# Patient Record
Sex: FEMALE | Race: WHITE | Employment: FULL TIME | URBAN - METROPOLITAN AREA
[De-identification: names, ages, dates, MRNs, and addresses within clinical notes are randomized per-mention and may not be internally consistent; named-entity substitution may affect disease eponyms.]

---

## 2017-03-23 ENCOUNTER — TELEPHONE (OUTPATIENT)
Dept: FAMILY MEDICINE CLINIC | Age: 62
End: 2017-03-23

## 2017-04-20 ENCOUNTER — OFFICE VISIT (OUTPATIENT)
Dept: FAMILY MEDICINE CLINIC | Age: 62
End: 2017-04-20

## 2017-04-20 VITALS
SYSTOLIC BLOOD PRESSURE: 118 MMHG | WEIGHT: 220.8 LBS | RESPIRATION RATE: 16 BRPM | HEART RATE: 60 BPM | BODY MASS INDEX: 36.79 KG/M2 | HEIGHT: 65 IN | DIASTOLIC BLOOD PRESSURE: 82 MMHG

## 2017-04-20 DIAGNOSIS — F33.42 RECURRENT MAJOR DEPRESSIVE DISORDER, IN FULL REMISSION (HCC): ICD-10-CM

## 2017-04-20 DIAGNOSIS — I34.1 MVP (MITRAL VALVE PROLAPSE): ICD-10-CM

## 2017-04-20 DIAGNOSIS — F51.01 PRIMARY INSOMNIA: ICD-10-CM

## 2017-04-20 DIAGNOSIS — E78.00 PURE HYPERCHOLESTEROLEMIA: Primary | ICD-10-CM

## 2017-04-20 DIAGNOSIS — G47.33 OBSTRUCTIVE SLEEP APNEA SYNDROME: ICD-10-CM

## 2017-04-20 DIAGNOSIS — G25.81 RESTLESS LEG SYNDROME: ICD-10-CM

## 2017-04-20 PROCEDURE — 99214 OFFICE O/P EST MOD 30 MIN: CPT | Performed by: INTERNAL MEDICINE

## 2017-04-20 RX ORDER — ZOLPIDEM TARTRATE 6.25 MG/1
TABLET, FILM COATED, EXTENDED RELEASE ORAL
Qty: 30 TABLET | Refills: 5 | Status: SHIPPED | OUTPATIENT
Start: 2017-04-20 | End: 2017-10-20 | Stop reason: SDUPTHER

## 2017-04-20 RX ORDER — PRAMIPEXOLE DIHYDROCHLORIDE 0.5 MG/1
TABLET ORAL
Qty: 90 TABLET | Refills: 1 | Status: SHIPPED | OUTPATIENT
Start: 2017-04-20 | End: 2017-10-17 | Stop reason: SDUPTHER

## 2017-04-20 RX ORDER — ATORVASTATIN CALCIUM 10 MG/1
TABLET, FILM COATED ORAL
Qty: 90 TABLET | Refills: 1 | Status: SHIPPED | OUTPATIENT
Start: 2017-04-20 | End: 2017-06-19 | Stop reason: SDUPTHER

## 2017-04-20 RX ORDER — BUPROPION HYDROCHLORIDE 150 MG/1
TABLET ORAL
Qty: 90 TABLET | Refills: 1 | Status: SHIPPED | OUTPATIENT
Start: 2017-04-20 | End: 2017-10-30 | Stop reason: SDUPTHER

## 2017-04-20 RX ORDER — SERTRALINE HYDROCHLORIDE 100 MG/1
TABLET, FILM COATED ORAL
Qty: 90 TABLET | Refills: 1 | Status: SHIPPED | OUTPATIENT
Start: 2017-04-20 | End: 2017-10-30 | Stop reason: SDUPTHER

## 2017-04-20 ASSESSMENT — ENCOUNTER SYMPTOMS
ALLERGIC/IMMUNOLOGIC NEGATIVE: 1
RESPIRATORY NEGATIVE: 1

## 2017-04-27 ENCOUNTER — HOSPITAL ENCOUNTER (OUTPATIENT)
Dept: NON INVASIVE DIAGNOSTICS | Age: 62
Discharge: HOME | End: 2017-04-27
Attending: INTERNAL MEDICINE | Admitting: INTERNAL MEDICINE

## 2017-04-27 DIAGNOSIS — I34.1 NONRHEUMATIC MITRAL VALVE PROLAPSE: ICD-10-CM

## 2017-04-27 LAB
LV EF: 55 %
LVEF MODALITY: NORMAL

## 2017-05-01 ENCOUNTER — TELEPHONE (OUTPATIENT)
Dept: FAMILY MEDICINE CLINIC | Age: 62
End: 2017-05-01

## 2017-05-30 ENCOUNTER — OFFICE VISIT (OUTPATIENT)
Dept: PULMONOLOGY | Age: 62
End: 2017-05-30

## 2017-05-30 ENCOUNTER — TELEPHONE (OUTPATIENT)
Dept: PULMONOLOGY | Age: 62
End: 2017-05-30

## 2017-05-30 VITALS
RESPIRATION RATE: 16 BRPM | BODY MASS INDEX: 36.99 KG/M2 | HEART RATE: 74 BPM | SYSTOLIC BLOOD PRESSURE: 116 MMHG | WEIGHT: 222 LBS | HEIGHT: 65 IN | TEMPERATURE: 98 F | OXYGEN SATURATION: 98 % | DIASTOLIC BLOOD PRESSURE: 72 MMHG

## 2017-05-30 DIAGNOSIS — G47.10 HYPERSOMNIA: ICD-10-CM

## 2017-05-30 DIAGNOSIS — G47.33 OSA (OBSTRUCTIVE SLEEP APNEA): Primary | ICD-10-CM

## 2017-05-30 DIAGNOSIS — G25.81 RESTLESS LEG SYNDROME: Primary | ICD-10-CM

## 2017-05-30 DIAGNOSIS — R06.83 SNORING: ICD-10-CM

## 2017-05-30 DIAGNOSIS — F51.01 PRIMARY INSOMNIA: ICD-10-CM

## 2017-05-30 PROCEDURE — 99203 OFFICE O/P NEW LOW 30 MIN: CPT | Performed by: NURSE PRACTITIONER

## 2017-05-30 ASSESSMENT — SLEEP AND FATIGUE QUESTIONNAIRES
HOW LIKELY ARE YOU TO NOD OFF OR FALL ASLEEP IN A CAR, WHILE STOPPED FOR A FEW MINUTES IN TRAFFIC: 0
HOW LIKELY ARE YOU TO NOD OFF OR FALL ASLEEP WHILE SITTING QUIETLY AFTER LUNCH WITHOUT ALCOHOL: 0
HOW LIKELY ARE YOU TO NOD OFF OR FALL ASLEEP WHILE SITTING AND READING: 3
HOW LIKELY ARE YOU TO NOD OFF OR FALL ASLEEP WHILE SITTING INACTIVE IN A PUBLIC PLACE: 1
HOW LIKELY ARE YOU TO NOD OFF OR FALL ASLEEP WHEN YOU ARE A PASSENGER IN A CAR FOR AN HOUR WITHOUT A BREAK: 3
HOW LIKELY ARE YOU TO NOD OFF OR FALL ASLEEP WHILE SITTING AND TALKING TO SOMEONE: 0
HOW LIKELY ARE YOU TO NOD OFF OR FALL ASLEEP WHILE LYING DOWN TO REST IN THE AFTERNOON WHEN CIRCUMSTANCES PERMIT: 3
NECK CIRCUMFERENCE (INCHES): 14.25
HOW LIKELY ARE YOU TO NOD OFF OR FALL ASLEEP WHILE WATCHING TV: 1
ESS TOTAL SCORE: 11

## 2017-06-19 DIAGNOSIS — E78.00 PURE HYPERCHOLESTEROLEMIA: ICD-10-CM

## 2017-06-19 RX ORDER — ATORVASTATIN CALCIUM 10 MG/1
TABLET, FILM COATED ORAL
Qty: 90 TABLET | Refills: 1 | Status: SHIPPED | OUTPATIENT
Start: 2017-06-19 | End: 2018-05-10 | Stop reason: SDUPTHER

## 2017-07-21 ENCOUNTER — HOSPITAL ENCOUNTER (OUTPATIENT)
Dept: SLEEP MEDICINE | Age: 62
Discharge: HOME | End: 2017-07-21
Attending: NURSE PRACTITIONER | Admitting: NURSE PRACTITIONER

## 2017-07-21 DIAGNOSIS — G47.33 OSA (OBSTRUCTIVE SLEEP APNEA): ICD-10-CM

## 2017-07-25 ENCOUNTER — TELEPHONE (OUTPATIENT)
Dept: PULMONOLOGY | Age: 62
End: 2017-07-25

## 2017-07-25 DIAGNOSIS — G47.33 OSA (OBSTRUCTIVE SLEEP APNEA): Primary | ICD-10-CM

## 2017-10-10 ENCOUNTER — OFFICE VISIT (OUTPATIENT)
Dept: PULMONOLOGY | Age: 62
End: 2017-10-10

## 2017-10-10 VITALS
TEMPERATURE: 98.4 F | HEIGHT: 65 IN | DIASTOLIC BLOOD PRESSURE: 65 MMHG | RESPIRATION RATE: 16 BRPM | BODY MASS INDEX: 37.29 KG/M2 | OXYGEN SATURATION: 97 % | HEART RATE: 80 BPM | SYSTOLIC BLOOD PRESSURE: 103 MMHG | WEIGHT: 223.8 LBS

## 2017-10-10 DIAGNOSIS — Z99.89 OSA ON CPAP: Primary | ICD-10-CM

## 2017-10-10 DIAGNOSIS — G47.33 OSA ON CPAP: Primary | ICD-10-CM

## 2017-10-10 DIAGNOSIS — E66.9 OBESITY (BMI 30-39.9): ICD-10-CM

## 2017-10-10 DIAGNOSIS — F51.04 PSYCHOPHYSIOLOGICAL INSOMNIA: ICD-10-CM

## 2017-10-10 PROCEDURE — 99213 OFFICE O/P EST LOW 20 MIN: CPT | Performed by: NURSE PRACTITIONER

## 2017-10-10 ASSESSMENT — SLEEP AND FATIGUE QUESTIONNAIRES
HOW LIKELY ARE YOU TO NOD OFF OR FALL ASLEEP WHILE SITTING AND READING: 2
HOW LIKELY ARE YOU TO NOD OFF OR FALL ASLEEP IN A CAR, WHILE STOPPED FOR A FEW MINUTES IN TRAFFIC: 0
HOW LIKELY ARE YOU TO NOD OFF OR FALL ASLEEP WHILE SITTING INACTIVE IN A PUBLIC PLACE: 0
HOW LIKELY ARE YOU TO NOD OFF OR FALL ASLEEP WHILE SITTING AND TALKING TO SOMEONE: 0
HOW LIKELY ARE YOU TO NOD OFF OR FALL ASLEEP WHILE LYING DOWN TO REST IN THE AFTERNOON WHEN CIRCUMSTANCES PERMIT: 0
ESS TOTAL SCORE: 4
HOW LIKELY ARE YOU TO NOD OFF OR FALL ASLEEP WHEN YOU ARE A PASSENGER IN A CAR FOR AN HOUR WITHOUT A BREAK: 0
HOW LIKELY ARE YOU TO NOD OFF OR FALL ASLEEP WHILE SITTING QUIETLY AFTER LUNCH WITHOUT ALCOHOL: 0
NECK CIRCUMFERENCE (INCHES): 14
HOW LIKELY ARE YOU TO NOD OFF OR FALL ASLEEP WHILE WATCHING TV: 2

## 2017-10-10 NOTE — PATIENT INSTRUCTIONS
Please keep all of your future appointments scheduled by [address], [facility] ([facility]) Pulmonary and Sleep. You should have a follow up appointment scheduled with a sleep medicine provider within 12 months. Routine parts, masks, tubing and filters should all be obtainable from your DME (equipment) provider. Any problems related to mask fit, comfort or functioning of equipment should also be addressed directly with your DME provider. If you are unable to resolve problems, then please notify our office and schedule an appointment to be seen sooner than the usual follow up appointment. For all patients who are evaluated for sleep disorders, never drive or operate motorized equipment while sleepy, fatigued or groggy. Please bring in all of your sleep equipment to all of your appointments, including machine, mask, cords and hoses. Here are some tips to to getting better sleep  1- Avoid napping during the day: This will ensure you are tired at bedtime. If you have to take a nap, sleep less than one hour, before 3 pm.   2- Exercise regularly, but not right before bed: but the timing of the workout is important. Exercising in the morning or early afternoon will not interfere with sleep. Exercising within two hours before bedtime can decrease your ability to fall asleep. Regular exercise is recommended to help you deepen the sleep. 3- Avoid heavy, spicy, or sugary foods 4-6 hours before bedtime: These can affect your ability to stay asleep. 4- Have a light snack before bed: Having an empty stomach can interfere with your sleep. Dairy products and turkey contain tryptophan, which acts as a natural sleep inducer. 5- Stay away from caffeine, nicotine and alcohol at least 4-6 hours before bed: Caffeine and nicotine are stimulants that interfere with your ability to fall asleep.  While alcohol has an immediate sleep-inducing effect, a few hours later, as alcohol levels in your blood start to fall, there is a stimulant effect and you will experience fragmented sleep. 6- Take a hot bath 90 minutes before bedtime:  A hot bath will raise your body temperature, but it is the drop in body temperature that may leave you feeling sleepy  7- Develop sleep rituals: it is important to give your body cues that it is time to slow down and sleep. Listen to relaxing music, read something soothing for 15 minutes, have a cup of caffeine free tea, or do relaxation exercises such as yoga or deep breathing help relieve anxiety and reduce muscle tension. 8- Fix a bedtime and an awakening time: Even on weekends! When your sleep cycle has a regular rhythm, you will feel better. 9- Sleep only when sleepy: This reduces the time you are awake in bed. 10- Get into your favorite sleeping position: If you can't fall asleep within 15-30 minutes, get up and do something boring until you feel sleepy. Sit quietly in the dark or read the warranty on your refrigerator. Don't expose yourself to bright light while you are up, it gives cues to your brain that it is time to wake up. 11- Only use your bed for sleeping: Dont use the bed as an office, workroom or recreation room. Let your body \"know\" that the bed is associated with sleeping  12- Use comfortable bedding. Uncomfortable bedding can prevent good sleep. Evaluate whether or not this is a source of your problem, and make appropriate changes. 13- Make sure your bed and bedroom are quiet and comfortable: A hot room can be uncomfortable. A cooler room, along with enough blankets to stay warm is recommended. Get a blackout shade or wear a slumber mask and wear earplugs or get a \"white noise\" machine for light and noise distractions. 14- Use sunlight to set your biological clock: When you get up in the morning, go outside and turn your face to the sun for 15 minutes. 13- Dont take your worries to bed: Leave worries about job, school, daily life, etc., behind when you go to bed.  Some people find it useful to assign a \"worry period\" during the evening or afternoon for these issues. CPAP Equipment Cleaning and Disinfecting Schedule  Equipment Cleaning Frequency Instructions  Disinfecting Frequency   Non-Disposable Filters  Weekly Mild soapy water, Rinse, Air Dry Not Required   Disposable Filters Change as needed  2-4 weeks Do Not Wash Not Required   Hose/tubing Daily Mild soapy water, Rinse, Air Dry Once a week   Mask / Nasal Pillows Daily Mild soapy water, Rinse, Air Dry Once a week   Headgear Weekly Hand wash, Mild soapy water, Rinse, Dry  Not Required   Humidifier Daily Empty water daily  Mild soapy water, Rinse well, Air Dry  Once a week   CPAP Unit As Needed Dust with damp cloth,  No detergents or sprays Not Required         Disinfect (per schedule) with 1 part white vinegar and 3 parts water- soak mask and water chamber for 30 minutes every 1-2 weeks, more often if sick. Allow water/vinegar mixture to run through tubing. Allow all equipment to air dry. Drying Hints:   Always hang tubing away from direct sunlight, as this will cause the tubing to become yellow, brittle and crack over a period of time. DO NOT attach the wet tubing to your CPAP unit to blow-dry it. The moisture from the tubing can drain back into your machine. Moisture in your unit can cause sudden pressure increases or short circuits  DO's and DON'Ts:  - Don't use alcohol-based products to clean your mask, because it can cause the materials to become hard and brittle. - Don't put headgear in the washer or dryer  - Don't use any caustic or household cleaning solutions such as bleach on your CPAP   equipment.  - Do follow the recommended cleaning schedule. - Do change your disposable filter frequently. Adapted From: [email or web address]. shtm.   These are general suggestions for all models please follow specific s recommendations and specific instructions

## 2017-10-10 NOTE — PROGRESS NOTES
Patient ID: Miky Acuña is a 63 y.o. female who is being seen today for   Chief Complaint   Patient presents with    Sleep Apnea     31-90     Referring Dr. Mukul Houston    HPI:     Miky Acuña is a 63 y.o. female in office for BIJAN follow up. Old PSG and current CPAP titration were reviewed by me and noted below. Results were dicussed with patient and multiple good questions were answered. States she had not been doing as well with CPAP but last night she realized there were different size nasal pillows. Last night she used medium and slept all night and feels great today. She was using small pillows not getting good seal and waking through the night. States she is much less sleepy during day since CPAP. Patient is using CPAP 4 hrs/night. Using humidifier. No snoring on CPAP. The pressure is well tolerated. The mask is comfortable- nasal pillows. Some mask leak- now better with size medium. No significant daytime sleepiness. No nodding off when driving. No dry nose or throat. No fatigue. Bedtime is 10-11 pm and rise time is 530 am. Sleep onset is 10 minutes. Still taking Ambien, states has skipped some on weekends and done okay thinks she might start weaning off. Wakes up 0 times at night total. 0 nocturia. No naps during the day. No headache in am. No weight gain. 0-1 caffienated beverages during the day. Rare alcohol. ESS is 4 down from 11 initially    Compliance download report from 8/2/17 to 8/31/17 reviewed today by me and showed patient is using machine 6:33 hrs/night with 70% compliance and AHI 1.0 within this time frame. 21/30days with greater than 4 hours of machine use. CPAP 9 cm H20    Compliance download report from 9/10/17 to 10/9/17 reviewed today by me and showed patient is using machine 3:16 hrs/night with 26% compliance and AHI 1.3 within this time frame. 8/30days with greater than 4 hours of machine use.   90% pressure CPAP 9 cm H20    Sleep Medicine 10/10/2017 5/30/2017   Sitting and

## 2017-10-17 RX ORDER — PRAMIPEXOLE DIHYDROCHLORIDE 0.5 MG/1
TABLET ORAL
Qty: 90 TABLET | Refills: 3 | Status: SHIPPED | OUTPATIENT
Start: 2017-10-17 | End: 2018-05-10 | Stop reason: ALTCHOICE

## 2017-10-30 ENCOUNTER — OFFICE VISIT (OUTPATIENT)
Dept: FAMILY MEDICINE CLINIC | Age: 62
End: 2017-10-30

## 2017-10-30 VITALS
BODY MASS INDEX: 37.55 KG/M2 | HEIGHT: 65 IN | HEART RATE: 60 BPM | DIASTOLIC BLOOD PRESSURE: 78 MMHG | RESPIRATION RATE: 18 BRPM | WEIGHT: 225.4 LBS | SYSTOLIC BLOOD PRESSURE: 128 MMHG | OXYGEN SATURATION: 96 %

## 2017-10-30 DIAGNOSIS — I34.1 MVP (MITRAL VALVE PROLAPSE): ICD-10-CM

## 2017-10-30 DIAGNOSIS — F33.42 RECURRENT MAJOR DEPRESSIVE DISORDER, IN FULL REMISSION (HCC): ICD-10-CM

## 2017-10-30 DIAGNOSIS — E78.00 PURE HYPERCHOLESTEROLEMIA: ICD-10-CM

## 2017-10-30 DIAGNOSIS — Z12.11 SCREENING FOR COLON CANCER: ICD-10-CM

## 2017-10-30 DIAGNOSIS — Z99.89 OSA ON CPAP: ICD-10-CM

## 2017-10-30 DIAGNOSIS — Z11.4 SCREENING FOR HIV (HUMAN IMMUNODEFICIENCY VIRUS): ICD-10-CM

## 2017-10-30 DIAGNOSIS — G47.33 OSA ON CPAP: ICD-10-CM

## 2017-10-30 DIAGNOSIS — Z00.00 ANNUAL PHYSICAL EXAM: Primary | ICD-10-CM

## 2017-10-30 DIAGNOSIS — G25.81 RESTLESS LEG SYNDROME: ICD-10-CM

## 2017-10-30 DIAGNOSIS — Z11.59 NEED FOR HEPATITIS C SCREENING TEST: ICD-10-CM

## 2017-10-30 DIAGNOSIS — Z12.11 COLON CANCER SCREENING: ICD-10-CM

## 2017-10-30 LAB
ALBUMIN SERPL-MCNC: 4.4 G/DL (ref 3.4–5)
ALP BLD-CCNC: 118 U/L (ref 40–129)
ALT SERPL-CCNC: 8 U/L (ref 10–40)
ANION GAP SERPL CALCULATED.3IONS-SCNC: 15 MMOL/L (ref 3–16)
AST SERPL-CCNC: 13 U/L (ref 15–37)
BILIRUB SERPL-MCNC: 0.6 MG/DL (ref 0–1)
BILIRUBIN DIRECT: <0.2 MG/DL (ref 0–0.3)
BILIRUBIN, INDIRECT: ABNORMAL MG/DL (ref 0–1)
BUN BLDV-MCNC: 12 MG/DL (ref 7–20)
CALCIUM SERPL-MCNC: 9.7 MG/DL (ref 8.3–10.6)
CHLORIDE BLD-SCNC: 100 MMOL/L (ref 99–110)
CHOLESTEROL, TOTAL: 204 MG/DL (ref 0–199)
CO2: 26 MMOL/L (ref 21–32)
CONTROL: ABNORMAL
CREAT SERPL-MCNC: 0.8 MG/DL (ref 0.6–1.2)
GFR AFRICAN AMERICAN: >60
GFR NON-AFRICAN AMERICAN: >60
GLUCOSE BLD-MCNC: 87 MG/DL (ref 70–99)
HCT VFR BLD CALC: 43.3 % (ref 36–48)
HDLC SERPL-MCNC: 81 MG/DL (ref 40–60)
HEMOCCULT STL QL: ABNORMAL
HEMOGLOBIN: 14.1 G/DL (ref 12–16)
HEPATITIS C ANTIBODY INTERPRETATION: NORMAL
LDL CHOLESTEROL CALCULATED: 102 MG/DL
MCH RBC QN AUTO: 28.1 PG (ref 26–34)
MCHC RBC AUTO-ENTMCNC: 32.5 G/DL (ref 31–36)
MCV RBC AUTO: 86.4 FL (ref 80–100)
PDW BLD-RTO: 14.6 % (ref 12.4–15.4)
PHOSPHORUS: 4 MG/DL (ref 2.5–4.9)
PLATELET # BLD: 264 K/UL (ref 135–450)
PMV BLD AUTO: 9.3 FL (ref 5–10.5)
POTASSIUM SERPL-SCNC: 4.1 MMOL/L (ref 3.5–5.1)
RBC # BLD: 5.01 M/UL (ref 4–5.2)
SODIUM BLD-SCNC: 141 MMOL/L (ref 136–145)
TOTAL PROTEIN: 7.1 G/DL (ref 6.4–8.2)
TRIGL SERPL-MCNC: 106 MG/DL (ref 0–150)
TSH SERPL DL<=0.05 MIU/L-ACNC: 3.39 UIU/ML (ref 0.27–4.2)
VLDLC SERPL CALC-MCNC: 21 MG/DL
WBC # BLD: 6.6 K/UL (ref 4–11)

## 2017-10-30 PROCEDURE — 36415 COLL VENOUS BLD VENIPUNCTURE: CPT | Performed by: INTERNAL MEDICINE

## 2017-10-30 PROCEDURE — 81002 URINALYSIS NONAUTO W/O SCOPE: CPT | Performed by: INTERNAL MEDICINE

## 2017-10-30 PROCEDURE — 82274 ASSAY TEST FOR BLOOD FECAL: CPT | Performed by: INTERNAL MEDICINE

## 2017-10-30 PROCEDURE — 99396 PREV VISIT EST AGE 40-64: CPT | Performed by: INTERNAL MEDICINE

## 2017-10-30 RX ORDER — BUPROPION HYDROCHLORIDE 150 MG/1
TABLET ORAL
Qty: 90 TABLET | Refills: 1 | Status: SHIPPED | OUTPATIENT
Start: 2017-10-30 | End: 2018-05-03 | Stop reason: SDUPTHER

## 2017-10-30 RX ORDER — SERTRALINE HYDROCHLORIDE 100 MG/1
TABLET, FILM COATED ORAL
Qty: 90 TABLET | Refills: 1 | Status: SHIPPED | OUTPATIENT
Start: 2017-10-30 | End: 2018-05-10 | Stop reason: SDUPTHER

## 2017-10-30 ASSESSMENT — PATIENT HEALTH QUESTIONNAIRE - PHQ9
SUM OF ALL RESPONSES TO PHQ9 QUESTIONS 1 & 2: 1
2. FEELING DOWN, DEPRESSED OR HOPELESS: 1
1. LITTLE INTEREST OR PLEASURE IN DOING THINGS: 0
SUM OF ALL RESPONSES TO PHQ QUESTIONS 1-9: 1

## 2017-10-30 ASSESSMENT — ENCOUNTER SYMPTOMS
RESPIRATORY NEGATIVE: 1
ALLERGIC/IMMUNOLOGIC NEGATIVE: 1
EYES NEGATIVE: 1
GASTROINTESTINAL NEGATIVE: 1

## 2017-10-30 NOTE — PROGRESS NOTES
Subjective:      Patient ID: Dina Calvo is a 63 y.o. female. HPI   Annual physical exam  Mammo: 10/2016. rechx 2 yr per Gyn  Pap: 10/2016. rechx 2 yr per Gyn  Colonoscopy: Referral made. DEXA: due age 74  Eye: 2017. Hearing: passed in office exam  Immunnization: Flu shot to given at work in 2 weeks. Rx for Shingles vaccine given  MMSE: na  Get Up and Go: na  Tob: nonsmoker/ never  ETOH: 1 glass wine/week  Caffeine: moderate  Cardiac Risk Assessment: labs pending. Living will: no  Medical power of : no      BIJAN on CPAP  Doing much better w/ compliance on C-pap. Much better sleeping and less Restless leg. HYPERLIPIDEMIA  Has gained wt over last yr. Diet not as good. MVP (mitral valve prolapse)  Dx many yrs ago. On Verapamil for long time w/ no new c/o. Recurrent major depressive disorder, in full remission ([facility] [address]  Stable w/ meds. Restless leg syndrome  Much better w/ C-pap. Past Medical History:   Diagnosis Date    Depression     GERD (gastroesophageal reflux disease)     Hyperlipidemia     Lumbar herniated disc 2/98    MVP (mitral valve prolapse)     BIJAN (obstructive sleep apnea)     Restless leg syndrome      Current Outpatient Prescriptions   Medication Sig Dispense Refill    zolpidem (AMBIEN CR) 6.25 MG extended release tablet Take 1 tablet by mouth nightly as needed for Sleep . Zolpidem is intended for short term of occasional use for sleeplessness. Long term use has been associated w/ chronic memory loss and increased risk of night time falls. If chronic use is required alternative mediation might be more appropriate.  30 tablet 0    pramipexole (MIRAPEX) 0.5 MG tablet TAKE 1 TABLET BY MOUTH EVERY NIGHT 90 tablet 3    atorvastatin (LIPITOR) 10 MG tablet TAKE 1 TABLET DAILY 90 tablet 1    buPROPion (WELLBUTRIN XL) 150 MG extended release tablet TAKE 1 TABLET BY MOUTH EVERY MORNING 90 tablet 1    sertraline (ZOLOFT) 100 MG tablet TAKE 1 TABLET DAILY 90 tablet 1    abdominal bruit, no ascites, no pulsatile midline mass and no mass. There is no hepatosplenomegaly. There is no tenderness. There is no rebound, no guarding and no CVA tenderness. No hernia. Hernia confirmed negative in the ventral area. Genitourinary: No breast swelling, tenderness, discharge or bleeding. Pelvic exam was performed with patient supine. Genitourinary Comments: To gyn   Musculoskeletal: Normal range of motion. She exhibits no edema or tenderness. Lymphadenopathy:        Head (right side): No submental, no submandibular, no tonsillar, no preauricular, no posterior auricular and no occipital adenopathy present. Head (left side): No submental, no submandibular, no tonsillar, no preauricular, no posterior auricular and no occipital adenopathy present. She has no cervical adenopathy. She has no axillary adenopathy. Right: No inguinal, no supraclavicular and no epitrochlear adenopathy present. Left: No inguinal, no supraclavicular and no epitrochlear adenopathy present. Neurological: She is alert and oriented to person, place, and time. She has normal strength and normal reflexes. She is not disoriented. She displays no atrophy and no tremor. No cranial nerve deficit or sensory deficit. She exhibits normal muscle tone. She displays a negative Romberg sign. She displays no seizure activity. Gait normal.   Reflex Scores:       Tricep reflexes are 2+ on the right side and 2+ on the left side. Bicep reflexes are 2+ on the right side and 2+ on the left side. Brachioradialis reflexes are 2+ on the right side and 2+ on the left side. Patellar reflexes are 2+ on the right side and 2+ on the left side. Achilles reflexes are 2+ on the right side and 2+ on the left side. Skin: Skin is warm, dry and intact. No abrasion and no rash noted. She is not diaphoretic. No cyanosis or erythema. No pallor. Nails show no clubbing.    Psychiatric: She has a normal mood

## 2017-10-30 NOTE — PATIENT INSTRUCTIONS
All above problems reviewed and the found to be unchanged except for the following :     Annual Physical Exam. Flu shot soon. Shingles shot Rx soon. Colonoscopy referrral     Grabiel On Cpap. Good w/ C-pap. Continue ,call if new c/o. Mvp (Mitral Valve Prolapse). Wean Verapamil as directed and stoip. Call if BP>140/90 or new c/o. Restless Leg Syndrome. Continue C-pap. Call if new c/o. Hyperlipidemia. Must improve diet and lose some weight. Goal ! Lb/week. Recurrent Major Depressive Disorder, in Full Remission ([facility]). Continue meds. Call if new c/o. Mammo: 10/2016. rechx 2 yr per Gyn  Pap: 10/2016. rechx 2 yr per Gyn  Colonoscopy: Referral made. DEXA: due age 74  Eye: 2017. Hearing: passed in office exam  Immunnization: Flu shot to given at work in 2 weeks. Rx for Shingles vaccine given  MMSE: na  Get Up and Go: na  Tob: nonsmoker/ never  ETOH: 1 glass wine/week  Caffeine: moderate  Cardiac Risk Assessment: labs pending.   Living will: no  Medical power of : no

## 2017-10-31 LAB — HIV-1 AND HIV-2 ANTIBODIES: NORMAL

## 2017-12-05 RX ORDER — ZOLPIDEM TARTRATE 6.25 MG/1
6.25 TABLET, FILM COATED, EXTENDED RELEASE ORAL NIGHTLY PRN
Qty: 30 TABLET | Refills: 0 | Status: SHIPPED | OUTPATIENT
Start: 2017-12-05 | End: 2018-01-05 | Stop reason: SDUPTHER

## 2017-12-14 DIAGNOSIS — E78.00 PURE HYPERCHOLESTEROLEMIA: ICD-10-CM

## 2017-12-14 RX ORDER — ATORVASTATIN CALCIUM 10 MG/1
TABLET, FILM COATED ORAL
Qty: 90 TABLET | Refills: 1 | Status: SHIPPED | OUTPATIENT
Start: 2017-12-14 | End: 2018-05-10 | Stop reason: SDUPTHER

## 2017-12-14 RX ORDER — SERTRALINE HYDROCHLORIDE 100 MG/1
TABLET, FILM COATED ORAL
Qty: 90 TABLET | Refills: 1 | Status: SHIPPED | OUTPATIENT
Start: 2017-12-14 | End: 2018-05-10 | Stop reason: SDUPTHER

## 2018-01-05 RX ORDER — ZOLPIDEM TARTRATE 6.25 MG/1
TABLET, FILM COATED, EXTENDED RELEASE ORAL
Qty: 30 TABLET | Refills: 0 | Status: SHIPPED | OUTPATIENT
Start: 2018-01-05 | End: 2018-02-06 | Stop reason: SDUPTHER

## 2018-01-05 NOTE — TELEPHONE ENCOUNTER
From: Essie Quintero  Sent: 1/4/2018 5:24 PM EST  Subject: Medication Renewal Request    Essie Quintero would like a refill of the following medications:  zolpidem (AMBIEN CR) 6.25 MG extended release tablet [SAJI Lozada MD]    Preferred pharmacy: [address] [phone]    Comment:

## 2018-02-06 RX ORDER — ZOLPIDEM TARTRATE 6.25 MG/1
TABLET, FILM COATED, EXTENDED RELEASE ORAL
Qty: 30 TABLET | Refills: 0 | Status: SHIPPED | OUTPATIENT
Start: 2018-02-06 | End: 2018-03-07 | Stop reason: SDUPTHER

## 2018-03-07 RX ORDER — ZOLPIDEM TARTRATE 6.25 MG/1
TABLET, FILM COATED, EXTENDED RELEASE ORAL
Qty: 30 TABLET | Refills: 0 | Status: SHIPPED | OUTPATIENT
Start: 2018-03-07 | End: 2018-04-09 | Stop reason: SDUPTHER

## 2018-04-10 ENCOUNTER — OFFICE VISIT (OUTPATIENT)
Dept: PULMONOLOGY | Age: 63
End: 2018-04-10

## 2018-04-10 VITALS
OXYGEN SATURATION: 99 % | HEIGHT: 65 IN | RESPIRATION RATE: 16 BRPM | BODY MASS INDEX: 37.82 KG/M2 | TEMPERATURE: 98.4 F | DIASTOLIC BLOOD PRESSURE: 70 MMHG | WEIGHT: 227 LBS | SYSTOLIC BLOOD PRESSURE: 104 MMHG | HEART RATE: 76 BPM

## 2018-04-10 DIAGNOSIS — F51.04 PSYCHOPHYSIOLOGICAL INSOMNIA: ICD-10-CM

## 2018-04-10 DIAGNOSIS — Z99.89 OSA ON CPAP: Primary | ICD-10-CM

## 2018-04-10 DIAGNOSIS — Z71.89 CPAP USE COUNSELING: ICD-10-CM

## 2018-04-10 DIAGNOSIS — E66.9 OBESITY (BMI 30-39.9): ICD-10-CM

## 2018-04-10 DIAGNOSIS — G47.33 OSA ON CPAP: Primary | ICD-10-CM

## 2018-04-10 PROCEDURE — 99213 OFFICE O/P EST LOW 20 MIN: CPT | Performed by: NURSE PRACTITIONER

## 2018-04-10 RX ORDER — ZOLPIDEM TARTRATE 6.25 MG/1
TABLET, FILM COATED, EXTENDED RELEASE ORAL
Qty: 30 TABLET | Refills: 0 | Status: SHIPPED | OUTPATIENT
Start: 2018-04-10 | End: 2018-05-09 | Stop reason: SDUPTHER

## 2018-04-10 ASSESSMENT — SLEEP AND FATIGUE QUESTIONNAIRES
HOW LIKELY ARE YOU TO NOD OFF OR FALL ASLEEP WHILE WATCHING TV: 1
HOW LIKELY ARE YOU TO NOD OFF OR FALL ASLEEP WHILE SITTING QUIETLY AFTER LUNCH WITHOUT ALCOHOL: 2
HOW LIKELY ARE YOU TO NOD OFF OR FALL ASLEEP WHILE WATCHING TV: 0
HOW LIKELY ARE YOU TO NOD OFF OR FALL ASLEEP WHEN YOU ARE A PASSENGER IN A CAR FOR AN HOUR WITHOUT A BREAK: 1
ESS TOTAL SCORE: 15
HOW LIKELY ARE YOU TO NOD OFF OR FALL ASLEEP WHILE SITTING QUIETLY AFTER LUNCH WITHOUT ALCOHOL: 2
HOW LIKELY ARE YOU TO NOD OFF OR FALL ASLEEP WHILE SITTING AND READING: 3
HOW LIKELY ARE YOU TO NOD OFF OR FALL ASLEEP WHILE SITTING AND TALKING TO SOMEONE: 1
ESS TOTAL SCORE: 9
HOW LIKELY ARE YOU TO NOD OFF OR FALL ASLEEP IN A CAR, WHILE STOPPED FOR A FEW MINUTES IN TRAFFIC: 2
HOW LIKELY ARE YOU TO NOD OFF OR FALL ASLEEP WHILE SITTING INACTIVE IN A PUBLIC PLACE: 2
NECK CIRCUMFERENCE (INCHES): 15.5
HOW LIKELY ARE YOU TO NOD OFF OR FALL ASLEEP WHILE SITTING INACTIVE IN A PUBLIC PLACE: 1
HOW LIKELY ARE YOU TO NOD OFF OR FALL ASLEEP IN A CAR, WHILE STOPPED FOR A FEW MINUTES IN TRAFFIC: 0
HOW LIKELY ARE YOU TO NOD OFF OR FALL ASLEEP WHILE LYING DOWN TO REST IN THE AFTERNOON WHEN CIRCUMSTANCES PERMIT: 2
HOW LIKELY ARE YOU TO NOD OFF OR FALL ASLEEP WHILE LYING DOWN TO REST IN THE AFTERNOON WHEN CIRCUMSTANCES PERMIT: 2
HOW LIKELY ARE YOU TO NOD OFF OR FALL ASLEEP WHEN YOU ARE A PASSENGER IN A CAR FOR AN HOUR WITHOUT A BREAK: 2
HOW LIKELY ARE YOU TO NOD OFF OR FALL ASLEEP WHILE SITTING AND READING: 3
HOW LIKELY ARE YOU TO NOD OFF OR FALL ASLEEP WHILE SITTING AND TALKING TO SOMEONE: 0

## 2018-04-12 PROBLEM — Z00.00 ANNUAL PHYSICAL EXAM: Status: RESOLVED | Noted: 2017-10-30 | Resolved: 2018-04-12

## 2018-05-03 RX ORDER — BUPROPION HYDROCHLORIDE 150 MG/1
TABLET ORAL
Qty: 90 TABLET | Refills: 1 | Status: SHIPPED | OUTPATIENT
Start: 2018-05-03 | End: 2018-09-24 | Stop reason: SDUPTHER

## 2018-05-09 RX ORDER — ZOLPIDEM TARTRATE 6.25 MG/1
TABLET, FILM COATED, EXTENDED RELEASE ORAL
Qty: 30 TABLET | Refills: 0 | Status: SHIPPED | OUTPATIENT
Start: 2018-05-09 | End: 2018-05-10 | Stop reason: ALTCHOICE

## 2018-05-10 ENCOUNTER — OFFICE VISIT (OUTPATIENT)
Dept: FAMILY MEDICINE CLINIC | Age: 63
End: 2018-05-10

## 2018-05-10 VITALS
WEIGHT: 228 LBS | DIASTOLIC BLOOD PRESSURE: 72 MMHG | BODY MASS INDEX: 37.99 KG/M2 | RESPIRATION RATE: 16 BRPM | HEIGHT: 65 IN | OXYGEN SATURATION: 98 % | HEART RATE: 73 BPM | SYSTOLIC BLOOD PRESSURE: 120 MMHG

## 2018-05-10 DIAGNOSIS — E66.9 OBESITY (BMI 30-39.9): ICD-10-CM

## 2018-05-10 DIAGNOSIS — I34.1 MVP (MITRAL VALVE PROLAPSE): ICD-10-CM

## 2018-05-10 DIAGNOSIS — G47.33 OSA ON CPAP: ICD-10-CM

## 2018-05-10 DIAGNOSIS — F51.01 PRIMARY INSOMNIA: ICD-10-CM

## 2018-05-10 DIAGNOSIS — Z99.89 OSA ON CPAP: ICD-10-CM

## 2018-05-10 DIAGNOSIS — E78.00 PURE HYPERCHOLESTEROLEMIA: ICD-10-CM

## 2018-05-10 DIAGNOSIS — F33.42 RECURRENT MAJOR DEPRESSIVE DISORDER, IN FULL REMISSION (HCC): ICD-10-CM

## 2018-05-10 PROCEDURE — 99214 OFFICE O/P EST MOD 30 MIN: CPT | Performed by: INTERNAL MEDICINE

## 2018-05-10 RX ORDER — ATORVASTATIN CALCIUM 10 MG/1
10 TABLET, FILM COATED ORAL DAILY
Qty: 90 TABLET | Refills: 1 | Status: SHIPPED | OUTPATIENT
Start: 2018-05-10 | End: 2018-11-13 | Stop reason: SDUPTHER

## 2018-05-10 RX ORDER — PRAMIPEXOLE DIHYDROCHLORIDE 0.5 MG/1
0.5 TABLET ORAL NIGHTLY
Qty: 90 TABLET | Refills: 1 | Status: SHIPPED | OUTPATIENT
Start: 2018-05-10 | End: 2019-04-25 | Stop reason: SDUPTHER

## 2018-05-10 RX ORDER — SERTRALINE HYDROCHLORIDE 100 MG/1
100 TABLET, FILM COATED ORAL DAILY
Qty: 90 TABLET | Refills: 1 | Status: SHIPPED | OUTPATIENT
Start: 2018-05-10 | End: 2018-11-13 | Stop reason: SDUPTHER

## 2018-05-10 RX ORDER — ZOLPIDEM TARTRATE 5 MG/1
TABLET ORAL
Qty: 30 TABLET | Refills: 0 | Status: SHIPPED | OUTPATIENT
Start: 2018-05-10 | End: 2018-07-10 | Stop reason: SDUPTHER

## 2018-05-10 ASSESSMENT — ENCOUNTER SYMPTOMS: RESPIRATORY NEGATIVE: 1

## 2018-07-10 DIAGNOSIS — F51.01 PRIMARY INSOMNIA: Primary | ICD-10-CM

## 2018-07-10 RX ORDER — ZOLPIDEM TARTRATE 5 MG/1
TABLET ORAL
Qty: 30 TABLET | Refills: 0 | Status: SHIPPED | OUTPATIENT
Start: 2018-07-10 | End: 2018-08-07 | Stop reason: SDUPTHER

## 2018-09-04 DIAGNOSIS — F51.01 PRIMARY INSOMNIA: ICD-10-CM

## 2018-09-04 RX ORDER — ZOLPIDEM TARTRATE 5 MG/1
TABLET ORAL
Qty: 30 TABLET | Refills: 0 | Status: SHIPPED | OUTPATIENT
Start: 2018-09-04 | End: 2018-10-08 | Stop reason: SDUPTHER

## 2018-09-24 RX ORDER — BUPROPION HYDROCHLORIDE 150 MG/1
150 TABLET ORAL EVERY MORNING
Qty: 90 TABLET | Refills: 1 | Status: SHIPPED | OUTPATIENT
Start: 2018-09-24 | End: 2018-10-08 | Stop reason: SDUPTHER

## 2018-09-24 NOTE — TELEPHONE ENCOUNTER
From: Dwan Bosworth  Sent: 9/23/2018 11:22 AM EDT  Subject: Medication Renewal Request    Sallie Garcia would like a refill of the following medications:     buPROPion (WELLBUTRIN XL) 150 MG extended release tablet [SAJI Song MD]   Patient Comment: [facility] filled the Verapamil instead of buPropion on the last refill. I discontinued taking the Verapamil, so they would not refill the buPropion stating that they had already refilled it when in fact they did not.     Preferred pharmacy: [place] [address] -  [phone]

## 2018-09-25 ENCOUNTER — HOSPITAL ENCOUNTER (OUTPATIENT)
Dept: MAMMOGRAPHY | Age: 63
Discharge: HOME | End: 2018-09-30

## 2018-09-25 DIAGNOSIS — Z12.31 VISIT FOR SCREENING MAMMOGRAM: ICD-10-CM

## 2018-09-25 PROCEDURE — 77067 SCR MAMMO BI INCL CAD: CPT

## 2018-10-08 DIAGNOSIS — F51.01 PRIMARY INSOMNIA: ICD-10-CM

## 2018-10-08 RX ORDER — ZOLPIDEM TARTRATE 5 MG/1
TABLET ORAL
Qty: 30 TABLET | Refills: 0 | Status: SHIPPED | OUTPATIENT
Start: 2018-10-08 | End: 2018-11-06 | Stop reason: SDUPTHER

## 2018-10-08 RX ORDER — BUPROPION HYDROCHLORIDE 150 MG/1
150 TABLET ORAL EVERY MORNING
Qty: 90 TABLET | Refills: 1 | Status: SHIPPED | OUTPATIENT
Start: 2018-10-08 | End: 2019-04-11 | Stop reason: SDUPTHER

## 2018-10-08 NOTE — TELEPHONE ENCOUNTER
From: Bisi Rodriguez  Sent: 10/6/2018 10:41 PM EDT  Subject: Medication Renewal Request    Deidre Angelo would like a refill of the following medications:     zolpidem (AMBIEN) 5 MG tablet [SAJI Higuera MD]    Preferred pharmacy: [facility] [address] [phone]

## 2018-11-06 DIAGNOSIS — F51.01 PRIMARY INSOMNIA: ICD-10-CM

## 2018-11-06 RX ORDER — ZOLPIDEM TARTRATE 5 MG/1
TABLET ORAL
Qty: 30 TABLET | Refills: 0 | Status: SHIPPED | OUTPATIENT
Start: 2018-11-06 | End: 2018-12-07 | Stop reason: SDUPTHER

## 2018-11-13 DIAGNOSIS — E78.00 PURE HYPERCHOLESTEROLEMIA: ICD-10-CM

## 2018-11-13 RX ORDER — SERTRALINE HYDROCHLORIDE 100 MG/1
100 TABLET, FILM COATED ORAL DAILY
Qty: 90 TABLET | Refills: 0 | Status: SHIPPED | OUTPATIENT
Start: 2018-11-13 | End: 2019-02-10 | Stop reason: SDUPTHER

## 2018-11-13 RX ORDER — ATORVASTATIN CALCIUM 10 MG/1
10 TABLET, FILM COATED ORAL DAILY
Qty: 90 TABLET | Refills: 0 | Status: SHIPPED | OUTPATIENT
Start: 2018-11-13 | End: 2019-02-10 | Stop reason: SDUPTHER

## 2018-12-07 DIAGNOSIS — F51.01 PRIMARY INSOMNIA: ICD-10-CM

## 2018-12-07 RX ORDER — ZOLPIDEM TARTRATE 5 MG/1
TABLET ORAL
Qty: 15 TABLET | Refills: 0 | Status: SHIPPED | OUTPATIENT
Start: 2018-12-07 | End: 2019-06-25 | Stop reason: ALTCHOICE

## 2018-12-07 RX ORDER — TRAZODONE HYDROCHLORIDE 50 MG/1
TABLET ORAL
Qty: 90 TABLET | Refills: 1 | Status: SHIPPED | OUTPATIENT
Start: 2018-12-07 | End: 2019-06-04 | Stop reason: SDUPTHER

## 2018-12-07 NOTE — TELEPHONE ENCOUNTER
From: Gerber Grande  Sent: 12/6/2018 8:22 PM EST  Subject: Medication Renewal Request    Junior Dietrich would like a refill of the following medications:     zolpidem (AMBIEN) 5 MG tablet [SAJI Lange MD]    Preferred pharmacy: [address] [phone]

## 2018-12-24 ENCOUNTER — OFFICE VISIT (OUTPATIENT)
Dept: FAMILY MEDICINE CLINIC | Age: 63
End: 2018-12-24
Payer: COMMERCIAL

## 2018-12-24 VITALS
HEIGHT: 65 IN | BODY MASS INDEX: 35.19 KG/M2 | OXYGEN SATURATION: 99 % | WEIGHT: 211.2 LBS | HEART RATE: 87 BPM | DIASTOLIC BLOOD PRESSURE: 68 MMHG | RESPIRATION RATE: 16 BRPM | SYSTOLIC BLOOD PRESSURE: 118 MMHG

## 2018-12-24 DIAGNOSIS — Z00.00 ANNUAL PHYSICAL EXAM: Primary | ICD-10-CM

## 2018-12-24 DIAGNOSIS — E78.00 PURE HYPERCHOLESTEROLEMIA: ICD-10-CM

## 2018-12-24 DIAGNOSIS — F51.01 PRIMARY INSOMNIA: ICD-10-CM

## 2018-12-24 DIAGNOSIS — G25.81 RESTLESS LEG SYNDROME: ICD-10-CM

## 2018-12-24 DIAGNOSIS — F33.42 RECURRENT MAJOR DEPRESSIVE DISORDER, IN FULL REMISSION (HCC): ICD-10-CM

## 2018-12-24 LAB
ALBUMIN SERPL-MCNC: 4.5 G/DL (ref 3.4–5)
ALP BLD-CCNC: 117 U/L (ref 40–129)
ALT SERPL-CCNC: 7 U/L (ref 10–40)
ANION GAP SERPL CALCULATED.3IONS-SCNC: 16 MMOL/L (ref 3–16)
AST SERPL-CCNC: 12 U/L (ref 15–37)
BILIRUB SERPL-MCNC: 0.5 MG/DL (ref 0–1)
BILIRUBIN DIRECT: <0.2 MG/DL (ref 0–0.3)
BILIRUBIN, INDIRECT: ABNORMAL MG/DL (ref 0–1)
BUN BLDV-MCNC: 9 MG/DL (ref 7–20)
CALCIUM SERPL-MCNC: 9.6 MG/DL (ref 8.3–10.6)
CHLORIDE BLD-SCNC: 106 MMOL/L (ref 99–110)
CHOLESTEROL, TOTAL: 182 MG/DL (ref 0–199)
CO2: 22 MMOL/L (ref 21–32)
CREAT SERPL-MCNC: 0.9 MG/DL (ref 0.6–1.2)
GFR AFRICAN AMERICAN: >60
GFR NON-AFRICAN AMERICAN: >60
GLUCOSE BLD-MCNC: 89 MG/DL (ref 70–99)
HCT VFR BLD CALC: 42.8 % (ref 36–48)
HDLC SERPL-MCNC: 68 MG/DL (ref 40–60)
HEMOGLOBIN: 14 G/DL (ref 12–16)
LDL CHOLESTEROL CALCULATED: 96 MG/DL
MCH RBC QN AUTO: 28.2 PG (ref 26–34)
MCHC RBC AUTO-ENTMCNC: 32.7 G/DL (ref 31–36)
MCV RBC AUTO: 86.1 FL (ref 80–100)
PDW BLD-RTO: 14.4 % (ref 12.4–15.4)
PHOSPHORUS: 3.9 MG/DL (ref 2.5–4.9)
PLATELET # BLD: 273 K/UL (ref 135–450)
PMV BLD AUTO: 9.3 FL (ref 5–10.5)
POTASSIUM SERPL-SCNC: 4.1 MMOL/L (ref 3.5–5.1)
RBC # BLD: 4.97 M/UL (ref 4–5.2)
SODIUM BLD-SCNC: 144 MMOL/L (ref 136–145)
TOTAL PROTEIN: 6.8 G/DL (ref 6.4–8.2)
TRIGL SERPL-MCNC: 91 MG/DL (ref 0–150)
TSH SERPL DL<=0.05 MIU/L-ACNC: 3.59 UIU/ML (ref 0.27–4.2)
VLDLC SERPL CALC-MCNC: 18 MG/DL
WBC # BLD: 6.5 K/UL (ref 4–11)

## 2018-12-24 PROCEDURE — 99396 PREV VISIT EST AGE 40-64: CPT | Performed by: INTERNAL MEDICINE

## 2018-12-24 PROCEDURE — 36415 COLL VENOUS BLD VENIPUNCTURE: CPT | Performed by: INTERNAL MEDICINE

## 2018-12-24 ASSESSMENT — ENCOUNTER SYMPTOMS
GASTROINTESTINAL NEGATIVE: 1
RESPIRATORY NEGATIVE: 1
EYES NEGATIVE: 1
ALLERGIC/IMMUNOLOGIC NEGATIVE: 1

## 2018-12-24 NOTE — PROGRESS NOTES
Subjective:      Patient ID: Brittani España is a 66 y.o. female. HPI  Annual physical exam  Mammo: 9/25/2018. rechx 2 yr per Gyn  Pap: 10/2016. rechx 2 yr per Gyn  Colonoscopy: 11/2017. rechx 5 yrs. Brother w/ Colon Ca. DEXA: due age 72  Eye: 6/2018  Hearing: passed in office exam  Immunnization:  Rx for Shingles vaccine given  MMSE: na  Get Up and Go: na  Tob: nonsmoker/ never  ETOH: rare  Caffeine: moderate  Cardiac Risk Assessment: labs pending. Living will: no  Medical power of : no    Recurrent major depressive disorder, in full remission ([facility] [address]  Stable w/ meds. Feels much better. Pure hypercholesterolemia  Has lost 14 lbs over last yr. Diet has improved, more active. Restless leg syndrome  Much better w/ C-pap and Mirapex. Primary insomnia  Changing from Ambien to Trazodone. Past Medical History:   Diagnosis Date    Depression     GERD (gastroesophageal reflux disease)     Hyperlipidemia     Lumbar herniated disc 2/98    MVP (mitral valve prolapse)     BIJAN (obstructive sleep apnea)     Restless leg syndrome      Current Outpatient Prescriptions   Medication Sig Dispense Refill    Phentermine-Topiramate (QSYMIA) 15-92 MG CP24 Take 1 capsule by mouth daily. .      zolpidem (AMBIEN) 5 MG tablet Take 1/2 pill at bed time as needed. . 15 tablet 0    traZODone (DESYREL) 50 MG tablet Begin w/ 1/2 pill at bed time and may increase to full tab after 3rd night. 90 tablet 1    atorvastatin (LIPITOR) 10 MG tablet TAKE 1 TABLET BY MOUTH DAILY 90 tablet 0    sertraline (ZOLOFT) 100 MG tablet TAKE 1 TABLET BY MOUTH DAILY 90 tablet 0    buPROPion (WELLBUTRIN XL) 150 MG extended release tablet Take 1 tablet by mouth every morning 90 tablet 1    pramipexole (MIRAPEX) 0.5 MG tablet Take 1 tablet by mouth nightly 90 tablet 1     No current facility-administered medications for this visit.       No Known Allergies  Social History   Substance Use Topics    Smoking status: Never Smoker   

## 2018-12-24 NOTE — ASSESSMENT & PLAN NOTE
Mammo: 9/25/2018. rechx 2 yr per Gyn  Pap: 10/2016. rechx 2 yr per Gyn  Colonoscopy: 11/2017. rechx 5 yrs. Brother w/ Colon Ca. DEXA: due age 72  Eye: 6/2018  Hearing: passed in office exam  Immunnization:  Rx for Shingles vaccine given  MMSE: na  Get Up and Go: na  Tob: nonsmoker/ never  ETOH: rare  Caffeine: moderate  Cardiac Risk Assessment: labs pending.   Living will: no  Medical power of : no

## 2019-01-23 PROBLEM — Z00.00 ANNUAL PHYSICAL EXAM: Status: RESOLVED | Noted: 2017-10-30 | Resolved: 2019-01-23

## 2019-02-10 DIAGNOSIS — E78.00 PURE HYPERCHOLESTEROLEMIA: ICD-10-CM

## 2019-02-11 RX ORDER — SERTRALINE HYDROCHLORIDE 100 MG/1
100 TABLET, FILM COATED ORAL DAILY
Qty: 90 TABLET | Refills: 1 | Status: SHIPPED | OUTPATIENT
Start: 2019-02-11 | End: 2019-06-25 | Stop reason: SDUPTHER

## 2019-02-11 RX ORDER — ATORVASTATIN CALCIUM 10 MG/1
10 TABLET, FILM COATED ORAL DAILY
Qty: 90 TABLET | Refills: 1 | Status: SHIPPED | OUTPATIENT
Start: 2019-02-11 | End: 2019-08-14 | Stop reason: SDUPTHER

## 2019-04-04 ENCOUNTER — TELEPHONE (OUTPATIENT)
Dept: PULMONOLOGY | Age: 64
End: 2019-04-04

## 2019-04-04 NOTE — TELEPHONE ENCOUNTER
Appointment canceled for [facility] ([id number])   Visit Type: OFFICE VISIT   Date        Time      Length    Provider                  Department   4/9/2019     8:30 AM  30 mins. [facility] [place] - [facility] SLEEP      Reason for Cancellation: Cancelled via MyChart       LOV  4/10/18    Assessment:       · Mild BIJAN (from PSG 2007). CPAP 9 cm H2O. Optimal compliance and efficacy on review today. · Snoring- resolved on CPAP  · Hypersomnia -improved with CPAP  · Insomnia- psychophysiologic arousal, stable- taking Ambien per PCP  · RLS- taking mirapex per PCP with good benefit  · Obesity         Plan:       · Continue CPAP 9 cm H2O with medium nasal pillows  · Advised to use CPAP 6-8 hrs at night and during naps. · Replacement of mask, tubing, head straps every 3-6 months or sooner if damaged. · Patient instructed to contact [facility] for any mask, tubing or machine trouble shooting if problems arise. · Sleep hygiene  · Cognitive behavioral therapy was discussed with patient including stimulus control and sleep restriction  · Discussed \"worry period\" before bed, write down issues on mind. Restart keeping notebook by bed. · Recommend Go! To Sleep online program with [facility]. Six weeks' worth of effective sleep therapy, online sleep log, daily sleep score, daily sleep improvement recommendations, activities to help get the sleep needed, daily e-mails from program , daily articles to help get the most out of the program, personal progress charts, six specially crafted relaxation practices, and motivational tips. · Avoid sedatives, alcohol and caffeinated drinks at bed time. · D/W patient non pharmacological therapy for RLS including avoiding aggravating factors (sleep deprivation, mental alerting activities at time of rest, antihistamines), moderate regular exercise, leg message and heating pads/hot shower.   · Continue Mirapex per PCP  · Try to decrease Ambien  · No driving time.  · Stay away from stimulants such as caffeine and nicotine for at least 4-6 hours before bed. Stimulants can interfere with your ability to fall asleep. Caffeine is found in tea, cola, coffee, cocoa and chocolate and is best avoided at bedtime. Nicotine is found in tobacco products. · Avoid alcohol 4-6 hours before bedtime. Alcohol has an immediate sleep-inducing effect, after a few hours when alcohol levels fall there is a stimulant or wake-up effect and will cause fragmented sleep. · Sleep rituals are important. Give your body clues it is time to slow down and sleep. Examples include; yoga, deep breathing, listen to relaxing music, a hot bath or a few minutes of reading. · Have a fixed bedtime and awakening time, Even on weekends! You will feel better keeping a regular sleep cycle, even if you are retired or not working. · Get into your favorite sleep position. If not asleep in 30 minutes, get up and do something boring until you feel sleepy. Remember not to expose yourself to bright lights such as TV, phone or tablet screens. · Only use your bed for sleeping. Do not use your bed as an office, workroom or recreation room. · Use comfortable bedding. Uncomfortable bedding can prevent good sleep. · Ensure your bedroom is quiet and comfortable. A cooler room along with enough blankets to stay warm is recommended. If your room is too noisy, try a white noise machine. If too bright, try black out shades or an eye mask. · Dont take worries to bed. Leave worries about work, school etc. behind you when you go to bed.  Some people find it helpful to assign a worry period in the evening or late afternoon to write down your worries and get them out of your system.      CPAP Equipment Cleaning and Disinfecting Schedule  Equipment Cleaning Frequency Instructions  Disinfecting Frequency   Non-Disposable Filters  Weekly Mild soapy water, Rinse, Air Dry Not Required   Disposable Filters Change as

## 2019-04-08 NOTE — TELEPHONE ENCOUNTER
SW patient. She declined to reschedule. She said she has lost a bunch of weight and no longer has sleep issues. She has not used the machine in quite awhile. I suggested she still come in to discuss with [facility]. She still declined and said she does not plan to follow up any longer.

## 2019-04-08 NOTE — TELEPHONE ENCOUNTER
Noted  Patient did not keep follow up appointment as was recommended. Please schedule a follow up visit for this patient if she calls requesting such appointment.     Can cc referring

## 2019-04-11 RX ORDER — BUPROPION HYDROCHLORIDE 150 MG/1
150 TABLET ORAL EVERY MORNING
Qty: 90 TABLET | Refills: 0 | Status: SHIPPED | OUTPATIENT
Start: 2019-04-11 | End: 2019-06-25 | Stop reason: SDUPTHER

## 2019-04-25 RX ORDER — PRAMIPEXOLE DIHYDROCHLORIDE 0.5 MG/1
TABLET ORAL
Qty: 90 TABLET | Refills: 1 | Status: SHIPPED | OUTPATIENT
Start: 2019-04-25 | End: 2019-10-23 | Stop reason: SDUPTHER

## 2019-06-04 RX ORDER — TRAZODONE HYDROCHLORIDE 50 MG/1
TABLET ORAL
Qty: 90 TABLET | Refills: 1 | Status: SHIPPED | OUTPATIENT
Start: 2019-06-04 | End: 2019-12-05 | Stop reason: SDUPTHER

## 2019-06-25 ENCOUNTER — OFFICE VISIT (OUTPATIENT)
Dept: FAMILY MEDICINE CLINIC | Age: 64
End: 2019-06-25
Payer: COMMERCIAL

## 2019-06-25 VITALS
HEIGHT: 65 IN | WEIGHT: 186.8 LBS | RESPIRATION RATE: 16 BRPM | SYSTOLIC BLOOD PRESSURE: 108 MMHG | OXYGEN SATURATION: 98 % | HEART RATE: 63 BPM | BODY MASS INDEX: 31.12 KG/M2 | DIASTOLIC BLOOD PRESSURE: 66 MMHG

## 2019-06-25 DIAGNOSIS — G47.33 OSA ON CPAP: ICD-10-CM

## 2019-06-25 DIAGNOSIS — G25.81 RESTLESS LEG SYNDROME: ICD-10-CM

## 2019-06-25 DIAGNOSIS — F33.42 RECURRENT MAJOR DEPRESSIVE DISORDER, IN FULL REMISSION (HCC): ICD-10-CM

## 2019-06-25 DIAGNOSIS — Z99.89 OSA ON CPAP: ICD-10-CM

## 2019-06-25 DIAGNOSIS — E78.00 PURE HYPERCHOLESTEROLEMIA: ICD-10-CM

## 2019-06-25 DIAGNOSIS — E66.9 OBESITY (BMI 30-39.9): ICD-10-CM

## 2019-06-25 LAB
ALBUMIN SERPL-MCNC: 4.7 G/DL (ref 3.4–5)
ALP BLD-CCNC: 97 U/L (ref 40–129)
ALT SERPL-CCNC: 7 U/L (ref 10–40)
AST SERPL-CCNC: 11 U/L (ref 15–37)
BILIRUB SERPL-MCNC: 0.5 MG/DL (ref 0–1)
BILIRUBIN DIRECT: <0.2 MG/DL (ref 0–0.3)
BILIRUBIN, INDIRECT: ABNORMAL MG/DL (ref 0–1)
CHOLESTEROL, TOTAL: 191 MG/DL (ref 0–199)
HDLC SERPL-MCNC: 69 MG/DL (ref 40–60)
LDL CHOLESTEROL CALCULATED: 106 MG/DL
TOTAL PROTEIN: 6.9 G/DL (ref 6.4–8.2)
TRIGL SERPL-MCNC: 82 MG/DL (ref 0–150)
VLDLC SERPL CALC-MCNC: 16 MG/DL

## 2019-06-25 PROCEDURE — 99214 OFFICE O/P EST MOD 30 MIN: CPT | Performed by: INTERNAL MEDICINE

## 2019-06-25 RX ORDER — BUPROPION HYDROCHLORIDE 150 MG/1
150 TABLET ORAL EVERY MORNING
Qty: 90 TABLET | Refills: 1 | Status: SHIPPED | OUTPATIENT
Start: 2019-06-25 | End: 2019-12-24

## 2019-06-25 RX ORDER — ATORVASTATIN CALCIUM 10 MG/1
10 TABLET, FILM COATED ORAL DAILY
Qty: 90 TABLET | Refills: 1 | Status: CANCELLED | OUTPATIENT
Start: 2019-06-25

## 2019-06-25 RX ORDER — SERTRALINE HYDROCHLORIDE 100 MG/1
100 TABLET, FILM COATED ORAL DAILY
Qty: 90 TABLET | Refills: 1 | Status: SHIPPED | OUTPATIENT
Start: 2019-06-25 | End: 2020-01-03 | Stop reason: SDUPTHER

## 2019-06-25 ASSESSMENT — ENCOUNTER SYMPTOMS
ALLERGIC/IMMUNOLOGIC NEGATIVE: 1
GASTROINTESTINAL NEGATIVE: 1
RESPIRATORY NEGATIVE: 1

## 2019-06-25 NOTE — PROGRESS NOTES
Subjective:      Patient ID: Sandi Orta is a 66 y.o. female. HPI  Pure hypercholesterolemia  Has lost 40 lbs over last yr. Diet has improved, more active. Recurrent major depressive disorder, in full remission ([address]  Stable w/ meds. Feels much better wt loss. Obesity (BMI 30-39. 9)  Great wt loss 40 lbs over last 18 mo. Goal is<165. Much improved diet and gx. BIJAN on CPAP  Doing much better w/ compliance on C-pap. Much better sleeping and less Restless leg. Restless leg syndrome  Much better w/ C-pap, wt loss, and Mirapex. Past Medical History:   Diagnosis Date    Depression     GERD (gastroesophageal reflux disease)     Hyperlipidemia     Lumbar herniated disc 2/98    MVP (mitral valve prolapse)     BIJAN (obstructive sleep apnea)     Restless leg syndrome      Current Outpatient Medications   Medication Sig Dispense Refill    traZODone (DESYREL) 50 MG tablet TAKE 1/2 TABLET BY MOUTH EVERY NIGHT AT BEDTIME AND MAY INCREASE TO 1 TABLET AFTER THIRD NIGHT 90 tablet 1    pramipexole (MIRAPEX) 0.5 MG tablet TAKE 1 TABLET BY MOUTH EVERY NIGHT 90 tablet 1    buPROPion (WELLBUTRIN XL) 150 MG extended release tablet TAKE 1 TABLET BY MOUTH EVERY MORNING 90 tablet 0    atorvastatin (LIPITOR) 10 MG tablet TAKE 1 TABLET BY MOUTH DAILY 90 tablet 1    sertraline (ZOLOFT) 100 MG tablet TAKE 1 TABLET BY MOUTH DAILY 90 tablet 1    Phentermine-Topiramate (QSYMIA) 15-92 MG CP24 Take 1 capsule by mouth daily. .       No current facility-administered medications for this visit.       No Known Allergies  Social History     Tobacco Use    Smoking status: Never Smoker    Smokeless tobacco: Never Used   Substance Use Topics    Alcohol use: No     Family History   Problem Relation Age of Onset    Arthritis Mother     Arthritis Father     Diabetes Father     High Blood Pressure Father     High Cholesterol Father     Other Father         meniere's disease       Review of Systems   Constitutional: Negative. Respiratory: Negative. Cardiovascular: Negative. Gastrointestinal: Negative. Endocrine: Negative. Genitourinary: Negative. Musculoskeletal: Negative. Skin: Negative. Allergic/Immunologic: Negative. Neurological: Negative. Hematological: Negative. Psychiatric/Behavioral: Negative. Vitals:    06/25/19 0746 06/25/19 0807   BP: 118/76 108/66   Pulse: 63    Resp: 16    SpO2: 98%    Weight: 186 lb 12.8 oz (84.7 kg)    Height: 5' 5\" (1.651 m)      Objective:   Physical Exam   Constitutional: She is oriented to person, place, and time. She appears well-developed and well-nourished. Non-toxic appearance. She does not have a sickly appearance. She does not appear ill. No distress. HENT:   Head: Normocephalic and atraumatic. Eyes: Pupils are equal, round, and reactive to light. Conjunctivae and EOM are normal.   Neck: Trachea normal, normal range of motion and full passive range of motion without pain. Neck supple. Normal carotid pulses, no hepatojugular reflux and no JVD present. No spinous process tenderness and no muscular tenderness present. Carotid bruit is not present. No tracheal deviation, no edema, no erythema and normal range of motion present. No thyroid mass and no thyromegaly present. Cardiovascular: Normal rate, regular rhythm, S1 normal, S2 normal, normal heart sounds, intact distal pulses and normal pulses. No extrasystoles are present. PMI is not displaced. Exam reveals no gallop, no S3, no S4, no distant heart sounds, no friction rub and no decreased pulses. No murmur heard. Pulses:       Carotid pulses are 2+ on the right side, and 2+ on the left side. Radial pulses are 2+ on the right side, and 2+ on the left side. Femoral pulses are 2+ on the right side, and 2+ on the left side. Popliteal pulses are 2+ on the right side, and 2+ on the left side. Dorsalis pedis pulses are 2+ on the right side, and 2+ on the left side. Posterior tibial pulses are 2+ on the right side, and 2+ on the left side. Pulmonary/Chest: Effort normal and breath sounds normal. No accessory muscle usage. No apnea, no tachypnea and no bradypnea. No respiratory distress. She has no decreased breath sounds. She has no wheezes. She has no rhonchi. She has no rales. She exhibits no tenderness. Musculoskeletal: Normal range of motion. She exhibits no edema or tenderness. Lymphadenopathy:     She has no cervical adenopathy. She has no axillary adenopathy. Neurological: She is alert and oriented to person, place, and time. She has normal strength and normal reflexes. She is not disoriented. She displays no atrophy and no tremor. No cranial nerve deficit or sensory deficit. She exhibits normal muscle tone. Coordination normal.   Skin: Skin is warm, dry and intact. No abrasion and no rash noted. She is not diaphoretic. No cyanosis or erythema. No pallor. Nails show no clubbing. Psychiatric: She has a normal mood and affect. Her speech is normal and behavior is normal. Judgment and thought content normal. Cognition and memory are normal.       Assessment:      Problem   Grabiel On Cpap. Good w/ C-pap   Obesity (Bmi 30-39.9). Has lost 40 lbs. Restless Leg Syndrome. Much improved W/ C-pap, wt loss, and Mirapex. Pure Hypercholesterolemia. Lost 40 lbs. No c/o w/ med. Recurrent Major Depressive Disorder, in Full Remission ([facility]). Much improved w/ meds, diet , and wt loss. Plan:      All above problems reviewed and the found to be unchanged except for the following :     Grabiel On Cpap. Continue C-pap. To have sleep test repeated when gets<165 lbs. Obesity (Bmi 30-39.9). Continue diet and exercise. Goal<165 lbs. Restless Leg Syndrome. To try to wean meds when gets<165 lbs. Pure Hypercholesterolemia. Will do labs. May not need Lipitor. Recurrent Major Depressive Disorder, in Full Remission ([facility]). Continue meds. Call if new c/o. Salvatore Saab MD

## 2019-08-14 DIAGNOSIS — E78.00 PURE HYPERCHOLESTEROLEMIA: ICD-10-CM

## 2019-08-14 RX ORDER — ATORVASTATIN CALCIUM 10 MG/1
10 TABLET, FILM COATED ORAL DAILY
Qty: 90 TABLET | Refills: 0 | Status: SHIPPED | OUTPATIENT
Start: 2019-08-14 | End: 2019-11-15 | Stop reason: SDUPTHER

## 2019-08-15 RX ORDER — SERTRALINE HYDROCHLORIDE 100 MG/1
100 TABLET, FILM COATED ORAL DAILY
Qty: 90 TABLET | Refills: 1 | Status: SHIPPED | OUTPATIENT
Start: 2019-08-15 | End: 2020-01-03 | Stop reason: SDUPTHER

## 2019-10-23 RX ORDER — PRAMIPEXOLE DIHYDROCHLORIDE 0.5 MG/1
TABLET ORAL
Qty: 90 TABLET | Refills: 0 | Status: SHIPPED | OUTPATIENT
Start: 2019-10-23 | End: 2020-01-03 | Stop reason: SDUPTHER

## 2019-11-15 DIAGNOSIS — E78.00 PURE HYPERCHOLESTEROLEMIA: ICD-10-CM

## 2019-11-15 RX ORDER — ATORVASTATIN CALCIUM 10 MG/1
10 TABLET, FILM COATED ORAL DAILY
Qty: 90 TABLET | Refills: 1 | Status: SHIPPED | OUTPATIENT
Start: 2019-11-15 | End: 2020-05-11

## 2019-12-05 RX ORDER — TRAZODONE HYDROCHLORIDE 50 MG/1
TABLET ORAL
Qty: 90 TABLET | Refills: 1 | Status: SHIPPED | OUTPATIENT
Start: 2019-12-05 | End: 2020-06-03

## 2019-12-24 RX ORDER — BUPROPION HYDROCHLORIDE 150 MG/1
150 TABLET ORAL EVERY MORNING
Qty: 90 TABLET | Refills: 1 | Status: SHIPPED | OUTPATIENT
Start: 2019-12-24 | End: 2020-06-25

## 2020-01-03 ENCOUNTER — OFFICE VISIT (OUTPATIENT)
Dept: FAMILY MEDICINE CLINIC | Age: 65
End: 2020-01-03
Payer: COMMERCIAL

## 2020-01-03 VITALS
HEIGHT: 65 IN | BODY MASS INDEX: 28.99 KG/M2 | HEART RATE: 72 BPM | SYSTOLIC BLOOD PRESSURE: 106 MMHG | RESPIRATION RATE: 18 BRPM | OXYGEN SATURATION: 100 % | WEIGHT: 174 LBS | DIASTOLIC BLOOD PRESSURE: 70 MMHG

## 2020-01-03 PROBLEM — E66.813 CLASS 3 SEVERE OBESITY DUE TO EXCESS CALORIES IN ADULT: Status: ACTIVE | Noted: 2020-01-03

## 2020-01-03 PROBLEM — E66.01 CLASS 3 SEVERE OBESITY DUE TO EXCESS CALORIES IN ADULT (HCC): Status: ACTIVE | Noted: 2020-01-03

## 2020-01-03 LAB
ALBUMIN SERPL-MCNC: 4.3 G/DL (ref 3.4–5)
ALP BLD-CCNC: 94 U/L (ref 40–129)
ALT SERPL-CCNC: 14 U/L (ref 10–40)
ANION GAP SERPL CALCULATED.3IONS-SCNC: 13 MMOL/L (ref 3–16)
AST SERPL-CCNC: 16 U/L (ref 15–37)
BILIRUB SERPL-MCNC: 0.4 MG/DL (ref 0–1)
BILIRUBIN DIRECT: <0.2 MG/DL (ref 0–0.3)
BILIRUBIN, INDIRECT: NORMAL MG/DL (ref 0–1)
BUN BLDV-MCNC: 11 MG/DL (ref 7–20)
CALCIUM SERPL-MCNC: 9.8 MG/DL (ref 8.3–10.6)
CHLORIDE BLD-SCNC: 103 MMOL/L (ref 99–110)
CHOLESTEROL, TOTAL: 190 MG/DL (ref 0–199)
CO2: 25 MMOL/L (ref 21–32)
CREAT SERPL-MCNC: 0.9 MG/DL (ref 0.6–1.2)
GFR AFRICAN AMERICAN: >60
GFR NON-AFRICAN AMERICAN: >60
GLUCOSE BLD-MCNC: 88 MG/DL (ref 70–99)
HCT VFR BLD CALC: 40.7 % (ref 36–48)
HDLC SERPL-MCNC: 69 MG/DL (ref 40–60)
HEMOGLOBIN: 13.4 G/DL (ref 12–16)
LDL CHOLESTEROL CALCULATED: 101 MG/DL
MCH RBC QN AUTO: 28.7 PG (ref 26–34)
MCHC RBC AUTO-ENTMCNC: 32.9 G/DL (ref 31–36)
MCV RBC AUTO: 87.2 FL (ref 80–100)
PDW BLD-RTO: 13.2 % (ref 12.4–15.4)
PHOSPHORUS: 4.1 MG/DL (ref 2.5–4.9)
PLATELET # BLD: 364 K/UL (ref 135–450)
PMV BLD AUTO: 8.2 FL (ref 5–10.5)
POTASSIUM SERPL-SCNC: 3.8 MMOL/L (ref 3.5–5.1)
RBC # BLD: 4.67 M/UL (ref 4–5.2)
SODIUM BLD-SCNC: 141 MMOL/L (ref 136–145)
TOTAL PROTEIN: 6.9 G/DL (ref 6.4–8.2)
TRIGL SERPL-MCNC: 98 MG/DL (ref 0–150)
TSH SERPL DL<=0.05 MIU/L-ACNC: 3.51 UIU/ML (ref 0.27–4.2)
VLDLC SERPL CALC-MCNC: 20 MG/DL
WBC # BLD: 9.8 K/UL (ref 4–11)

## 2020-01-03 PROCEDURE — 99396 PREV VISIT EST AGE 40-64: CPT | Performed by: INTERNAL MEDICINE

## 2020-01-03 PROCEDURE — 90471 IMMUNIZATION ADMIN: CPT | Performed by: INTERNAL MEDICINE

## 2020-01-03 PROCEDURE — 36415 COLL VENOUS BLD VENIPUNCTURE: CPT | Performed by: INTERNAL MEDICINE

## 2020-01-03 PROCEDURE — 90686 IIV4 VACC NO PRSV 0.5 ML IM: CPT | Performed by: INTERNAL MEDICINE

## 2020-01-03 RX ORDER — PSEUDOPHEDRINE HCL 30 MG/1
TABLET, FILM COATED ORAL
COMMUNITY
Start: 2019-12-28 | End: 2021-04-20 | Stop reason: ALTCHOICE

## 2020-01-03 RX ORDER — AZITHROMYCIN 250 MG/1
TABLET, FILM COATED ORAL
COMMUNITY
Start: 2019-12-28 | End: 2020-01-03 | Stop reason: ALTCHOICE

## 2020-01-03 RX ORDER — SERTRALINE HYDROCHLORIDE 100 MG/1
100 TABLET, FILM COATED ORAL DAILY
Qty: 90 TABLET | Refills: 3 | Status: SHIPPED | OUTPATIENT
Start: 2020-01-03 | End: 2020-08-10

## 2020-01-03 RX ORDER — PRAMIPEXOLE DIHYDROCHLORIDE 0.5 MG/1
0.5 TABLET ORAL NIGHTLY PRN
Qty: 90 TABLET | Refills: 3 | Status: SHIPPED | OUTPATIENT
Start: 2020-01-03 | End: 2020-12-30

## 2020-01-03 ASSESSMENT — ENCOUNTER SYMPTOMS
RESPIRATORY NEGATIVE: 1
GASTROINTESTINAL NEGATIVE: 1
ALLERGIC/IMMUNOLOGIC NEGATIVE: 1
EYES NEGATIVE: 1

## 2020-01-03 NOTE — PROGRESS NOTES
Vaccine Information Sheet, \"Influenza - Inactivated\"  given to Hung Prescott, or parent/legal guardian of  Hung Prescott and verbalized understanding. Patient responses:    Have you ever had a reaction to a flu vaccine? No  Do you have any current illness? No  Have you ever had [place] Syndrome? No  Do you have a serious allergy to any of the follow: Neomycin, Polymyxin, Thimerosal, eggs or egg products? No    Flu vaccine given per order. Please see immunization tab. Risks and benefits explained. Current VIS given.

## 2020-01-03 NOTE — ASSESSMENT & PLAN NOTE
Mammo: 9/25/2018. rechx 2 yr per Gyn  Pap: 10/2018 rechx 2 yr per Gyn  Colonoscopy: 11/2017. rechx 5 yrs. Brother w/ Colon Ca. DEXA: due age 72  Eye: 8/2019  Hearing: passed in office exam  Immunnization:  Rx for Shingles vaccine given  MMSE: na  Get Up and Go: na  Tob: nonsmoker/ never  ETOH: rare  Caffeine: moderate  Cardiac Risk Assessment: labs pending.   Living will: no  Medical power of : no

## 2020-01-03 NOTE — PROGRESS NOTES
Subjective:      Patient ID: Olga Mar is a 60 y.o. female. HPI  Annual physical exam  Mammo: 9/25/2018. rechx 2 yr per Gyn  Pap: 10/2018 rechx 2 yr per Gyn  Colonoscopy: 11/2017. rechx 5 yrs. Brother w/ Colon Ca. DEXA: due age 72  Eye: 8/2019  Hearing: passed in office exam  Immunnization:  Rx for Shingles vaccine given  MMSE: na  Get Up and Go: na  Tob: nonsmoker/ never  ETOH: rare  Caffeine: moderate  Cardiac Risk Assessment: labs pending. Living will: no  Medical power of : no    Class 3 severe obesity due to excess calories in adult [facility])  Has lost ~60 lbs in last yr on Qsymia. Sees wt loss MD. Goal is ~150 lbs. BMI now<29. Restless leg syndrome  Much better w/ C-pap, wt loss, and Mirapex. BIJAN on CPAP  Doing much better w/ compliance on C-pap and significant wt loss. Much better sleeping and less Restless leg. Pure hypercholesterolemia  Has lost 60 lbs over last yr. Diet has improved, more active. Recurrent major depressive disorder, in full remission ([facility] [address]  Stable w/ meds. Feels much better wt loss.      Past Medical History:   Diagnosis Date    Depression     GERD (gastroesophageal reflux disease)     Hyperlipidemia     Lumbar herniated disc 2/98    MVP (mitral valve prolapse)     BIJAN (obstructive sleep apnea)     Restless leg syndrome      Current Outpatient Medications   Medication Sig Dispense Refill    buPROPion (WELLBUTRIN XL) 150 MG extended release tablet TAKE 1 TABLET BY MOUTH EVERY MORNING 90 tablet 1    traZODone (DESYREL) 50 MG tablet TAKE ONE-HALF TABLET BY MOUTH EVERY NIGHT AT BEDTIME. MAY INCREASE TO 1 TABLET AFTER 3RD NIGHT 90 tablet 1    atorvastatin (LIPITOR) 10 MG tablet TAKE 1 TABLET BY MOUTH DAILY 90 tablet 1    pramipexole (MIRAPEX) 0.5 MG tablet TAKE 1 TABLET BY MOUTH EVERY NIGHT 90 tablet 0    sertraline (ZOLOFT) 100 MG tablet TAKE 1 TABLET BY MOUTH DAILY 90 tablet 1    Phentermine-Topiramate (QSYMIA) 15-92 MG CP24 Take 1 capsule by mouth daily..      WAL-PHED 30 MG tablet TK 1 T PO Q 8 H FOR CONGESTION       No current facility-administered medications for this visit. No Known Allergies  Social History     Tobacco Use    Smoking status: Never Smoker    Smokeless tobacco: Never Used   Substance Use Topics    Alcohol use: No     Family History   Problem Relation Age of Onset    Arthritis Mother     Arthritis Father     Diabetes Father     High Blood Pressure Father     High Cholesterol Father     Other Father         meniere's disease       Review of Systems   Constitutional: Negative. HENT: Negative. Eyes: Negative. Respiratory: Negative. Cardiovascular: Negative. Gastrointestinal: Negative. Endocrine: Negative. Genitourinary: Negative. Musculoskeletal: Negative. Skin: Negative. Allergic/Immunologic: Negative. Neurological: Negative. Hematological: Negative. Psychiatric/Behavioral: Negative. Vitals:    01/03/20 0759 01/03/20 0831   BP: 120/70 106/70   Pulse: 72    Resp: 18    SpO2: 100%    Weight: 174 lb (78.9 kg)    Height: 5' 5\" (1.651 m)      Objective:   Physical Exam  Constitutional:       General: She is not in acute distress. Appearance: Normal appearance. She is well-developed and normal weight. She is not ill-appearing, toxic-appearing or diaphoretic. HENT:      Head: Normocephalic and atraumatic. Right Ear: Hearing, tympanic membrane, ear canal and external ear normal. There is no impacted cerumen. Left Ear: Hearing, tympanic membrane, ear canal and external ear normal. There is no impacted cerumen. Nose: Nose normal. No congestion or rhinorrhea. Right Sinus: No maxillary sinus tenderness or frontal sinus tenderness. Left Sinus: No maxillary sinus tenderness or frontal sinus tenderness. Mouth/Throat:      Mouth: Mucous membranes are moist.      Pharynx: Oropharynx is clear. Uvula midline.  No oropharyngeal exudate or posterior oropharyngeal erythema. Comments: Mildly enlarged tonsils w/o sign of infection. Eyes:      General: Lids are normal. No scleral icterus. Right eye: No discharge. Left eye: No discharge. Extraocular Movements: Extraocular movements intact. Conjunctiva/sclera: Conjunctivae normal.      Pupils: Pupils are equal, round, and reactive to light. Funduscopic exam:     Right eye: No hemorrhage, exudate, AV nicking, arteriolar narrowing or papilledema. Left eye: No hemorrhage, exudate, AV nicking, arteriolar narrowing or papilledema. Neck:      Musculoskeletal: Full passive range of motion without pain, normal range of motion and neck supple. No neck rigidity or muscular tenderness. Thyroid: No thyromegaly. Vascular: Normal carotid pulses. No carotid bruit, hepatojugular reflux or JVD. Trachea: Trachea and phonation normal. No tracheal deviation. Cardiovascular:      Rate and Rhythm: Normal rate and regular rhythm. No extrasystoles are present. Chest Wall: PMI is not displaced. Pulses: Normal pulses. No decreased pulses. Carotid pulses are 2+ on the right side and 2+ on the left side. Radial pulses are 2+ on the right side and 2+ on the left side. Femoral pulses are 2+ on the right side and 2+ on the left side. Popliteal pulses are 2+ on the right side and 2+ on the left side. Dorsalis pedis pulses are 2+ on the right side and 2+ on the left side. Posterior tibial pulses are 2+ on the right side and 2+ on the left side. Heart sounds: Normal heart sounds. No murmur. No friction rub. No gallop. Pulmonary:      Effort: Pulmonary effort is normal. No respiratory distress. Breath sounds: Normal breath sounds. No stridor. No decreased breath sounds, wheezing, rhonchi or rales. Chest:      Chest wall: No tenderness. Abdominal:      General: Bowel sounds are normal. There is no distension or abdominal bruit.

## 2020-01-03 NOTE — ASSESSMENT & PLAN NOTE
Doing much better w/ compliance on C-pap and significant wt loss. Much better sleeping and less Restless leg.

## 2020-02-02 PROBLEM — Z00.00 ANNUAL PHYSICAL EXAM: Status: RESOLVED | Noted: 2017-10-30 | Resolved: 2020-02-02

## 2020-02-13 ENCOUNTER — HOSPITAL ENCOUNTER (OUTPATIENT)
Dept: MAMMOGRAPHY | Age: 65
Discharge: HOME | End: 2020-02-18

## 2020-02-13 PROCEDURE — 77063 BREAST TOMOSYNTHESIS BI: CPT

## 2020-05-11 RX ORDER — ATORVASTATIN CALCIUM 10 MG/1
10 TABLET, FILM COATED ORAL DAILY
Qty: 90 TABLET | Refills: 1 | Status: SHIPPED | OUTPATIENT
Start: 2020-05-11 | End: 2020-11-17 | Stop reason: SDUPTHER

## 2020-06-03 RX ORDER — TRAZODONE HYDROCHLORIDE 50 MG/1
TABLET ORAL
Qty: 90 TABLET | Refills: 1 | Status: SHIPPED | OUTPATIENT
Start: 2020-06-03 | End: 2020-12-11

## 2020-06-25 RX ORDER — BUPROPION HYDROCHLORIDE 150 MG/1
150 TABLET ORAL EVERY MORNING
Qty: 90 TABLET | Refills: 1 | Status: SHIPPED | OUTPATIENT
Start: 2020-06-25 | End: 2020-12-23

## 2020-08-02 ENCOUNTER — PATIENT MESSAGE (OUTPATIENT)
Dept: FAMILY MEDICINE CLINIC | Age: 65
End: 2020-08-02

## 2020-08-04 NOTE — TELEPHONE ENCOUNTER
From: Shailesh Rocha  To: Odalis Rico MD  Sent: 8/2/2020 3:42 PM EDT  Subject: Visit Follow-Up Question    Wanted to let Dr. Jerome Gresham know and to update my chart that I had my annual Pap test at the [facility] on July 23, 2020.     Thank you,  Viktoriya Lockett

## 2020-08-10 RX ORDER — SERTRALINE HYDROCHLORIDE 100 MG/1
100 TABLET, FILM COATED ORAL DAILY
Qty: 90 TABLET | Refills: 1 | Status: SHIPPED | OUTPATIENT
Start: 2020-08-10 | End: 2021-02-08

## 2020-09-25 ENCOUNTER — OFFICE VISIT (OUTPATIENT)
Dept: ORTHOPEDIC SURGERY | Age: 65
End: 2020-09-25
Payer: COMMERCIAL

## 2020-09-25 VITALS — WEIGHT: 174 LBS | HEIGHT: 65 IN | BODY MASS INDEX: 28.99 KG/M2

## 2020-09-25 PROBLEM — M17.12 PRIMARY OSTEOARTHRITIS OF LEFT KNEE: Status: ACTIVE | Noted: 2020-09-25

## 2020-09-25 PROBLEM — M25.562 LEFT KNEE PAIN: Status: ACTIVE | Noted: 2020-09-25

## 2020-09-25 PROCEDURE — 20610 DRAIN/INJ JOINT/BURSA W/O US: CPT | Performed by: ORTHOPAEDIC SURGERY

## 2020-09-25 PROCEDURE — 99203 OFFICE O/P NEW LOW 30 MIN: CPT | Performed by: ORTHOPAEDIC SURGERY

## 2020-09-25 RX ORDER — TRIAMCINOLONE ACETONIDE 40 MG/ML
80 INJECTION, SUSPENSION INTRA-ARTICULAR; INTRAMUSCULAR ONCE
Status: COMPLETED | OUTPATIENT
Start: 2020-09-25 | End: 2020-09-25

## 2020-09-25 RX ORDER — LIDOCAINE HYDROCHLORIDE 10 MG/ML
5 INJECTION, SOLUTION INFILTRATION; PERINEURAL ONCE
Status: COMPLETED | OUTPATIENT
Start: 2020-09-25 | End: 2020-09-25

## 2020-09-25 RX ADMIN — TRIAMCINOLONE ACETONIDE 80 MG: 40 INJECTION, SUSPENSION INTRA-ARTICULAR; INTRAMUSCULAR at 08:20

## 2020-09-25 RX ADMIN — LIDOCAINE HYDROCHLORIDE 5 ML: 10 INJECTION, SOLUTION INFILTRATION; PERINEURAL at 08:20

## 2020-09-25 NOTE — PROGRESS NOTES
Chief Complaint   Patient presents with    New Patient     Left Knee: C/O posterior pain with swelling, when over does it will get a lump behind knee, pain increases with prolonged sitting and will give out at times       [address] Bernstein is a 63 y.o. female. Presents for evaluation of her left knee. For several months she is had some pain mostly in the posterior medial aspect of her knee. No one single injury. Minimal popping catching or locking. She had some swelling in the area of her popliteal fossa. No prior surgeries, injections, therapy. She is occasionally been taking some acetaminophen and occasionally ibuprofen or Aleve. No jamari instability. No falls or acute trauma. No right knee issues at this time.     Activities/occupation: HR     PAST MEDICAL/SURGICAL HISTORY     Past Medical History:   Diagnosis Date    Depression     GERD (gastroesophageal reflux disease)     Hyperlipidemia     Lumbar herniated disc 2/98    MVP (mitral valve prolapse)     BIJAN (obstructive sleep apnea)     Primary osteoarthritis of left knee 9/25/2020    Restless leg syndrome        Past Surgical History:   Procedure Laterality Date    BACK SURGERY  2/1998    LAP BAND  2010       Social History     Socioeconomic History    Marital status:      Spouse name: Not on file    Number of children: Not on file    Years of education: Not on file    Highest education level: Not on file   Occupational History    Not on file   Social Needs    Financial resource strain: Not on file    Food insecurity     Worry: Not on file     Inability: Not on file    Transportation needs     Medical: Not on file     Non-medical: Not on file   Tobacco Use    Smoking status: Never Smoker    Smokeless tobacco: Never Used   Substance and Sexual Activity    Alcohol use: No    Drug use: No    Sexual activity: Not on file   Lifestyle    Physical activity     Days per week: Not on file Minutes per session: Not on file    Stress: Not on file   Relationships    Social connections     Talks on phone: Not on file     Gets together: Not on file     Attends Church service: Not on file     Active member of club or organization: Not on file     Attends meetings of clubs or organizations: Not on file     Relationship status: Not on file    Intimate partner violence     Fear of current or ex partner: Not on file     Emotionally abused: Not on file     Physically abused: Not on file     Forced sexual activity: Not on file   Other Topics Concern    Not on file   Social History Narrative    Not on file       Family History   Problem Relation Age of Onset    Arthritis Mother     Arthritis Father     Diabetes Father     High Blood Pressure Father     High Cholesterol Father     Other Father         meniere's disease          REVIEW OF SYSTEMS  Pertinent items are noted in HPI  Review of systems reviewed from Patient History Form dated on 9/25/2020 and available in the patient's chart under the Media tab. PHYSICAL EXAM    Vitals:    09/25/20 0808   Weight: 174 lb (78.9 kg)   Height: 5' 5\" (1.651 m)       General Exam:   Constitutional: Patient is adequately groomed with no evidence of malnutrition  Mental Status: The patient is oriented to time, place and person. The patient's mood and affect are appropriate. Lymphatic: The lymphatic examination bilaterally reveals all areas to be without enlargement or induration. Neurological: The patient has good coordination. There is no weakness or sensory deficit. Antalgic gait:  No    Bilateral lower extremities are neurovascularly intact with symmetric light touch sensation and distal pulses. Right Knee Exam:  No skin lesions, erythema, or warmth. No joint effusion. No joint line tenderness. Negative Jennifer. Ligaments stable. Quad tone good. ROM 0-130    Left Knee Exam:  No skin lesions, erythema, or warmth.   Effusion: 0+/4  Range Of Motion:  0-130    Hyperextension Pain:    positive  Hyperflexion Pain:     positive  Jennifer:      positive  Medial Joint Line Tenderness:   positive  Lateral Joint Line Tenderness: negative    Anterior Drawer:   negative  Posterior Drawer:   negative  Lachman:   negative  Pivot Shift:  not done  Valgus Stress:   negative  Varus Stress:   negative      REVIEW OF IMAGING  4 Views AP/PA 45 degree/Lateral/Sunrise of the left knee dated 9/25/2020 demonstrate no acute fracture. No dislocation. Mild degenerative changes with joint space mostly maintained and minimal osteophyte noted. Visible bony lesions loose bodies    MRI available for review today: no      ASSESSMENT  82-year-old female with mild left knee osteoarthritis, possible meniscus tear, Baker's cyst        Procedures    NC ARTHROCENTESIS ASPIR&/INJ MAJOR JT/BURSA W/O US         PLAN  -Diagnosis and treatment options discussed in detail today  -At this time we will proceed with a corticosteroid injection for acute pain relief for any arthritis related pain  -Ice, activity modification, over-the-counter medications, moderate exercise, healthy lifestyle measures, compression, supplements were discussed  -We discussed the possibility of an MRI if she is having any worsening mechanical symptoms or other unimproved symptoms and we will see her back in 1 month as needed and if there are issues in interim she will contact the office    Left Knee corticosteroid injection    After informed consent was provided including a discussion of risks such as infection, alternative treatments, and benefits verbal consent was obtained. The patient was seated on the exam table with the Left knee(s) flexed to 90 degrees. The anterolateral aspect of the knee adjacent to the joint line was prepped with Betadine and alcohol. A 22-gauge needle was inserted into the  Left knee and a mixture of 5 mL of 1% lidocaine and 2 ml of Kenalog was injected.  The needle was withdrawn and the puncture site was cleaned with alcohol and sealed with a Band-Aid. The patient tolerated the procedure well. Gianni Arevalo MD  [id number] [facility] of [facility] ([facility])          Voice Recognition Dictation disclaimer: Please note that portions of this chart were generated using Dragon dictation software. Although every effort was made to ensure the accuracy of this automated transcription, some errors in transcription may have occurred.

## 2020-11-17 RX ORDER — ATORVASTATIN CALCIUM 10 MG/1
10 TABLET, FILM COATED ORAL DAILY
Qty: 90 TABLET | Refills: 1 | Status: SHIPPED | OUTPATIENT
Start: 2020-11-17 | End: 2021-05-26

## 2020-12-11 RX ORDER — TRAZODONE HYDROCHLORIDE 50 MG/1
TABLET ORAL
Qty: 90 TABLET | Refills: 0 | Status: SHIPPED | OUTPATIENT
Start: 2020-12-11 | End: 2021-02-01

## 2020-12-14 ENCOUNTER — TELEPHONE (OUTPATIENT)
Dept: FAMILY MEDICINE CLINIC | Age: 65
End: 2020-12-14

## 2020-12-14 NOTE — TELEPHONE ENCOUNTER
Patient called to ask if the biometric screening that she completed at work would suffice as her yearly appointment (preffered). She needs fbw and can get that at a lab somewhere if needed and do the physical virtually. Her  is high risk and doesn't want her in a doctors office. How would you like her to proceed?

## 2020-12-23 RX ORDER — BUPROPION HYDROCHLORIDE 150 MG/1
150 TABLET ORAL EVERY MORNING
Qty: 90 TABLET | Refills: 1 | Status: SHIPPED | OUTPATIENT
Start: 2020-12-23 | End: 2021-06-18

## 2020-12-23 NOTE — TELEPHONE ENCOUNTER
Refill Request BUPROPION XL 150MG TABLETS (24 H)    Last Seen: 1/3/2020    Last Written: 6/25/20 90 tablets with 1 refill    Next Appointment:   No future appointments.           Requested Prescriptions     Pending Prescriptions Disp Refills    buPROPion (WELLBUTRIN XL) 150 MG extended release tablet [Pharmacy Med Name: BUPROPION XL 150MG TABLETS (24 H)] 90 tablet 1     Sig: TAKE 1 TABLET BY MOUTH EVERY MORNING

## 2020-12-30 RX ORDER — PRAMIPEXOLE DIHYDROCHLORIDE 0.5 MG/1
TABLET ORAL
Qty: 90 TABLET | Refills: 0 | Status: SHIPPED | OUTPATIENT
Start: 2020-12-30 | End: 2021-04-05

## 2020-12-30 NOTE — TELEPHONE ENCOUNTER
Norah De La O [phone] (home) [phone] (work)   is requesting refill(s) of medication Pramipexole to preferred pharmacy [facility] 1/3/20 (pertaining to medication)   Last refill 12/28/19 (per medication requested)  Next office visit scheduled or attempted No  Date   If No, reason Due next month .  Sent [facility] [facility] to schedule

## 2021-02-01 RX ORDER — TRAZODONE HYDROCHLORIDE 50 MG/1
TABLET ORAL
Qty: 90 TABLET | Refills: 1 | Status: SHIPPED | OUTPATIENT
Start: 2021-02-01 | End: 2021-08-04

## 2021-02-08 RX ORDER — SERTRALINE HYDROCHLORIDE 100 MG/1
100 TABLET, FILM COATED ORAL DAILY
Qty: 90 TABLET | Refills: 1 | Status: SHIPPED | OUTPATIENT
Start: 2021-02-08 | End: 2021-08-13

## 2021-04-05 RX ORDER — PRAMIPEXOLE DIHYDROCHLORIDE 0.5 MG/1
TABLET ORAL
Qty: 90 TABLET | Refills: 1 | Status: SHIPPED | OUTPATIENT
Start: 2021-04-05 | End: 2021-10-19

## 2021-04-20 ENCOUNTER — TELEPHONE (OUTPATIENT)
Dept: FAMILY MEDICINE CLINIC | Age: 66
End: 2021-04-20

## 2021-04-20 ENCOUNTER — OFFICE VISIT (OUTPATIENT)
Dept: FAMILY MEDICINE CLINIC | Age: 66
End: 2021-04-20
Payer: COMMERCIAL

## 2021-04-20 VITALS
HEIGHT: 65 IN | OXYGEN SATURATION: 100 % | HEART RATE: 73 BPM | RESPIRATION RATE: 16 BRPM | DIASTOLIC BLOOD PRESSURE: 70 MMHG | WEIGHT: 171.8 LBS | SYSTOLIC BLOOD PRESSURE: 112 MMHG | BODY MASS INDEX: 28.62 KG/M2

## 2021-04-20 DIAGNOSIS — F33.42 RECURRENT MAJOR DEPRESSIVE DISORDER, IN FULL REMISSION (HCC): ICD-10-CM

## 2021-04-20 DIAGNOSIS — Z00.00 ANNUAL PHYSICAL EXAM: Primary | ICD-10-CM

## 2021-04-20 DIAGNOSIS — I48.91 ATRIAL FIBRILLATION, UNSPECIFIED TYPE (HCC): ICD-10-CM

## 2021-04-20 DIAGNOSIS — G25.81 RESTLESS LEG SYNDROME: ICD-10-CM

## 2021-04-20 DIAGNOSIS — Z99.89 OSA ON CPAP: ICD-10-CM

## 2021-04-20 DIAGNOSIS — G47.33 OSA ON CPAP: ICD-10-CM

## 2021-04-20 DIAGNOSIS — R19.05 PERIUMBILICAL MASS: ICD-10-CM

## 2021-04-20 DIAGNOSIS — I48.20 CHRONIC ATRIAL FIBRILLATION (HCC): ICD-10-CM

## 2021-04-20 DIAGNOSIS — I48.91 ATRIAL FIBRILLATION, UNSPECIFIED TYPE (HCC): Primary | ICD-10-CM

## 2021-04-20 DIAGNOSIS — E66.01 CLASS 3 SEVERE OBESITY DUE TO EXCESS CALORIES WITHOUT SERIOUS COMORBIDITY WITH BODY MASS INDEX (BMI) OF 40.0 TO 44.9 IN ADULT (HCC): ICD-10-CM

## 2021-04-20 DIAGNOSIS — I49.9 IRREGULAR HEART BEAT: ICD-10-CM

## 2021-04-20 DIAGNOSIS — E78.00 PURE HYPERCHOLESTEROLEMIA: ICD-10-CM

## 2021-04-20 LAB
HCT VFR BLD CALC: 41.1 % (ref 36–48)
HEMOGLOBIN: 13.4 G/DL (ref 12–16)
MCH RBC QN AUTO: 28.5 PG (ref 26–34)
MCHC RBC AUTO-ENTMCNC: 32.6 G/DL (ref 31–36)
MCV RBC AUTO: 87.6 FL (ref 80–100)
PDW BLD-RTO: 14.3 % (ref 12.4–15.4)
PLATELET # BLD: 293 K/UL (ref 135–450)
PMV BLD AUTO: 8.6 FL (ref 5–10.5)
RBC # BLD: 4.69 M/UL (ref 4–5.2)
WBC # BLD: 10.2 K/UL (ref 4–11)

## 2021-04-20 PROCEDURE — 36415 COLL VENOUS BLD VENIPUNCTURE: CPT | Performed by: INTERNAL MEDICINE

## 2021-04-20 PROCEDURE — 99396 PREV VISIT EST AGE 40-64: CPT | Performed by: INTERNAL MEDICINE

## 2021-04-20 PROCEDURE — 93000 ELECTROCARDIOGRAM COMPLETE: CPT | Performed by: INTERNAL MEDICINE

## 2021-04-20 RX ORDER — DILTIAZEM HYDROCHLORIDE 180 MG/1
180 CAPSULE, COATED, EXTENDED RELEASE ORAL DAILY
Qty: 30 CAPSULE | Refills: 3 | Status: SHIPPED | OUTPATIENT
Start: 2021-04-20 | End: 2021-06-15 | Stop reason: SDUPTHER

## 2021-04-20 SDOH — ECONOMIC STABILITY: TRANSPORTATION INSECURITY
IN THE PAST 12 MONTHS, HAS THE LACK OF TRANSPORTATION KEPT YOU FROM MEDICAL APPOINTMENTS OR FROM GETTING MEDICATIONS?: NO

## 2021-04-20 SDOH — ECONOMIC STABILITY: FOOD INSECURITY: WITHIN THE PAST 12 MONTHS, THE FOOD YOU BOUGHT JUST DIDN'T LAST AND YOU DIDN'T HAVE MONEY TO GET MORE.: NEVER TRUE

## 2021-04-20 SDOH — ECONOMIC STABILITY: INCOME INSECURITY: HOW HARD IS IT FOR YOU TO PAY FOR THE VERY BASICS LIKE FOOD, HOUSING, MEDICAL CARE, AND HEATING?: NOT HARD AT ALL

## 2021-04-20 ASSESSMENT — ENCOUNTER SYMPTOMS
GASTROINTESTINAL NEGATIVE: 1
EYES NEGATIVE: 1
RESPIRATORY NEGATIVE: 1

## 2021-04-20 NOTE — ASSESSMENT & PLAN NOTE
Mammo: 2/2020. rechx 1 yr per Gyn  Pap: 2/2020 rechx 2 yr per Gyn  Colonoscopy: 11/2017. rechx 5 yrs. Brother w/ Colon Ca. DEXA: due age 72  Eye: appt 5/5/2021  Hearing: passed in office exam  Immunnization:  Rx for Shingles vaccine given  MMSE: na  Get Up and Go: na  Tob: nonsmoker/ never  ETOH: rare  Caffeine: moderate  Cardiac Risk Assessment: labs pending.   Living will: no  Medical power of : no

## 2021-04-20 NOTE — TELEPHONE ENCOUNTER
Patient was asked to make and appointment with her Cardiologist.  He is a [facility] Cardiologist and cannot get in to see him until July. She would like to know if you could refer her to one of our [facility] Cardiologists downstairs?

## 2021-04-20 NOTE — PROGRESS NOTES
Subjective:      Patient ID: Alexx Barrios is a 63 y.o. female. HPI  Annual physical exam  Mammo: 2/2020. rechx 1 yr per Gyn  Pap: 2/2020 rechx 2 yr per Gyn  Colonoscopy: 11/2017. rechx 5 yrs. Brother w/ Colon Ca. DEXA: due age 72  Eye: appt 5/5/2021  Hearing: passed in office exam  Immunnization:  Rx for Shingles vaccine given  MMSE: na  Get Up and Go: na  Tob: nonsmoker/ never  ETOH: rare  Caffeine: moderate  Cardiac Risk Assessment: labs pending. Living will: no  Medical power of : no    Restless leg syndrome   Off C-pap. On Maripex. BIJAN on CPAP   Was doing much better w/ compliance on C-pap and significant wt loss. Much better sleeping and less Restless leg. Stopped C-pap during Pandemic. Says doing well. Pure hypercholesterolemia   Had lost 60 lbs in previous yr. Wt stable over last yr. Diet has improved, more active. Recurrent major depressive disorder, in full remission ([address]   Stable w/ meds. Feels much better wt loss. Class 3 severe obesity due to excess calories in adult [facility])   Has lost ~60 lbs in past 2 yrs on Qsymia. Sees wt loss MD. Goal is ~150 lbs. BMI now<29. Review of Systems   Constitutional: Negative. HENT: Negative. Eyes: Negative. Respiratory: Negative. Cardiovascular: Negative. Gastrointestinal: Negative. Genitourinary: Negative. Musculoskeletal: Negative. Skin: Negative. Neurological: Negative. Psychiatric/Behavioral: Negative. Objective:   Physical Exam  Constitutional:       General: She is not in acute distress. Appearance: Normal appearance. She is well-developed. She is not ill-appearing, toxic-appearing or diaphoretic. HENT:      Head: Normocephalic and atraumatic.       Right Ear: Hearing, tympanic membrane, ear canal and external ear normal.      Left Ear: Hearing, tympanic membrane, ear canal and external ear normal.      Nose: Nose normal.      Right Sinus: No maxillary sinus tenderness or frontal sinus dullness, fluid wave, mass or pulsatile mass. Tenderness: There is no abdominal tenderness. There is no guarding or rebound. Hernia: No hernia is present. There is no hernia in the ventral area. Genitourinary:     Exam position: Supine. Musculoskeletal: Normal range of motion. General: No tenderness. Lymphadenopathy:      Head:      Right side of head: No submental, submandibular, tonsillar, preauricular, posterior auricular or occipital adenopathy. Left side of head: No submental, submandibular, tonsillar, preauricular, posterior auricular or occipital adenopathy. Cervical: No cervical adenopathy. Upper Body:      Right upper body: No supraclavicular or epitrochlear adenopathy. Left upper body: No supraclavicular or epitrochlear adenopathy. Skin:     General: Skin is warm and dry. Coloration: Skin is not pale. Findings: No abrasion, erythema or rash. Nails: There is no clubbing. Neurological:      Mental Status: She is alert and oriented to person, place, and time. She is not disoriented. Cranial Nerves: No cranial nerve deficit. Sensory: No sensory deficit. Motor: No tremor, atrophy, abnormal muscle tone or seizure activity. Gait: Gait normal.      Deep Tendon Reflexes: Reflexes are normal and symmetric. Reflexes normal. Babinski sign absent on the right side. Babinski sign absent on the left side. Reflex Scores:       Tricep reflexes are 2+ on the right side and 2+ on the left side. Bicep reflexes are 2+ on the right side and 2+ on the left side. Brachioradialis reflexes are 2+ on the right side and 2+ on the left side. Patellar reflexes are 2+ on the right side and 2+ on the left side. Achilles reflexes are 2+ on the right side and 2+ on the left side. Psychiatric:         Speech: Speech normal.         Behavior: Behavior normal.         Thought Content:  Thought content normal.         Judgment: Judgment normal.         Assessment / Plan:     Annual Physical Exam. Rx for Shingrix. Class 3 Severe Obesity Due to Excess Calories in Adult (Hcc). Continue to improve diet and lose weight. Grabiel On Cpap. Restart C-pap. Restless Leg Syndrome. Continue meds and exercise. Call if new c/o. New Onset  Atrial Fibrillation (Hcc). Will begin Diltiazem 180 mg qd and Apixaban. Periumbilical Mass. Will do CT. Pure Hypercholesterolemia. Will do labs and adjust med if needed. Recurrent Major Depressive Disorder, in Full Remission (Hcc). Continue med. Call if new c/o.

## 2021-04-20 NOTE — PATIENT INSTRUCTIONS
Annual Physical Exam. Rx for Shingrix. Class 3 Severe Obesity Due to Excess Calories in Adult (Hcc). Continue to improve diet and lose weight. Grabiel On Cpap. Restart C-pap. Restless Leg Syndrome. Continue meds and exercise. Call if new c/o. New Onset  Atrial Fibrillation (Hcc). Will begin Diltiazem 180 mg qd and Apixaban. Periumbilical Mass. Will do CT. Pure Hypercholesterolemia. Will do labs and adjust med if needed. Recurrent Major Depressive Disorder, in Full Remission (Hcc). Continue med. Call if new c/o. Mammo: 2/2020. rechx 1 yr per Gyn  Pap: 2/2020 rechx 2 yr per Gyn  Colonoscopy: 11/2017. rechx 5 yrs. Brother w/ Colon Ca. DEXA: due age 72  Eye: appt 5/5/2021  Hearing: passed in office exam  Immunnization:  Rx for Shingles vaccine given  MMSE: na  Get Up and Go: na  Tob: nonsmoker/ never  ETOH: rare  Caffeine: moderate  Cardiac Risk Assessment: labs pending.   Living will: no  Medical power of : no

## 2021-04-20 NOTE — ASSESSMENT & PLAN NOTE
Was doing much better w/ compliance on C-pap and significant wt loss. Much better sleeping and less Restless leg. Stopped C-pap during Pandemic. Says doing well.

## 2021-04-21 ENCOUNTER — TELEPHONE (OUTPATIENT)
Dept: ADMINISTRATIVE | Age: 66
End: 2021-04-21

## 2021-04-21 LAB
ALBUMIN SERPL-MCNC: 4.5 G/DL (ref 3.4–5)
ALP BLD-CCNC: 88 U/L (ref 40–129)
ALT SERPL-CCNC: 10 U/L (ref 10–40)
ANION GAP SERPL CALCULATED.3IONS-SCNC: 12 MMOL/L (ref 3–16)
AST SERPL-CCNC: 14 U/L (ref 15–37)
BILIRUB SERPL-MCNC: 0.3 MG/DL (ref 0–1)
BILIRUBIN DIRECT: <0.2 MG/DL (ref 0–0.3)
BILIRUBIN, INDIRECT: ABNORMAL MG/DL (ref 0–1)
BUN BLDV-MCNC: 14 MG/DL (ref 7–20)
CALCIUM SERPL-MCNC: 9.5 MG/DL (ref 8.3–10.6)
CHLORIDE BLD-SCNC: 104 MMOL/L (ref 99–110)
CHOLESTEROL, TOTAL: 185 MG/DL (ref 0–199)
CO2: 25 MMOL/L (ref 21–32)
CREAT SERPL-MCNC: 1.1 MG/DL (ref 0.6–1.2)
GFR AFRICAN AMERICAN: >60
GFR NON-AFRICAN AMERICAN: 50
GLUCOSE BLD-MCNC: 91 MG/DL (ref 70–99)
HDLC SERPL-MCNC: 71 MG/DL (ref 40–60)
LDL CHOLESTEROL CALCULATED: 97 MG/DL
PHOSPHORUS: 4.2 MG/DL (ref 2.5–4.9)
POTASSIUM SERPL-SCNC: 4.2 MMOL/L (ref 3.5–5.1)
SODIUM BLD-SCNC: 141 MMOL/L (ref 136–145)
TOTAL PROTEIN: 6.6 G/DL (ref 6.4–8.2)
TRIGL SERPL-MCNC: 85 MG/DL (ref 0–150)
TSH SERPL DL<=0.05 MIU/L-ACNC: 2.51 UIU/ML (ref 0.27–4.2)
VLDLC SERPL CALC-MCNC: 17 MG/DL

## 2021-04-21 NOTE — TELEPHONE ENCOUNTER
Submitted PA for Eliquis 5MG tablets, Key: [id number]. Medication has been APPROVED through 04/21/2022. Please notify patient. Thank you.

## 2021-04-24 ENCOUNTER — HOSPITAL ENCOUNTER (OUTPATIENT)
Dept: CT IMAGING | Age: 66
Discharge: HOME | End: 2021-04-24
Payer: COMMERCIAL

## 2021-04-24 DIAGNOSIS — R19.05 PERIUMBILICAL MASS: ICD-10-CM

## 2021-04-24 PROCEDURE — 74176 CT ABD & PELVIS W/O CONTRAST: CPT

## 2021-04-26 ENCOUNTER — TELEPHONE (OUTPATIENT)
Dept: FAMILY MEDICINE CLINIC | Age: 66
End: 2021-04-26

## 2021-04-26 ENCOUNTER — TELEPHONE (OUTPATIENT)
Dept: CARDIOLOGY CLINIC | Age: 66
End: 2021-04-26

## 2021-04-26 ENCOUNTER — OFFICE VISIT (OUTPATIENT)
Dept: CARDIOLOGY CLINIC | Age: 66
End: 2021-04-26
Payer: COMMERCIAL

## 2021-04-26 VITALS
HEIGHT: 65 IN | SYSTOLIC BLOOD PRESSURE: 100 MMHG | HEART RATE: 76 BPM | WEIGHT: 170.5 LBS | BODY MASS INDEX: 28.41 KG/M2 | OXYGEN SATURATION: 99 % | DIASTOLIC BLOOD PRESSURE: 68 MMHG

## 2021-04-26 DIAGNOSIS — I51.89 DIASTOLIC DYSFUNCTION: ICD-10-CM

## 2021-04-26 DIAGNOSIS — E78.00 PURE HYPERCHOLESTEROLEMIA: ICD-10-CM

## 2021-04-26 DIAGNOSIS — R06.09 DYSPNEA ON EXERTION: ICD-10-CM

## 2021-04-26 DIAGNOSIS — I48.0 PAF (PAROXYSMAL ATRIAL FIBRILLATION) (HCC): Primary | ICD-10-CM

## 2021-04-26 DIAGNOSIS — N20.0 KIDNEY STONE: Primary | ICD-10-CM

## 2021-04-26 DIAGNOSIS — R42 LIGHTHEADEDNESS: ICD-10-CM

## 2021-04-26 DIAGNOSIS — R07.9 CHEST PAIN, UNSPECIFIED TYPE: ICD-10-CM

## 2021-04-26 DIAGNOSIS — G47.33 OSA (OBSTRUCTIVE SLEEP APNEA): ICD-10-CM

## 2021-04-26 DIAGNOSIS — I34.1 MVP (MITRAL VALVE PROLAPSE): ICD-10-CM

## 2021-04-26 PROBLEM — R06.02 SHORTNESS OF BREATH: Status: ACTIVE | Noted: 2021-04-26

## 2021-04-26 PROCEDURE — 93000 ELECTROCARDIOGRAM COMPLETE: CPT | Performed by: INTERNAL MEDICINE

## 2021-04-26 PROCEDURE — 99244 OFF/OP CNSLTJ NEW/EST MOD 40: CPT | Performed by: INTERNAL MEDICINE

## 2021-04-26 NOTE — LETTER
and does not consume alcohol or use or illicit drugs. Family history is notable for carotid artery disease in her mother. She was recently evaluated by her primary care provider, Dr. Mitchell Nash on 4/20/21 and noted to have an irregularly irregular heart rate and rhythm. ECG from that time showed atrial fibrillation with RVR in the 160s and non-specific ST-T wave changes. She was subsequently started on diltiazem  mg daily and Eliquis 5 mg BID. Repeat ECG today in clinic shows sinus rhythm with heart rate of 68 bpm.        Patient Active Problem List   Diagnosis    Pure hypercholesterolemia    Recurrent major depressive disorder, in full remission ([facility] [address]    Closed nondisplaced fracture of fifth metatarsal bone of left foot    Foot sprain    Left foot pain    Closed nondisplaced fracture of metatarsal bone of left foot with routine healing    Foot pain, left    MVP (mitral valve prolapse)    Primary insomnia    Restless leg syndrome    BIJAN on CPAP    Annual physical exam    Class 3 severe obesity due to excess calories in adult [facility])    Left knee pain    Primary osteoarthritis of left knee    Chronic atrial fibrillation (HCC)    Periumbilical mass         Past Medical History:   has a past medical history of Depression, GERD (gastroesophageal reflux disease), Hyperlipidemia, Lumbar herniated disc, MVP (mitral valve prolapse), BIJAN (obstructive sleep apnea), Primary osteoarthritis of left knee, and Restless leg syndrome. Surgical History:   has a past surgical history that includes back surgery (2/1998) and Lap Band (2010). Social History:   reports that she has never smoked. She has never used smokeless tobacco. She reports that she does not drink alcohol or use drugs. Family History:  No evidence for sudden cardiac death or premature CAD. Mother had carotid artery disease. Home Medications:  Reviewed and are listed in nursing record.  and/or listed below  Current Outpatient Medications   Medication Sig Dispense Refill    dilTIAZem (DILTIAZEM CD) 180 MG extended release capsule Take 1 capsule by mouth daily 30 capsule 3    apixaban (ELIQUIS) 5 MG TABS tablet Take 1 tablet by mouth 2 times daily 60 tablet 5    pramipexole (MIRAPEX) 0.5 MG tablet TAKE 1 TABLET BY MOUTH NIGHTLY AS NEEDED FOR RESTLESS LEG 90 tablet 1    sertraline (ZOLOFT) 100 MG tablet TAKE 1 TABLET BY MOUTH DAILY 90 tablet 1    traZODone (DESYREL) 50 MG tablet TAKE ONE-HALF TABLET BY MOUTH EVERY NIGHT AT BEDTIME, MAY INCREASE TO 1 TABLET AFTER 3RD NIGHT 90 tablet 1    buPROPion (WELLBUTRIN XL) 150 MG extended release tablet TAKE 1 TABLET BY MOUTH EVERY MORNING 90 tablet 1    atorvastatin (LIPITOR) 10 MG tablet Take 1 tablet by mouth daily 90 tablet 1    Phentermine-Topiramate (QSYMIA) 15-92 MG CP24 Take 1 capsule by mouth daily. .       No current facility-administered medications for this visit. Allergies:  Patient has no known allergies. Review of Systems:   A 14 point review of symptoms completed. Pertinent positives identified in the HPI, all other review of symptoms negative as below. Review of Systems   Constitutional: Positive for fatigue. Negative for chills and fever. HENT: Negative for congestion, rhinorrhea and sore throat. Eyes: Negative for photophobia, pain and visual disturbance. Respiratory: Positive for shortness of breath. Cardiovascular: Positive for chest pain and palpitations. Negative for leg swelling. Gastrointestinal: Negative for abdominal pain, blood in stool, constipation, diarrhea, nausea and vomiting. Endocrine: Negative for cold intolerance and heat intolerance. Genitourinary: Negative for difficulty urinating, dysuria and hematuria. Musculoskeletal: Negative for arthralgias, joint swelling and myalgias. Skin: Negative for rash and wound. Allergic/Immunologic: Negative for environmental allergies and food allergies.    Neurological: Positive for dizziness and light-headedness. Negative for syncope. Hematological: Negative for adenopathy. Does not bruise/bleed easily. Psychiatric/Behavioral: Negative for dysphoric mood. The patient is not nervous/anxious. Objective:   PHYSICAL EXAM:    Vitals:    04/26/21 1128   BP: 100/70   Pulse: 77   SpO2: 99%    Weight: 170 lb 8 oz (77.3 kg)     Wt Readings from Last 3 Encounters:   04/26/21 170 lb 8 oz (77.3 kg)   04/20/21 171 lb 12.8 oz (77.9 kg)   09/25/20 174 lb (78.9 kg)     Orthostatic vitals:  Lying:  /86 HR 64  Sitting:  /70 HR 62  Standing: /68 HR 76    General: Adult female in no acute distress. Pleasant and interactive on exam.  HEENT: Normocephalic, atraumatic, non-icteric, hearing intact, nares normal, mucous membranes moist.  Neck: Supple, trachea midline. No adenopathy. No thyromegaly. No carotid bruits. No JVD. Heart: Regular rate and rhythm. Normal S1 and S2. No murmurs, gallops or rubs. Lungs: Normal respiratory effort. Clear to auscultation bilaterally. No wheezes, rales, or rhonchi. Abdomen: Soft, non-tender. Normoactive bowel sounds. No masses or organomegaly. Skin: No rashes, wounds, or lesions. Pulses: 2+ and symmetric. Extremities: No clubbing, cyanosis, or edema. Musculoskeletal: Spontaneously moves all four extremities. Psych: Normal mood and affect. Neuro: Alert and oriented to person, place, and time. No focal deficits noted.       LABS   CBC:      Lab Results   Component Value Date    WBC 10.2 04/20/2021    RBC 4.69 04/20/2021    HGB 13.4 04/20/2021    HCT 41.1 04/20/2021    MCV 87.6 04/20/2021    RDW 14.3 04/20/2021     04/20/2021     CMP:  Lab Results   Component Value Date     04/20/2021    K 4.2 04/20/2021     04/20/2021    CO2 25 04/20/2021    BUN 14 04/20/2021    CREATININE 1.1 04/20/2021    GFRAA >60 04/20/2021    GFRAA >60 11/21/2012    AGRATIO 1.8 10/20/2016    LABGLOM 50 04/20/2021    GLUCOSE 91 04/20/2021    PROT 6.6 04/20/2021    PROT 7.2 11/21/2012    CALCIUM 9.5 04/20/2021    BILITOT 0.3 04/20/2021    ALKPHOS 88 04/20/2021    AST 14 04/20/2021    ALT 10 04/20/2021     PT/INR:   No results found for: PTINR  Liver:  No components found for: CHLPL  Lab Results   Component Value Date    ALT 10 04/20/2021    AST 14 (L) 04/20/2021    ALKPHOS 88 04/20/2021    BILITOT 0.3 04/20/2021     No results found for: LABA1C  Lipids:         Lab Results   Component Value Date    TRIG 85 04/20/2021    TRIG 98 01/03/2020    TRIG 82 06/25/2019            Lab Results   Component Value Date    HDL 71 (H) 04/20/2021    HDL 69 (H) 01/03/2020    HDL 69 (H) 06/25/2019            Lab Results   Component Value Date    LDLCALC 97 04/20/2021    LDLCALC 101 (H) 01/03/2020    LDLCALC 106 (H) 06/25/2019            Lab Results   Component Value Date    LABVLDL 17 04/20/2021    LABVLDL 20 01/03/2020    LABVLDL 16 06/25/2019       CARDIAC DATA   LAST EKG 4/20/21:  Normal sinus rhythm, HR 68 bpm.    ECHO:   TTE 1/11/13:  Study Conclusions  - Left ventricle: The cavity size was normal. Wall thickness    was normal. Systolic function was normal. The estimated    ejection fraction was in the range of 55% to 60%. Wall    motion was normal; there were no regional wall motion    abnormalities. Left ventricular diastolic function    parameters were normal. Left atrium: The atrium was mildly    dilated. Tricuspid valve: Mild-moderate regurgitation. TTE 4/27/17:    Summary   Left ventricular systolic function is normal with the ejection fraction   estimated at 55%. No regional wall motion abnormalities. Grade II diastolic dysfunction with elevated filling pressure. Mildly dilated left atrium. Mild late systolic prolapse of the mitral valve. Mild mitral valve regurgitation. Mild tricuspid regurgitation. Systolic pulmonary artery pressure (SPAP) is normal and estimated at 38 mmHg   (RA pressure 3 mmHg).     STRESS TEST: N/A    CATH: N/A      Assessment: 1. Paroxysmal atrial fibrillation - Newly diagnosed. Has been having palpitations on and off. Started on diltiazem  mg daily and Eliquis 5 mg BID by Dr. Armand Saldana on 4/20/21 and is currently in sinus rhythm with heart rate in 60s. Last TTE from 2017 showed preserved biventricular systolic function with mildly dilated left atrium, mild mitral prolapse, and mild mitral and tricuspid regurgitation. Will arrange for 2-week cardiac event monitor to evaluate a-fib burden and also obtain repeat transthoracic echocardiogram to re-assess cardiac structure and function. 2. Chest pain/dyspnea - Describes both typical and atypical features. Symptoms may be secondary to paroxysmal atrial fibrillation, but underlying ischemic heart disease cannot be entirely excluded at this time and additional risk stratification with non-invasive stress testing is warranted. She did have evidence of diastolic dysfunction on her previous TTE, but currently appears clinically euvolemic. 3. Lightheadedness - History is most suggestive of orthostatic hypotension or vasovagal reaction. Orthostatic vitals were negative today in clinic. Cannot rule out that above arrhythmia is contributing to symptoms. Planning for 2-week cardiac event monitor and repeat TTE as above. 4. Mitral valve prolapse - Appeared mild on last TTE from 2017. No appreciable mid-systolic click or significant murmur on exam.  5. Diastolic dysfunction - Currently appears clinically euvolemic. 6. Hyperlipidemia  7. BIJAN  8. Restless legs syndrome      Plan:     1. Will arrange for two-week cardiac event monitor to assess a-fib burden and if significant can consider initiation of antiarrhythmic agent versus referral to EP for discussion of RFCA. 2. Continue diltiazem  mg daily and Eliquis 5 mg BID.  3. Order GXT nuclear SPECT stress test for cardiac risk stratification. OK to convert study to [place] if does not reach target heart rate with exercise.   4. Obtain transthoracic echocardiogram to re-evaluate cardiac structure and function and degree of mitral valve prolapse. 5. Encouraged adequate PO fluid intake. 6. Advised patient to always rise slowly from lying and sitting positions and to sit or lie down immediately whenever she begins to feel lightheaded. 7. Also instructed on use of counter-pressure maneuvers, such as handgripping, leg crossing, and abdominal tensing. 8. Encouraged compliance with CPAP. 9. Follow-up in 6 weeks. This note was scribed in the presence of Harman Flores DO by Jocelyn Brown RN. It is a pleasure to assist in the care of Joy Alvarez. Please call with any questions. The scribes documentation has been prepared under my direction and personally reviewed by me in its entirety. I confirm that the note above accurately reflects all work, treatment, procedures, and medical decision making performed by me. I, Dr. Alonzo Barth, personally performed the services described in this documentation as scribed by Jocelyn Brown RN in my presence, and it is both accurate and complete to the best of our ability.          Alonzo Barth, [address] [facility]  [phone] [facility]  [phone] [facility]

## 2021-04-26 NOTE — PROGRESS NOTES
/      [facility]   Cardiovascular Evaluation    PATIENT: Brennan Bill  DATE: 2021  MRN: <[id number]>  CSN: [id number]  : 1955      Primary Care Doctor: Mati Farley MD  Reason for evaluation:   Atrial fibrillation    Subjective:   History of present illness on initial date of evaluation:  Brennan Bill is a 81-year-old female with a history of mitral valve prolapse, diastolic dysfunction, hyperlipidemia, BIJAN, and restless legs syndrome referred for further evaluation and management of newly diagnosed atrial fibrillation. The patient reports that she occasionally has palpitations where she feels like her heart is beating fasts or irregularly. For the last several months, she has also been having substernal chest pressure and shortness of breath with relatively minimal exertion. She says that that the chest pressure will also sometimes come on at rest without any specific trigger and last for approximately one minutes. She also has also been having some lightheadedness, typically when standing. This morning, while taking a shower she says that she felt very lightheaded and had to sit down. She denies recent syncope, but says that she did pass out once in her youth after a long day in the heat. She does not get any regular physical exercise. She works for [employer] within her company and says that she has been working 70-80 hours per week. She formerly received her cardiology care through Dr. Tyler Montgomery at [facility] and was previously diagnosed with mitral valve prolpase. Her last TTE from  showed an LVEF of 55% with normal wall motion, grade 2 diastolic dysfunction, normal RV size and systolic function, mildly dilated left atrium, mild late systolic prolapse of the mitral valve with mild mitral regurgitation, and mild tricuspid regurgitation. She has never had a cardiac stress test or undergone cardiac catheterization.   She denies use of tobacco products and does not consume alcohol or use or illicit drugs. Family history is notable for carotid artery disease in her mother. She was recently evaluated by her primary care provider, Dr. Irish Ramos on 4/20/21 and noted to have an irregularly irregular heart rate and rhythm. ECG from that time showed atrial fibrillation with RVR in the 160s and non-specific ST-T wave changes. She was subsequently started on diltiazem  mg daily and Eliquis 5 mg BID. Repeat ECG today in clinic shows sinus rhythm with heart rate of 68 bpm.        Patient Active Problem List   Diagnosis    Pure hypercholesterolemia    Recurrent major depressive disorder, in full remission ([facility] [address]    Closed nondisplaced fracture of fifth metatarsal bone of left foot    Foot sprain    Left foot pain    Closed nondisplaced fracture of metatarsal bone of left foot with routine healing    Foot pain, left    MVP (mitral valve prolapse)    Primary insomnia    Restless leg syndrome    BIJAN on CPAP    Annual physical exam    Class 3 severe obesity due to excess calories in adult [facility])    Left knee pain    Primary osteoarthritis of left knee    Chronic atrial fibrillation (HCC)    Periumbilical mass         Past Medical History:   has a past medical history of Depression, GERD (gastroesophageal reflux disease), Hyperlipidemia, Lumbar herniated disc, MVP (mitral valve prolapse), BIJAN (obstructive sleep apnea), Primary osteoarthritis of left knee, and Restless leg syndrome. Surgical History:   has a past surgical history that includes back surgery (2/1998) and Lap Band (2010). Social History:   reports that she has never smoked. She has never used smokeless tobacco. She reports that she does not drink alcohol or use drugs. Family History:  No evidence for sudden cardiac death or premature CAD. Mother had carotid artery disease. Home Medications:  Reviewed and are listed in nursing record.  and/or listed below  Current Outpatient Medications   Medication Sig Dispense Refill    dilTIAZem (DILTIAZEM CD) 180 MG extended release capsule Take 1 capsule by mouth daily 30 capsule 3    apixaban (ELIQUIS) 5 MG TABS tablet Take 1 tablet by mouth 2 times daily 60 tablet 5    pramipexole (MIRAPEX) 0.5 MG tablet TAKE 1 TABLET BY MOUTH NIGHTLY AS NEEDED FOR RESTLESS LEG 90 tablet 1    sertraline (ZOLOFT) 100 MG tablet TAKE 1 TABLET BY MOUTH DAILY 90 tablet 1    traZODone (DESYREL) 50 MG tablet TAKE ONE-HALF TABLET BY MOUTH EVERY NIGHT AT BEDTIME, MAY INCREASE TO 1 TABLET AFTER 3RD NIGHT 90 tablet 1    buPROPion (WELLBUTRIN XL) 150 MG extended release tablet TAKE 1 TABLET BY MOUTH EVERY MORNING 90 tablet 1    atorvastatin (LIPITOR) 10 MG tablet Take 1 tablet by mouth daily 90 tablet 1    Phentermine-Topiramate (QSYMIA) 15-92 MG CP24 Take 1 capsule by mouth daily. .       No current facility-administered medications for this visit. Allergies:  Patient has no known allergies. Review of Systems:   A 14 point review of symptoms completed. Pertinent positives identified in the HPI, all other review of symptoms negative as below. Review of Systems   Constitutional: Positive for fatigue. Negative for chills and fever. HENT: Negative for congestion, rhinorrhea and sore throat. Eyes: Negative for photophobia, pain and visual disturbance. Respiratory: Positive for shortness of breath. Cardiovascular: Positive for chest pain and palpitations. Negative for leg swelling. Gastrointestinal: Negative for abdominal pain, blood in stool, constipation, diarrhea, nausea and vomiting. Endocrine: Negative for cold intolerance and heat intolerance. Genitourinary: Negative for difficulty urinating, dysuria and hematuria. Musculoskeletal: Negative for arthralgias, joint swelling and myalgias. Skin: Negative for rash and wound. Allergic/Immunologic: Negative for environmental allergies and food allergies.    Neurological: Positive for dizziness and light-headedness. Negative for syncope. Hematological: Negative for adenopathy. Does not bruise/bleed easily. Psychiatric/Behavioral: Negative for dysphoric mood. The patient is not nervous/anxious. Objective:   PHYSICAL EXAM:    Vitals:    04/26/21 1128   BP: 100/70   Pulse: 77   SpO2: 99%    Weight: 170 lb 8 oz (77.3 kg)     Wt Readings from Last 3 Encounters:   04/26/21 170 lb 8 oz (77.3 kg)   04/20/21 171 lb 12.8 oz (77.9 kg)   09/25/20 174 lb (78.9 kg)     Orthostatic vitals:  Lying:  /86 HR 64  Sitting:  /70 HR 62  Standing: /68 HR 76    General: Adult female in no acute distress. Pleasant and interactive on exam.  HEENT: Normocephalic, atraumatic, non-icteric, hearing intact, nares normal, mucous membranes moist.  Neck: Supple, trachea midline. No adenopathy. No thyromegaly. No carotid bruits. No JVD. Heart: Regular rate and rhythm. Normal S1 and S2. No murmurs, gallops or rubs. Lungs: Normal respiratory effort. Clear to auscultation bilaterally. No wheezes, rales, or rhonchi. Abdomen: Soft, non-tender. Normoactive bowel sounds. No masses or organomegaly. Skin: No rashes, wounds, or lesions. Pulses: 2+ and symmetric. Extremities: No clubbing, cyanosis, or edema. Musculoskeletal: Spontaneously moves all four extremities. Psych: Normal mood and affect. Neuro: Alert and oriented to person, place, and time. No focal deficits noted.       LABS   CBC:      Lab Results   Component Value Date    WBC 10.2 04/20/2021    RBC 4.69 04/20/2021    HGB 13.4 04/20/2021    HCT 41.1 04/20/2021    MCV 87.6 04/20/2021    RDW 14.3 04/20/2021     04/20/2021     CMP:  Lab Results   Component Value Date     04/20/2021    K 4.2 04/20/2021     04/20/2021    CO2 25 04/20/2021    BUN 14 04/20/2021    CREATININE 1.1 04/20/2021    GFRAA >60 04/20/2021    GFRAA >60 11/21/2012    AGRATIO 1.8 10/20/2016    LABGLOM 50 04/20/2021    GLUCOSE 91 04/20/2021    PROT 6.6 04/20/2021    PROT 7.2 11/21/2012    CALCIUM 9.5 04/20/2021    BILITOT 0.3 04/20/2021    ALKPHOS 88 04/20/2021    AST 14 04/20/2021    ALT 10 04/20/2021     PT/INR:   No results found for: PTINR  Liver:  No components found for: CHLPL  Lab Results   Component Value Date    ALT 10 04/20/2021    AST 14 (L) 04/20/2021    ALKPHOS 88 04/20/2021    BILITOT 0.3 04/20/2021     No results found for: LABA1C  Lipids:         Lab Results   Component Value Date    TRIG 85 04/20/2021    TRIG 98 01/03/2020    TRIG 82 06/25/2019            Lab Results   Component Value Date    HDL 71 (H) 04/20/2021    HDL 69 (H) 01/03/2020    HDL 69 (H) 06/25/2019            Lab Results   Component Value Date    LDLCALC 97 04/20/2021    LDLCALC 101 (H) 01/03/2020    LDLCALC 106 (H) 06/25/2019            Lab Results   Component Value Date    LABVLDL 17 04/20/2021    LABVLDL 20 01/03/2020    LABVLDL 16 06/25/2019       CARDIAC DATA   LAST EKG 4/20/21:  Normal sinus rhythm, HR 68 bpm.    ECHO:   TTE 1/11/13:  Study Conclusions  - Left ventricle: The cavity size was normal. Wall thickness    was normal. Systolic function was normal. The estimated    ejection fraction was in the range of 55% to 60%. Wall    motion was normal; there were no regional wall motion    abnormalities. Left ventricular diastolic function    parameters were normal. Left atrium: The atrium was mildly    dilated. Tricuspid valve: Mild-moderate regurgitation. TTE 4/27/17:    Summary   Left ventricular systolic function is normal with the ejection fraction   estimated at 55%. No regional wall motion abnormalities. Grade II diastolic dysfunction with elevated filling pressure. Mildly dilated left atrium. Mild late systolic prolapse of the mitral valve. Mild mitral valve regurgitation. Mild tricuspid regurgitation. Systolic pulmonary artery pressure (SPAP) is normal and estimated at 38 mmHg   (RA pressure 3 mmHg).     STRESS TEST: N/A    CATH: N/A      Assessment: 1. Paroxysmal atrial fibrillation - Newly diagnosed. Has been having palpitations on and off. Started on diltiazem  mg daily and Eliquis 5 mg BID by Dr. Ramesh Saunders on 4/20/21 and is currently in sinus rhythm with heart rate in 60s. Last TTE from 2017 showed preserved biventricular systolic function with mildly dilated left atrium, mild mitral prolapse, and mild mitral and tricuspid regurgitation. Will arrange for 2-week cardiac event monitor to evaluate a-fib burden and also obtain repeat transthoracic echocardiogram to re-assess cardiac structure and function. 2. Chest pain/dyspnea - Describes both typical and atypical features. Symptoms may be secondary to paroxysmal atrial fibrillation, but underlying ischemic heart disease cannot be entirely excluded at this time and additional risk stratification with non-invasive stress testing is warranted. She did have evidence of diastolic dysfunction on her previous TTE, but currently appears clinically euvolemic. 3. Lightheadedness - History is most suggestive of orthostatic hypotension or vasovagal reaction. Orthostatic vitals were negative today in clinic. Cannot rule out that above arrhythmia is contributing to symptoms. Planning for 2-week cardiac event monitor and repeat TTE as above. 4. Mitral valve prolapse - Appeared mild on last TTE from 2017. No appreciable mid-systolic click or significant murmur on exam.  5. Diastolic dysfunction - Currently appears clinically euvolemic. 6. Hyperlipidemia  7. BIJAN  8. Restless legs syndrome      Plan:     1. Will arrange for two-week cardiac event monitor to assess a-fib burden and if significant can consider initiation of antiarrhythmic agent versus referral to EP for discussion of RFCA. 2. Continue diltiazem  mg daily and Eliquis 5 mg BID.  3. Order GXT nuclear SPECT stress test for cardiac risk stratification. OK to convert study to Lilian Espinal if does not reach target heart rate with exercise.   4. Obtain transthoracic echocardiogram to re-evaluate cardiac structure and function and degree of mitral valve prolapse. 5. Encouraged adequate PO fluid intake. 6. Advised patient to always rise slowly from lying and sitting positions and to sit or lie down immediately whenever she begins to feel lightheaded. 7. Also instructed on use of counter-pressure maneuvers, such as handgripping, leg crossing, and abdominal tensing. 8. Encouraged compliance with CPAP. 9. Follow-up in 6 weeks. This note was scribed in the presence of Harman Flores DO by Yvette Diamond RN. It is a pleasure to assist in the care of Kam Yost. Please call with any questions. The scribes documentation has been prepared under my direction and personally reviewed by me in its entirety. I confirm that the note above accurately reflects all work, treatment, procedures, and medical decision making performed by me. I, Dr. Diomedes Joe, personally performed the services described in this documentation as scribed by Yvette Diamond RN in my presence, and it is both accurate and complete to the best of our ability.          Diomedes Joe, [facility]  [phone] [facility]  [phone] [address] [place] [address]

## 2021-04-26 NOTE — TELEPHONE ENCOUNTER
Monitor placed by MM  Monitor company [employer]  Length of monitor 2 weeks  Monitor ordered by [facility]  Serial number [id number]  Kit ID   Activation successful prior to pt leaving office?  YES

## 2021-04-26 NOTE — PATIENT INSTRUCTIONS
1.  Goal of heart rate is below 80 at rest and 110 with exertion. Diltiazem is to control heart rate. Atrial fibrillation increases risk of stroke. Eliquis is to prevent strokes. 2. EKG and orthostatic BP's today in office  3. Call [phone] to schedule stress test okay to stay on diltiazam for stress test and Echocardiogram  4. Recommend that you wear cardiac event to assess how often you have atrial fibrillation for 2 weeks  5.  Follow up in 6 weeks

## 2021-04-26 NOTE — TELEPHONE ENCOUNTER
----- Message from Emy Montoya MD sent at 4/26/2021 10:43 AM EDT -----  Lap band reservoir has slipped down to just above belly button. No obstruction. This ok unless painful. Has non obstruction R sided Kidney stones. referral to see Uro for kidney stones. Drink 72 oz water a day and Cranberry juice 8 oz/d.

## 2021-05-01 ENCOUNTER — TELEPHONE (OUTPATIENT)
Dept: CARDIOLOGY | Age: 66
End: 2021-05-01

## 2021-05-01 NOTE — TELEPHONE ENCOUNTER
Seen 4/26/21 with Dr. Luek Nicely. Notified that the patient had a 6.2 sec pause/asystole by her [employer] monitor. This occurred at 05:22 AM following atrial fibrillation event lasting <1 min. Findings consistent with post-conversion pause. Called patient. This was forward to voicemail. Left confidential VM with information to call on call service to get me this weekend as I will be on call to discuss any symptoms. Appears anti-coagulated. On diltiazem for rate control. Noted BIJAN history on CPAP. Assume monitor study done for breakthrough symptoms - will await call back to discuss. Anibal Jasmine conversation to Dr. Luke Nicely her primary cardiologist as well.      Regina Solano MD, [facility]  [phone] [facility]  [phone] [address] [place] [address]

## 2021-05-01 NOTE — TELEPHONE ENCOUNTER
Pt called back. No symptoms. Sleeping at time of pause. Not using CPAP as she lost 70 lbs. Not re-evaluated by sleep since wt loss. Will forward to cardiologist but no need to present to hospital at this time.      KAREEN

## 2021-05-02 ASSESSMENT — ENCOUNTER SYMPTOMS
NAUSEA: 0
SHORTNESS OF BREATH: 1
EYE PAIN: 0
BLOOD IN STOOL: 0
VOMITING: 0
PHOTOPHOBIA: 0
RHINORRHEA: 0
ABDOMINAL PAIN: 0
CONSTIPATION: 0
SORE THROAT: 0
DIARRHEA: 0

## 2021-05-14 ENCOUNTER — HOSPITAL ENCOUNTER (OUTPATIENT)
Dept: CARDIOLOGY | Age: 66
Discharge: HOME | End: 2021-05-14
Payer: COMMERCIAL

## 2021-05-14 DIAGNOSIS — I34.1 MVP (MITRAL VALVE PROLAPSE): ICD-10-CM

## 2021-05-14 DIAGNOSIS — R06.09 DYSPNEA ON EXERTION: ICD-10-CM

## 2021-05-14 DIAGNOSIS — R07.9 CHEST PAIN, UNSPECIFIED TYPE: ICD-10-CM

## 2021-05-14 DIAGNOSIS — I48.0 PAF (PAROXYSMAL ATRIAL FIBRILLATION) (HCC): ICD-10-CM

## 2021-05-14 DIAGNOSIS — E78.00 PURE HYPERCHOLESTEROLEMIA: ICD-10-CM

## 2021-05-14 LAB
LV EF: 55 %
LV EF: 81 %
LVEF MODALITY: NORMAL
LVEF MODALITY: NORMAL

## 2021-05-14 PROCEDURE — 93017 CV STRESS TEST TRACING ONLY: CPT

## 2021-05-14 PROCEDURE — 78452 HT MUSCLE IMAGE SPECT MULT: CPT

## 2021-05-14 PROCEDURE — 93306 TTE W/DOPPLER COMPLETE: CPT

## 2021-05-14 PROCEDURE — A9502 TC99M TETROFOSMIN: HCPCS | Performed by: INTERNAL MEDICINE

## 2021-05-14 PROCEDURE — 3430000000 HC RX DIAGNOSTIC RADIOPHARMACEUTICAL: Performed by: INTERNAL MEDICINE

## 2021-05-14 RX ADMIN — TETROFOSMIN 10.7 MILLICURIE: 1.38 INJECTION, POWDER, LYOPHILIZED, FOR SOLUTION INTRAVENOUS at 13:00

## 2021-05-14 RX ADMIN — TETROFOSMIN 32.4 MILLICURIE: 1.38 INJECTION, POWDER, LYOPHILIZED, FOR SOLUTION INTRAVENOUS at 14:54

## 2021-05-18 ENCOUNTER — TELEPHONE (OUTPATIENT)
Dept: CARDIOLOGY CLINIC | Age: 66
End: 2021-05-18

## 2021-05-18 ENCOUNTER — OFFICE VISIT (OUTPATIENT)
Dept: CARDIOLOGY CLINIC | Age: 66
End: 2021-05-18
Payer: COMMERCIAL

## 2021-05-18 VITALS
SYSTOLIC BLOOD PRESSURE: 100 MMHG | HEIGHT: 65 IN | HEART RATE: 170 BPM | DIASTOLIC BLOOD PRESSURE: 60 MMHG | WEIGHT: 172 LBS | OXYGEN SATURATION: 98 % | BODY MASS INDEX: 28.66 KG/M2

## 2021-05-18 DIAGNOSIS — G47.33 OSA ON CPAP: ICD-10-CM

## 2021-05-18 DIAGNOSIS — Z99.89 OSA ON CPAP: ICD-10-CM

## 2021-05-18 DIAGNOSIS — I48.0 PAF (PAROXYSMAL ATRIAL FIBRILLATION) (HCC): Primary | ICD-10-CM

## 2021-05-18 PROCEDURE — 99244 OFF/OP CNSLTJ NEW/EST MOD 40: CPT | Performed by: INTERNAL MEDICINE

## 2021-05-18 PROCEDURE — 93000 ELECTROCARDIOGRAM COMPLETE: CPT | Performed by: INTERNAL MEDICINE

## 2021-05-18 NOTE — TELEPHONE ENCOUNTER
Pt was seen by [address] [place] today and there was discussion about cancelling the [facility] appt on 6-11-21. Does that appt need to be cancelled for now? Please advise, I informed pt that we will call her back.

## 2021-05-18 NOTE — PATIENT INSTRUCTIONS
RECOMMENDATIONS:  1. Discussed at length treatment options for AF:    1. Medications to slow heart rate (goal of less than 90 at rest, less than 110 with mild activity). Unfortunately, due to your sinus pauses, medications to slow your HR is not ideal at this time. 2. Medications for rhythm control (amiodarone, flecainide, dronedarone, or dofetilide which requires hospital admission for 3-4 days). Discussed risks and benefits.     -Start flecainide 100mg twice daily. 3. Ablation to burn the abnormal electrical activity within the heart. Discussed risks and benefits. Recommend a combination of an ablation and an antiarrhythmic medication. 4. Pace and Ablate to put pacemaker in the heart and burn the electrical activity in the heart. Would be dependent upon pacemaker for the rest of your life. 5. Cardioversion to electrically shock heart back into normal rhythm on Friday, 5/21/2021.     -DO NOT miss any doses of your Eliquis. 2. Recommend light activity as tolerated until your heart rate is better controlled. 3. Follow up with me in 4 weeks. Patient Education        Learning About Atrial Fibrillation  What is atrial fibrillation? Atrial fibrillation (say \"AY-tree-raleigh alh-asyf-OJT-shun\") is a common type of irregular heartbeat (arrhythmia). Normally, the heart beats in a strong, steady rhythm. In atrial fibrillation, a problem with the heart's electrical system causes the two upper chambers of the heart (called the atria) to quiver, or fibrillate. Atrial fibrillation can be dangerous. This is because if the heartbeat isn't strong and steady, blood can collect, or pool, in the atria. And pooled blood is more likely to form clots. Clots can travel to the brain, block blood flow, and cause a stroke. Atrial fibrillation can also lead to heart failure. This condition also upsets the normal rhythm between the atria and the lower chambers of the heart.  (These chambers are called the ventricles.) The ventricles may beat fast and without a regular rhythm. What are the symptoms? Some people feel symptoms when they have episodes of atrial fibrillation. But other people don't notice any symptoms. If you have symptoms, you may feel:  · A fluttering, racing, or pounding feeling in your chest called palpitations. · Weak or tired. · Dizzy or lightheaded. · Short of breath. · Chest pain. · Confused. You may notice signs of atrial fibrillation when you check your pulse. Your pulse may seem uneven or fast.  What can you expect when you have atrial fibrillation? At first, spells of atrial fibrillation may come on suddenly and last a short time. They may go away on their own or with treatment. Over time, the spells may last longer and occur more often. They often don't go away on their own. How is it treated? Treatments can help you feel better and prevent future problems, especially stroke and heart failure. Your treatment will depend on the cause of your atrial fibrillation, your symptoms, and your risk for stroke. Types of treatment include:  · Heart rate treatment. Medicine may be used to slow your heart rate. Your heartbeat may still be irregular. But these medicines keep your heart from beating too fast. They may also help relieve symptoms. · Heart rhythm treatment. Different treatments may be used to try to stop atrial fibrillation and keep it from returning. They can also relieve symptoms. These treatments include medicine, electrical cardioversion to shock the heart back to a normal rhythm, a procedure called catheter ablation, and heart surgery. · Stroke prevention. You and your doctor can decide how to lower your risk. You may decide to take a blood-thinning medicine called an anticoagulant. What is a heart-healthy lifestyle for atrial fibrillation? You can live well and help manage atrial fibrillation by having a heart-healthy lifestyle.  This lifestyle may help reduce how often you have episodes of atrial fibrillation. Don't smoke. Avoid secondhand smoke too. Quitting smoking is the best thing you can do for your heart. Be active. Talk to your doctor about what type and level of exercise is safe for you. Eat a heart-healthy diet. These foods include vegetables, fruits, nuts, beans, lean meat, fish, and whole grains. Limit sodium, alcohol, and sugar. Avoid alcohol if it triggers symptoms. Stay at a healthy weight. Lose weight if you need to. Losing weight can help relieve symptoms. Manage other health problems. These problems include diabetes, high blood pressure, and high cholesterol. If you think you may have a problem with alcohol or drug use, talk to your doctor. Manage stress. Options like yoga, biofeedback, and meditation may help. Where can you learn more? Go to https://[employer]ren[employer]eb[email or web address]. org and sign in to your [employer] account. Enter V340 in the [employer] box to learn more about \"Learning About Atrial Fibrillation. \"     If you do not have an account, please click on the \"Sign Up Now\" link. Current as of: August 31, 2020               Content Version: 12.8  © 2006-2021 [employer]. Care instructions adapted under license by [facility] ([facility]). If you have questions about a medical condition or this instruction, always ask your healthcare professional. [address] any warranty or liability for your use of this information. Patient Education        dofetilide  Pronunciation:  kadi correa  Brand:  Shahnaz  What is the most important information I should know about dofetilide? You should not take dofetilide if you have severe kidney disease or a history of Long QT syndrome. Serious drug interactions can occur when certain medicines are used together with dofetilide. Tell each of your healthcare providers about all medicines you use now, and any medicine you start or stop using.   You will need to spend at least 3 days in a hospital setting when you first start taking dofetilide. This is so your heart rhythm and kidney function can be monitored in case the medicine causes serious side effects. What is dofetilide? Dofetilide is a heart rhythm medicine, also called an antiarrhythmic. Dofetilide is used to help keep the heart beating normally in people with certain heart rhythm disorders of the atrium (the upper chambers of the heart that allow blood to flow into the heart). Dofetilide is used in people with atrial fibrillation or atrial flutter. Dofetilide may also be used for purposes not listed in this medication guide. What should I discuss with my health care provider before taking dofetilide? You should not take dofetilide if you are allergic to it, or if you have:  · severe kidney disease (or if you are on dialysis); or  · a history of Long QT syndrome. Some medicines can cause unwanted or dangerous effects when used with dofetilide, and should not be used at the same time. Your doctor may need to change your treatment plan if you use any of the following drugs:  · cimetidine;  · dolutegravir;  · ketoconazole;  · megestrol;  · prochlorperazine;  · trimethoprim (Proloprim, Trimpex, Bactrim, Septra);  · verapamil; or  · a diuretic (water pill) that contains hydrochlorothiazide (HCTZ), such as Accuretic, Aldactazide, Atacand HCT, Benicar HCT, Diovan HCT, Dyazide, Exforge HCT, Hyzaar, Lopressor HCT, Maxzide, Micardis HCT, Monopril HCT, Prinzide, Tekturna HCT, Vaseretic, and others. To make sure dofetilide is safe for you, tell your doctor if you have:  · heart disease, high blood pressure;  · liver or kidney disease;  · depression, mental illness;  · asthma or allergies;  · any active infection;  · skin problems; or  · an electrolyte imbalance (such as low levels of potassium or magnesium in your blood). It is not known whether this medicine will harm an unborn baby.  Tell your doctor if you are face, lips, tongue, or throat. Call your doctor at once if you have:  · headache with chest pain and severe dizziness, fainting, fast or pounding heartbeats;  · loss of appetite, vomiting or severe diarrhea; or  · low magnesium or potassium --confusion, uneven heart rate, increased thirst or urination, sweating, jerking muscle movements, leg discomfort, muscle weakness or limp feeling. Common side effects may include:  · mild headache;  · mild dizziness; or  · cold symptoms such as stuffy nose, sneezing, sore throat. This is not a complete list of side effects and others may occur. Call your doctor for medical advice about side effects. You may report side effects to FDA at 6-616-KGZ-5163. What other drugs will affect dofetilide? Other drugs may interact with dofetilide, including prescription and over-the-counter medicines, vitamins, and herbal products. Tell each of your health care providers about all medicines you use now and any medicine you start or stop using. Where can I get more information? Your doctor or pharmacist can provide more information about dofetilide. Remember, keep this and all other medicines out of the reach of children, never share your medicines with others, and use this medication only for the indication prescribed. Every effort has been made to ensure that the information provided by Sheila Osorio [address] is accurate, up-to-date, and complete, but no guarantee is made to that effect. Drug information contained herein may be time sensitive. [facility] information has been compiled for use by healthcare practitioners and consumers in the [place] and therefore [employer] does not warrant that uses outside of the [place] are appropriate, unless specifically indicated otherwise. [facility]'s drug information does not endorse drugs, diagnose patients or recommend therapy.  [employer]s drug information is an informational resource designed to assist licensed healthcare practitioners in caring for their patients and/or to serve consumers viewing this service as a supplement to, and not a substitute for, the expertise, skill, knowledge and judgment of healthcare practitioners. The absence of a warning for a given drug or drug combination in no way should be construed to indicate that the drug or drug combination is safe, effective or appropriate for any given patient. [facility] does not assume any responsibility for any aspect of healthcare administered with the aid of information [facility] provides. The information contained herein is not intended to cover all possible uses, directions, precautions, warnings, drug interactions, allergic reactions, or adverse effects. If you have questions about the drugs you are taking, check with your doctor, nurse or pharmacist.  Copyright 7697-9699 [address]: 4.01. Revision date: 4/13/2016. Care instructions adapted under license by [facility] ([facility]). If you have questions about a medical condition or this instruction, always ask your healthcare professional. [place] [address] any warranty or liability for your use of this information. Patient Education        dronedarone  Pronunciation:  justyna reese  Brand:  Chilango  What is the most important information I should know about dronedarone? You should not use dronedarone if you have severe liver disease, if you are pregnant or breast-feeding, or if you have ever used amiodarone and then had liver or lung problems. You should not use dronedarone if you have a serious heart condition such as very slow heartbeats, \"sick sinus syndrome,\" or \"AV block\" (unless you have a pacemaker). Dronedarone can double your risk of death if you have certain heart conditions.  You should not use this medicine if you have severe heart failure, if you were recently hospitalized for worsening heart failure symptoms, or if you have a \"permanent\" type of atrial fibrillation (this will be determined by your doctor). Check your pulse often, and tell your doctor right away if you notice an irregular rhythm. Tell your doctor about all your current medicines and any you start or stop using. Many drugs can interact with dronedarone, and some drugs should not be used together. Dronedarone can cause liver problems. Call your doctor at once if you have symptoms such as nausea, loss of appetite, unusual tiredness, dark urine, or yellowing of your skin or eyes. What is dronedarone? Dronedarone is a heart rhythm medicine that helps maintain normal heartbeats in certain people with life-threatening rhythm disorders of the atrium (the upper chambers of the heart that allow blood to flow into the heart). Dronedarone helps lower your risk of needing to be hospitalized for a heart rhythm disorder called atrial fibrillation. Dronedarone is for people who have had this disorder in the past, but now have normal heart rhythm. Dronedarone may also be used for purposes not listed in this medication guide. What should I discuss with my healthcare provider before taking dronedarone? You should not use dronedarone if you are allergic to it, or if:  · you have severe liver disease;  · you have a serious heart condition such as \"sick sinus syndrome,\" \"AV block\" (unless you have a pacemaker), or very slow heartbeats that have caused you to faint;  · you are pregnant or breast-feeding; or  · you used a medicine called amiodarone and then had lung problems or liver problems. Dronedarone is used to treat intermittent or \"temporary\" heart rhythm disorders. In some people with \"permanent\" atrial fibrillation, dronedarone increased the risk of stroke, hospitalization, and death. Dronedarone can double your risk of death if you have certain heart conditions.  You should not use this medicine if:  · you have severe heart failure;  · you were recently hospitalized for worsening heart failure symptoms (shortness of breath, chest tightness, night-time breathing problems, swelling, rapid weight gain); or  · you have a \"permanent\" atrial fibrillation that cannot be changed back to a normal rhythm (this will be determined by your doctor). Some medicines can cause unwanted or dangerous effects when used with dronedarone. Your doctor may need to change your treatment plan if you use any of the following drugs:  · cyclosporine;  · ritonavir;  · other heart rhythm medicines;  · an antibiotic --azithromycin, clarithromycin, erythromycin, levofloxacin, moxifloxacin, pentamidine, telithromycin;  · antifungal medicine --ketoconazole, itraconazole, voriconazole;  · an antidepressant --amitriptyline, amoxapine, clomipramine, desipramine, doxepin, imipramine, maprotiline, mirtazapine, nefazodone, nortriptyline, protriptyline, trimipramine; or  · antipsychotic medicine --chlorpromazine, fluphenazine, perphenazine, prochlorperazine, promethazine, thioridazine, trifluoperazine. To make sure dronedarone is safe for you, tell your doctor if you have ever had:  · other heart problems;  · an electrolyte imbalance (such as low levels of potassium or magnesium in your blood); or  · liver or kidney disease. Do not use dronedarone if you are pregnant. It could harm the unborn baby or cause birth defects. Use effective birth control to prevent pregnancy while you are using this medicine and tell your doctor if you become pregnant. It is not known whether dronedarone passes into breast milk or if it could harm a nursing baby. You should not breast-feed while taking dronedarone. How should I take dronedarone? Follow all directions on your prescription label. Do not take this medicine in larger or smaller amounts or for longer than recommended. Dronedarone works best if you take it with your morning and evening meals. Your heart function may need to be checked using an electrocardiograph or ECG (sometimes called an EKG) every 3 months.  This will help your doctor determine how long to treat you with dronedarone. Your liver and kidney function may also need to be checked. Check your own pulse often, and call your doctor right away if you notice an irregular rhythm. Use dronedarone regularly even if you feel fine or have no symptoms. Get your prescription refilled before you run out of medicine completely. You should not stop using dronedarone suddenly. Stopping suddenly may make your condition worse. Store at room temperature away from heat and moisture. What happens if I miss a dose? Skip the missed dose and take the next regularly scheduled dose. Do not use extra medicine to make up the missed dose. What happens if I overdose? Seek emergency medical attention or call the Poison Help line at 1-259.930.6545. What should I avoid while taking dronedarone? Grapefruit and grapefruit juice may interact with dronedarone and lead to unwanted side effects. Avoid the use of grapefruit products while taking dronedarone. What are the possible side effects of dronedarone? Get emergency medical help if you have signs of an allergic reaction: hives; difficult breathing; swelling of your face, lips, tongue, or throat. Call your doctor at once if you have:  · shortness of breath (even with mild exertion), swelling, rapid weight gain;  · chest pain, wheezing, trouble breathing, dry cough, or coughing up mucus;  · breathing problems while lying down trying to sleep;  · severe dizziness, fainting, fast or pounding heartbeats;  · slow heart rate, feeling like you might pass out;  · a new or a worsening irregular heartbeat pattern;  · kidney problems --little or no urination, swelling in your feet or ankles, feeling tired or short of breath; or  · liver problems --nausea, upper stomach pain, itching, unusual tiredness, loss of appetite, dark urine, kyaw-colored stools, jaundice (yellowing of the skin or eyes).   Common side effects may include:  · stomach pain, indigestion, nausea, vomiting, diarrhea;  · feeling weak or tired; or  · skin rash, itching, or redness. This is not a complete list of side effects and others may occur. Call your doctor for medical advice about side effects. You may report side effects to FDA at 0-033-NKT-6240. What other drugs will affect dronedarone? Many drugs can interact with dronedarone. Not all possible interactions are listed here. Tell your doctor about all your current medicines and any you start or stop using, especially:  · digoxin;  · [place]'s wort;  · a blood thinner (warfarin, Coumadin, Jantoven);  · heart or blood pressure medication;  · medicines to treat tuberculosis;  · medicine to prevent organ transplant rejection;  · seizure medicine; or  · \"statin\" cholesterol medication (Lipitor, Zocor, Vytorin, and others). This list is not complete and many other drugs can interact with dronedarone. This includes prescription and over-the-counter medicines, vitamins, and herbal products. Give a list of all your medicines to any healthcare provider who treats you. Where can I get more information? Your pharmacist can provide more information about dronedarone. Remember, keep this and all other medicines out of the reach of children, never share your medicines with others, and use this medication only for the indication prescribed. Every effort has been made to ensure that the information provided by Sheila Osorio [address] is accurate, up-to-date, and complete, but no guarantee is made to that effect. Drug information contained herein may be time sensitive. [facility] information has been compiled for use by healthcare practitioners and consumers in the [facility] and therefore [facility] does not warrant that uses outside of the [facility] are appropriate, unless specifically indicated otherwise. [facility] drug information does not endorse drugs, diagnose patients or recommend therapy.  [facility] drug information is an informational resource designed to assist licensed healthcare practitioners in caring for their patients and/or to serve consumers viewing this service as a supplement to, and not a substitute for, the expertise, skill, knowledge and judgment of healthcare practitioners. The absence of a warning for a given drug or drug combination in no way should be construed to indicate that the drug or drug combination is safe, effective or appropriate for any given patient. [facility] does not assume any responsibility for any aspect of healthcare administered with the aid of information [facility] provides. The information contained herein is not intended to cover all possible uses, directions, precautions, warnings, drug interactions, allergic reactions, or adverse effects. If you have questions about the drugs you are taking, check with your doctor, nurse or pharmacist.  Copyright 0614-8441 [address]: 12.01. Revision date: 11/15/2017. Care instructions adapted under license by [facility] ([facility]). If you have questions about a medical condition or this instruction, always ask your healthcare professional. [place] [address] any warranty or liability for your use of this information. Patient Education        flecainide  Pronunciation:  OLLIE celeste  Brand:  Tambocor  What is the most important information I should know about flecainide? You should not use this medicine if you have a serious heart condition such as bundle branch block or AV block (without a pacemaker), or if your heart cannot pump blood properly. You may receive your first dose in a hospital or clinic setting to quickly treat any serious side effects. What is flecainide? Flecainide is a Class IC anti-arrhythmic that is used in certain situations to prevent serious heart rhythm disorders. Flecainide may also be used for purposes not listed in this medication guide. What should I discuss with my healthcare provider before taking flecainide?   You should not use flecainide if you are allergic to it, or if:  · you have a serious heart condition such as bundle branch block or AV block (unless you have a pacemaker); or  · your heart cannot pump blood properly. Tell your doctor if you have ever had:  · a heart attack;  · chronic atrial fibrillation, or \"AFib\";  · congestive heart failure;  · a heart condition called \"sick sinus syndrome\";  · an electrolyte imbalance (such as low levels of potassium or magnesium in your blood); or  · liver disease. It is not known whether this medicine will harm an unborn baby. Tell your doctor if you are pregnant or plan to become pregnant. It may not be safe to breast-feed while using this medicine. Ask your doctor about any risk. How should I take flecainide? Follow all directions on your prescription label and read all medication guides or instruction sheets. Your doctor may occasionally change your dose. Use the medicine exactly as directed. You may receive your first few doses in a hospital or clinic setting to quickly treat any serious side effects. Your heart rate will be monitored using an electrocardiograph or ECG (sometimes called an EKG). This will help your doctor determine how long to treat you with flecainide. You may also need frequent blood tests to check your liver or kidney function. Store at room temperature away from moisture, heat, and light. What happens if I miss a dose? Take the medicine as soon as you can, but skip the missed dose if it is almost time for your next dose. Do not take two doses at one time. What happens if I overdose? Seek emergency medical attention or call the Poison Help line at 1-850.477.6850. Overdose symptoms may include nausea, vomiting, slow heart rate, fainting, or seizure (convulsions). What should I avoid while taking flecainide? Follow your doctor's instructions about any restrictions on food, beverages, or activity.   What are the possible side effects of flecainide? Get emergency medical help if you have signs of an allergic reaction:  hives; difficulty breathing; swelling of your face, lips, tongue, or throat. Call your doctor at once if you have:  · fast or pounding heartbeats;  · fluttering in your chest, shortness of breath, and sudden dizziness (like you might pass out);  · slow heart rate, weak pulse, slow breathing (breathing may stop);  · feeling short of breath;  · swelling, rapid weight gain;  · pale skin, easy bruising or bleeding, unusual weakness;  · fever, flu-like symptoms; or  · jaundice (yellowing of the skin or eyes). Common side effects may include:  · dizziness;  · vision problems;  · trouble breathing;  · headache;  · nausea; or  · feeling weak or tired. This is not a complete list of side effects and others may occur. Call your doctor for medical advice about side effects. You may report side effects to FDA at 2-358-FDA-8888. What other drugs will affect flecainide? Tell your doctor about all your other medicines, especially:  · digoxin;  · a diuretic or \"water pill\";  · a beta-blocker (atenolol, metoprolol, propranolol, sotalol, and others);  · other heart medications such as amiodarone, diltiazem, disopyramide, nifedipine, quinidine, or verapamil; or  · seizure medication. This list is not complete. Other drugs may affect flecainide, including prescription and over-the-counter medicines, vitamins, and herbal products. Not all possible drug interactions are listed here. Where can I get more information? Your pharmacist can provide more information about flecainide. Remember, keep this and all other medicines out of the reach of children, never share your medicines with others, and use this medication only for the indication prescribed. Every effort has been made to ensure that the information provided by Sheila Osorio [address] is accurate, up-to-date, and complete, but no guarantee is made to that effect.  Drug information contained herein may be time sensitive. [employer] information has been compiled for use by healthcare practitioners and consumers in the [facility] and therefore [employer] does not warrant that uses outside of the [facility] are appropriate, unless specifically indicated otherwise. [facility] drug information does not endorse drugs, diagnose patients or recommend therapy. [facility] drug information is an informational resource designed to assist licensed healthcare practitioners in caring for their patients and/or to serve consumers viewing this service as a supplement to, and not a substitute for, the expertise, skill, knowledge and judgment of healthcare practitioners. The absence of a warning for a given drug or drug combination in no way should be construed to indicate that the drug or drug combination is safe, effective or appropriate for any given patient. [facility] does not assume any responsibility for any aspect of healthcare administered with the aid of information [facility] provides. The information contained herein is not intended to cover all possible uses, directions, precautions, warnings, drug interactions, allergic reactions, or adverse effects. If you have questions about the drugs you are taking, check with your doctor, nurse or pharmacist.  Copyright 0141-4871 [address]: 4.01. Revision date: 1/15/2019. Care instructions adapted under license by [facility] ([facility]). If you have questions about a medical condition or this instruction, always ask your healthcare professional. [place] [address] any warranty or liability for your use of this information. Patient Education        Learning About Cardioversion  What is cardioversion? Cardioversion is a treatment that helps your heart return to a normal rhythm. It treats problems like atrial fibrillation. It is also sometimes used in emergencies.  It can correct a fast heartbeat that causes low blood pressure, chest pain, or [email or web address] and sign in to your [employer] account. Enter Y571 in the [facility] box to learn more about \"Learning About Cardioversion. \"     If you do not have an account, please click on the \"Sign Up Now\" link. Current as of: August 31, 2020               Content Version: 12.8  © 2006-2021 [employer]. Care instructions adapted under license by [facility] ([facility]). If you have questions about a medical condition or this instruction, always ask your healthcare professional. [address] any warranty or liability for your use of this information. Patient Education        Electrophysiology Study and Catheter Ablation: Before Your Procedure  What is an electrophysiology study and catheter ablation? An electrophysiology study is a test to see if there is a problem with your heart rhythm and to find out how to fix it. It is also called an EP study. A catheter ablation procedure is sometimes done at the same time. This procedure destroys (ablates) small areas of your heart that are causing your heart rhythm problem. The doctor puts plastic tubes called catheters into blood vessels in your groin, arm, or neck. He or she then uses an X-ray machine to guide long wires through the tubes to your heart. The doctor can use these wires to record your heart's electrical signals. If the doctor thinks your problem can be fixed with ablation, he or she can use the wires to destroy a small part of your heart tissue. This is most often done with radio waves. You will probably be awake during the procedure. But you might be asleep. The doctor will give you medicines to help you feel relaxed and to numb the areas where the catheters go in. An EP study and ablation can take 2 to 6 hours. In rare cases, it can take longer. If you have an EP study only and you don't need more treatment, you may go home the same day.  But if you also have ablation, you may stay overnight in the hospital. How long you stay in the hospital depends on the type of ablation you have. Do not exercise hard or lift anything heavy for a week. You may be able to go back to work and to your normal routine in 1 or 2 days. Follow-up care is a key part of your treatment and safety. Be sure to make and go to all appointments, and call your doctor if you are having problems. It's also a good idea to know your test results and keep a list of the medicines you take. How do you prepare for the procedure? Procedures can be stressful. This information will help you understand what you can expect. And it will help you safely prepare for your procedure. Preparing for the procedure    · Be sure you have someone to take you home. Anesthesia and pain medicine will make it unsafe for you to drive or get home on your own.     · Understand exactly what procedure is planned, along with the risks, benefits, and other options.     · Tell your doctor ALL the medicines, vitamins, supplements, and herbal remedies you take. Some may increase the risk of problems during your procedure. Your doctor will tell you if you should stop taking any of them before the procedure and how soon to do it.     · If you take aspirin or some other blood thinner, ask your doctor if you should stop taking it before your procedure. Make sure that you understand exactly what your doctor wants you to do. These medicines increase the risk of bleeding.     · Make sure your doctor and the hospital have a copy of your advance directive. If you don't have one, you may want to prepare one. It lets others know your health care wishes. It's a good thing to have before any type of surgery or procedure. What happens on the day of the procedure? · Follow the instructions exactly about when to stop eating and drinking. If you don't, your procedure may be canceled.  If your doctor told you to take your medicines on the day of the procedure, take them with only a sip of water.     · Take a bath or shower before you come in for your procedure. Do not apply lotions, perfumes, deodorants, or nail polish.     · Take off all jewelry and piercings. And take out contact lenses, if you wear them. At the hospital or surgery center   · Bring a picture ID.     · You will be kept comfortable and safe by your anesthesia provider. The anesthesia may make you sleep. Or it may just numb the area being worked on.     · This procedure can take 2 to 6 hours. In rare cases, it can take longer.     · After the procedure, pressure will be applied to the area where the catheter was put in your blood vessel. Then the area may be covered with a bandage or a compression device. This will prevent bleeding.     · Nurses will check your heart rate and blood pressure. The nurse will also check the catheter site for bleeding.     · If the catheter was put in your groin, you will need to lie still and keep your leg straight for several hours. The nurse may put a weighted bag on your leg to keep it still.     · If the catheter was put in your arm, you may be able to sit up and get out of bed right away. But you will need to keep your arm still for at least 1 hour.     · You may have a bruise or a small lump where the catheter was put in your blood vessel. This is normal and will go away. When should you call your doctor? · You have questions or concerns.     · You don't understand how to prepare for your procedure.     · You become ill before the procedure (such as fever, flu, or a cold).     · You need to reschedule or have changed your mind about having the procedure. Where can you learn more? Go to [email or web address] org and sign in to your [employer] account. Enter L029 in the [employer] box to learn more about \"Electrophysiology Study and Catheter Ablation: Before Your Procedure. \"     If you do not have an account, please click on the \"Sign Up Now\" link.  Current as of: August 31, 2020               Content Version: 12.8  © 2006-2021 [employer]. Care instructions adapted under license by [facility] ([facility]). If you have questions about a medical condition or this instruction, always ask your healthcare professional. [address] any warranty or liability for your use of this information. Patient Education        Electrophysiology Study and Catheter Ablation: What to Expect at [address] [place] [address] Drive had an electrophysiology study for a problem with your heartbeat. You may also have had a catheter ablation to try to correct the problem. You may have swelling, bruising, or a small lump around the site where the catheters went into your body. These should go away in 3 to 4 weeks. Do not exercise hard or lift anything heavy for a week. You may be able to go back to work and to your normal routine in 1 or 2 days. This care sheet gives you a general idea about how long it will take for you to recover. But each person recovers at a different pace. Follow the steps below to get better as quickly as possible. How can you care for yourself at home? Activity    · For 1 week, do not lift anything that would make you strain. This may include heavy grocery bags and milk containers, a heavy briefcase or backpack, cat litter or dog food bags, a vacuum , or a child.     · For 1 week, do not exercise hard or do any activity that could strain your blood vessels or the site where the catheters went into your body.     · Ask your doctor when it is okay to have sex. Diet    · You can eat your normal diet. If your stomach is upset, try bland, low-fat foods like plain rice, broiled chicken, toast, and yogurt.     · Drink plenty of fluids (unless your doctor tells you not to). Medicines    · Your doctor will tell you if and when you can restart your medicines.  He or she will also give you instructions about taking any new medicines.     · If you take aspirin or some other blood thinner, ask your doctor if and when to start taking it again. Make sure that you understand exactly what your doctor wants you to do.     · Ask your doctor if you can take acetaminophen (Tylenol) for pain. Do not take aspirin for 3 days, unless your doctor says it is okay.     · Check with your doctor before you take aspirin or anti-inflammatory medicines to reduce pain and swelling. These include ibuprofen (Advil, Motrin) and naproxen (Aleve).   · Make sure you know which heart medicines to continue and which ones to stop. Ask your doctor if you aren't sure. Catheter site care    · You can remove your bandages the day after the procedure.     · You may shower 24 to 48 hours after the procedure, if your doctor okays it. Pat the incision dry.     · Do not soak the catheter site until it is healed. Don't take a bath for 1 week, or until your doctor tells you it is okay.     · Watch for bleeding from the site. A small amount of blood (up to the size of a quarter) on the bandage can be normal.     · If you are bleeding, lie down and press on the area for 15 minutes to try to make it stop. If the bleeding does not stop, call your doctor or seek immediate medical care. Follow-up care is a key part of your treatment and safety. Be sure to make and go to all appointments, and call your doctor if you are having problems. It's also a good idea to know your test results and keep a list of the medicines you take. When should you call for help? Call  911 anytime you think you may need emergency care. For example, call if:    · You passed out (lost consciousness).     · You have symptoms of a heart attack. These may include:  ? Chest pain or pressure, or a strange feeling in the chest.  ? Sweating. ? Shortness of breath. ? Nausea or vomiting. ? Pain, pressure, or a strange feeling in the back, neck, jaw, or upper belly, or in one or both shoulders or arms.   ? Lightheadedness or sudden weakness. ? A fast or irregular heartbeat. After you call 911, the  may tell you to chew 1 adult-strength or 2 to 4 low-dose aspirin. Wait for an ambulance. Do not try to drive yourself.     · You have symptoms of a stroke. These may include:  ? Sudden numbness, tingling, weakness, or loss of movement in your face, arm, or leg, especially on only one side of your body. ? Sudden vision changes. ? Sudden trouble speaking. ? Sudden confusion or trouble understanding simple statements. ? Sudden problems with walking or balance. ? A sudden, severe headache that is different from past headaches. Call your doctor now or seek immediate medical care if:    · You are bleeding from the area where the catheter was put in your blood vessel.     · You have a fast-growing, painful lump at the catheter site.     · You have signs of infection, such as:  ? Increased pain, swelling, warmth, or redness. ? Red streaks leading from the catheter site. ? Pus draining from the catheter site. ? A fever.     · Your leg, arm, or hand is painful, looks blue, or feels cold, numb, or tingly. Watch closely for any changes in your health, and be sure to contact your doctor if you have any problems. Where can you learn more? Go to [email or web address]orks.[email or web address] org and sign in to your [employer] account. Enter [phone] in the [facility] box to learn more about \"Electrophysiology Study and Catheter Ablation: What to Expect at Home. \"     If you do not have an account, please click on the \"Sign Up Now\" link. Current as of: August 31, 2020               Content Version: 12.8  © 3954-6531 [employer]. Care instructions adapted under license by [facility] ([facility]). If you have questions about a medical condition or this instruction, always ask your healthcare professional. [place] [address] any warranty or liability for your use of this information.

## 2021-05-18 NOTE — LETTER
[facility]   Electrophysiology Consult Note            Date: 5/18/21  Patient Name: Chandni Chauhan  YOB: 1955    Primary Care Physician: Feli Zhu MD    CHIEF COMPLAINT:   Chief Complaint   Patient presents with    New Patient    Atrial Fibrillation    Other     sinus pauses      HISTORY OF PRESENT ILLNESS: Chandni Chauhan is a 63 y.o. female  with a history of mitral valve prolapse, diastolic dysfunction, hyperlipidemia, BIJAN, and restless legs syndrome referred for further evaluation and management of newly diagnosed atrial fibrillation. The patient reported that she occasionally has palpitations where she feels like her heart is beating fasts or irregularly. For the last several months, she has also been having substernal chest pressure and shortness of breath with relatively minimal exertion. She says that that the chest pressure will also sometimes come on at rest without any specific trigger and last for approximately one minutes. She also has also been having some lightheadedness, typically when standing. This morning, while taking a shower she says that she felt very lightheaded and had to sit down. She denies recent syncope, but says that she did pass out once in her youth after a long day in the heat. She does not get any regular physical exercise. She works for [email or web address] within her company and says that she has been working 70-80 hours per week. She formerly received her cardiology care through Dr. Bishop Starr at [facility] and was previously diagnosed with mitral valve prolpase. Her last TTE from 2017 showed an LVEF of 55% with normal wall motion, grade 2 diastolic dysfunction, normal RV size and systolic function, mildly dilated left atrium, mild late systolic prolapse of the mitral valve with mild mitral regurgitation, and mild tricuspid regurgitation. She has never had a cardiac stress test or undergone cardiac catheterization.   She denied use of tobacco products and does not consume alcohol or use or illicit drugs. Family history is notable for carotid artery disease in her mother. She was recently evaluated by her primary care provider, Dr. Blanca Mendoza on 4/20/21 and noted to have an irregularly irregular heart rate and rhythm. ECG from that time showed atrial fibrillation with RVR in the 160s and non-specific ST-T wave changes. She was subsequently started on diltiazem  mg daily and Eliquis 5 mg BID. Patient wore a cardiac event monitor from [facility] with an average HR of 70 () BPM, 9.94% AF with an average HR of 154 () BPM, 0.42% PACs burden, 0.06% PVCs burden, 6.2 second conversion pause. Today, 5/18/2021, patient's ECG demonstrated AF at 170 BPM. She reports that she presented to her PCP for a routine visit in 4/2021 and was found to be in AF. She reports she has been experiencing dizziness with position changes and this has been going on for years. She reports she has no history of CAD, stents in her body, or history of CVA. She reports her brother was also diagnosed with AF recently. She reports she has lost 60lbs in the last few years and tolerates activity well. She reports she is taking all medications as prescribed and tolerates them well. She denies any bleeding or bruising issues. Patient denies current edema, chest pain, sob, palpitations, or syncope. Past Medical History:   has a past medical history of Depression, GERD (gastroesophageal reflux disease), Hyperlipidemia, Lumbar herniated disc, MVP (mitral valve prolapse), BIJAN (obstructive sleep apnea), Primary osteoarthritis of left knee, and Restless leg syndrome. Past Surgical History:   has a past surgical history that includes back surgery (2/1998) and Lap Band (2010). Allergies:  Patient has no known allergies. Social History:   reports that she has never smoked.  She has never used smokeless tobacco. She reports that she does not drink alcohol and does not use drugs. Family History: family history includes Arthritis in her father and mother; Diabetes in her father; High Blood Pressure in her father; High Cholesterol in her father; Other in her father. Home Medications:    Prior to Admission medications    Medication Sig Start Date End Date Taking? Authorizing Provider   dilTIAZem (DILTIAZEM CD) 180 MG extended release capsule Take 1 capsule by mouth daily 4/20/21  Yes SAJI Whipple MD   apixaban (ELIQUIS) 5 MG TABS tablet Take 1 tablet by mouth 2 times daily 4/20/21  Yes Lopez Adams MD   pramipexole (MIRAPEX) 0.5 MG tablet TAKE 1 TABLET BY MOUTH NIGHTLY AS NEEDED FOR RESTLESS LEG 4/5/21  Yes Lopez Adams MD   sertraline (ZOLOFT) 100 MG tablet TAKE 1 TABLET BY MOUTH DAILY 2/8/21  Yes Lopez Adams MD   traZODone (DESYREL) 50 MG tablet TAKE ONE-HALF TABLET BY MOUTH EVERY NIGHT AT BEDTIME, MAY INCREASE TO 1 TABLET AFTER 3RD NIGHT 2/1/21  Yes Lopez Adams MD   buPROPion (WELLBUTRIN XL) 150 MG extended release tablet TAKE 1 TABLET BY MOUTH EVERY MORNING 12/23/20  Yes LAURITA May   atorvastatin (LIPITOR) 10 MG tablet Take 1 tablet by mouth daily 11/17/20  Yes Lopez Adams MD   Phentermine-Topiramate (QSYMIA) 15-92 MG CP24 Take 1 capsule by mouth daily. .   Yes Historical Provider, MD       REVIEW OF SYSTEMS:    All 14-point review of systems are completed and  pertinent positives are mentioned in the history of present illness. Other  systems are reviewed and are negative. Physical Examination:    /60   Pulse 170   Ht 5' 5\" (1.651 m)   Wt 172 lb (78 kg)   SpO2 98%   BMI 28.62 kg/m²      Constitutional and General Appearance:    alert, cooperative, no distress and appears stated age  [de-identified]:    PERRLA, no cervical lymphadenopathy. No masses palpable.  Normal oral  mucosa  Respiratory:  · Normal excursion and expansion without use of accessory muscles  · Resp Auscultation: Normal breath sounds without dullness or wheezing Cardiovascular:  · The apical impulse is not displaced  · Tachycardic. Irregular rate and rhythm w/o M/G/R  Abdomen:  · No masses or tenderness  · Bowel sounds present  Extremities:  ·  No Cyanosis or Clubbing  ·  Lower extremity edema: No  · Skin: Warm and dry  Neurological:  · Alert and oriented. · Moves all extremities well  · No abnormalities of mood, affect, memory, mentation, or behavior are noted    DATA:      ECG 5/18/21: Personally reviewed. ECHO 5/14/2021:   Summary   Left ventricular systolic function is normal with a visually estimated   ejection fraction of 55%. The left ventricle is normal in size with mild septal hypertrophy. No obvious regional wall motion abnormalities noted. Normal left ventricular diastolic function. The left atrium appears moderately enlarged. Systolic pulmonary artery pressure (SPAP) is normal and estimated at 26 mmHg   (right atrial pressure 3 mmHg). Stress test 5/14/2021:  Summary Hypertensive response to exercise. Normal LV size and systolic function. Left ventricular ejection fraction of  81%. Normal wall motion. There is normal isotope uptake at stress and rest.  There is no evidence of myocardial ischemia or scar. IMPRESSION:    1. Atrial fibrillation with RVR. Patient is a pleasant 82-year-old female with a medical history significant for obstructive sleep apnea, and obesity who presents from home to [facility]. Patient recently diagnosed with atrial fibrillation with RVR. She is extremely tachycardic when she is in atrial fibrillation. This diet not being on the highest dose of rate controlling agents patient has significant pauses for several seconds on her monitor (up to 6 seconds with conversion pauses). She reports being only mildly symptomatic with palpitations. At her rates I believe patient needs to consider rhythm control with either antiarrhythmic or ablation or pace and ablate strategy.   We discussed anticoagulation (NOAC and warfarin), rate control, rhythm control, AVN + pacemaker, and atrial fibrillation ablation. We reviewed risks and benefits of each approach. We discussed side effects of class IC, class II, class III, and class IV antiarrhythmics. All questions were answered. We will start patient on flecainide as her stress test was within normal limits and her wall thickness is within normal limits despite her LVH. We will schedule a cardioversion on Friday. We will follow-up with patient sooner rather than later to consider other options.  - Start flecainide 100 mg BID.  - Cardioversion with EKG on Friday. - Continue apixaban. - Continue diltiazem. - Cardiac monitor post cardioversion.  - Follow up with me in 4 weeks. RECOMMENDATIONS:  1. Discussed at length treatment options for AF:    1. Medications to slow heart rate (goal of less than 90 at rest, less than 110 with mild activity). Unfortunately, due to your sinus pauses, medications to slow your HR is not ideal at this time. 2. Medications for rhythm control (amiodarone, flecainide, dronedarone, or dofetilide which requires hospital admission for 3-4 days). Discussed risks and benefits.     -Start flecainide 100mg twice daily. 3. Ablation to burn the abnormal electrical activity within the heart. Discussed risks and benefits. Recommend a combination of an ablation and an antiarrhythmic medication. 4. Pace and Ablate to put pacemaker in the heart and burn the electrical activity in the heart. Would be dependent upon pacemaker for the rest of your life. 5. Cardioversion to electrically shock heart back into normal rhythm on Friday, 5/21/2021.     -DO NOT miss any doses of your Eliquis. 2. Recommend light activity as tolerated until your heart rate is better controlled. 3. Follow up with me in 4 weeks. QUALITY MEASURES  1. Tobacco Cessation Counseling: NA  2. Retake of BP if >140/90:   NA  3.  Documentation to PCP/referring for new patient:  Sent to PCP at close of office visit  4. CAD patient on anti-platelet: NA  5. CAD patient on STATIN therapy:  NA  6. Patient with CHF and aFib on anticoagulation:  Yes     All questions and concerns were addressed to the patient/family. Alternatives to my treatment were discussed. This note has been scribed in the presence of Cordelia Cool MD by Rolo Arriaza RN. The scribe's documentation has been prepared under my direction and personally reviewed by me in its entirety. I confirm that the note above accurately reflects all work, physical examination, the discussion of treatments and procedures, and medical decision making performed by me. August Eldridge MD personally performed the services described in this documentation as scribed by nurse in my presence, and is both accurate and complete. Electronically signed by Deion Bobo MD on 5/20/2021 at 8:32 AM     Dr. Ling Lovett MD  Electrophysiology  [facility]. [facility] [facility]. [address]. [place], [address]  Phone: [phone]  Fax: [phone]     NOTE: This report was transcribed using voice recognition software. Every effort was made to ensure accuracy, however, inadvertent computerized transcription errors may be present.

## 2021-05-18 NOTE — PROGRESS NOTES
products and does not consume alcohol or use or illicit drugs. Family history is notable for carotid artery disease in her mother. She was recently evaluated by her primary care provider, Dr. Berenice Darling on 4/20/21 and noted to have an irregularly irregular heart rate and rhythm. ECG from that time showed atrial fibrillation with RVR in the 160s and non-specific ST-T wave changes. She was subsequently started on diltiazem  mg daily and Eliquis 5 mg BID. Patient wore a cardiac event monitor from [facility] with an average HR of 70 () BPM, 9.94% AF with an average HR of 154 () BPM, 0.42% PACs burden, 0.06% PVCs burden, 6.2 second conversion pause. Today, 5/18/2021, patient's ECG demonstrated AF at 170 BPM. She reports that she presented to her PCP for a routine visit in 4/2021 and was found to be in AF. She reports she has been experiencing dizziness with position changes and this has been going on for years. She reports she has no history of CAD, stents in her body, or history of CVA. She reports her brother was also diagnosed with AF recently. She reports she has lost 60lbs in the last few years and tolerates activity well. She reports she is taking all medications as prescribed and tolerates them well. She denies any bleeding or bruising issues. Patient denies current edema, chest pain, sob, palpitations, or syncope. Past Medical History:   has a past medical history of Depression, GERD (gastroesophageal reflux disease), Hyperlipidemia, Lumbar herniated disc, MVP (mitral valve prolapse), BIJAN (obstructive sleep apnea), Primary osteoarthritis of left knee, and Restless leg syndrome. Past Surgical History:   has a past surgical history that includes back surgery (2/1998) and Lap Band (2010). Allergies:  Patient has no known allergies. Social History:   reports that she has never smoked.  She has never used smokeless tobacco. She reports that she does not drink alcohol and does not use drugs. Family History: family history includes Arthritis in her father and mother; Diabetes in her father; High Blood Pressure in her father; High Cholesterol in her father; Other in her father. Home Medications:    Prior to Admission medications    Medication Sig Start Date End Date Taking? Authorizing Provider   dilTIAZem (DILTIAZEM CD) 180 MG extended release capsule Take 1 capsule by mouth daily 4/20/21  Yes SAJI Dueñas MD   apixaban (ELIQUIS) 5 MG TABS tablet Take 1 tablet by mouth 2 times daily 4/20/21  Yes Delmar Munoz MD   pramipexole (MIRAPEX) 0.5 MG tablet TAKE 1 TABLET BY MOUTH NIGHTLY AS NEEDED FOR RESTLESS LEG 4/5/21  Yes Delmar Munoz MD   sertraline (ZOLOFT) 100 MG tablet TAKE 1 TABLET BY MOUTH DAILY 2/8/21  Yes Delmar Munoz MD   traZODone (DESYREL) 50 MG tablet TAKE ONE-HALF TABLET BY MOUTH EVERY NIGHT AT BEDTIME, MAY INCREASE TO 1 TABLET AFTER 3RD NIGHT 2/1/21  Yes Delmar Munoz MD   buPROPion (WELLBUTRIN XL) 150 MG extended release tablet TAKE 1 TABLET BY MOUTH EVERY MORNING 12/23/20  Yes LAURITA Beckman   atorvastatin (LIPITOR) 10 MG tablet Take 1 tablet by mouth daily 11/17/20  Yes Delmar Munoz MD   Phentermine-Topiramate (QSYMIA) 15-92 MG CP24 Take 1 capsule by mouth daily. .   Yes Historical Provider, MD       REVIEW OF SYSTEMS:    All 14-point review of systems are completed and  pertinent positives are mentioned in the history of present illness. Other  systems are reviewed and are negative. Physical Examination:    /60   Pulse 170   Ht 5' 5\" (1.651 m)   Wt 172 lb (78 kg)   SpO2 98%   BMI 28.62 kg/m²      Constitutional and General Appearance:    alert, cooperative, no distress and appears stated age  [de-identified]:    PERRLA, no cervical lymphadenopathy. No masses palpable.  Normal oral  mucosa  Respiratory:  · Normal excursion and expansion without use of accessory muscles  · Resp Auscultation: Normal breath sounds without dullness or wheezing Cardiovascular:  · The apical impulse is not displaced  · Tachycardic. Irregular rate and rhythm w/o M/G/R  Abdomen:  · No masses or tenderness  · Bowel sounds present  Extremities:  ·  No Cyanosis or Clubbing  ·  Lower extremity edema: No  · Skin: Warm and dry  Neurological:  · Alert and oriented. · Moves all extremities well  · No abnormalities of mood, affect, memory, mentation, or behavior are noted    DATA:      ECG 5/18/21: Personally reviewed. ECHO 5/14/2021:   Summary   Left ventricular systolic function is normal with a visually estimated   ejection fraction of 55%. The left ventricle is normal in size with mild septal hypertrophy. No obvious regional wall motion abnormalities noted. Normal left ventricular diastolic function. The left atrium appears moderately enlarged. Systolic pulmonary artery pressure (SPAP) is normal and estimated at 26 mmHg   (right atrial pressure 3 mmHg). Stress test 5/14/2021:  Summary Hypertensive response to exercise. Normal LV size and systolic function. Left ventricular ejection fraction of  81%. Normal wall motion. There is normal isotope uptake at stress and rest.  There is no evidence of myocardial ischemia or scar. IMPRESSION:    1. Atrial fibrillation with RVR. Patient is a pleasant 53-year-old female with a medical history significant for obstructive sleep apnea, and obesity who presents from home to [facility]. Patient recently diagnosed with atrial fibrillation with RVR. She is extremely tachycardic when she is in atrial fibrillation. This diet not being on the highest dose of rate controlling agents patient has significant pauses for several seconds on her monitor (up to 6 seconds with conversion pauses). She reports being only mildly symptomatic with palpitations. At her rates I believe patient needs to consider rhythm control with either antiarrhythmic or ablation or pace and ablate strategy.   We discussed anticoagulation (NOAC and warfarin), rate control, rhythm control, AVN + pacemaker, and atrial fibrillation ablation. We reviewed risks and benefits of each approach. We discussed side effects of class IC, class II, class III, and class IV antiarrhythmics. All questions were answered. We will start patient on flecainide as her stress test was within normal limits and her wall thickness is within normal limits despite her LVH. We will schedule a cardioversion on Friday. We will follow-up with patient sooner rather than later to consider other options.  - Start flecainide 100 mg BID.  - Cardioversion with EKG on Friday. - Continue apixaban. - Continue diltiazem. - Cardiac monitor post cardioversion.  - Follow up with me in 4 weeks. RECOMMENDATIONS:  1. Discussed at length treatment options for AF:    1. Medications to slow heart rate (goal of less than 90 at rest, less than 110 with mild activity). Unfortunately, due to your sinus pauses, medications to slow your HR is not ideal at this time. 2. Medications for rhythm control (amiodarone, flecainide, dronedarone, or dofetilide which requires hospital admission for 3-4 days). Discussed risks and benefits.     -Start flecainide 100mg twice daily. 3. Ablation to burn the abnormal electrical activity within the heart. Discussed risks and benefits. Recommend a combination of an ablation and an antiarrhythmic medication. 4. Pace and Ablate to put pacemaker in the heart and burn the electrical activity in the heart. Would be dependent upon pacemaker for the rest of your life. 5. Cardioversion to electrically shock heart back into normal rhythm on Friday, 5/21/2021.     -DO NOT miss any doses of your Eliquis. 2. Recommend light activity as tolerated until your heart rate is better controlled. 3. Follow up with me in 4 weeks. QUALITY MEASURES  1. Tobacco Cessation Counseling: NA  2. Retake of BP if >140/90:   NA  3.  Documentation to PCP/referring for new patient:  Sent to PCP at close of office visit  4. CAD patient on anti-platelet: NA  5. CAD patient on STATIN therapy:  NA  6. Patient with CHF and aFib on anticoagulation:  Yes     All questions and concerns were addressed to the patient/family. Alternatives to my treatment were discussed. This note has been scribed in the presence of Arabella Haines MD by Muna Rehman RN. The scribe's documentation has been prepared under my direction and personally reviewed by me in its entirety. I confirm that the note above accurately reflects all work, physical examination, the discussion of treatments and procedures, and medical decision making performed by me. Elda Graves MD personally performed the services described in this documentation as scribed by nurse in my presence, and is both accurate and complete. Electronically signed by Korin Moreau MD on 5/20/2021 at 8:32 AM     Dr. Ryan Saldivar MD  Electrophysiology  [facility]. [address] [place] [address]. [address]. [place], [address]  Phone: [phone]  Fax: [phone]     NOTE: This report was transcribed using voice recognition software. Every effort was made to ensure accuracy, however, inadvertent computerized transcription errors may be present.

## 2021-05-20 ENCOUNTER — TELEPHONE (OUTPATIENT)
Dept: CARDIOLOGY CLINIC | Age: 66
End: 2021-05-20

## 2021-05-20 PROBLEM — Z00.00 ANNUAL PHYSICAL EXAM: Status: RESOLVED | Noted: 2017-10-30 | Resolved: 2021-05-20

## 2021-05-20 RX ORDER — FLECAINIDE ACETATE 100 MG/1
100 TABLET ORAL 2 TIMES DAILY
Qty: 180 TABLET | Refills: 3 | Status: SHIPPED | OUTPATIENT
Start: 2021-05-20 | End: 2021-06-15 | Stop reason: DRUGHIGH

## 2021-05-20 NOTE — TELEPHONE ENCOUNTER
Diana, I was delayed in sending this. Patient seen in office 5/18/2021. CV discussed and agreed upon by [id number] and patient. Medications discussed. Patient will need covid testing prior. Thank you!

## 2021-05-20 NOTE — TELEPHONE ENCOUNTER
Spoke with patient. Patient is scheduled with Dr. Negar Dahl for Cardioversion on 5/21/21 at [facility], arrival time of 7:30am to the Cath Lab. Please have patient arrive to the main entrance of [facility] and check in with the registration desk. Remind patient to be NPO after midnight (8 hours prior). Do not apply lotions/creams on skin the day of procedure.

## 2021-05-20 NOTE — TELEPHONE ENCOUNTER
Called and spoke with patient. Thoroughly reviewed results of nuclear SPECT stress test, transthoracic echocardiogram, and available data from cardiac event monitor. She was seen by Dr. Logan Styles earlier this week who recommended initiating flecainide 100 mg BID and scheduling for KRISHNA/DCCV. She is still awaiting a call from the scheduling department to schedule the aforementioned procedure. She also mentioned that her pharmacy told her they did not have a prescription for the flecainide. I confirmed that the medication was sent to the correct pharmacy and there is documentation that the pharmacy received the request.  I recommended the patient call her pharmacy back to make sure the prescription is being filled and if there are any issues she will call us back. She should keep her scheduled follow-up appointment with me.

## 2021-05-20 NOTE — TELEPHONE ENCOUNTER
See Dr Ivonne Flor message re: appointment. There is a separate encounter for CV that has already been addressed.

## 2021-05-21 ENCOUNTER — HOSPITAL ENCOUNTER (OUTPATIENT)
Dept: CARDIAC CATH/INVASIVE PROCEDURES | Age: 66
Discharge: HOME | End: 2021-05-21
Attending: INTERNAL MEDICINE | Admitting: INTERNAL MEDICINE
Payer: COMMERCIAL

## 2021-05-21 ENCOUNTER — TELEPHONE (OUTPATIENT)
Dept: CARDIOLOGY CLINIC | Age: 66
End: 2021-05-21

## 2021-05-21 VITALS — BODY MASS INDEX: 28.59 KG/M2 | WEIGHT: 171.6 LBS | HEIGHT: 65 IN

## 2021-05-21 LAB
EKG ATRIAL RATE: 69 BPM
EKG DIAGNOSIS: NORMAL
EKG P AXIS: 64 DEGREES
EKG P-R INTERVAL: 156 MS
EKG Q-T INTERVAL: 440 MS
EKG QRS DURATION: 90 MS
EKG QTC CALCULATION (BAZETT): 471 MS
EKG R AXIS: 64 DEGREES
EKG T AXIS: 42 DEGREES
EKG VENTRICULAR RATE: 69 BPM

## 2021-05-21 PROCEDURE — 93010 ELECTROCARDIOGRAM REPORT: CPT | Performed by: INTERNAL MEDICINE

## 2021-05-21 PROCEDURE — 93005 ELECTROCARDIOGRAM TRACING: CPT | Performed by: INTERNAL MEDICINE

## 2021-05-24 ENCOUNTER — OFFICE VISIT (OUTPATIENT)
Dept: FAMILY MEDICINE CLINIC | Age: 66
End: 2021-05-24
Payer: COMMERCIAL

## 2021-05-24 ENCOUNTER — TELEPHONE (OUTPATIENT)
Dept: FAMILY MEDICINE CLINIC | Age: 66
End: 2021-05-24

## 2021-05-24 VITALS
BODY MASS INDEX: 28.92 KG/M2 | RESPIRATION RATE: 16 BRPM | HEIGHT: 65 IN | HEART RATE: 69 BPM | SYSTOLIC BLOOD PRESSURE: 124 MMHG | WEIGHT: 173.6 LBS | OXYGEN SATURATION: 98 % | DIASTOLIC BLOOD PRESSURE: 76 MMHG

## 2021-05-24 DIAGNOSIS — I48.0 PAF (PAROXYSMAL ATRIAL FIBRILLATION) (HCC): ICD-10-CM

## 2021-05-24 PROCEDURE — 99213 OFFICE O/P EST LOW 20 MIN: CPT | Performed by: INTERNAL MEDICINE

## 2021-05-24 ASSESSMENT — ENCOUNTER SYMPTOMS
ALLERGIC/IMMUNOLOGIC NEGATIVE: 1
GASTROINTESTINAL NEGATIVE: 1
RESPIRATORY NEGATIVE: 1

## 2021-05-24 NOTE — PROGRESS NOTES
Subjective:      Patient ID: Kam Yost is a 63 y.o. female. HPI  PAF (paroxysmal atrial fibrillation) ([address]   New onset. Sent for Cardiac evaluation. A fib on Holter, ECHO was ok. Spontaneously converted to NSR on Diltiazem. Saw Cardiology. Began Flecainide. Scheduled Cardioversion but spontaneous conversion. Still in NSR. Much less SOB and fatigue. Continues Apixaban. Review of Systems   Constitutional: Positive for fatigue. Respiratory: Negative. Cardiovascular: Positive for palpitations. Gastrointestinal: Negative. Endocrine: Negative. Genitourinary: Negative. Musculoskeletal: Negative. Skin: Negative. Allergic/Immunologic: Negative. Neurological: Negative. Hematological: Negative. Objective:   Physical Exam  Constitutional:       General: She is not in acute distress. Appearance: Normal appearance. She is well-developed. She is not ill-appearing, toxic-appearing or diaphoretic. HENT:      Head: Normocephalic and atraumatic. Eyes:      Conjunctiva/sclera: Conjunctivae normal.      Pupils: Pupils are equal, round, and reactive to light. Neck:      Thyroid: No thyroid mass or thyromegaly. Vascular: Normal carotid pulses. No carotid bruit, hepatojugular reflux or JVD. Trachea: Trachea normal. No tracheal deviation. Cardiovascular:      Rate and Rhythm: Normal rate and regular rhythm. No extrasystoles are present. Chest Wall: PMI is not displaced. Pulses: Normal pulses. No decreased pulses. Carotid pulses are 2+ on the right side and 2+ on the left side. Radial pulses are 2+ on the right side and 2+ on the left side. Femoral pulses are 2+ on the right side and 2+ on the left side. Popliteal pulses are 2+ on the right side and 2+ on the left side. Dorsalis pedis pulses are 2+ on the right side and 2+ on the left side. Posterior tibial pulses are 2+ on the right side and 2+ on the left side. Heart sounds: Normal heart sounds, S1 normal and S2 normal. Heart sounds not distant. No murmur heard. No friction rub. No gallop. No S3 or S4 sounds. Pulmonary:      Effort: Pulmonary effort is normal. No tachypnea, bradypnea, accessory muscle usage or respiratory distress. Breath sounds: Normal breath sounds. No decreased breath sounds, wheezing, rhonchi or rales. Chest:      Chest wall: No tenderness. Musculoskeletal:         General: No tenderness. Normal range of motion. Cervical back: Full passive range of motion without pain, normal range of motion and neck supple. No edema or erythema. No spinous process tenderness or muscular tenderness. Normal range of motion. Right lower leg: No edema. Left lower leg: No edema. Lymphadenopathy:      Cervical: No cervical adenopathy. Skin:     General: Skin is warm and dry. Capillary Refill: Capillary refill takes less than 2 seconds. Coloration: Skin is not jaundiced or pale. Findings: No abrasion, bruising, erythema, lesion or rash. Nails: There is no clubbing. Neurological:      General: No focal deficit present. Mental Status: She is alert and oriented to person, place, and time. Mental status is at baseline. She is not disoriented. Cranial Nerves: No cranial nerve deficit. Sensory: No sensory deficit. Motor: No weakness, tremor, atrophy or abnormal muscle tone. Coordination: Coordination normal.      Gait: Gait normal.      Deep Tendon Reflexes: Reflexes are normal and symmetric. Psychiatric:         Mood and Affect: Mood normal.         Speech: Speech normal.         Behavior: Behavior normal.         Thought Content: Thought content normal.         Judgment: Judgment normal.         Assessment / Plan:     Paf (Paroxysmal Atrial Fibrillation) (Hcc). Continue meds. To Cardiology as scheduled. Call if new c/o.

## 2021-05-24 NOTE — TELEPHONE ENCOUNTER
[facility] Transitions Initial Follow Up Call    Outreach made within 2 business days of discharge: Yes    Patient: Lucia Portillo Patient : 1955   MRN: <[id number]>  Reason for Admission: There are no discharge diagnoses documented for the most recent discharge.   Discharge Date: 21       Spoke with: patient has HFU with PCP on Monday, May 24    Discharge department/facility: [facility]    Scheduled appointment with PCP within 7-14 days    Follow Up  Future Appointments   Date Time Provider Abdullahi Peres   2021  4:00 PM Debbe Alpers [facility]   6/15/2021  8:15 AM MD Valerie Adams [facility]   2021  8:30 AM SAJI Pappas MD [address] [place] [address]

## 2021-05-24 NOTE — ASSESSMENT & PLAN NOTE
New onset. Sent for Cardiac evaluation. A fib on Holter, ECHO was ok. Spontaneously converted to NSR on Diltiazem. Saw Cardiology. Began Flecainide. Scheduled Cardioversion but spontaneous conversion. Still in NSR. Much less SOB and fatigue. Continues Apixaban.

## 2021-05-26 DIAGNOSIS — E78.00 PURE HYPERCHOLESTEROLEMIA: ICD-10-CM

## 2021-05-26 RX ORDER — ATORVASTATIN CALCIUM 10 MG/1
10 TABLET, FILM COATED ORAL DAILY
Qty: 90 TABLET | Refills: 1 | Status: SHIPPED | OUTPATIENT
Start: 2021-05-26 | End: 2021-11-02

## 2021-06-10 NOTE — PROGRESS NOTES
review of systems are completed and  pertinent positives are mentioned in the history of present illness. Other  systems are reviewed and are negative. Physical Examination:    /60   Pulse 59   Ht 5' 5\" (1.651 m)   Wt 174 lb (78.9 kg)   SpO2 94%   BMI 28.96 kg/m²      Constitutional and General Appearance:    alert, cooperative, no distress and appears stated age  [de-identified]:    PERRLA, no cervical lymphadenopathy. No masses palpable. Normal oral  mucosa  Respiratory:  · Normal excursion and expansion without use of accessory muscles  · Resp Auscultation: Normal breath sounds without dullness or wheezing  Cardiovascular:  · The apical impulse is not displaced  · RRR S1S2 w/o M/G/R  Abdomen:  · No masses or tenderness  · Bowel sounds present  Extremities:  ·  No Cyanosis or Clubbing  ·  Lower extremity edema: No  · Skin: Warm and dry  Neurological:  · Alert and oriented. · Moves all extremities well  · No abnormalities of mood, affect, memory, mentation, or behavior are noted    DATA:      ECG 6/15/21: Personally reviewed. ECHO 5/14/2021:   Summary   Left ventricular systolic function is normal with a visually estimated   ejection fraction of 55%. The left ventricle is normal in size with mild septal hypertrophy. No obvious regional wall motion abnormalities noted. Normal left ventricular diastolic function. The left atrium appears moderately enlarged. Systolic pulmonary artery pressure (SPAP) is normal and estimated at 26 mmHg   (right atrial pressure 3 mmHg). Stress test 5/14/2021:  Summary Hypertensive response to exercise. Normal LV size and systolic function. Left ventricular ejection fraction of  81%. Normal wall motion. There is normal isotope uptake at stress and rest.  There is no evidence of myocardial ischemia or scar. IMPRESSION:    1. Atrial fibrillation with RVR.   Patient is a pleasant 61-year-old female with a medical history significant for obstructive sleep apnea, and obesity who presents from home to establish care. Patient recently diagnosed with atrial fibrillation with RVR. She is extremely tachycardic when she is in atrial fibrillation. This diet not being on the highest dose of rate controlling agents patient has significant pauses for several seconds on her monitor (up to 6 seconds with conversion pauses). She reports being only mildly symptomatic with palpitations. At her rates I believe patient needs to consider rhythm control with either antiarrhythmic or ablation or pace and ablate strategy. We discussed anticoagulation (NOAC and warfarin), rate control, rhythm control, AVN + pacemaker, and atrial fibrillation ablation. We reviewed risks and benefits of each approach. We discussed side effects of class IC, class II, class III, and class IV antiarrhythmics. All questions were answered. We will start patient on flecainide as her stress test was within normal limits and her wall thickness is within normal limits despite her LVH. We will schedule a cardioversion on Friday. We will follow-up with patient sooner rather than later to consider other options.  - Start flecainide 100 mg BID.  - Cardioversion with EKG on Friday. - Continue apixaban. - Continue diltiazem. - Cardiac monitor post cardioversion.  - Follow up with me in 4 weeks. 6/15/2021  Patient presents for follow-up. She presented last week for a cardioversion however had converted back into normal sinus rhythm on flecainide. She has a history of bradycardia so this will be watched closely while she is on the 1C agent. She is tolerating her flecainide without any issues. She is tolerating apixaban without issues. She continues on her diltiazem. He can clearly tell when she is in normal rhythm feel significantly better. As she is doing well I think be reasonable patient to follow-up with us in 1 year or as needed.   - Continue flecainide 100 mg BID with  mg with atrial fibrillation lasting longer than 2 hours. No more than 300 mg in a day. - Continue apixaban. - Continue diltiazem. - Follow up with EP NP in 1 year unless/until procedure/discussion required or PRN. RECOMMENDATIONS:  1. Continue current medications as prescribed. May take one extra flecainide per day if you feel like you are in AF. 2. Encourage adequate fluid intake (at least 64 oz per day) while working outside, and work outside when temperatures are cooler if possible. 3. Follow up with EP NP one year     QUALITY MEASURES  1. Tobacco Cessation Counseling: NA  2. Retake of BP if >140/90:   NA  3. Documentation to PCP/referring for new patient:  Sent to PCP at close of office visit  4. CAD patient on anti-platelet: NA  5. CAD patient on STATIN therapy:  NA  6. Patient with CHF and aFib on anticoagulation:  Yes     All questions and concerns were addressed to the patient/family. Alternatives to my treatment were discussed. The scribe's documentation has been prepared under my direction and personally reviewed by me in its entirety. I confirm that the note above accurately reflects all work, physical examination, the discussion of treatments and procedures, and medical decision making performed by me. Tino Mariee RN, am scribing for and in the presence of Dr. Unique Rowland. 06/15/21 8:42 AM   Thom Pedraza RN    The scribe's documentation has been prepared under my direction and personally reviewed by me in its entirety. I confirm that the note above accurately reflects all work, physical examination, the discussion of treatments and procedures, and medical decision making performed by me. Gina Castillo MD personally performed the services described in this documentation as scribed by nurse in my presence, and is both accurate and complete.     Electronically signed by Corinna Bradley MD on 6/15/2021 at 9:24 AM     Dr. Rhea Mcintosh MD  Electrophysiology  [facility]. [address] [place] [address]. [address] [facility]. [address] [facility], [facility]  Phone: [phone]  Fax: [phone]     NOTE: This report was transcribed using voice recognition software. Every effort was made to ensure accuracy, however, inadvertent computerized transcription errors may be present.

## 2021-06-11 ENCOUNTER — TELEPHONE (OUTPATIENT)
Dept: CARDIOLOGY CLINIC | Age: 66
End: 2021-06-11

## 2021-06-11 ENCOUNTER — OFFICE VISIT (OUTPATIENT)
Dept: CARDIOLOGY CLINIC | Age: 66
End: 2021-06-11
Payer: COMMERCIAL

## 2021-06-11 VITALS
BODY MASS INDEX: 28.91 KG/M2 | SYSTOLIC BLOOD PRESSURE: 122 MMHG | OXYGEN SATURATION: 100 % | HEIGHT: 65 IN | WEIGHT: 173.5 LBS | DIASTOLIC BLOOD PRESSURE: 60 MMHG | HEART RATE: 70 BPM

## 2021-06-11 DIAGNOSIS — G47.33 OSA (OBSTRUCTIVE SLEEP APNEA): ICD-10-CM

## 2021-06-11 DIAGNOSIS — I48.0 PAF (PAROXYSMAL ATRIAL FIBRILLATION) (HCC): Primary | ICD-10-CM

## 2021-06-11 DIAGNOSIS — R42 LIGHTHEADEDNESS: ICD-10-CM

## 2021-06-11 DIAGNOSIS — R07.9 CHEST PAIN, UNSPECIFIED TYPE: ICD-10-CM

## 2021-06-11 DIAGNOSIS — R06.00 DYSPNEA, UNSPECIFIED TYPE: ICD-10-CM

## 2021-06-11 PROCEDURE — 99214 OFFICE O/P EST MOD 30 MIN: CPT | Performed by: INTERNAL MEDICINE

## 2021-06-11 PROCEDURE — 93000 ELECTROCARDIOGRAM COMPLETE: CPT | Performed by: INTERNAL MEDICINE

## 2021-06-11 ASSESSMENT — ENCOUNTER SYMPTOMS
NAUSEA: 0
ABDOMINAL PAIN: 0
RHINORRHEA: 0
CONSTIPATION: 0
DIARRHEA: 0
PHOTOPHOBIA: 0
EYE PAIN: 0
BLOOD IN STOOL: 0
SORE THROAT: 0
VOMITING: 0

## 2021-06-11 NOTE — PATIENT INSTRUCTIONS
1. Will obtain an EKG today to confirm sinus rhythm   2. Continue current medications for now. We will call [facility] physicians [phone] (bariatric surgeon) to discuss possibility of this medications contributing to atrial fibrillation   3.  Follow up with Dr. Wilton Talbert for atrial fibrillation

## 2021-06-11 NOTE — PROGRESS NOTES
[address]   Cardiovascular Evaluation    PATIENT: Spenser Domingo Gaw: 2021  MRN: <[id number]>  [facility]: [id number]  : 1955      Primary Care Doctor: Maria Fernanda Alonzo MD     Reason for evaluation:   Follow-up for paroxysmal atrial fibrillation, chest pain, and shortness of breath    Subjective: Ansel Rinne is a 62-year-old female with a history of paroxysmal atrial fibrillation, mitral valve prolapse, hyperlipidemia, BIJAN, and restless legs syndrome who presents for follow-up. Since patient's initial visit on 21, she underwent a GXT nuclear SPECT stress test during which she exercised for 10 minutes on a Fabrizio protocol and achieved 87% of her age-predicted maximal heart rate. No ischemic ECG changes were observed, but she did have a hypertensive response to exercise with BP as high as 206/81. Nuclear SPECT imaging demonstrated normal myocardial perfusion. A TTE was also performed at that time and showed an LVEF of 55% with normal wall motion, mild septal hypertrophy, normal RV size and systolic function, moderately enlarged left atrium, and no hemodynamically significant valvular abnormalities. There was no mitral valve prolapse observed (appeared mild on study from 2017). She also wore a cardiac event monitor which showed pre-dominant sinus rhythm with an average HR of 70 () BPM, 9.94% AF with an average HR of 154 () BPM, 0.42% PACs burden, 0.06% PVCs burden, and 6.2 second conversion pause. She was seen by Dr. Hamida Rodas with our electrophysiology team on 21 who recommended initiating flecainide 100 mg BID and scheduling for KRISHNA/DCCV. When she arrived for her scheduled cardioversion she had already converted back to sinus rhythm. Since she converted to sinus rhythm, she reports that she has been feeling well and that her fatigue has improved. Her chest pain and shortness of breath seem to have resolved.   She occasionally has some mild lightheadedness with position changes, but this overall seems to be non-limiting. She denies any lower extremity edema, orthopnea, or paroxysmal nocturnal dyspnea. She has been taking Qsymia which she says is prescribed through the [facility]. Patient Active Problem List   Diagnosis    Pure hypercholesterolemia    Recurrent major depressive disorder, in full remission ([facility] [address]    Closed nondisplaced fracture of fifth metatarsal bone of left foot    Foot sprain    Left foot pain    Closed nondisplaced fracture of metatarsal bone of left foot with routine healing    Foot pain, left    MVP (mitral valve prolapse)    Primary insomnia    Restless leg syndrome    BIJAN on CPAP    Class 3 severe obesity due to excess calories in adult [facility])    Left knee pain    Primary osteoarthritis of left knee    Chronic atrial fibrillation (HCC)    Periumbilical mass    PAF (paroxysmal atrial fibrillation) (HCC)    Shortness of breath    Chest pain         Past Medical History:   has a past medical history of Depression, GERD (gastroesophageal reflux disease), Hyperlipidemia, Lumbar herniated disc, MVP (mitral valve prolapse), BIJAN (obstructive sleep apnea), Primary osteoarthritis of left knee, and Restless leg syndrome. Surgical History:   has a past surgical history that includes back surgery (2/1998) and Lap Band (2010). Social History:   reports that she has never smoked. She has never used smokeless tobacco. She reports that she does not drink alcohol and does not use drugs. Family History:  Family History   Problem Relation Age of Onset    Arthritis Mother     Arthritis Father     Diabetes Father     High Blood Pressure Father     High Cholesterol Father     Other Father         meniere's disease     Mother had carotid artery disease. Home Medications:  Reviewed and are listed in nursing record.  and/or listed below  Current Outpatient Medications   Medication Sig Dispense Refill    atorvastatin (LIPITOR) 10 MG tablet TAKE 1 TABLET BY MOUTH DAILY 90 tablet 1    flecainide (TAMBOCOR) 100 MG tablet Take 1 tablet by mouth 2 times daily 180 tablet 3    dilTIAZem (DILTIAZEM CD) 180 MG extended release capsule Take 1 capsule by mouth daily 30 capsule 3    apixaban (ELIQUIS) 5 MG TABS tablet Take 1 tablet by mouth 2 times daily 60 tablet 5    pramipexole (MIRAPEX) 0.5 MG tablet TAKE 1 TABLET BY MOUTH NIGHTLY AS NEEDED FOR RESTLESS LEG 90 tablet 1    sertraline (ZOLOFT) 100 MG tablet TAKE 1 TABLET BY MOUTH DAILY 90 tablet 1    traZODone (DESYREL) 50 MG tablet TAKE ONE-HALF TABLET BY MOUTH EVERY NIGHT AT BEDTIME, MAY INCREASE TO 1 TABLET AFTER 3RD NIGHT 90 tablet 1    buPROPion (WELLBUTRIN XL) 150 MG extended release tablet TAKE 1 TABLET BY MOUTH EVERY MORNING 90 tablet 1    Phentermine-Topiramate (QSYMIA) 15-92 MG CP24 Take 1 capsule by mouth daily. .       No current facility-administered medications for this visit. Allergies:  Patient has no known allergies. Review of Systems:   A 14 point review of symptoms completed. Pertinent positives identified in the HPI, all other review of symptoms negative as below. Review of Systems   Constitutional: Positive for fatigue. Negative for chills and fever. HENT: Negative for congestion, rhinorrhea and sore throat. Eyes: Negative for photophobia, pain and visual disturbance. Cardiovascular: Negative for leg swelling. Gastrointestinal: Negative for abdominal pain, blood in stool, constipation, diarrhea, nausea and vomiting. Endocrine: Negative for cold intolerance and heat intolerance. Genitourinary: Negative for difficulty urinating, dysuria and hematuria. Musculoskeletal: Negative for arthralgias, joint swelling and myalgias. Skin: Negative for rash and wound. Allergic/Immunologic: Negative for environmental allergies and food allergies. Neurological: Positive for dizziness and light-headedness. Negative for syncope. Hematological: Negative for adenopathy. Does not bruise/bleed easily. Psychiatric/Behavioral: Negative for dysphoric mood. The patient is not nervous/anxious. Objective:   PHYSICAL EXAM:    Vitals:    06/11/21 1608   BP: 122/60   Pulse: 70   SpO2: 100%    Weight: 173 lb 8 oz (78.7 kg)     Wt Readings from Last 3 Encounters:   06/11/21 173 lb 8 oz (78.7 kg)   05/24/21 173 lb 9.6 oz (78.7 kg)   05/21/21 171 lb 9.6 oz (77.8 kg)     General: Adult female in no acute distress. Pleasant and interactive on exam.  HEENT: Normocephalic, atraumatic, non-icteric, hearing intact, nares normal, mucous membranes moist.  Neck: No JVD. Heart: Regular rate and rhythm. Normal S1 and S2. No murmurs, gallops or rubs. Lungs: Normal respiratory effort. Clear to auscultation bilaterally. No wheezes, rales, or rhonchi. Abdomen: Soft, non-tender. Normoactive bowel sounds. No masses or organomegaly. Skin: No rashes, wounds, or lesions. Pulses: 2+ and symmetric. Extremities: No clubbing, cyanosis, or edema. Psych: Normal mood and affect. Neuro: Alert and oriented to person, place, and time. No focal deficits noted.       LABS   CBC:      Lab Results   Component Value Date    WBC 10.2 04/20/2021    RBC 4.69 04/20/2021    HGB 13.4 04/20/2021    HCT 41.1 04/20/2021    MCV 87.6 04/20/2021    RDW 14.3 04/20/2021     04/20/2021     CMP:  Lab Results   Component Value Date     04/20/2021    K 4.2 04/20/2021     04/20/2021    CO2 25 04/20/2021    BUN 14 04/20/2021    CREATININE 1.1 04/20/2021    GFRAA >60 04/20/2021    GFRAA >60 11/21/2012    AGRATIO 1.8 10/20/2016    LABGLOM 50 04/20/2021    GLUCOSE 91 04/20/2021    PROT 6.6 04/20/2021    PROT 7.2 11/21/2012    CALCIUM 9.5 04/20/2021    BILITOT 0.3 04/20/2021    ALKPHOS 88 04/20/2021    AST 14 04/20/2021    ALT 10 04/20/2021     PT/INR:   No results found for: PTINR  Liver:  No components found for: CHLPL  Lab Results   Component Value Date ventricular diastolic function. The left atrium appears moderately enlarged. Systolic pulmonary artery pressure (SPAP) is normal and estimated at 26 mmHg   (right atrial pressure 3 mmHg). STRESS TEST:   GXT Nuclear SPECT Stress 5/14/21:  Conclusions        Summary    Hypertensive response to exercise.    Normal LV size and systolic function. Left ventricular ejection fraction of    81%. Normal wall motion. There is normal isotope uptake at stress and rest.    There is no evidence of myocardial ischemia or scar.       Stress Protocols        Resting ECG    Normal sinus rhythm .        Resting HR:77 bpm  Resting BP:142/92 mmHg       Stress Protocol:Exercise - Fabrizio        Peak HR:137 bpm                                HR/BP product:71666    Peak BP:206/81 mmHg                            Max exercise: 13 METS    Predicted HR: 157 bpm    % of predicted HR: 87    Test duration:10 min    Reason for termination:Target heart rate        ECG Findings    Hypertensive response to exercise.    The stress ECG showed no ischemia at target heart rate. Feng Score of 10 (    low Risk ).      Arrhythmias   [place] is no abnormal arrhythmia noted.        Symptoms    No symptoms with exercise.        Complications    Procedure complication was none.        Stress Interpretation    Normal exercise EKG.  Hypertensive blood pressure response during testing. CATH: N/A    Cardiac Event Monitor 5/2021:  pre-dominant sinus rhythm with an average HR of 70 () BPM, 9.94% AF with an average HR of 154 () BPM, 0.42% PACs burden, 0.06% PVCs burden, and 6.2 second conversion pause. Assessment:     1. Paroxysmal atrial fibrillation with conversion pauses - Currently in sinus rhythm. On Eliquis and flecainide. Following with EP. 2. Chest pain/dyspnea - Suspect may have been secondary to paroxysms of atrial fibrillation and seem to have resolved with better rhythm control.   Recent GXT nuclear SPECT stress test demonstrated normal myocardial perfusion. 3. Lightheadedness - History is most suggestive of orthostatic hypotension. Overall, symptoms are mild and non-limiting. 4. Mitral valve prolapse - Appeared mild on TTE from 2017. No significant prolapse or mitral regurgitation observed on most recent stuy. 5. Hyperlipidemia  6. BIJAN  7. Restless legs syndrome      Plan:     1. Continue Eliquis 5 mg BID, diltiazem  mg daily, and flecainide 100 mg BID. 2. We dicussed the potential pro-arrhythmic potential of Qsymia which contains phentermine and will discuss potentially weaning off this agent with patient's prescribing physician through [facility]. 3. Continued to encourage heart healthy, low sodium diet and regular physical exercise for at least 30 minutes 3-5 times per week. 4. Patient will follow-up with EP in the future for further monitoring and treatment of her paroxysmal atrial fibrillation. Scribe's attestation: This note was scribed in the presence of Dr. Radha Capellan, DO by Ely Rust RN. It is a pleasure to assist in the care of Shiv Hernández. Please call with any questions. The scribes documentation has been prepared under my direction and personally reviewed by me in its entirety. I confirm that the note above accurately reflects all work, treatment, procedures, and medical decision making performed by me. I, Dr. Radha Capellan, personally performed the services described in this documentation as scribed by Ely Rust RN in my presence, and it is both accurate and complete to the best of our ability.          Radha Capellan, [facility] [facility]  [phone] [facility]  [phone] [address] [place] [address]

## 2021-06-14 ENCOUNTER — TELEPHONE (OUTPATIENT)
Dept: CARDIOLOGY CLINIC | Age: 66
End: 2021-06-14

## 2021-06-14 NOTE — TELEPHONE ENCOUNTER
[address] [facility] [place] from [address] returned call to office. Weaning instructions for Qsymia for pt is: 1 cap every other day for 1 week and then after that week pt may stop taking Qsymia.

## 2021-06-14 NOTE — TELEPHONE ENCOUNTER
I called and spoke with  staff from [facility] physicians [phone] . They will send a message to their providers and let us know weaning instructions for Qsymia.

## 2021-06-15 ENCOUNTER — OFFICE VISIT (OUTPATIENT)
Dept: CARDIOLOGY CLINIC | Age: 66
End: 2021-06-15
Payer: COMMERCIAL

## 2021-06-15 VITALS
OXYGEN SATURATION: 94 % | HEART RATE: 59 BPM | HEIGHT: 65 IN | WEIGHT: 174 LBS | BODY MASS INDEX: 28.99 KG/M2 | DIASTOLIC BLOOD PRESSURE: 60 MMHG | SYSTOLIC BLOOD PRESSURE: 110 MMHG

## 2021-06-15 DIAGNOSIS — G47.33 OSA ON CPAP: ICD-10-CM

## 2021-06-15 DIAGNOSIS — I48.20 CHRONIC ATRIAL FIBRILLATION (HCC): ICD-10-CM

## 2021-06-15 DIAGNOSIS — I48.0 PAF (PAROXYSMAL ATRIAL FIBRILLATION) (HCC): Primary | ICD-10-CM

## 2021-06-15 DIAGNOSIS — Z99.89 OSA ON CPAP: ICD-10-CM

## 2021-06-15 PROCEDURE — 93000 ELECTROCARDIOGRAM COMPLETE: CPT | Performed by: INTERNAL MEDICINE

## 2021-06-15 PROCEDURE — 99214 OFFICE O/P EST MOD 30 MIN: CPT | Performed by: INTERNAL MEDICINE

## 2021-06-15 RX ORDER — FLECAINIDE ACETATE 100 MG/1
100 TABLET ORAL 2 TIMES DAILY
Qty: 195 TABLET | Refills: 3 | Status: SHIPPED | OUTPATIENT
Start: 2021-06-15 | End: 2022-05-16

## 2021-06-15 RX ORDER — DILTIAZEM HYDROCHLORIDE 180 MG/1
180 CAPSULE, COATED, EXTENDED RELEASE ORAL DAILY
Qty: 90 CAPSULE | Refills: 3 | Status: SHIPPED | OUTPATIENT
Start: 2021-06-15 | End: 2022-05-17 | Stop reason: SDUPTHER

## 2021-06-15 RX ORDER — FLECAINIDE ACETATE 100 MG/1
100 TABLET ORAL 2 TIMES DAILY
Qty: 180 TABLET | Refills: 3 | Status: SHIPPED
Start: 2021-06-15 | End: 2021-06-15 | Stop reason: SDUPTHER

## 2021-06-15 NOTE — PATIENT INSTRUCTIONS
RECOMMENDATIONS:  1. Continue current medications as prescribed. May take one extra flecainide per day if you feel like you are in AF. 2. Encourage adequate fluid intake (at least 64 oz per day) while working outside, and work outside when temperatures are cooler if possible.   3. Follow up with EP NP one year

## 2021-06-15 NOTE — LETTER
[facility]   Electrophysiology Consult Note            Date: 6/15/21  Patient Name: Agustin Chow  YOB: 1955    Primary Care Physician: Noel Sanches MD    CHIEF COMPLAINT:   Chief Complaint   Patient presents with    Follow-up     4 weeks. No current cardiac symptoms or concerns to report at this time    Atrial Fibrillation     HISTORY OF PRESENT ILLNESS: Agustin Chow is a 63 y.o. female  with a history of mitral valve prolapse, diastolic dysfunction, hyperlipidemia, BIJAN, and restless legs syndrome referred for further evaluation and management of newly diagnosed atrial fibrillation. The patient reported that she occasionally has palpitations where she feels like her heart is beating fasts or irregularly. For the last several months, she has also been having substernal chest pressure and shortness of breath with relatively minimal exertion. She says that that the chest pressure will also sometimes come on at rest without any specific trigger and last for approximately one minutes. She also has also been having some lightheadedness, typically when standing. This morning, while taking a shower she says that she felt very lightheaded and had to sit down. She denies recent syncope, but says that she did pass out once in her youth after a long day in the heat. She does not get any regular physical exercise. She works for [employer] within her company and says that she has been working 70-80 hours per week. She formerly received her cardiology care through Dr. Ector Meraz at [facility] and was previously diagnosed with mitral valve prolpase. Her last TTE from 2017 showed an LVEF of 55% with normal wall motion, grade 2 diastolic dysfunction, normal RV size and systolic function, mildly dilated left atrium, mild late systolic prolapse of the mitral valve with mild mitral regurgitation, and mild tricuspid regurgitation.   She has never had a cardiac stress test or undergone GERD (gastroesophageal reflux disease), Hyperlipidemia, Lumbar herniated disc, MVP (mitral valve prolapse), BIJAN (obstructive sleep apnea), Primary osteoarthritis of left knee, and Restless leg syndrome. Past Surgical History:   has a past surgical history that includes back surgery (2/1998) and Lap Band (2010). Allergies:  Patient has no known allergies. Social History:   reports that she has never smoked. She has never used smokeless tobacco. She reports that she does not drink alcohol and does not use drugs. Family History: family history includes Arthritis in her father and mother; Diabetes in her father; High Blood Pressure in her father; High Cholesterol in her father; Other in her father. Home Medications:    Prior to Admission medications    Medication Sig Start Date End Date Taking? Authorizing Provider   atorvastatin (LIPITOR) 10 MG tablet TAKE 1 TABLET BY MOUTH DAILY 5/26/21  Yes Mickey Mcneal MD   flecainide (TAMBOCOR) 100 MG tablet Take 1 tablet by mouth 2 times daily 5/20/21  Yes Hector Lopez MD   dilTIAZem (DILTIAZEM CD) 180 MG extended release capsule Take 1 capsule by mouth daily 4/20/21  Yes Mickey Mcneal MD   apixaban (ELIQUIS) 5 MG TABS tablet Take 1 tablet by mouth 2 times daily 4/20/21  Yes Mickey Mcneal MD   pramipexole (MIRAPEX) 0.5 MG tablet TAKE 1 TABLET BY MOUTH NIGHTLY AS NEEDED FOR RESTLESS LEG 4/5/21  Yes Mickey Mcneal MD   sertraline (ZOLOFT) 100 MG tablet TAKE 1 TABLET BY MOUTH DAILY 2/8/21  Yes Mickey Mcneal MD   traZODone (DESYREL) 50 MG tablet TAKE ONE-HALF TABLET BY MOUTH EVERY NIGHT AT BEDTIME, MAY INCREASE TO 1 TABLET AFTER 3RD NIGHT 2/1/21  Yes Mickey Mcneal MD   buPROPion (WELLBUTRIN XL) 150 MG extended release tablet TAKE 1 TABLET BY MOUTH EVERY MORNING 12/23/20  Yes LAURITA Goel   Phentermine-Topiramate (QSYMIA) 15-92 MG CP24 Take 1 capsule by mouth daily. .   Yes Historical Provider, MD       REVIEW OF SYSTEMS:    All 14-point and obesity who presents from home to establish care. Patient recently diagnosed with atrial fibrillation with RVR. She is extremely tachycardic when she is in atrial fibrillation. This diet not being on the highest dose of rate controlling agents patient has significant pauses for several seconds on her monitor (up to 6 seconds with conversion pauses). She reports being only mildly symptomatic with palpitations. At her rates I believe patient needs to consider rhythm control with either antiarrhythmic or ablation or pace and ablate strategy. We discussed anticoagulation (NOAC and warfarin), rate control, rhythm control, AVN + pacemaker, and atrial fibrillation ablation. We reviewed risks and benefits of each approach. We discussed side effects of class IC, class II, class III, and class IV antiarrhythmics. All questions were answered. We will start patient on flecainide as her stress test was within normal limits and her wall thickness is within normal limits despite her LVH. We will schedule a cardioversion on Friday. We will follow-up with patient sooner rather than later to consider other options.  - Start flecainide 100 mg BID.  - Cardioversion with EKG on Friday. - Continue apixaban. - Continue diltiazem. - Cardiac monitor post cardioversion.  - Follow up with me in 4 weeks. 6/15/2021  Patient presents for follow-up. She presented last week for a cardioversion however had converted back into normal sinus rhythm on flecainide. She has a history of bradycardia so this will be watched closely while she is on the 1C agent. She is tolerating her flecainide without any issues. She is tolerating apixaban without issues. She continues on her diltiazem. He can clearly tell when she is in normal rhythm feel significantly better. As she is doing well I think be reasonable patient to follow-up with us in 1 year or as needed.   - Continue flecainide 100 mg BID with  mg with atrial

## 2021-06-18 RX ORDER — BUPROPION HYDROCHLORIDE 150 MG/1
150 TABLET ORAL EVERY MORNING
Qty: 90 TABLET | Refills: 2 | Status: SHIPPED | OUTPATIENT
Start: 2021-06-18 | End: 2022-02-28

## 2021-06-24 ENCOUNTER — OFFICE VISIT (OUTPATIENT)
Dept: ORTHOPEDIC SURGERY | Age: 66
End: 2021-06-24
Payer: COMMERCIAL

## 2021-06-24 ENCOUNTER — HOSPITAL ENCOUNTER (EMERGENCY)
Age: 66
Discharge: HOME | End: 2021-06-24
Attending: EMERGENCY MEDICINE
Payer: COMMERCIAL

## 2021-06-24 ENCOUNTER — APPOINTMENT (OUTPATIENT)
Dept: GENERAL RADIOLOGY | Age: 66
End: 2021-06-24
Payer: COMMERCIAL

## 2021-06-24 ENCOUNTER — TELEPHONE (OUTPATIENT)
Dept: ORTHOPEDIC SURGERY | Age: 66
End: 2021-06-24

## 2021-06-24 VITALS
BODY MASS INDEX: 28.32 KG/M2 | RESPIRATION RATE: 17 BRPM | HEART RATE: 55 BPM | DIASTOLIC BLOOD PRESSURE: 78 MMHG | OXYGEN SATURATION: 100 % | SYSTOLIC BLOOD PRESSURE: 116 MMHG | HEIGHT: 65 IN | TEMPERATURE: 98.5 F | WEIGHT: 170 LBS

## 2021-06-24 VITALS — WEIGHT: 170 LBS | BODY MASS INDEX: 28.32 KG/M2 | HEIGHT: 65 IN

## 2021-06-24 DIAGNOSIS — S52.502A CLOSED FRACTURE OF DISTAL END OF LEFT RADIUS, UNSPECIFIED FRACTURE MORPHOLOGY, INITIAL ENCOUNTER: Primary | ICD-10-CM

## 2021-06-24 DIAGNOSIS — S52.592A OTHER CLOSED FRACTURE OF DISTAL END OF LEFT RADIUS, INITIAL ENCOUNTER: Primary | ICD-10-CM

## 2021-06-24 PROCEDURE — 29125 APPL SHORT ARM SPLINT STATIC: CPT

## 2021-06-24 PROCEDURE — 6370000000 HC RX 637 (ALT 250 FOR IP): Performed by: EMERGENCY MEDICINE

## 2021-06-24 PROCEDURE — L3807 WHFO W/O JOINTS PRE CST: HCPCS | Performed by: PHYSICIAN ASSISTANT

## 2021-06-24 PROCEDURE — 25600 CLTX DST RDL FX/EPHYS SEP WO: CPT | Performed by: PHYSICIAN ASSISTANT

## 2021-06-24 PROCEDURE — 73110 X-RAY EXAM OF WRIST: CPT

## 2021-06-24 PROCEDURE — 99213 OFFICE O/P EST LOW 20 MIN: CPT | Performed by: PHYSICIAN ASSISTANT

## 2021-06-24 PROCEDURE — 99284 EMERGENCY DEPT VISIT MOD MDM: CPT

## 2021-06-24 PROCEDURE — L3660 SO 8 AB RSTR CAN/WEB PRE OTS: HCPCS | Performed by: PHYSICIAN ASSISTANT

## 2021-06-24 RX ORDER — IBUPROFEN 600 MG/1
600 TABLET ORAL ONCE
Status: COMPLETED | OUTPATIENT
Start: 2021-06-24 | End: 2021-06-24

## 2021-06-24 RX ORDER — HYDROCODONE BITARTRATE AND ACETAMINOPHEN 5; 325 MG/1; MG/1
1 TABLET ORAL EVERY 6 HOURS PRN
Qty: 12 TABLET | Refills: 0 | Status: SHIPPED | OUTPATIENT
Start: 2021-06-24 | End: 2021-06-27

## 2021-06-24 RX ADMIN — IBUPROFEN 600 MG: 600 TABLET, FILM COATED ORAL at 06:43

## 2021-06-24 ASSESSMENT — PAIN DESCRIPTION - PAIN TYPE: TYPE: ACUTE PAIN

## 2021-06-24 ASSESSMENT — PAIN DESCRIPTION - LOCATION
LOCATION: WRIST
LOCATION: WRIST

## 2021-06-24 ASSESSMENT — PAIN SCALES - GENERAL
PAINLEVEL_OUTOF10: 10

## 2021-06-24 ASSESSMENT — PAIN DESCRIPTION - DESCRIPTORS: DESCRIPTORS: ACHING

## 2021-06-24 ASSESSMENT — PAIN DESCRIPTION - ORIENTATION: ORIENTATION: LEFT

## 2021-06-24 NOTE — PROGRESS NOTES
Chief Complaint    Arm Pain (Patient slipped and fell and landed on an outstretched arm.  )      History of Present Illness: Linnette Bray is a 63 y.o. female who presents today for evaluation of left wrist pain. Patient states that last night she was ambulating with socks in her kitchen on a hardwood floor when she slipped and fell backwards landing onto her outstretched left upper extremity. She had immediate pain but she was able to continue icing the wrist.  She was seen today at [facility], ER where x-rays were obtained that showed a fracture of the distal radius. She was placed into a sugar tong splint and placed into a sling. She is complaining of significant pain within the sugar tong splint. She denies any previous trauma to the wrist and she is right-hand dominant. Pain Assessment  Location of Pain: Arm  Location Modifiers: Left  Severity of Pain: 10  Quality of Pain: Dull, Aching  Duration of Pain: Persistent  Frequency of Pain: Constant  Aggravating Factors: Other (Comment)  Limiting Behavior: Yes  Relieving Factors: Rest  Result of Injury: Yes  Work-Related Injury: No  Are there other pain locations you wish to document?: No    Medical History:  Patient's medications, allergies, past medical, surgical, social and family histories were reviewed and updated as appropriate. Review of Systems:  Pertinent items are noted in HPI  Review of systems reviewed from Patient History Form dated on 6/24/2021 and available in the patient's chart under the Media tab. Vital Signs:  Ht 5' 5\" (1.651 m)   Wt 170 lb (77.1 kg)   BMI 28.29 kg/m²     General Exam:   Constitutional: Patient is adequately groomed with no evidence of malnutrition  Mental Status: The patient is oriented to time, place and person. The patient's mood and affect are appropriate. Neurological: The patient has good coordination. There is no weakness or sensory deficit.     Left wrist examination:    Inspection: Today's inspection left wrist reveals a sugar tong splint in place. Today the sugar tong splint was removed and the skin was inspected which shows mild erythema with swelling over the distal radius and ulna. There was an area of redness noted over her elbow. Palpation: Patient is diffusely tender to palpation over the distal radius and minimally over the distal ulna. She does have decreased range of motion of her fingers secondary to pain and stiffness. Range of Motion: Significantly decreased secondary to pain within the left wrist and range of motion of the fingers is decreased secondary to swelling and stiffness. Strength: Not tested today    Special Tests: Capillary refill is within 2 seconds    Skin: There are no rashes, ulcerations or lesions. Distal nervous status grossly intact    Radiology:     X-rays obtained and reviewed in office:  Views x-rays of the left wrist which were obtained today at [facility], ED to include AP, lateral, oblique  Location left wrist  Impression x-rays reveal comminuted distal radius fracture which is slightly displaced with minimal intra-articular displacement. There is minimal dorsal displacement noted. Assessment : Left comminuted distal radius fracture    Impression:  Encounter Diagnosis   Name Primary?  Other closed fracture of distal end of left radius, initial encounter Yes       Office Procedures:  Orders Placed This Encounter   Procedures    Exos Medical Short Arm Fracture Brace     Patient was prescribed a Exos Short Arm Fracture Brace. The left arm will require stabilization / immobilization from this semi-rigid / rigid orthosis to improve their function. The orthosis will assist in protecting the affected area, provide functional support and facilitate healing. The orthosis used a heating element to mold and shape the brace to provide a customizable fit for the patient.     The patient was educated and fit by a healthcare professional with expert knowledge and specialization in brace application while under the direct supervision of the treating physician. Verbal and written instructions for the use of and application of this item were provided. They were instructed to contact the office immediately should the brace result in increased pain, decreased sensation, increased swelling or worsening of the condition.  Breg DLX Shoulder Immobilizer     Patient was prescribed a DLX Shoulder Immobilizer. The left shoulder will require stabilization / immobilization from this orthosis. The orthosis will assist in protecting the affected area, provide functional support and facilitate healing. The patient was educated and fit by a healthcare professional with expert knowledge and specialization in brace application while under the direct supervision of the treating physician. Verbal and written instructions for the use of and application of this item were provided. They were instructed to contact the office immediately should the brace result in increased pain, decreased sensation, increased swelling or worsening of the condition. Treatment Plan: Today we discussed the diagnosis and treatment options and the sugar tong splint was removed due to patient's complaints of pain within the splint. She was placed into a long-arm Exos fracture brace that she is to have on at all times. She was placed into a different sling that fits that she is to wear for comfort. She is to continue with range of motion of her fingers and icing as needed. She was given a prescription for pain medicine in the emergency room that she will take as needed. Follow-up: In 2 weeks at which time repeat clinical examination and x-rays will be obtained out of the brace.     Patient examined and note dictated by Sarah Nagel PA-C.

## 2021-06-24 NOTE — ED PROVIDER NOTES
[facility]  ED  EMERGENCY DEPARTMENT ENCOUNTER      Pt Name: Ramesh Murray  MRN: [id number]  Bandargfhoward 1955  Date of evaluation: 6/24/2021  Provider: Gena Wallace MD    CHIEF COMPLAINT       Chief Complaint   Patient presents with    Wrist Injury     slipped and fell last night in the kitchen, injured left wrist         HISTORY OF PRESENT ILLNESS   (Location/Symptom, Timing/Onset, Context/Setting, Quality, Duration, Modifying Factors, Severity)  Note limiting factors. Ramesh Murray is a 63 y.o. female with past medical history of atrial fibrillation on anticoagulation here today after a fall with left wrist pain. Patient states last night she was wearing socks on a hardwood floor when she lost her footing slipped and fell landing on the left wrist.  She did not hit her head. She is complaining of a throbbing aching pain in the left wrist worse with any attempted range of motion. No left elbow pain. No numbness or tingling in the left hand. States it has been swelling she got very little sleep secondary to the pain. No obvious alleviating factors    HPI    Nursing Notes were reviewed. REVIEW OF SYSTEMS    (2-9 systems for level 4, 10 or more for level 5)     Review of Systems    Please see HPI for pertinent positive and negative review of system findings. A full 10 system ROS was performed and otherwise negative.         PAST MEDICAL HISTORY     Past Medical History:   Diagnosis Date    Depression     GERD (gastroesophageal reflux disease)     Hyperlipidemia     Lumbar herniated disc 2/98    MVP (mitral valve prolapse)     BIJAN (obstructive sleep apnea)     Primary osteoarthritis of left knee 9/25/2020    Restless leg syndrome          SURGICAL HISTORY       Past Surgical History:   Procedure Laterality Date    BACK SURGERY  2/1998    LAP BAND  2010         CURRENT MEDICATIONS       Previous Medications    APIXABAN (ELIQUIS) 5 MG TABS TABLET    Take 1 tablet by mouth 2 times daily    ATORVASTATIN (LIPITOR) 10 MG TABLET    TAKE 1 TABLET BY MOUTH DAILY    BUPROPION (WELLBUTRIN XL) 150 MG EXTENDED RELEASE TABLET    TAKE 1 TABLET BY MOUTH EVERY MORNING    DILTIAZEM (DILTIAZEM CD) 180 MG EXTENDED RELEASE CAPSULE    Take 1 capsule by mouth daily    FLECAINIDE (TAMBOCOR) 100 MG TABLET    Take 1 tablet by mouth 2 times daily May take one additional pill per day if in AF. Do not exceed 300 mg in a 24 hour period. PHENTERMINE-TOPIRAMATE (QSYMIA) 15-92 MG CP24    Take 1 capsule by mouth daily. Nadyne Sayra PRAMIPEXOLE (MIRAPEX) 0.5 MG TABLET    TAKE 1 TABLET BY MOUTH NIGHTLY AS NEEDED FOR RESTLESS LEG    SERTRALINE (ZOLOFT) 100 MG TABLET    TAKE 1 TABLET BY MOUTH DAILY    TRAZODONE (DESYREL) 50 MG TABLET    TAKE ONE-HALF TABLET BY MOUTH EVERY NIGHT AT BEDTIME, MAY INCREASE TO 1 TABLET AFTER 3RD NIGHT       ALLERGIES     Patient has no known allergies.     FAMILY HISTORY       Family History   Problem Relation Age of Onset    Arthritis Mother     Arthritis Father     Diabetes Father     High Blood Pressure Father     High Cholesterol Father     Other Father         meniere's disease          SOCIAL HISTORY       Social History     Socioeconomic History    Marital status:      Spouse name: None    Number of children: None    Years of education: None    Highest education level: None   Occupational History    None   Tobacco Use    Smoking status: Never Smoker    Smokeless tobacco: Never Used   Vaping Use    Vaping Use: Never used   Substance and Sexual Activity    Alcohol use: No    Drug use: No    Sexual activity: None   Other Topics Concern    None   Social History Narrative    None     Social Determinants of Health     Financial Resource Strain: Low Risk     Difficulty of Paying Living Expenses: Not hard at all   Food Insecurity: No Food Insecurity    Worried About Running Out of Food in the Last Year: Never true    Vladislav of Food in the Last Year: Never true   Transportation Needs: No Transportation Needs    Lack of Transportation (Medical): No    Lack of Transportation (Non-Medical): No   Physical Activity:     Days of Exercise per Week:     Minutes of Exercise per Session:    Stress:     Feeling of Stress :    Social Connections:     Frequency of Communication with Friends and Family:     Frequency of Social Gatherings with Friends and Family:     Attends Judaism Services:     Active Member of Clubs or Organizations:     Attends Club or Organization Meetings:     Marital Status:    Intimate Partner Violence:     Fear of Current or Ex-Partner:     Emotionally Abused:     Physically Abused:     Sexually Abused:        SCREENINGS    [place] Coma Scale  Eye Opening: Spontaneous  Best Verbal Response: Oriented  Best Motor Response: Obeys commands  [place] Coma Scale Score: 15          PHYSICAL EXAM    (up to 7 for level 4, 8 or more for level 5)     ED Triage Vitals [06/24/21 0626]   BP Temp Temp Source Pulse Resp SpO2 Height Weight   105/63 98.5 °F (36.9 °C) Oral 68 16 100 % 5' 5\" (1.651 m) 170 lb (77.1 kg)       Physical Exam      General appearance:  Cooperative. No acute distress. Skin:  Warm. Dry. Ears, nose, mouth and throat:  Oral mucosa moist,  Perfusion:  intact with good capillary refill in all fingers of the left hand as well as 2+ left radial pulse  Respiratory: Respirations nonlabored. Neurological:  Alert. Moves all extremities spontaneously. Intact sensation throughout all dermatomes in the left hand  Musculoskeletal: Close deformity of the left wrist with tenderness to palpation of the left distal radius and limited range of motion of the left wrist secondary to pain. Normal flexion extension of all fingers in the left hand. Normal flexion extension of the left elbow with no tenderness of the proximal forearm. Normal range of motion of the left shoulder.           Psychiatric:  Normal mood      DIAGNOSTIC RESULTS       Labs Reviewed - No data to display    Interpretation per the Radiologist below, if obtained/available at the time of this note:    XR WRIST LEFT (MIN 3 VIEWS)   Preliminary Result   1. Acute, comminuted and mildly displaced intra-articular fracture of the   distal radius   2. Significantly decreased bone mineral density   3. Severe osteoarthritis of the 1st carpometacarpal joint             All other labs/imaging were within normal range or not returned as of this dictation. EMERGENCY DEPARTMENT COURSE and DIFFERENTIAL DIAGNOSIS/MDM:   Vitals:    Vitals:    06/24/21 0626   BP: 105/63   Pulse: 68   Resp: 16   Temp: 98.5 °F (36.9 °C)   TempSrc: Oral   SpO2: 100%   Weight: 170 lb (77.1 kg)   Height: 5' 5\" (1.651 m)       Patient presents to the emergency department today with left wrist pain after fall. Exam concerning for fracture. Ultimately confirmed with imaging. No proximal forearm injury. Neurovascularly intact. Placed in sugar tong splint and will be provided outpatient orthopedic follow-up. Post splinting had intact neurovascular status with good capillary refill distally in the digits in the left hand. MDM    CONSULTS     None    Critical Care:   None    REASSESSMENT          PROCEDURE     Unless otherwise noted below, none     Splint Application    Date/Time: 6/24/2021 7:21 AM  Performed by: Samuel Salas MD  Authorized by: Samuel Salas MD     Consent:     Consent obtained:  Verbal    Consent given by:  Patient  Pre-procedure details:     Sensation:  Normal  Procedure details:     Laterality:  Left    Location:  Wrist    Wrist:  L wrist    Splint type:  Sugar tong    Supplies:  Ortho-Glass  Post-procedure details:     Pain:  Unchanged    Sensation:  Normal    Patient tolerance of procedure: Tolerated well, no immediate complications          FINAL IMPRESSION      1.  Closed fracture of distal end of left radius, unspecified fracture morphology, initial encounter            DISPOSITION/PLAN   DISPOSITION Decision To Discharge 06/24/2021 07:18:40 AM        PATIENT REFERRED TO:  Elvie Galarza MD  [facility] [id number]  [phone]    Schedule an appointment as soon as possible for a visit   Call for orthopedic follow up      DISCHARGE MEDICATIONS:  New Prescriptions    HYDROCODONE-ACETAMINOPHEN (NORCO) 5-325 MG PER TABLET    Take 1 tablet by mouth every 6 hours as needed for Pain for up to 3 days. Intended supply: 3 days. Take lowest dose possible to manage pain     Controlled Substances Monitoring:     RX Monitoring 5/3/2016   Attestation The Prescription Monitoring Report for this patient was reviewed today. Periodic Controlled Substance Monitoring Possible medication side effects, risk of tolerance and/or dependence, and alternative treatments discussed; No signs of potential drug abuse or diversion identified.        (Please note that portions of this note were completed with a voice recognition program.  Efforts were made to edit the dictations but occasionally words are mis-transcribed.)    Jayshree Montesinos MD (electronically signed)  Attending Emergency Physician            Ekta Hall MD  06/24/21 0322

## 2021-06-24 NOTE — ED NOTES
--Patient provided with discharge instructions. --Instructions, and follow-up appointments reviewed with patient/family. No further questions or needs at this time. --Vital signs and patient stable upon discharge. --Patient ambulatory to [facility]. --Being driven home by .       Nahed Dumont RN  06/24/21 0592

## 2021-06-24 NOTE — ED NOTES
Completed application of OCL Sugar Tong Splint to pt's left arm. Applied stockinet, 32 by 2 inches of Orthoglass, 1 roll of 2 inch ace wrap, and 1 roll of 4 inch ace wrap. Fitted pt for medium sling. Pt tolerated application, and acknowledged all care taking instructions.      Nina Bland  06/24/21 3082

## 2021-06-25 ENCOUNTER — TELEPHONE (OUTPATIENT)
Dept: ORTHOPEDIC SURGERY | Age: 66
End: 2021-06-25

## 2021-06-25 NOTE — TELEPHONE ENCOUNTER
6/25/21 DME   - NOT COVERED - MUST BE [employer] AC[employer]ED[employer] FOR ORTHOTIC APPLIANCES OR PROSTHETICS SUPPLIES - DME ONLY COVERED IF ITEM RECEIVED FROM PROSTHETICS OR ORTHOTIC SUPPLIER OR ACCREDITED SUPPLY COMPANY - NOT [facility] OR CLINIC - WE CAN PRESCRIBE BUT NOT DISPENSE -  PER NOTES -  MP

## 2021-07-09 ENCOUNTER — OFFICE VISIT (OUTPATIENT)
Dept: ORTHOPEDIC SURGERY | Age: 66
End: 2021-07-09

## 2021-07-09 VITALS — BODY MASS INDEX: 28.32 KG/M2 | WEIGHT: 170 LBS | HEIGHT: 65 IN

## 2021-07-09 DIAGNOSIS — M25.532 LEFT WRIST PAIN: Primary | ICD-10-CM

## 2021-07-09 DIAGNOSIS — S52.592A OTHER CLOSED FRACTURE OF DISTAL END OF LEFT RADIUS, INITIAL ENCOUNTER: ICD-10-CM

## 2021-07-09 PROCEDURE — 99024 POSTOP FOLLOW-UP VISIT: CPT | Performed by: PHYSICIAN ASSISTANT

## 2021-07-09 NOTE — PROGRESS NOTES
Chief Complaint    Follow-up (Left Wrist Fx, Pain Level 3-4)      History of Present Illness: Renetta Villanueva is a 63 y.o. female who presents today for follow-up of left distal radius. Patient was placed into an Exos fracture brace approximately 2 weeks ago and has been very comfortable in the fracture brace. She states that her current pain is 3-4/10. Patient states that 2 weeks ago she was ambulating with socks in her kitchen on a hardwood floor when she slipped and fell backwards landing onto her outstretched left upper extremity. She had immediate pain but she was able to continue icing the wrist.  She was seen today at [facility], ER where x-rays were obtained that showed a fracture of the distal radius. She was placed into a sugar tong splint and placed into a sling. She is complaining of significant pain within the sugar tong splint. She denies any previous trauma to the wrist and she is right-hand dominant. Pain Assessment  Location of Pain: Wrist  Location Modifiers: Left  Severity of Pain: 4  Frequency of Pain: Intermittent  Aggravating Factors: Bending, Stretching, Straightening    Medical History:  Patient's medications, allergies, past medical, surgical, social and family histories were reviewed and updated as appropriate. Review of Systems:  Pertinent items are noted in HPI  Review of systems reviewed from Patient History Form dated on 6/24/2021 and available in the patient's chart under the Media tab. Vital Signs:  Ht 5' 5\" (1.651 m)   Wt 170 lb (77.1 kg)   BMI 28.29 kg/m²     General Exam:   Constitutional: Patient is adequately groomed with no evidence of malnutrition  Mental Status: The patient is oriented to time, place and person. The patient's mood and affect are appropriate. Neurological: The patient has good coordination. There is no weakness or sensory deficit.     Left wrist examination:    Inspection: Today's inspection left wrist reveals Exos fracture brace in place.  Today the fracture brace t was removed and the skin was inspected which shows minimal erythema with minimal swelling over the distal radius and ulna. Palpation: Patient is diffusely tender to palpation over the distal radius and minimally over the distal ulna. She does have decreased range of motion of her fingers secondary to pain and stiffness. Range of Motion: Patient was able to fully extend her fingers and was able to make a fist.    Strength: Not tested today    Special Tests: Capillary refill is within 2 seconds    Skin: There are no rashes, ulcerations or lesions. Distal nervous status grossly intact    Radiology:     X-rays obtained and reviewed in office:  Views: 3 views of the left wrist to include AP, lateral, oblique were obtained in the office today and independently reviewed with the patient which reveals a comminuted distal radius fracture which is slightly displaced with minimal intra-articular displacement. There is minimal dorsal displacement noted. There has been no change in alignment noted. Assessment : Left comminuted distal radius fracture    Impression:  Encounter Diagnoses   Name Primary?  Left wrist pain Yes    Other closed fracture of distal end of left radius, initial encounter        Office Procedures:  Orders Placed This Encounter   Procedures    XR WRIST LEFT (MIN 3 VIEWS)     AP  ,  LATERAL, OBLIQUE     Standing Status:   Future     Number of Occurrences:   1     Standing Expiration Date:   8/8/2021     Order Specific Question:   Reason for exam:     Answer:   pain       Treatment Plan:   Patient is to continue with the fracture brace and was given new stockinette to use underneath the fracture brace. She may discontinue the sling at this time. She is to continue with range of motion exercises for her fingers. Follow-up: In 2 weeks at which time repeat clinical examination and x-rays will be obtained out of the brace.     Patient examined and note dictated by Konrad Bullock PA-C.

## 2021-07-23 ENCOUNTER — OFFICE VISIT (OUTPATIENT)
Dept: ORTHOPEDIC SURGERY | Age: 66
End: 2021-07-23

## 2021-07-23 VITALS — HEIGHT: 65 IN | BODY MASS INDEX: 28.32 KG/M2 | WEIGHT: 170 LBS

## 2021-07-23 DIAGNOSIS — S52.592A OTHER CLOSED FRACTURE OF DISTAL END OF LEFT RADIUS, INITIAL ENCOUNTER: Primary | ICD-10-CM

## 2021-07-23 PROCEDURE — 99024 POSTOP FOLLOW-UP VISIT: CPT | Performed by: PHYSICIAN ASSISTANT

## 2021-07-23 NOTE — PROGRESS NOTES
Chief Complaint    Wrist Pain (Patient continues to have some pain.)      History of Present Illness: Denise Henderson is a 63 y.o. female who presents today for follow-up of left distal radius. Patient was placed into an Exos fracture brace approximately 4 weeks ago and has been very comfortable in the fracture brace. She states that her current pain is mild to moderate. Patient states that 4 weeks ago she was ambulating with socks in her kitchen on a hardwood floor when she slipped and fell backwards landing onto her outstretched left upper extremity. She had immediate pain but she was able to continue icing the wrist.  She was seen today at [facility] where x-rays were obtained that showed a fracture of the distal radius. She was placed into a sugar tong splint and placed into a sling. She is complaining of significant pain within the sugar tong splint. She denies any previous trauma to the wrist and she is right-hand dominant. Pain Assessment  Location of Pain: Wrist  Location Modifiers: Left  Severity of Pain: 5  Quality of Pain: Dull, Aching  Duration of Pain: Persistent  Frequency of Pain: Constant  Aggravating Factors: Other (Comment)  Limiting Behavior: Yes  Relieving Factors: Rest  Result of Injury: Yes  Work-Related Injury: No  Are there other pain locations you wish to document?: No    Medical History:  Patient's medications, allergies, past medical, surgical, social and family histories were reviewed and updated as appropriate. Review of Systems:  Pertinent items are noted in HPI  Review of systems reviewed from Patient History Form dated on 6/24/2021 and available in the patient's chart under the Media tab. Vital Signs:  Ht 5' 5\" (1.651 m)   Wt 170 lb (77.1 kg)   BMI 28.29 kg/m²     General Exam:   Constitutional: Patient is adequately groomed with no evidence of malnutrition  Mental Status: The patient is oriented to time, place and person.   The patient's mood and affect are appropriate. Neurological: The patient has good coordination. There is no weakness or sensory deficit. Left wrist examination:    Inspection: Today's inspection left wrist reveals Exos fracture brace in place. Today the fracture brace t was removed and the skin was inspected which shows minimal erythema with minimal swelling over the distal radius and ulna. Palpation: Patient is diffusely tender to palpation over the distal radius and minimally over the distal ulna. She does have decreased range of motion of her fingers secondary to pain and stiffness. Range of Motion: Patient was able to fully extend her fingers and was able to make a fist.  Her range of motion now is improving with less pain. Strength: Not tested today    Special Tests: Capillary refill is within 2 seconds    Skin: There are no rashes, ulcerations or lesions. Distal nervous status grossly intact    Radiology:     X-rays obtained and reviewed in office:  Views: 3 views of the left wrist to include AP, lateral, oblique were obtained in the office today and compared to the x-rays that were performed on 7/9/2021 and independently reviewed with the patient which reveals the distal radius fracture to show good callus formation with significant progression towards healing. Assessment : Left comminuted distal radius fracture now 4 weeks post injury    Impression:  Encounter Diagnosis   Name Primary?     Other closed fracture of distal end of left radius, initial encounter Yes       Office Procedures:  Orders Placed This Encounter   Procedures    XR WRIST LEFT (MIN 3 VIEWS)     3V L Wrist     Standing Status:   Future     Number of Occurrences:   1     Standing Expiration Date:   7/23/2022     Order Specific Question:   Reason for exam:     Answer:   Wrist Pain       Treatment Plan:   Patient is to continue with the fracture brace but she is to come out of the brace several times a day for range of motion exercises of her wrist.    Follow-up: In 3 weeks at which time repeat clinical examination and x-rays will be obtained out of the brace.     Patient examined and note dictated by Luan Cast PA-C.

## 2021-07-28 ENCOUNTER — NURSE TRIAGE (OUTPATIENT)
Dept: OTHER | Facility: CLINIC | Age: 66
End: 2021-07-28

## 2021-07-28 ENCOUNTER — TELEPHONE (OUTPATIENT)
Dept: CARDIOLOGY CLINIC | Age: 66
End: 2021-07-28

## 2021-07-28 ENCOUNTER — APPOINTMENT (OUTPATIENT)
Dept: GENERAL RADIOLOGY | Age: 66
End: 2021-07-28
Payer: COMMERCIAL

## 2021-07-28 ENCOUNTER — HOSPITAL ENCOUNTER (OUTPATIENT)
Age: 66
Setting detail: OBSERVATION
Discharge: HOME | End: 2021-07-29
Attending: EMERGENCY MEDICINE | Admitting: INTERNAL MEDICINE
Payer: COMMERCIAL

## 2021-07-28 DIAGNOSIS — R07.9 CHEST PAIN, UNSPECIFIED TYPE: Primary | ICD-10-CM

## 2021-07-28 LAB
A/G RATIO: 1.4 (ref 1.1–2.2)
ALBUMIN SERPL-MCNC: 4.4 G/DL (ref 3.4–5)
ALP BLD-CCNC: 109 U/L (ref 40–129)
ALT SERPL-CCNC: 11 U/L (ref 10–40)
ANION GAP SERPL CALCULATED.3IONS-SCNC: 11 MMOL/L (ref 3–16)
AST SERPL-CCNC: 16 U/L (ref 15–37)
BASOPHILS ABSOLUTE: 0.1 K/UL (ref 0–0.2)
BASOPHILS RELATIVE PERCENT: 0.7 %
BILIRUB SERPL-MCNC: 0.5 MG/DL (ref 0–1)
BUN BLDV-MCNC: 14 MG/DL (ref 7–20)
CALCIUM SERPL-MCNC: 9.4 MG/DL (ref 8.3–10.6)
CHLORIDE BLD-SCNC: 102 MMOL/L (ref 99–110)
CO2: 25 MMOL/L (ref 21–32)
CREAT SERPL-MCNC: 1 MG/DL (ref 0.6–1.2)
EOSINOPHILS ABSOLUTE: 0.1 K/UL (ref 0–0.6)
EOSINOPHILS RELATIVE PERCENT: 0.6 %
GFR AFRICAN AMERICAN: >60
GFR NON-AFRICAN AMERICAN: 56
GLOBULIN: 3.1 G/DL
GLUCOSE BLD-MCNC: 114 MG/DL (ref 70–99)
HCT VFR BLD CALC: 39.3 % (ref 36–48)
HEMOGLOBIN: 13.1 G/DL (ref 12–16)
LYMPHOCYTES ABSOLUTE: 1.8 K/UL (ref 1–5.1)
LYMPHOCYTES RELATIVE PERCENT: 14.6 %
MCH RBC QN AUTO: 28.6 PG (ref 26–34)
MCHC RBC AUTO-ENTMCNC: 33.5 G/DL (ref 31–36)
MCV RBC AUTO: 85.4 FL (ref 80–100)
MONOCYTES ABSOLUTE: 1 K/UL (ref 0–1.3)
MONOCYTES RELATIVE PERCENT: 8.4 %
NEUTROPHILS ABSOLUTE: 9.5 K/UL (ref 1.7–7.7)
NEUTROPHILS RELATIVE PERCENT: 75.7 %
PDW BLD-RTO: 13.8 % (ref 12.4–15.4)
PLATELET # BLD: 252 K/UL (ref 135–450)
PMV BLD AUTO: 7.8 FL (ref 5–10.5)
POTASSIUM SERPL-SCNC: 4.1 MMOL/L (ref 3.5–5.1)
RBC # BLD: 4.6 M/UL (ref 4–5.2)
SODIUM BLD-SCNC: 138 MMOL/L (ref 136–145)
TOTAL PROTEIN: 7.5 G/DL (ref 6.4–8.2)
TROPONIN: <0.01 NG/ML
TROPONIN: <0.01 NG/ML
WBC # BLD: 12.5 K/UL (ref 4–11)

## 2021-07-28 PROCEDURE — 6370000000 HC RX 637 (ALT 250 FOR IP): Performed by: NURSE PRACTITIONER

## 2021-07-28 PROCEDURE — G0378 HOSPITAL OBSERVATION PER HR: HCPCS

## 2021-07-28 PROCEDURE — 99284 EMERGENCY DEPT VISIT MOD MDM: CPT

## 2021-07-28 PROCEDURE — 84484 ASSAY OF TROPONIN QUANT: CPT

## 2021-07-28 PROCEDURE — 80053 COMPREHEN METABOLIC PANEL: CPT

## 2021-07-28 PROCEDURE — 85025 COMPLETE CBC W/AUTO DIFF WBC: CPT

## 2021-07-28 PROCEDURE — 36415 COLL VENOUS BLD VENIPUNCTURE: CPT

## 2021-07-28 PROCEDURE — 93005 ELECTROCARDIOGRAM TRACING: CPT | Performed by: EMERGENCY MEDICINE

## 2021-07-28 PROCEDURE — 71045 X-RAY EXAM CHEST 1 VIEW: CPT

## 2021-07-28 PROCEDURE — 2580000003 HC RX 258: Performed by: NURSE PRACTITIONER

## 2021-07-28 RX ORDER — SODIUM CHLORIDE 9 MG/ML
25 INJECTION, SOLUTION INTRAVENOUS PRN
Status: DISCONTINUED | OUTPATIENT
Start: 2021-07-28 | End: 2021-07-29 | Stop reason: HOSPADM

## 2021-07-28 RX ORDER — ASPIRIN 81 MG/1
81 TABLET, CHEWABLE ORAL DAILY
Status: DISCONTINUED | OUTPATIENT
Start: 2021-07-29 | End: 2021-07-29

## 2021-07-28 RX ORDER — SODIUM CHLORIDE 0.9 % (FLUSH) 0.9 %
5-40 SYRINGE (ML) INJECTION PRN
Status: DISCONTINUED | OUTPATIENT
Start: 2021-07-28 | End: 2021-07-29 | Stop reason: HOSPADM

## 2021-07-28 RX ORDER — POLYETHYLENE GLYCOL 3350 17 G/17G
17 POWDER, FOR SOLUTION ORAL DAILY PRN
Status: DISCONTINUED | OUTPATIENT
Start: 2021-07-28 | End: 2021-07-29 | Stop reason: HOSPADM

## 2021-07-28 RX ORDER — NITROGLYCERIN 0.4 MG/1
0.4 TABLET SUBLINGUAL EVERY 5 MIN PRN
Status: DISCONTINUED | OUTPATIENT
Start: 2021-07-28 | End: 2021-07-29 | Stop reason: HOSPADM

## 2021-07-28 RX ORDER — TRAZODONE HYDROCHLORIDE 50 MG/1
50 TABLET ORAL NIGHTLY
Status: DISCONTINUED | OUTPATIENT
Start: 2021-07-28 | End: 2021-07-29 | Stop reason: HOSPADM

## 2021-07-28 RX ORDER — ATORVASTATIN CALCIUM 10 MG/1
10 TABLET, FILM COATED ORAL DAILY
Status: DISCONTINUED | OUTPATIENT
Start: 2021-07-29 | End: 2021-07-29 | Stop reason: HOSPADM

## 2021-07-28 RX ORDER — ACETAMINOPHEN 325 MG/1
650 TABLET ORAL EVERY 6 HOURS PRN
Status: DISCONTINUED | OUTPATIENT
Start: 2021-07-28 | End: 2021-07-29 | Stop reason: HOSPADM

## 2021-07-28 RX ORDER — PRAMIPEXOLE DIHYDROCHLORIDE 0.25 MG/1
0.5 TABLET ORAL NIGHTLY PRN
Status: DISCONTINUED | OUTPATIENT
Start: 2021-07-28 | End: 2021-07-29 | Stop reason: HOSPADM

## 2021-07-28 RX ORDER — DILTIAZEM HYDROCHLORIDE 180 MG/1
180 CAPSULE, COATED, EXTENDED RELEASE ORAL DAILY
Status: DISCONTINUED | OUTPATIENT
Start: 2021-07-29 | End: 2021-07-29

## 2021-07-28 RX ORDER — SODIUM CHLORIDE 0.9 % (FLUSH) 0.9 %
5-40 SYRINGE (ML) INJECTION EVERY 12 HOURS SCHEDULED
Status: DISCONTINUED | OUTPATIENT
Start: 2021-07-28 | End: 2021-07-29 | Stop reason: HOSPADM

## 2021-07-28 RX ORDER — ONDANSETRON 4 MG/1
4 TABLET, ORALLY DISINTEGRATING ORAL EVERY 8 HOURS PRN
Status: DISCONTINUED | OUTPATIENT
Start: 2021-07-28 | End: 2021-07-29 | Stop reason: HOSPADM

## 2021-07-28 RX ORDER — BUPROPION HYDROCHLORIDE 150 MG/1
150 TABLET ORAL EVERY MORNING
Status: DISCONTINUED | OUTPATIENT
Start: 2021-07-29 | End: 2021-07-29 | Stop reason: HOSPADM

## 2021-07-28 RX ORDER — FLECAINIDE ACETATE 100 MG/1
100 TABLET ORAL 2 TIMES DAILY
Status: DISCONTINUED | OUTPATIENT
Start: 2021-07-28 | End: 2021-07-29 | Stop reason: HOSPADM

## 2021-07-28 RX ORDER — ONDANSETRON 2 MG/ML
4 INJECTION INTRAMUSCULAR; INTRAVENOUS EVERY 6 HOURS PRN
Status: DISCONTINUED | OUTPATIENT
Start: 2021-07-28 | End: 2021-07-29 | Stop reason: HOSPADM

## 2021-07-28 RX ORDER — ACETAMINOPHEN 650 MG/1
650 SUPPOSITORY RECTAL EVERY 6 HOURS PRN
Status: DISCONTINUED | OUTPATIENT
Start: 2021-07-28 | End: 2021-07-29 | Stop reason: HOSPADM

## 2021-07-28 RX ADMIN — TRAZODONE HYDROCHLORIDE 50 MG: 50 TABLET ORAL at 23:50

## 2021-07-28 RX ADMIN — Medication 10 ML: at 23:51

## 2021-07-28 RX ADMIN — APIXABAN 5 MG: 5 TABLET, FILM COATED ORAL at 23:50

## 2021-07-28 RX ADMIN — ACETAMINOPHEN 650 MG: 325 TABLET ORAL at 23:50

## 2021-07-28 ASSESSMENT — PAIN SCALES - GENERAL
PAINLEVEL_OUTOF10: 4
PAINLEVEL_OUTOF10: 8
PAINLEVEL_OUTOF10: 8

## 2021-07-28 ASSESSMENT — PAIN DESCRIPTION - PAIN TYPE
TYPE: ACUTE PAIN
TYPE: ACUTE PAIN

## 2021-07-28 ASSESSMENT — PAIN DESCRIPTION - DESCRIPTORS
DESCRIPTORS: THROBBING
DESCRIPTORS: THROBBING

## 2021-07-28 ASSESSMENT — PAIN DESCRIPTION - LOCATION
LOCATION: JAW
LOCATION: JAW

## 2021-07-28 NOTE — ED NOTES
Call placed to [address] [place] [address] Cardiology @ [phone]  Re: Chest pain; recent Stress in May [facility] [id number]  07/28/21 3531

## 2021-07-28 NOTE — TELEPHONE ENCOUNTER
07/28-Pt contacted the office today stating she was having shortness of breath, chest pain, tight/pain in jaws. Per [facility] suggested pt to go to the emergency room due to the severity of symptoms she was expressing.

## 2021-07-28 NOTE — H&P
Hospital Medicine History & Physical      PCP: William Guido MD    Date of Admission: 7/28/2021    Date of Service: Pt seen/examined on 7/28/2021 and Admitted to observation with expected LOS less than two midnights due to medical therapy. Chief Complaint:  Chest pain/ jaw pain    History Of Present Illness:      63 y.o. female, with PMH of HLD, PAF and anxiety depression, who presented to [facility] with chest pain/jaw pain. History obtained from the patient and review of EMR. The patient stated she has been experiencing left-sided jaw pain since Sunday. She stated for the last couple of days that she is also been experiencing some palpitations and shortness of breath. Patient stated she does have a history of atrial fibrillation and her symptoms feel similar to that. The patient stated her shortness of breath and palpitations seem to get worse with exertion. She stated she called her cardiologist, Dr. Juaquin Fuentes who recommended she go to the emergency room for evaluation. Per EMR, the patient did have a recent stress test and echocardiogram in May 2021, which was unremarkable with a normal ejection fraction. In the emergency room the patient had a negative troponin as well as a nonischemic EKG. The patient was found to be in a normal sinus rhythm. She will be admitted for further evaluation and treatment. Cardiology has been consulted for the a. m. The patient denied any other associated symptoms as well as any aggravating and/or alleviating factors. At the time of this assessment, the patient was resting comfortably in bed. She currently denies any chest pain, back pain, abdominal pain, shortness of breath, numbness, tingling, N/V/C/D, fever and/or chills.      Past Medical History:          Diagnosis Date    Depression     GERD (gastroesophageal reflux disease)     Hyperlipidemia     Lumbar herniated disc 2/98    MVP (mitral valve prolapse)     BIJAN (obstructive sleep apnea)     Primary osteoarthritis of left knee 9/25/2020    Restless leg syndrome      Past Surgical History:          Procedure Laterality Date    BACK SURGERY  2/1998    LAP BAND  2010     Medications Prior to Admission:      Prior to Admission medications    Medication Sig Start Date End Date Taking? Authorizing Provider   buPROPion (WELLBUTRIN XL) 150 MG extended release tablet TAKE 1 TABLET BY MOUTH EVERY MORNING 6/18/21   SAJI Palomino MD   flecainide (TAMBOCOR) 100 MG tablet Take 1 tablet by mouth 2 times daily May take one additional pill per day if in AF. Do not exceed 300 mg in a 24 hour period. 6/15/21   DAWOOD Randall MD   dilTIAZem (DILTIAZEM CD) 180 MG extended release capsule Take 1 capsule by mouth daily 6/15/21   Yara Gallegos MD   apixaban (ELIQUIS) 5 MG TABS tablet Take 1 tablet by mouth 2 times daily 6/15/21   DAWOOD Randall MD   atorvastatin (LIPITOR) 10 MG tablet TAKE 1 TABLET BY MOUTH DAILY 5/26/21   SAJI Palomino MD   pramipexole (MIRAPEX) 0.5 MG tablet TAKE 1 TABLET BY MOUTH NIGHTLY AS NEEDED FOR RESTLESS LEG 4/5/21   SAJI Palomino MD   sertraline (ZOLOFT) 100 MG tablet TAKE 1 TABLET BY MOUTH DAILY 2/8/21   SAJI Palomino MD   traZODone (DESYREL) 50 MG tablet TAKE ONE-HALF TABLET BY MOUTH EVERY NIGHT AT BEDTIME, MAY INCREASE TO 1 TABLET AFTER 3RD NIGHT 2/1/21   SAJI Palomino MD   Phentermine-Topiramate (QSYMIA) 15-92 MG CP24 Take 1 capsule by mouth daily. Vicente Barone Historical Provider, MD     Allergies:  Patient has no known allergies. Social History:      The patient currently lives at home    TOBACCO:   reports that she has never smoked. She has never used smokeless tobacco.  ETOH:   reports no history of alcohol use. E-Cigarettes/Vaping Use     Questions Responses    E-Cigarette/Vaping Use Never User    Start Date     Passive Exposure     Quit Date     Counseling Given     Comments         Family History:      Reviewed in detail.  Positive as follows:        Problem Relation Age of Onset    Arthritis Mother     Arthritis Father     Diabetes Father     High Blood Pressure Father     High Cholesterol Father     Other Father         meniere's disease     REVIEW OF SYSTEMS COMPLETED:   Pertinent positives as noted in the HPI. All other systems reviewed and negative. PHYSICAL EXAM PERFORMED:    /73   Pulse 70   Temp 99.7 °F (37.6 °C) (Oral)   Resp 19   Ht 5' 5\" (1.651 m)   Wt 170 lb (77.1 kg)   SpO2 99%   BMI 28.29 kg/m²     General appearance:  Pleasant female in no apparent distress, appears stated age and cooperative. HEENT: Pupils equal, round, and reactive to light. Extra ocular muscles intact. Conjunctivae/corneas clear. Neck: Supple, with full range of motion. No jugular venous distention. Trachea midline. Respiratory:  Normal respiratory effort. Clear to auscultation, bilaterally without Rales/Wheezes/Rhonchi. Cardiovascular:  Regular rate and rhythm with normal S1/S2 without murmurs, rubs or gallops. Abdomen: Soft, non-tender, non-distended with normal bowel sounds. Musculoskeletal:  No clubbing, cyanosis or edema bilaterally. Full range of motion without deformity. Skin: Skin color, texture, turgor normal.  No significant rashes or lesions. Neurologic:  Neurovascularly intact.  Cranial nerves: II-XII intact, grossly non-focal.  Psychiatric:  Alert and oriented, thought content appropriate, normal insight  Capillary Refill: Brisk,3 seconds, normal  Peripheral Pulses: +2 palpable, equal bilaterally     Labs:     Recent Labs     07/28/21  1502   WBC 12.5*   HGB 13.1   HCT 39.3        Recent Labs     07/28/21  1502      K 4.1      CO2 25   BUN 14   CREATININE 1.0   CALCIUM 9.4     Recent Labs     07/28/21  1502   AST 16   ALT 11   BILITOT 0.5   ALKPHOS 109     Recent Labs     07/28/21  1502   TROPONINI <0.01     Urinalysis:      Lab Results   Component Value Date    BLOODU large 09/25/2014    SPECGRAV 1.030 09/25/2014    GLUCOSEU N 09/25/2014     Radiology:     CXR: I have reviewed the CXR with the following interpretation: cardiomegaly and pulmonary vascular congestion    EKG:  I have reviewed the EKG with the following interpretation: The Ekg interpreted in the absence of a cardiologist shows Normal sinus rhythm, rate 61. Nonspecific T wave abnormality. Abnormal ECG. When compared with ECG of 21-MAY-2021 07:54, Nonspecific T wave abnormality, worse in Inferior leads. Nonspecific T wave abnormality now evident in Anterolateral leads    XR CHEST PORTABLE   Final Result   Cardiomegaly and pulmonary vascular congestion. No focal airspace disease. ASSESSMENT:    Active Hospital Problems    Diagnosis Date Noted    Chest pain [R07.9] 04/26/2021    PAF (paroxysmal atrial fibrillation) ([facility] [address] [I48.0] 04/26/2021     PLAN:    Chest pain in setting of no known CAD  -atypical  -serial troponin - initial negative  -EKG w/o ischemic changes  -NPO after MN  -cardiology consulted in ED - appreciate recommendations in advance  -prn nitro  -tele monitoring  -ECHO 5/14/2021: EF 55%  -stress test 5/14/2021    HLD  -continue atorvastatin    PAF  -continue diltiazem  -anticoagulated on eliquis    Anxiety depression  -mood appears stable at this time  -continue wellbutrin, zoloft and trazadone    Leukocytosis, 12.5 on admission  -likely 2/2 stress response  -no obvious s/s of infection   -cbc in am    DVT Prophylaxis: eliquis    Diet: No diet orders on file     Code Status: No Order    PT/OT Eval Status: No indication for need at this time    Dispo - 1-2 days pending clinical improvement     Adriana Kerr, APRN - CNP    Thank you Richy Santos MD for the opportunity to be involved in this patient's care.  If you have any questions or concerns please feel free to contact me at [phone].  ----------------------------------Anticipated Dr. Santana arguello-----------------------------------------

## 2021-07-28 NOTE — TELEPHONE ENCOUNTER
Received call from Vika Paredes at [facility] with Red Flag Complaint. Brief description of triage: See below    Triage indicates for patient to go to [address] [place], [place] [address]r [facility] now (Or to office with PCP approval). 2nd level triage completed with Addison Riley NP; provider recommends patient be seen by her cardiologist today in the office. If unable to be seen by cardiology, pt to go to ED today. Care advice provided, patient verbalizes understanding; denies any other questions or concerns; instructed to call back for any new or worsening symptoms. Attention Provider: Thank you for allowing me to participate in the care of your patient. The patient was connected to triage in response to information provided to the [facility]. Please do not respond through this encounter as the response is not directed to a shared pool. Reason for Disposition   New or worsened shortness of breath with activity (dyspnea on exertion)    Answer Assessment - Initial Assessment Questions  1. DESCRIPTION: \"Please describe your heart rate or heart beat that you are having\" (e.g., fast/slow, regular/irregular, skipped or extra beats, \"palpitations\")      Palpitations \"in the evenings\" x 2 days    2. ONSET: \"When did it start? \" (Minutes, hours or days)       Monday    3. DURATION: \"How long does it last\" (e.g., seconds, minutes, hours)      Seconds    4. PATTERN \"Does it come and go, or has it been constant since it started? \"  \"Does it get worse with exertion? \"   \"Are you feeling it now? \"      Intermittent; Worse with exertion; Not experiencing currently    5. TAP: \"Using your hand, can you tap out what you are feeling on a chair or table in front of you, so that I can hear? \" (Note: not all patients can do this)        NA    6. HEART RATE: \"Can you tell me your heart rate? \" \"How many beats in 15 seconds? \"  (Note: not all patients can do this)        50 bpm via palpation    7. RECURRENT SYMPTOM: \"Have you ever had this before? \" If so, ask: \"When was the last time? \" and \"What happened that time? \"       Denies    8. CAUSE: \"What do you think is causing the palpitations? \"      Unsure    9. CARDIAC HISTORY: \"Do you have any history of heart disease? \" (e.g., heart attack, angina, bypass surgery, angioplasty, arrhythmia)       A-Fib, Mitral valve prolapse    10. OTHER SYMPTOMS: \"Do you have any other symptoms? \" (e.g., dizziness, chest pain, sweating, difficulty breathing)        SOB upon exertion, L sided jaw pain, dizziness upon standing, fatigue    11. PREGNANCY: \"Is there any chance you are pregnant? \" \"When was your last menstrual period? \"        NA    Protocols used: HEART RATE AND HEARTBEAT QUESTIONS-ADULT-[place]

## 2021-07-28 NOTE — ED NOTES
Pt to ED with reports of L jaw pain that started on Sunday, worsening since. Pt also had CP, SOB, lightheaded, dizziness, and palpitations. Pt reports new dx of afib in April 2021. Pt reports she called Cardiologist whom told to come for further evaluation.       Kitty Jalloh RN  07/28/21 3198

## 2021-07-28 NOTE — ED PROVIDER NOTES
 0.9 % sodium chloride infusion  25 mL Intravenous PRN Eual Frieze, APRN - CNP        ondansetron (ZOFRAN-ODT) disintegrating tablet 4 mg  4 mg Oral Q8H PRN Eual Frieze, APRN - CNP        Or    ondansetron (ZOFRAN) injection 4 mg  4 mg Intravenous Q6H PRN Eual Frieze, APRN - CNP        acetaminophen (TYLENOL) tablet 650 mg  650 mg Oral Q6H PRN Eual Frieze, APRN - CNP        Or    acetaminophen (TYLENOL) suppository 650 mg  650 mg Rectal Q6H PRN Eual Frieze, APRN - CNP        [START ON 7/29/2021] aspirin chewable tablet 81 mg  81 mg Oral Daily Eual Frieze, APRN - CNP        nitroGLYCERIN (NITROSTAT) SL tablet 0.4 mg  0.4 mg Sublingual Q5 Min PRN Eual Frieze, APRN - CNP         Current Outpatient Medications   Medication Sig Dispense Refill    buPROPion (WELLBUTRIN XL) 150 MG extended release tablet TAKE 1 TABLET BY MOUTH EVERY MORNING 90 tablet 2    flecainide (TAMBOCOR) 100 MG tablet Take 1 tablet by mouth 2 times daily May take one additional pill per day if in AF. Do not exceed 300 mg in a 24 hour period. 195 tablet 3    dilTIAZem (DILTIAZEM CD) 180 MG extended release capsule Take 1 capsule by mouth daily 90 capsule 3    apixaban (ELIQUIS) 5 MG TABS tablet Take 1 tablet by mouth 2 times daily 180 tablet 5    atorvastatin (LIPITOR) 10 MG tablet TAKE 1 TABLET BY MOUTH DAILY 90 tablet 1    pramipexole (MIRAPEX) 0.5 MG tablet TAKE 1 TABLET BY MOUTH NIGHTLY AS NEEDED FOR RESTLESS LEG 90 tablet 1    sertraline (ZOLOFT) 100 MG tablet TAKE 1 TABLET BY MOUTH DAILY 90 tablet 1    traZODone (DESYREL) 50 MG tablet TAKE ONE-HALF TABLET BY MOUTH EVERY NIGHT AT BEDTIME, MAY INCREASE TO 1 TABLET AFTER 3RD NIGHT 90 tablet 1    Phentermine-Topiramate (QSYMIA) 15-92 MG CP24 Take 1 capsule by mouth daily. Skinny Guallpa        No Known Allergies    REVIEW OF SYSTEMS  10 systems reviewed, pertinent positives per HPI otherwise noted to be negative    PHYSICAL EXAM  /70   Pulse 58   Temp 99.7 °F (37.6 °C) (Oral) Options     Amount and/or Complexity of Data Reviewed  Clinical lab tests: reviewed and ordered  Tests in the radiology section of CPT®: ordered and reviewed  Discussion of test results with the performing providers: yes  Decide to obtain previous medical records or to obtain history from someone other than the patient: yes  Obtain history from someone other than the patient: yes  Review and summarize past medical records: yes  Discuss the patient with other providers: yes  Independent visualization of images, tracings, or specimens: yes    Patient Progress  Patient progress: stable    63-year-old female with a history of paroxysmal A. fib presents emergency department evaluation of chest pain shortness of breath and left jaw pain. She was recommended she come the emergency department by her cardiologist for evaluation. Patient had a recent stress test and echo back in May which was unremarkable. Normal ejection fraction. Patient's vitals within normal limits at time. On exam patient appears to be normal sinus rhythm. Troponin within normal limits. EKG is pending at this time. Refer to attending note for EKG interpretation. However was informed by attending physician that there was no significant change from prior EKG. I reached out to the cardiology service to discuss plan for disposition. Given patient's risk factors for hyperlipidemia as well as concerning story will plan to observe this patient for serial troponins. Patient does have a heart score of 4 making her moderate risk for major adverse cardiac event and recommendation is observation. Reached out to the hospital service to discuss observation for serial troponins cardiac monitoring. Patient was readily excepted to the hospital service. Patient emergency with her course in emergency department. DISPOSITION  Place under observation the hospital for serial troponin cardiac monitoring. CLINICAL IMPRESSION  1.  Chest pain, unspecified type           Lady Nitish PA-C  07/28/21 0713

## 2021-07-29 VITALS
WEIGHT: 177.7 LBS | HEART RATE: 56 BPM | RESPIRATION RATE: 16 BRPM | BODY MASS INDEX: 29.61 KG/M2 | OXYGEN SATURATION: 96 % | DIASTOLIC BLOOD PRESSURE: 64 MMHG | TEMPERATURE: 97.9 F | SYSTOLIC BLOOD PRESSURE: 104 MMHG | HEIGHT: 65 IN

## 2021-07-29 LAB
ANION GAP SERPL CALCULATED.3IONS-SCNC: 9 MMOL/L (ref 3–16)
BUN BLDV-MCNC: 13 MG/DL (ref 7–20)
CALCIUM SERPL-MCNC: 8.7 MG/DL (ref 8.3–10.6)
CHLORIDE BLD-SCNC: 103 MMOL/L (ref 99–110)
CO2: 25 MMOL/L (ref 21–32)
CREAT SERPL-MCNC: 0.9 MG/DL (ref 0.6–1.2)
EKG ATRIAL RATE: 61 BPM
EKG DIAGNOSIS: NORMAL
EKG P AXIS: 48 DEGREES
EKG P-R INTERVAL: 160 MS
EKG Q-T INTERVAL: 434 MS
EKG QRS DURATION: 84 MS
EKG QTC CALCULATION (BAZETT): 436 MS
EKG R AXIS: 64 DEGREES
EKG T AXIS: 27 DEGREES
EKG VENTRICULAR RATE: 61 BPM
GFR AFRICAN AMERICAN: >60
GFR NON-AFRICAN AMERICAN: >60
GLUCOSE BLD-MCNC: 117 MG/DL (ref 70–99)
HCT VFR BLD CALC: 33.4 % (ref 36–48)
HEMOGLOBIN: 11.2 G/DL (ref 12–16)
MCH RBC QN AUTO: 28.8 PG (ref 26–34)
MCHC RBC AUTO-ENTMCNC: 33.6 G/DL (ref 31–36)
MCV RBC AUTO: 85.7 FL (ref 80–100)
PDW BLD-RTO: 13.4 % (ref 12.4–15.4)
PLATELET # BLD: 216 K/UL (ref 135–450)
PMV BLD AUTO: 7.6 FL (ref 5–10.5)
POTASSIUM REFLEX MAGNESIUM: 3.9 MMOL/L (ref 3.5–5.1)
RBC # BLD: 3.9 M/UL (ref 4–5.2)
SODIUM BLD-SCNC: 137 MMOL/L (ref 136–145)
TROPONIN: <0.01 NG/ML
WBC # BLD: 8.7 K/UL (ref 4–11)

## 2021-07-29 PROCEDURE — 6370000000 HC RX 637 (ALT 250 FOR IP): Performed by: NURSE PRACTITIONER

## 2021-07-29 PROCEDURE — 85027 COMPLETE CBC AUTOMATED: CPT

## 2021-07-29 PROCEDURE — G0378 HOSPITAL OBSERVATION PER HR: HCPCS

## 2021-07-29 PROCEDURE — 93010 ELECTROCARDIOGRAM REPORT: CPT | Performed by: INTERNAL MEDICINE

## 2021-07-29 PROCEDURE — 80048 BASIC METABOLIC PNL TOTAL CA: CPT

## 2021-07-29 PROCEDURE — 99244 OFF/OP CNSLTJ NEW/EST MOD 40: CPT | Performed by: INTERNAL MEDICINE

## 2021-07-29 PROCEDURE — 36415 COLL VENOUS BLD VENIPUNCTURE: CPT

## 2021-07-29 PROCEDURE — 93005 ELECTROCARDIOGRAM TRACING: CPT | Performed by: NURSE PRACTITIONER

## 2021-07-29 PROCEDURE — 2580000003 HC RX 258: Performed by: NURSE PRACTITIONER

## 2021-07-29 PROCEDURE — 84484 ASSAY OF TROPONIN QUANT: CPT

## 2021-07-29 RX ORDER — DILTIAZEM HYDROCHLORIDE 120 MG/1
120 CAPSULE, COATED, EXTENDED RELEASE ORAL DAILY
Status: DISCONTINUED | OUTPATIENT
Start: 2021-07-30 | End: 2021-07-29 | Stop reason: HOSPADM

## 2021-07-29 RX ORDER — IBUPROFEN 400 MG/1
400 TABLET ORAL EVERY 6 HOURS PRN
Status: DISCONTINUED | OUTPATIENT
Start: 2021-07-29 | End: 2021-07-29 | Stop reason: HOSPADM

## 2021-07-29 RX ADMIN — ACETAMINOPHEN 650 MG: 325 TABLET ORAL at 08:43

## 2021-07-29 RX ADMIN — FLECAINIDE ACETATE 100 MG: 100 TABLET ORAL at 00:03

## 2021-07-29 RX ADMIN — SERTRALINE 50 MG: 50 TABLET, FILM COATED ORAL at 08:42

## 2021-07-29 RX ADMIN — ASPIRIN 81 MG: 81 TABLET, CHEWABLE ORAL at 08:42

## 2021-07-29 RX ADMIN — ATORVASTATIN CALCIUM 10 MG: 10 TABLET, FILM COATED ORAL at 08:42

## 2021-07-29 RX ADMIN — BUPROPION HYDROCHLORIDE 150 MG: 150 TABLET, EXTENDED RELEASE ORAL at 08:42

## 2021-07-29 RX ADMIN — Medication 10 ML: at 08:43

## 2021-07-29 RX ADMIN — APIXABAN 5 MG: 5 TABLET, FILM COATED ORAL at 08:42

## 2021-07-29 ASSESSMENT — ENCOUNTER SYMPTOMS
ABDOMINAL PAIN: 0
EYE PAIN: 0
PHOTOPHOBIA: 0
NAUSEA: 0
SORE THROAT: 0
SHORTNESS OF BREATH: 1
VOMITING: 0
COUGH: 0
BLOOD IN STOOL: 0
RHINORRHEA: 0
CONSTIPATION: 0
DIARRHEA: 0

## 2021-07-29 ASSESSMENT — PAIN DESCRIPTION - PAIN TYPE: TYPE: ACUTE PAIN

## 2021-07-29 ASSESSMENT — PAIN SCALES - GENERAL: PAINLEVEL_OUTOF10: 7

## 2021-07-29 ASSESSMENT — PAIN DESCRIPTION - LOCATION: LOCATION: JAW

## 2021-07-29 NOTE — CONSULTS
[address] [facility]  [phone]      Attending Physician: Lino Nath MD  Reason for Consultation/Chief Complaint: Left jaw pain    Subjective   History of Present Illness: Jessika Odom is a 77-year-old female with a history of paroxysmal atrial fibrillation, mitral valve prolapse, hyperlipidemia, BIJAN, and restless legs syndrome who presented with left jaw pain. The patient reports that 5 days ago, she developed left jaw pain in the area of her temporomandibular joint that radiates down her left neck. The pain started while the patient was at rest and has been constant since Sunday. It becomes more severe when she opens and closes her jaw to eat and it not aggravated by exertion. She denies any associated chest pain or shortness of breath, but note that she does have some shortness of breath with exertion. She has known paroxysmal atrial fibrillation and is currently taking diltiazem and flecainide. She has not had any recent palpitations. Since admission, troponins have been negative x3 and ECG showed sinus rhythm with non-specific T wave abnormality prolonged QTc of 470 ms. Review of her telemetry shows that she has remained in sinus rhythm throughout her admission.       Of note, the patient had a GXT nuclear SPECT stress test on 5/14/21 during which she exercised for 10 minutes on a Fabrizio protocol and achieved 87% of her age-predicted maximal heart rate. No ischemic ECG changes were observed, but she did have a hypertensive response to exercise with BP as high as 206/81. Nuclear SPECT imaging demonstrated normal myocardial perfusion. A TTE was also performed at that time and showed an LVEF of 55% with normal wall motion, mild septal hypertrophy, normal RV size and systolic function, moderately enlarged left atrium, and no hemodynamically significant valvular abnormalities. There was no mitral valve prolapse observed (appeared mild on study from 2017).   She also wore a cardiac event monitor which showed pre-dominant sinus rhythm with an average HR of 70 () BPM, 9.94% AF with an average HR of 154 () BPM, 0.42% PACs burden, 0.06% PVCs burden, and 6.2 second conversion pause. She was seen by Dr. Sherif Faust with our electrophysiology team on 5/18/21 who recommended initiating flecainide 100 mg BID and scheduling for KRISHNA/DCCV. When she arrived for her scheduled cardioversion she had already converted back to sinus rhythm.       Past Medical History:   has a past medical history of Depression, GERD (gastroesophageal reflux disease), Hyperlipidemia, Lumbar herniated disc, MVP (mitral valve prolapse), BIJAN (obstructive sleep apnea), Primary osteoarthritis of left knee, and Restless leg syndrome. Surgical History:   has a past surgical history that includes back surgery (2/1998) and Lap Band (2010). Social History:   reports that she has never smoked. She has never used smokeless tobacco. She reports that she does not drink alcohol and does not use drugs. Family History:  family history includes Arthritis in her father and mother; Diabetes in her father; High Blood Pressure in her father; High Cholesterol in her father; Other in her father. Home Medications:  Were reviewed and are listed in nursing record and/or below  Prior to Admission medications    Medication Sig Start Date End Date Taking? Authorizing Provider   buPROPion (WELLBUTRIN XL) 150 MG extended release tablet TAKE 1 TABLET BY MOUTH EVERY MORNING 6/18/21  Yes SAJI Courtney MD   flecainide (TAMBOCOR) 100 MG tablet Take 1 tablet by mouth 2 times daily May take one additional pill per day if in AF. Do not exceed 300 mg in a 24 hour period.  6/15/21  Yes Sue Stuart MD   apixaban (ELIQUIS) 5 MG TABS tablet Take 1 tablet by mouth 2 times daily 6/15/21  Yes Sue Stuart MD   atorvastatin (LIPITOR) 10 MG tablet TAKE 1 TABLET BY MOUTH DAILY 5/26/21  Yes Samuel Lyon MD   pramipexole (MIRAPEX) 0.5 MG tablet TAKE 1 TABLET BY MOUTH NIGHTLY AS NEEDED FOR RESTLESS LEG 4/5/21  Yes Chrystal Mallory MD   sertraline (ZOLOFT) 100 MG tablet TAKE 1 TABLET BY MOUTH DAILY 2/8/21  Yes Chrystal Mallory MD   traZODone (DESYREL) 50 MG tablet TAKE ONE-HALF TABLET BY MOUTH EVERY NIGHT AT BEDTIME, MAY INCREASE TO 1 TABLET AFTER 3RD NIGHT 2/1/21  Yes Chrystal Mallory MD   dilTIAZem (DILTIAZEM CD) 180 MG extended release capsule Take 1 capsule by mouth daily 6/15/21   DAWOOD John MD        CURRENT Medications:  apixaban (ELIQUIS) tablet 5 mg, BID  atorvastatin (LIPITOR) tablet 10 mg, Daily  buPROPion (WELLBUTRIN XL) extended release tablet 150 mg, QAM  dilTIAZem (CARDIZEM CD) extended release capsule 180 mg, Daily  flecainide (TAMBOCOR) tablet 100 mg, BID  pramipexole (MIRAPEX) tablet 0.5 mg, Nightly PRN  sertraline (ZOLOFT) tablet 50 mg, Daily  traZODone (DESYREL) tablet 50 mg, Nightly  sodium chloride flush 0.9 % injection 5-40 mL, 2 times per day  sodium chloride flush 0.9 % injection 5-40 mL, PRN  0.9 % sodium chloride infusion, PRN  ondansetron (ZOFRAN-ODT) disintegrating tablet 4 mg, Q8H PRN   Or  ondansetron (ZOFRAN) injection 4 mg, Q6H PRN  acetaminophen (TYLENOL) tablet 650 mg, Q6H PRN   Or  acetaminophen (TYLENOL) suppository 650 mg, Q6H PRN  polyethylene glycol (GLYCOLAX) packet 17 g, Daily PRN  aspirin chewable tablet 81 mg, Daily  nitroGLYCERIN (NITROSTAT) SL tablet 0.4 mg, Q5 Min PRN        Allergies:  Patient has no known allergies. Review of Systems:   Review of Systems   Constitutional: Negative for chills and fever. HENT: Negative for congestion, rhinorrhea and sore throat. Positive for jaw pain. Eyes: Negative for photophobia, pain and visual disturbance. Respiratory: Positive for shortness of breath. Negative for cough. Cardiovascular: Negative for chest pain, palpitations and leg swelling.    Gastrointestinal: Negative for abdominal pain, blood in stool, constipation, 07/29/2021    CO2 25 07/29/2021    BUN 13 07/29/2021    CREATININE 0.9 07/29/2021    GFRAA >60 07/29/2021    GFRAA >60 11/21/2012    AGRATIO 1.4 07/28/2021    LABGLOM >60 07/29/2021    GLUCOSE 117 07/29/2021    PROT 7.5 07/28/2021    PROT 7.2 11/21/2012    CALCIUM 8.7 07/29/2021    BILITOT 0.5 07/28/2021    ALKPHOS 109 07/28/2021    AST 16 07/28/2021    ALT 11 07/28/2021     PT/INR:  No results found for: PTINR  HgBA1c:No results found for: LABA1C  Lab Results   Component Value Date    TROPONINI <0.01 07/29/2021         Cardiac Data     LAST EKG 7/28/21:  Normal sinus rhythm, non-specific T wave abnormality, prolonged QTc.     ECHO:   TTE 1/11/13:  Study Conclusions  - Left ventricle: The cavity size was normal. Wall thickness    was normal. Systolic function was normal. The estimated    ejection fraction was in the range of 55% to 60%. Wall    motion was normal; there were no regional wall motion    abnormalities. Left ventricular diastolic function    parameters were normal. Left atrium: The atrium was mildly    dilated.  Tricuspid valve: Mild-moderate regurgitation.     TTE 4/27/17:    Summary   Left ventricular systolic function is normal with the ejection fraction   estimated at 55%.   No regional wall motion abnormalities.   Grade II diastolic dysfunction with elevated filling pressure.   Mildly dilated left atrium.   Mild late systolic prolapse of the mitral valve.   Mild mitral valve regurgitation.   Mild tricuspid regurgitation.   Systolic pulmonary artery pressure (SPAP) is normal and estimated at 38 mmHg   (RA pressure 3 mmHg).     TTE 5/14/21:  Conclusions   Summary   Left ventricular systolic function is normal with a visually estimated   ejection fraction of 55%.   The left ventricle is normal in size with mild septal hypertrophy.   No obvious regional wall motion abnormalities noted.   Normal left ventricular diastolic function.   The left atrium appears moderately enlarged.   Systolic pulmonary artery pressure (SPAP) is normal and estimated at 26 mmHg   (right atrial pressure 3 mmHg).     STRESS TEST:   GXT Nuclear SPECT Stress 5/14/21:  Conclusions        Summary    Hypertensive response to exercise.    Normal LV size and systolic function. Left ventricular ejection fraction of    81%. Normal wall motion. There is normal isotope uptake at stress and rest.    There is no evidence of myocardial ischemia or scar.       Stress Protocols        Resting ECG    Normal sinus rhythm .        Resting HR:77 bpm  Resting BP:142/92 mmHg       Stress Protocol:Exercise - Fabrizio        Peak HR:137 bpm                                HR/BP product:07501    Peak BP:206/81 mmHg                            Max exercise: 13 METS    Predicted HR: 157 bpm    % of predicted HR: 87    Test duration:10 min    Reason for termination:Target heart rate        ECG Findings    Hypertensive response to exercise.    The stress ECG showed no ischemia at target heart rate. Feng Score of 10 (    low Risk ).      Arrhythmias   Suann Pillar is no abnormal arrhythmia noted.        Symptoms    No symptoms with exercise.        Complications    Procedure complication was none.        Stress Interpretation    Normal exercise EKG.  Hypertensive blood pressure response during testing.         CATH: N/A     Cardiac Event Monitor 5/2021:  pre-dominant sinus rhythm with an average HR of 70 () BPM, 9.94% AF with an average HR of 154 () BPM, 0.42% PACs burden, 0.06% PVCs burden, and 6.2 second conversion pause. Assessment:      1. Left temporomandibular joint pain/dysfunction  2. Left neck pain  3. Sinus bradycardia  4. Paroxysmal atrial fibrillation  5. Mild QTc prolongation  6. H/o mitral valve prolapse  7. Hyperlipidemia  8. BIJAN  9. Restless legs syndrome     Plan: Edgar Grijalva is a 81-year-old female with the above past medical history who presented with left jaw pain and neck pain.   On examination, her left temporomandibular joint is tender to palpation and her symptoms seem to be aggravated by chewing and opening and closing her jaw, consistent with temporomandibular joint pain/dysfunction. She is actually not having chest pain or angina and has rule out for acute coronary syndrome. She additionally had a GXT nuclear SPECT stress test in May that was negative for evidence of ischemia and had a TTE performed at that time that showed an LVEF of 55% with normal wall motion, mild septal hypertrophy, normal RV size and systolic function, moderately enlarged left atrium, and no hemodynamically significant valvular abnormalities. There was no mitral valve prolapse observed (appeared mild on study from 2017). Review of her telemetry shows that she has remained in sinus rhythm throughout her admission. Her mild exertion dyspnea is likely secondary to deconditioning. Recommendations:  1. No additional inpatient cardiac evaluation is currently indicated at this time. 2. OK to give prn tylenol and short course of NSAIDs as needed for treatment of TMJ pain/dysfunction. She may also benefit from application of cool compress. If her pain does not improve with conservative measures, would consider referral to orthodontist/TMJ specialist.  3. Will decrease diltiazem ER to 120 mg daily given mild bradycardia. 4. Continue Eliquis 5 mg BID and flecainide 100 mg BID.  5. OK to discontinue aspirin. 6. Maintain K>4 and Mg>2.  7. Will benefit from graduated physical exercise regimen. 8. Will arrange for follow-up with EP after discharge. Thank you for allowing us to participate in the care of Aloha Dubin. Cardiology will sign off at this time. Please call me with any questions [phone]. Leda Peralta, DO [address]  [phone] [facility]  [phone] [address] [place] [address]  7/29/2021 12:00 PM      I will address the patient's cardiac risk factors and adjusted pharmacologic treatment as needed.  In addition, I have reinforced the need for patient directed risk factor modification. All questions and concerns were addressed to the patient/family. Alternatives to my treatment were discussed. The note was completed using EMR. Every effort was made to ensure accuracy; however, inadvertent computerized transcription errors may be present.

## 2021-07-29 NOTE — PROGRESS NOTES
4 Eyes Skin Assessment     The patient is being assess for  Admission    I agree that 2 RN's have performed a thorough Head to Toe Skin Assessment on the patient. ALL assessment sites listed below have been assessed. Areas assessed by both nurses: Cinderella Asp  [x]   Head, Face, and Ears   [x]   Shoulders, Back, and Chest  [x]   Arms, Elbows, and Hands   [x]   Coccyx, Sacrum, and Ischum  [x]   Legs, Feet, and Heels        Does the Patient have Skin Breakdown?   No         Joseph Prevention initiated:  No   Wound Care Orders initiated:  No      [facility] nurse consulted for Pressure Injury (Stage 3,4, Unstageable, DTI, NWPT, and Complex wounds):  No      Nurse 1 eSignature: Electronically signed by Chery Aparicio RN on 7/29/21 at 1:47 AM EDT    **SHARE this note so that the co-signing nurse is able to place an eSignature**    Nurse 2 eSignature: {Esignature:[id number]}

## 2021-07-29 NOTE — PROGRESS NOTES
Pt alert and oriented, VSS. Shift assessment completed and documented. Still with jaw pain, denies any more SOB. PRN tylenol given, see eMAR. HR emmanuel, pt asymptomatic. Denies any further needs at this time. Will continue to monitor.

## 2021-07-29 NOTE — ED NOTES
Report given to C3 RN. Questions answered, care transferred. Pt in NAD, RR even and unlabored. Pt off unit via wheelchair.  VSS for transport     [facility]  07/28/21 0292

## 2021-07-29 NOTE — PROGRESS NOTES
Hospitalist Progress Note      PCP: Lorri Javier MD    Date of Admission: 7/28/2021    Chief Complaint: Chest pain/ jaw pain    Subjective: No new c/o. Medications:  Reviewed    Infusion Medications    sodium chloride       Scheduled Medications    apixaban  5 mg Oral BID    atorvastatin  10 mg Oral Daily    buPROPion  150 mg Oral QAM    dilTIAZem  180 mg Oral Daily    flecainide  100 mg Oral BID    sertraline  50 mg Oral Daily    traZODone  50 mg Oral Nightly    sodium chloride flush  5-40 mL Intravenous 2 times per day    aspirin  81 mg Oral Daily     PRN Meds: pramipexole, sodium chloride flush, sodium chloride, ondansetron **OR** ondansetron, acetaminophen **OR** acetaminophen, polyethylene glycol, nitroGLYCERIN      Intake/Output Summary (Last 24 hours) at 7/29/2021 0931  Last data filed at 7/29/2021 0300  Gross per 24 hour   Intake 0 ml   Output    Net 0 ml       Physical Exam Performed:    /67   Pulse (!) 49   Temp 97.9 °F (36.6 °C) (Oral)   Resp 16   Ht 5' 5\" (1.651 m)   Wt 177 lb 11.2 oz (80.6 kg)   SpO2 97%   BMI 29.57 kg/m²     General appearance: No apparent distress, appears stated age and cooperative. HEENT: Pupils equal, round, and reactive to light. Conjunctivae/corneas clear. Neck: Supple, with full range of motion. No jugular venous distention. Trachea midline. Respiratory:  Normal respiratory effort. Clear to auscultation, bilaterally without Rales/Wheezes/Rhonchi. Cardiovascular: Regular rate and rhythm with normal S1/S2 without murmurs, rubs or gallops. Abdomen: Soft, non-tender, non-distended with normal bowel sounds. Musculoskeletal: No clubbing, cyanosis or edema bilaterally. Full range of motion without deformity. Skin: Skin color, texture, turgor normal.  No rashes or lesions. Neurologic:  Neurovascularly intact without any focal sensory/motor deficits.  Cranial nerves: II-XII intact, grossly non-focal.  Psychiatric: Alert and oriented, controlled on home meds - continued. -mood appears stable at this time  -continue wellbutrin, zoloft and trazadone     Leukocytosis - of unclear etiology w/out signs/sxs of active infection. Likely stress response. Will continue to follow serial labs. Reviewed and documented as above.       DVT Prophylaxis: Eliquis     Recent Labs     07/28/21  1502 07/29/21  0316    216     Diet: Diet NPO  Code Status: Full Code      PT/OT Eval Status: not yet ordered. Dispo - Placed in OBServation. Possible discharge Thurs/Friday 29/30 July pending clinical course and subspecialty recs. Discussed w/  Isauro of 44 years present at bedside.      Leonardo Butler MD

## 2021-07-29 NOTE — CARE COORDINATION
CASE MANAGEMENT INITIAL ASSESSMENT      Reviewed chart and c ompleted assessment with:patient  Explained Case Management role/services. Primary contact information:[facility] Decision Maker :   Primary Decision Maker: Eula Lino - Spouse - [phone]          Can this person be reached and be able to respond quickly, such as within a few minutes or hours? Yes    Admit date/status:7/28/21  Diagnosis:chest pain   Is this a Readmission?:  No      Insurance:none current   Precert required for SNF: No       3 night stay required: No    Living arrangements, Adls, care needs, prior to admission:lives in home with      Transportation private     [address] at home:  Walker__Cane__RTS__ BSC__Shower Chair__  02__ HHN__ CPAP__  BiPap__  Hospital Bed__ W/C___ Other__________    Services in the home and/or outpatient, prior to [address] (if applicable)   · Name:  · Address:  · Dialysis Schedule:  · Phone:  · Fax:    PT/OT recs:none    Hospital Exemption Notification (HEN):needed for SNF    Barriers to discharge:none    Plan/comments:spoke with patient. Plans on returning home at discharge reported IPTA and drives. Denied any DCP needs. Patient is in between jobs, has no insurance coverage at this time. Will have finance see.  Augustina Goins RN      ECOC on chart for MD signature

## 2021-07-29 NOTE — PROGRESS NOTES
Pt discharged per order. Written and verbal discharge instructions provided. IV removed without complication. Left floor in stable condition to home. Left with all belongings.

## 2021-07-30 ENCOUNTER — TELEPHONE (OUTPATIENT)
Dept: FAMILY MEDICINE CLINIC | Age: 66
End: 2021-07-30

## 2021-07-30 LAB
EKG ATRIAL RATE: 55 BPM
EKG DIAGNOSIS: NORMAL
EKG P AXIS: 59 DEGREES
EKG P-R INTERVAL: 164 MS
EKG Q-T INTERVAL: 492 MS
EKG QRS DURATION: 88 MS
EKG QTC CALCULATION (BAZETT): 470 MS
EKG R AXIS: 77 DEGREES
EKG T AXIS: 64 DEGREES
EKG VENTRICULAR RATE: 55 BPM

## 2021-07-30 PROCEDURE — 93010 ELECTROCARDIOGRAM REPORT: CPT | Performed by: INTERNAL MEDICINE

## 2021-07-30 NOTE — DISCHARGE SUMMARY
Hospital Medicine Discharge Summary    Patient ID: Lisa Arzola      Patient's PCP: Richy Santos MD    Admit Date: 7/28/2021     Discharge Date: 7/29/2021      Admitting Physician: Yan Keenan DO     Discharge Physician: Dottie Bradshaw MD     Discharge Diagnoses: Active Hospital Problems    Diagnosis     Chest pain [R07.9]     PAF (paroxysmal atrial fibrillation) ([facility] [address] [I48.0]     Chronic atrial fibrillation (HCC) [I48.20]        The patient was seen and examined on day of discharge and this discharge summary is in conjunction with any daily progress note from day of discharge. Hospital Course:         Chest pain - Concerning to ED for ACS, etiology clinically unable to determine. Initial enzymes/EKG negative. Followed serial enzymes and monitored on tele, w/out evidence of ischemia/arrythmia. Stress test NOT ordered per Cardiology recs - consulted and appreciated. Had negative stress 14 May w/ Echo demonstrating preserved EF.     HTN - w/out known CAD and no evidence of active signs/sxs of ischemia/failure. Currently controlled on home meds     HyperLipidemia - controlled on home Statin. Continue, w/ f/u and med adjustment w/ PCP     Afib - chronic paroxysmal of unspecified and clinically unable to determine etiology. Normally rate controlled on CCBlocker - continued. Anticoagulated at baseline on home Eliquis - continued. Monitored on tele.      Anxiety/Depression - controlled on home meds - continued. - mood appears stable at this time  - continue wellbutrin, zoloft and trazadone     Leukocytosis - of unclear etiology w/out signs/sxs of active infection. Likely stress response. Followed serial labs and resolved w/out tx.        Labs:  For convenience and continuity at follow-up the following most recent labs are provided:      CBC:    Lab Results   Component Value Date    WBC 8.7 07/29/2021    HGB 11.2 07/29/2021    HCT 33.4 07/29/2021     07/29/2021       Renal:    Lab Results   Component Value Date     07/29/2021    K 3.9 07/29/2021     07/29/2021    CO2 25 07/29/2021    BUN 13 07/29/2021    CREATININE 0.9 07/29/2021    CALCIUM 8.7 07/29/2021    PHOS 4.2 04/20/2021         Significant Diagnostic Studies    Radiology:   XR CHEST PORTABLE   Final Result   Cardiomegaly and pulmonary vascular congestion. No focal airspace disease. Consults:     IP CONSULT TO CARDIOLOGY  IP CONSULT TO HOSPITALIST  IP CONSULT TO CARDIOLOGY    Disposition: home    Condition at Discharge: Stable    Discharge Instructions/Follow-up:  w/ PCP 1-2 weeks and subspecialists as arranged. Code Status:  Full Code    Activity: activity as tolerated    Diet: regular diet      Discharge Medications:     Discharge Medication List as of 7/29/2021  2:09 PM           Details   buPROPion (WELLBUTRIN XL) 150 MG extended release tablet TAKE 1 TABLET BY MOUTH EVERY MORNING, Disp-90 tablet, R-2Normal      flecainide (TAMBOCOR) 100 MG tablet Take 1 tablet by mouth 2 times daily May take one additional pill per day if in AF. Do not exceed 300 mg in a 24 hour period. , Disp-195 tablet, R-3Normal      dilTIAZem (DILTIAZEM CD) 180 MG extended release capsule Take 1 capsule by mouth daily, Disp-90 capsule, R-3Normal      apixaban (ELIQUIS) 5 MG TABS tablet Take 1 tablet by mouth 2 times daily, Disp-180 tablet, R-5Normal      atorvastatin (LIPITOR) 10 MG tablet TAKE 1 TABLET BY MOUTH DAILY, Disp-90 tablet, R-1Normal      pramipexole (MIRAPEX) 0.5 MG tablet TAKE 1 TABLET BY MOUTH NIGHTLY AS NEEDED FOR RESTLESS LEG, Disp-90 tablet, R-1Normal      sertraline (ZOLOFT) 100 MG tablet TAKE 1 TABLET BY MOUTH DAILY, Disp-90 tablet, R-1Normal      traZODone (DESYREL) 50 MG tablet TAKE ONE-HALF TABLET BY MOUTH EVERY NIGHT AT BEDTIME, MAY INCREASE TO 1 TABLET AFTER 3RD NIGHT, Disp-90 tablet, R-1Normal             Time Spent on discharge is more than 30 minutes in the examination, evaluation, counseling and review of medications and discharge plan. Signed:    Subhash Gong MD   7/30/2021      Thank you Lowry Cranker, MD for the opportunity to be involved in this patient's care. If you have any questions or concerns please feel free to contact me at [phone].

## 2021-07-30 NOTE — TELEPHONE ENCOUNTER
[facility] Trans[facility] Initial Follow Up Call    Outreach made within 2 business days of discharge: Yes    Patient: Federico Mallory Patient : 1955   MRN: [id number]  Reason for Admission: There are no discharge diagnoses documented for the most recent discharge. Discharge Date: 21       Spoke with: Joseline Pedraza    Discharge department/facility: [facility]    TCM Interactive Patient Contact:  Was patient able to fill all prescriptions:   Was patient instructed to bring all medications to the follow-up visit:   Is patient taking all medications as directed in the discharge summary?  Yes  Does patient understand their discharge instructions: Yes  Does patient have questions or concerns that need addressed prior to 7-14 day follow up office visit: no    Scheduled appointment with PCP within 7-14 days    Follow Up  Future Appointments   Date Time Provider Abdullahi Peres   2021  9:00 AM YANNI Collado [facility]   2021  1:30 PM Velma Salinas APRN - CNP Lisa Bauer [facility]   2021  8:30 AM SAJI Pedraza MD [address]

## 2021-08-05 RX ORDER — TRAZODONE HYDROCHLORIDE 50 MG/1
TABLET ORAL
Qty: 90 TABLET | Refills: 1 | Status: SHIPPED | OUTPATIENT
Start: 2021-08-05 | End: 2022-02-16

## 2021-08-13 ENCOUNTER — OFFICE VISIT (OUTPATIENT)
Dept: ORTHOPEDIC SURGERY | Age: 66
End: 2021-08-13
Payer: COMMERCIAL

## 2021-08-13 VITALS — WEIGHT: 177 LBS | HEIGHT: 65 IN | BODY MASS INDEX: 29.49 KG/M2

## 2021-08-13 DIAGNOSIS — S52.592A OTHER CLOSED FRACTURE OF DISTAL END OF LEFT RADIUS, INITIAL ENCOUNTER: Primary | ICD-10-CM

## 2021-08-13 PROCEDURE — 99024 POSTOP FOLLOW-UP VISIT: CPT | Performed by: PHYSICIAN ASSISTANT

## 2021-08-13 RX ORDER — SERTRALINE HYDROCHLORIDE 100 MG/1
100 TABLET, FILM COATED ORAL DAILY
Qty: 90 TABLET | Refills: 1 | Status: SHIPPED | OUTPATIENT
Start: 2021-08-13 | End: 2022-02-07

## 2021-08-13 NOTE — PROGRESS NOTES
patient has good coordination. There is no weakness or sensory deficit. Left wrist examination:    Inspection: Today's inspection left wrist reveals Exos fracture brace in place. Today the fracture brace  was removed and the skin was inspected which shows minimal erythema with minimal swelling over the distal radius and ulna. Palpation: Patient is minimally tender to palpation over the distal radius and minimally over the distal ulna. She does have decreased range of motion of her fingers secondary to pain and stiffness. Range of Motion: Patient was able to fully extend her fingers and was able to make a fist.  Her range of motion now is improving with less pain. Strength: Mild deficit noted upon resistive wrist extension and flexion    Special Tests: Capillary refill is within 2 seconds    Skin: There are no rashes, ulcerations or lesions. Distal nervous status grossly intact    Radiology:     X-rays obtained and reviewed in office:  Views: 3 views of the left wrist to include AP, lateral, oblique were obtained in the office today and compared to the x-rays that were performed on 7/23/21 and independently reviewed with the patient which reveals the distal radius fracture to show good callus formation with remodeling of the fracture line of the distal radius. .        Assessment : Status post left comminuted distal radius fracture now 7 weeks post injury    Impression:  Encounter Diagnosis   Name Primary?     Other closed fracture of distal end of left radius, initial encounter Yes       Office Procedures:  Orders Placed This Encounter   Procedures    XR WRIST LEFT (MIN 3 VIEWS)     3V L Wrist     Standing Status:   Future     Number of Occurrences:   1     Standing Expiration Date:   8/13/2022     Order Specific Question:   Reason for exam:     Answer:   Left radius fracture       Treatment Plan:   Patient may discontinue the fracture brace at this point and she was shown range of motion

## 2021-09-23 ENCOUNTER — OFFICE VISIT (OUTPATIENT)
Dept: CARDIOLOGY CLINIC | Age: 66
End: 2021-09-23
Payer: COMMERCIAL

## 2021-09-23 VITALS
BODY MASS INDEX: 30.49 KG/M2 | DIASTOLIC BLOOD PRESSURE: 58 MMHG | HEART RATE: 70 BPM | HEIGHT: 65 IN | SYSTOLIC BLOOD PRESSURE: 110 MMHG | WEIGHT: 183 LBS | OXYGEN SATURATION: 97 %

## 2021-09-23 DIAGNOSIS — R07.9 CHEST PAIN, UNSPECIFIED TYPE: ICD-10-CM

## 2021-09-23 DIAGNOSIS — I48.0 PAF (PAROXYSMAL ATRIAL FIBRILLATION) (HCC): Primary | ICD-10-CM

## 2021-09-23 PROCEDURE — 99214 OFFICE O/P EST MOD 30 MIN: CPT | Performed by: NURSE PRACTITIONER

## 2021-09-23 PROCEDURE — 93000 ELECTROCARDIOGRAM COMPLETE: CPT | Performed by: NURSE PRACTITIONER

## 2021-09-23 NOTE — LETTER
[facility]   Electrophysiology Outpatient Note              Date:  September 23, 2021  Patient name: Chitra Pardo  YOB: 1955    Primary Care physician: Villa Tristan MD    HISTORY OF PRESENT ILLNESS: The patient is a 61 y.o.  female with a history of AF, MVP, diastolic dysfunction, HLD, BIJAN and RLS. In 4/2021, she was at a visit with PCP where an irregular heart rate/rhtyhm was noted. EKG showed AF with RVR. She wore an event monitor that showed NSR, AF with an average heart rate of 14 (9.9%), PAC's/PVC's, and a 6.2 second conversion pause. Echo showed an EF of 55% and stress test was normal. In 5/2021, flecainide was started. She was scheduled for a CV on 5/21/2021 but arrived to the Cardiac Cath lab in sinus rhythm. In 7/2021, she was admitted for chest and jaw pain. EKG showed sinus bradycardia with a HR of 55. She was treated for TMJ dysfunction. Today she is being seen for AF. EKG shows SR with a HR of 64. Patient complains of chest pain that awakens her at night. Pain is mid-sternal and described as sharp. Accompanied by fast heart rates. This has been happening for about a week. She takes a TUMS and sits upright and then is usually able to go back to sleep. She does not believe she has had any more AF. She currently denies chest pain, palpitations, shortness of breath, and dizziness. Past Medical History:   has a past medical history of Depression, GERD (gastroesophageal reflux disease), Hyperlipidemia, Lumbar herniated disc, MVP (mitral valve prolapse), BIJAN (obstructive sleep apnea), Primary osteoarthritis of left knee, and Restless leg syndrome. Past Surgical History:   has a past surgical history that includes back surgery (2/1998) and Lap Band (2010). Home Medications:    Prior to Admission medications    Medication Sig Start Date End Date Taking?  Authorizing Provider   sertraline (ZOLOFT) 100 MG tablet TAKE 1 TABLET BY MOUTH DAILY 8/13/21 Yes Lacey Reyes MD   traZODone (DESYREL) 50 MG tablet TAKE ONE-HALF TABLET BY MOUTH EVERY NIGHT AT BEDTIME. MAY INCREASE TO 1 TABLET AFTER 3RD NIGHT 8/5/21  Yes Lacey Reyes MD   buPROPion (WELLBUTRIN XL) 150 MG extended release tablet TAKE 1 TABLET BY MOUTH EVERY MORNING 6/18/21  Yes Lacey Reyes MD   flecainide (TAMBOCOR) 100 MG tablet Take 1 tablet by mouth 2 times daily May take one additional pill per day if in AF. Do not exceed 300 mg in a 24 hour period. 6/15/21  Yes Dannielle Leonardo MD   dilTIAZem (DILTIAZEM CD) 180 MG extended release capsule Take 1 capsule by mouth daily 6/15/21  Yes Dannielle Leonardo MD   apixaban [facility]) 5 MG TABS tablet Take 1 tablet by mouth 2 times daily 6/15/21  Yes Dannielle Leonardo MD   atorvastatin (LIPITOR) 10 MG tablet TAKE 1 TABLET BY MOUTH DAILY 5/26/21  Yes Lacey Reyes MD   pramipexole (MIRAPEX) 0.5 MG tablet TAKE 1 TABLET BY MOUTH NIGHTLY AS NEEDED FOR RESTLESS LEG 4/5/21  Yes Lacey Reyes MD       Allergies:  Patient has no known allergies. Social History:   reports that she has never smoked. She has never used smokeless tobacco. She reports that she does not drink alcohol and does not use drugs. Family History: family history includes Arthritis in her father and mother; Diabetes in her father; High Blood Pressure in her father; High Cholesterol in her father; Other in her father. All 14 point review of systems are completed and pertinent positives are mentioned in the history of present illness. Other systems are reviewed and are negative. PHYSICAL EXAM:    Vital signs:    BP (!) 110/58   Pulse 70   Ht 5' 5\" (1.651 m)   Wt 183 lb (83 kg)   SpO2 97%   BMI 30.45 kg/m²      Constitutional and general appearance: alert, cooperative, no distress and appears stated age  HEENT: PERRL, no cervical lymphadenopathy. No masses palpable.  Normal oral mucosa  Respiratory:  · Normal excursion and expansion without use of accessory muscles  · Resp auscultation: Normal breath sounds without wheezing, rhonchi, and rales  Cardiovascular:  · The apical impulse is not displaced  · Heart tones are crisp and normal. regular S1 and S2.  · Jugular venous pulsation Normal  · The carotid upstroke is normal in amplitude and contour without delay or bruit  · Peripheral pulses are symmetrical and full   Abdomen:  · No masses or tenderness  · Bowel sounds present  Extremities:  ·  No cyanosis or clubbing  ·  No lower extremity edema  ·  Skin: warm and dry  Neurological:  · Alert and oriented  · Moves all extremities well  · No abnormalities of mood, affect, memory, mentation, or behavior are noted    DATA:    ECG 9/23/2021:  SR 64 bpm     TTE 5/14/21:  Conclusions   Summary   Left ventricular systolic function is normal with a visually estimated   ejection fraction of 55%.   The left ventricle is normal in size with mild septal hypertrophy.   No obvious regional wall motion abnormalities noted.   Normal left ventricular diastolic function.   The left atrium appears moderately enlarged.   Systolic pulmonary artery pressure (SPAP) is normal and estimated at 26 mmHg   (right atrial pressure 3 mmHg).     STRESS TEST:   GXT Nuclear SPECT Stress 5/14/21:  Conclusions        Summary    Hypertensive response to exercise.    Normal LV size and systolic function. Left ventricular ejection fraction of    81%. Normal wall motion.  There is normal isotope uptake at stress and rest.    There is no evidence of myocardial ischemia or scar.       Stress Protocols        Resting ECG    Normal sinus rhythm .        Resting HR:77 bpm  Resting BP:142/92 mmHg       Stress Protocol:Exercise - Fabrizio        Peak HR:137 bpm                                HR/BP product:[id number]    Peak BP:206/81 mmHg                            Max exercise: 13 METS    Predicted HR: 157 bpm    % of predicted HR: 87    Test duration:10 min    Reason for termination:Target heart rate        ECG Findings  Hypertensive response to exercise.    The stress ECG showed no ischemia at target heart rate. Feng Score of 10 (    low Risk ).      Arrhythmias   Jasen Lopez is no abnormal arrhythmia noted.        Symptoms    No symptoms with exercise.        Complications    Procedure complication was none.        Stress Interpretation    Normal exercise EKG.  Hypertensive blood pressure response during testing.            All labs and testing reviewed. CARDIOLOGY LABS:   CBC: No results for input(s): WBC, HGB, HCT, PLT in the last 72 hours. BMP: No results for input(s): NA, K, CO2, BUN, CREATININE, LABGLOM, GLUCOSE in the last 72 hours. PT/INR: No results for input(s): PROTIME, INR in the last 72 hours. APTT:No results for input(s): APTT in the last 72 hours. FASTING LIPID PANEL:  Lab Results   Component Value Date    HDL 71 04/20/2021    HDL 66 04/10/2012    LDLCALC 97 04/20/2021    TRIG 85 04/20/2021     LIVER PROFILE:No results for input(s): AST, ALT, ALB in the last 72 hours. IMPRESSION:      Assessment:   Paroxysmal atrial fibrillation: stable   -[id number] score 1 (HTN)   -flecainide started 5/2021  Mitral valve prolapse   HLD  BIJAN: improved woth weight loss   Restless leg syndrome  Osteoarthritis       Plan:   1. Continue Eliquis, Cardizem and flecainide  2. Annual labs due 7/2022  3. Monitor heart rate at home and call if consistently out of goal ranges  4. Cardiac CT for ongoing chest pain   5.  Follow up in 6 months or sooner if needed     DIAZ Aponte, APRN-CNP  [facility]  [phone]

## 2021-09-23 NOTE — PATIENT INSTRUCTIONS
Continue current medications     Call [phone] to schedule CT scan     Monitor heart rate  at home and call if consistently out of goal ranges

## 2021-09-23 NOTE — PROGRESS NOTES
[address]   Electrophysiology Outpatient Note              Date:  September 23, 2021  Patient name: Aloha Dubin  YOB: 1955    Primary Care physician: Lizzie Heart MD    HISTORY OF PRESENT ILLNESS: The patient is a 61 y.o.  female with a history of AF, MVP, diastolic dysfunction, HLD, BIJAN and RLS. In 4/2021, she was at a visit with PCP where an irregular heart rate/rhtyhm was noted. EKG showed AF with RVR. She wore an event monitor that showed NSR, AF with an average heart rate of 14 (9.9%), PAC's/PVC's, and a 6.2 second conversion pause. Echo showed an EF of 55% and stress test was normal. In 5/2021, flecainide was started. She was scheduled for a CV on 5/21/2021 but arrived to the Cardiac Cath lab in sinus rhythm. In 7/2021, she was admitted for chest and jaw pain. EKG showed sinus bradycardia with a HR of 55. She was treated for TMJ dysfunction. Today she is being seen for AF. EKG shows SR with a HR of 64. Patient complains of chest pain that awakens her at night. Pain is mid-sternal and described as sharp. Accompanied by fast heart rates. This has been happening for about a week. She takes a TUMS and sits upright and then is usually able to go back to sleep. She does not believe she has had any more AF. She currently denies chest pain, palpitations, shortness of breath, and dizziness. Past Medical History:   has a past medical history of Depression, GERD (gastroesophageal reflux disease), Hyperlipidemia, Lumbar herniated disc, MVP (mitral valve prolapse), BIJAN (obstructive sleep apnea), Primary osteoarthritis of left knee, and Restless leg syndrome. Past Surgical History:   has a past surgical history that includes back surgery (2/1998) and Lap Band (2010). Home Medications:    Prior to Admission medications    Medication Sig Start Date End Date Taking?  Authorizing Provider   sertraline (ZOLOFT) 100 MG tablet TAKE 1 TABLET BY MOUTH DAILY 8/13/21  Yes C auscultation: Normal breath sounds without wheezing, rhonchi, and rales  Cardiovascular:  · The apical impulse is not displaced  · Heart tones are crisp and normal. regular S1 and S2.  · Jugular venous pulsation Normal  · The carotid upstroke is normal in amplitude and contour without delay or bruit  · Peripheral pulses are symmetrical and full   Abdomen:  · No masses or tenderness  · Bowel sounds present  Extremities:  ·  No cyanosis or clubbing  ·  No lower extremity edema  ·  Skin: warm and dry  Neurological:  · Alert and oriented  · Moves all extremities well  · No abnormalities of mood, affect, memory, mentation, or behavior are noted    DATA:    ECG 9/23/2021:  SR 64 bpm     TTE 5/14/21:  Conclusions   Summary   Left ventricular systolic function is normal with a visually estimated   ejection fraction of 55%.   The left ventricle is normal in size with mild septal hypertrophy.   No obvious regional wall motion abnormalities noted.   Normal left ventricular diastolic function.   The left atrium appears moderately enlarged.   Systolic pulmonary artery pressure (SPAP) is normal and estimated at 26 mmHg   (right atrial pressure 3 mmHg).     STRESS TEST:   GXT Nuclear SPECT Stress 5/14/21:  Conclusions        Summary    Hypertensive response to exercise.    Normal LV size and systolic function. Left ventricular ejection fraction of    81%. Normal wall motion.  There is normal isotope uptake at stress and rest.    There is no evidence of myocardial ischemia or scar.       Stress Protocols        Resting ECG    Normal sinus rhythm .        Resting HR:77 bpm  Resting BP:142/92 mmHg       Stress Protocol:Exercise - Fabrizio        Peak HR:137 bpm                                HR/BP product:[id number]    Peak BP:206/81 mmHg                            Max exercise: 13 METS    Predicted HR: 157 bpm    % of predicted HR: 87    Test duration:10 min    Reason for termination:Target heart rate        ECG Findings    Hypertensive response to exercise.    The stress ECG showed no ischemia at target heart rate. Feng Score of 10 (    low Risk ).      Arrhythmias   Sheri Kauffman is no abnormal arrhythmia noted.        Symptoms    No symptoms with exercise.        Complications    Procedure complication was none.        Stress Interpretation    Normal exercise EKG.  Hypertensive blood pressure response during testing.            All labs and testing reviewed. CARDIOLOGY LABS:   CBC: No results for input(s): WBC, HGB, HCT, PLT in the last 72 hours. BMP: No results for input(s): NA, K, CO2, BUN, CREATININE, LABGLOM, GLUCOSE in the last 72 hours. PT/INR: No results for input(s): PROTIME, INR in the last 72 hours. APTT:No results for input(s): APTT in the last 72 hours. FASTING LIPID PANEL:  Lab Results   Component Value Date    HDL 71 04/20/2021    HDL 66 04/10/2012    LDLCALC 97 04/20/2021    TRIG 85 04/20/2021     LIVER PROFILE:No results for input(s): AST, ALT, ALB in the last 72 hours. IMPRESSION:      Assessment:   Paroxysmal atrial fibrillation: stable   -[id number] score 1 (HTN)   -flecainide started 5/2021  Mitral valve prolapse   HLD  BIJAN: improved woth weight loss   Restless leg syndrome  Osteoarthritis       Plan:   1. Continue Eliquis, Cardizem and flecainide  2. Annual labs due 7/2022  3. Monitor heart rate at home and call if consistently out of goal ranges  4. Cardiac CT for ongoing chest pain   5.  Follow up in 6 months or sooner if needed     DIAZ Aponte, VAHE-CNP  [facility], [place], operated by [facility]  [phone]

## 2021-09-30 ENCOUNTER — HOSPITAL ENCOUNTER (OUTPATIENT)
Dept: CT IMAGING | Age: 66
Discharge: HOME | End: 2021-09-30
Payer: COMMERCIAL

## 2021-09-30 DIAGNOSIS — R07.9 CHEST PAIN, UNSPECIFIED TYPE: ICD-10-CM

## 2021-09-30 PROCEDURE — 75571 CT HRT W/O DYE W/CA TEST: CPT

## 2021-10-07 ENCOUNTER — TELEPHONE (OUTPATIENT)
Dept: CARDIOLOGY CLINIC | Age: 66
End: 2021-10-07

## 2021-10-07 NOTE — TELEPHONE ENCOUNTER
----- Message from VAHE Butler CNP sent at 9/30/2021  2:13 PM EDT -----  Please let patient know that her calcium score is zero which is very good!

## 2021-10-19 RX ORDER — PRAMIPEXOLE DIHYDROCHLORIDE 0.5 MG/1
TABLET ORAL
Qty: 90 TABLET | Refills: 1 | Status: SHIPPED | OUTPATIENT
Start: 2021-10-19 | End: 2022-03-09 | Stop reason: SDUPTHER

## 2021-11-02 DIAGNOSIS — E78.00 PURE HYPERCHOLESTEROLEMIA: ICD-10-CM

## 2021-11-02 RX ORDER — ATORVASTATIN CALCIUM 10 MG/1
10 TABLET, FILM COATED ORAL DAILY
Qty: 90 TABLET | Refills: 1 | Status: SHIPPED | OUTPATIENT
Start: 2021-11-02 | End: 2022-03-09 | Stop reason: SDUPTHER

## 2021-11-02 NOTE — TELEPHONE ENCOUNTER
Denzel Carroll is requesting refill(s)   Last OV 05/24/2021 (pertaining to medication)  LR 05/26/2021 (per medication requested)  Next office visit scheduled or attempted 11/29/2021

## 2021-11-29 ENCOUNTER — OFFICE VISIT (OUTPATIENT)
Dept: FAMILY MEDICINE CLINIC | Age: 66
End: 2021-11-29
Payer: COMMERCIAL

## 2021-11-29 VITALS
DIASTOLIC BLOOD PRESSURE: 84 MMHG | OXYGEN SATURATION: 99 % | BODY MASS INDEX: 31.65 KG/M2 | HEART RATE: 62 BPM | WEIGHT: 190 LBS | HEIGHT: 65 IN | SYSTOLIC BLOOD PRESSURE: 132 MMHG | RESPIRATION RATE: 16 BRPM

## 2021-11-29 DIAGNOSIS — E78.00 PURE HYPERCHOLESTEROLEMIA: ICD-10-CM

## 2021-11-29 DIAGNOSIS — I48.0 PAF (PAROXYSMAL ATRIAL FIBRILLATION) (HCC): ICD-10-CM

## 2021-11-29 DIAGNOSIS — G47.33 OSA ON CPAP: ICD-10-CM

## 2021-11-29 DIAGNOSIS — E66.09 CLASS 1 OBESITY DUE TO EXCESS CALORIES WITHOUT SERIOUS COMORBIDITY WITH BODY MASS INDEX (BMI) OF 31.0 TO 31.9 IN ADULT: ICD-10-CM

## 2021-11-29 DIAGNOSIS — Z99.89 OSA ON CPAP: ICD-10-CM

## 2021-11-29 DIAGNOSIS — R53.83 FATIGUE, UNSPECIFIED TYPE: Primary | ICD-10-CM

## 2021-11-29 DIAGNOSIS — G25.81 RESTLESS LEG SYNDROME: ICD-10-CM

## 2021-11-29 DIAGNOSIS — F33.42 RECURRENT MAJOR DEPRESSIVE DISORDER, IN FULL REMISSION (HCC): ICD-10-CM

## 2021-11-29 PROBLEM — E66.811 CLASS 1 OBESITY DUE TO EXCESS CALORIES WITHOUT SERIOUS COMORBIDITY WITH BODY MASS INDEX (BMI) OF 31.0 TO 31.9 IN ADULT: Status: ACTIVE | Noted: 2020-01-03

## 2021-11-29 LAB
ALBUMIN SERPL-MCNC: 4.8 G/DL (ref 3.4–5)
ALP BLD-CCNC: 118 U/L (ref 40–129)
ALT SERPL-CCNC: 10 U/L (ref 10–40)
ANION GAP SERPL CALCULATED.3IONS-SCNC: 14 MMOL/L (ref 3–16)
AST SERPL-CCNC: 15 U/L (ref 15–37)
BASOPHILS ABSOLUTE: 0.1 K/UL (ref 0–0.2)
BASOPHILS RELATIVE PERCENT: 0.9 %
BILIRUB SERPL-MCNC: 0.4 MG/DL (ref 0–1)
BILIRUBIN DIRECT: <0.2 MG/DL (ref 0–0.3)
BILIRUBIN, INDIRECT: NORMAL MG/DL (ref 0–1)
BUN BLDV-MCNC: 10 MG/DL (ref 7–20)
CALCIUM SERPL-MCNC: 9.5 MG/DL (ref 8.3–10.6)
CHLORIDE BLD-SCNC: 102 MMOL/L (ref 99–110)
CHOLESTEROL, TOTAL: 210 MG/DL (ref 0–199)
CO2: 25 MMOL/L (ref 21–32)
CREAT SERPL-MCNC: 0.9 MG/DL (ref 0.6–1.2)
EOSINOPHILS ABSOLUTE: 0.3 K/UL (ref 0–0.6)
EOSINOPHILS RELATIVE PERCENT: 3.2 %
GFR AFRICAN AMERICAN: >60
GFR NON-AFRICAN AMERICAN: >60
GLUCOSE BLD-MCNC: 89 MG/DL (ref 70–99)
HCT VFR BLD CALC: 40.7 % (ref 36–48)
HDLC SERPL-MCNC: 89 MG/DL (ref 40–60)
HEMOGLOBIN: 13.1 G/DL (ref 12–16)
LDL CHOLESTEROL CALCULATED: 105 MG/DL
LYMPHOCYTES ABSOLUTE: 1.8 K/UL (ref 1–5.1)
LYMPHOCYTES RELATIVE PERCENT: 21.5 %
MCH RBC QN AUTO: 27.5 PG (ref 26–34)
MCHC RBC AUTO-ENTMCNC: 32.1 G/DL (ref 31–36)
MCV RBC AUTO: 85.6 FL (ref 80–100)
MONOCYTES ABSOLUTE: 0.5 K/UL (ref 0–1.3)
MONOCYTES RELATIVE PERCENT: 5.9 %
NEUTROPHILS ABSOLUTE: 5.6 K/UL (ref 1.7–7.7)
NEUTROPHILS RELATIVE PERCENT: 68.5 %
PDW BLD-RTO: 14.3 % (ref 12.4–15.4)
PLATELET # BLD: 269 K/UL (ref 135–450)
PMV BLD AUTO: 8.6 FL (ref 5–10.5)
POTASSIUM SERPL-SCNC: 4.3 MMOL/L (ref 3.5–5.1)
RBC # BLD: 4.75 M/UL (ref 4–5.2)
SODIUM BLD-SCNC: 141 MMOL/L (ref 136–145)
TOTAL PROTEIN: 6.9 G/DL (ref 6.4–8.2)
TRIGL SERPL-MCNC: 79 MG/DL (ref 0–150)
TSH SERPL DL<=0.05 MIU/L-ACNC: 2.98 UIU/ML (ref 0.27–4.2)
VLDLC SERPL CALC-MCNC: 16 MG/DL
WBC # BLD: 8.2 K/UL (ref 4–11)

## 2021-11-29 PROCEDURE — 99214 OFFICE O/P EST MOD 30 MIN: CPT | Performed by: INTERNAL MEDICINE

## 2021-11-29 PROCEDURE — 36415 COLL VENOUS BLD VENIPUNCTURE: CPT | Performed by: INTERNAL MEDICINE

## 2021-11-29 ASSESSMENT — ENCOUNTER SYMPTOMS
EYES NEGATIVE: 1
ALLERGIC/IMMUNOLOGIC NEGATIVE: 1
GASTROINTESTINAL NEGATIVE: 1
RESPIRATORY NEGATIVE: 1

## 2021-11-29 NOTE — PATIENT INSTRUCTIONS
A/P:     Paf (Paroxysmal Atrial Fibrillation) (Hcc). NSR on present meds. Call if new c/o. Class 1 Obesity Due to Excess Calories Without Serious Comorbidity With Body Mass Index (Bmi) of 31.0 to 31.9 in Adult. Goal is <165 lbs. Call if needs further assistance. Grabiel On Cpap. Continue C-pap. Call Sleep clinic w/ issues. Restless Leg Syndrome. Continue Mirapex and C-pap. Call if new c/o. Recurrent Major Depressive Disorder, in Full Remission (Hcc). contonue present meds and call if new c/o. Pure Hypercholesterolemia. Will do labs and adjust med if needed.           Mandie Khan MD

## 2021-11-29 NOTE — ASSESSMENT & PLAN NOTE
Was doing much better w/ compliance on C-pap and significant wt loss. Much better sleeping and less Restless leg.

## 2021-11-29 NOTE — PROGRESS NOTES
Oli Bauer presents today for   Chief Complaint   Patient presents with    Hypertension        PAF (paroxysmal atrial fibrillation) ([facility] [address]   New onset. Sent for Cardiac evaluation. A fib on Holter, ECHO was ok. Spontaneously converted to NSR on Diltiazem. Saw Cardiology. Began Flecainide. Scheduled Cardioversion but spontaneous conversion. Still in NSR. Much less SOB and fatigue. Continues Apixaban, Diltiazem, and Flecainide. Restless leg syndrome   On C-pap. On Maripex. Doing well. BIJAN on CPAP   Was doing much better w/ compliance on C-pap and significant wt loss. Much better sleeping and less Restless leg. Class 1 obesity due to excess calories without serious comorbidity with body mass index (BMI) of 31.0 to 31.9 in adult   Had lost ~60 lbs  on Qsymia. Goal is ~150 lbs. Stopped and has gained ~20 lbs. Pure hypercholesterolemia  Had lost 60 lbs and now gained 20 back. Diet has been not as good. Recurrent major depressive disorder, in full remission ([facility] [address]  Stable w/ meds. Feels much better wt loss. Review of Systems   Constitutional: Positive for fatigue. HENT: Negative. Eyes: Negative. Respiratory: Negative. Cardiovascular: Negative. Gastrointestinal: Negative. Endocrine: Negative. Genitourinary: Negative. Musculoskeletal: Positive for myalgias. Skin: Negative. Allergic/Immunologic: Negative. Neurological: Negative. Hematological: Negative. Psychiatric/Behavioral: Negative. Vitals:    11/29/21 0846 11/29/21 0911   BP: (!) 142/84 132/84   Pulse: 62    Resp: 16    SpO2: 99%    Weight: 190 lb (86.2 kg)    Height: 5' 5\" (1.651 m)         Physical Exam  Constitutional:       General: She is not in acute distress. Appearance: Normal appearance. She is well-developed. She is not ill-appearing, toxic-appearing or diaphoretic. HENT:      Head: Normocephalic and atraumatic.       Right Ear: Hearing, tympanic membrane, ear canal and external ear General: Bowel sounds are normal. There is no distension or abdominal bruit. Palpations: Abdomen is soft. There is no shifting dullness, fluid wave, mass or pulsatile mass. Tenderness: There is no abdominal tenderness. There is no guarding or rebound. Hernia: No hernia is present. There is no hernia in the ventral area. Genitourinary:     Exam position: Supine. Musculoskeletal:         General: No tenderness. Normal range of motion. Cervical back: Full passive range of motion without pain, normal range of motion and neck supple. Lymphadenopathy:      Head:      Right side of head: No submental, submandibular, tonsillar, preauricular, posterior auricular or occipital adenopathy. Left side of head: No submental, submandibular, tonsillar, preauricular, posterior auricular or occipital adenopathy. Cervical: No cervical adenopathy. Upper Body:      Right upper body: No supraclavicular or epitrochlear adenopathy. Left upper body: No supraclavicular or epitrochlear adenopathy. Skin:     General: Skin is warm and dry. Coloration: Skin is not pale. Findings: No abrasion, erythema or rash. Nails: There is no clubbing. Neurological:      Mental Status: She is alert and oriented to person, place, and time. She is not disoriented. Cranial Nerves: No cranial nerve deficit. Sensory: No sensory deficit. Motor: No tremor, atrophy, abnormal muscle tone or seizure activity. Gait: Gait normal.      Deep Tendon Reflexes: Reflexes are normal and symmetric. Reflexes normal. Babinski sign absent on the right side. Babinski sign absent on the left side. Reflex Scores:       Tricep reflexes are 2+ on the right side and 2+ on the left side. Bicep reflexes are 2+ on the right side and 2+ on the left side. Brachioradialis reflexes are 2+ on the right side and 2+ on the left side.        Patellar reflexes are 2+ on the right side and 2+ on the left side. Achilles reflexes are 2+ on the right side and 2+ on the left side. Psychiatric:         Speech: Speech normal.         Behavior: Behavior normal.         Thought Content: Thought content normal.         Judgment: Judgment normal.          A/P:     Paf (Paroxysmal Atrial Fibrillation) (Hcc). NSR on present meds. Call if new c/o. Class 1 Obesity Due to Excess Calories Without Serious Comorbidity With Body Mass Index (Bmi) of 31.0 to 31.9 in Adult. Goal is <165 lbs. Call if needs further assistance. Grabiel On Cpap. Continue C-pap. Call Sleep clinic w/ issues. Restless Leg Syndrome. Continue Mirapex and C-pap. Call if new c/o. Recurrent Major Depressive Disorder, in Full Remission (Hcc). contonue present meds and call if new c/o. Pure Hypercholesterolemia. Will do labs and adjust med if needed.           Rachel Ludwig MD

## 2021-12-17 ENCOUNTER — OFFICE VISIT (OUTPATIENT)
Dept: ORTHOPEDIC SURGERY | Age: 66
End: 2021-12-17
Payer: COMMERCIAL

## 2021-12-17 VITALS — HEIGHT: 65 IN | WEIGHT: 185 LBS | BODY MASS INDEX: 30.82 KG/M2

## 2021-12-17 DIAGNOSIS — M25.562 ACUTE PAIN OF LEFT KNEE: Primary | ICD-10-CM

## 2021-12-17 DIAGNOSIS — M17.12 PRIMARY OSTEOARTHRITIS OF LEFT KNEE: ICD-10-CM

## 2021-12-17 PROCEDURE — 20610 DRAIN/INJ JOINT/BURSA W/O US: CPT | Performed by: STUDENT IN AN ORGANIZED HEALTH CARE EDUCATION/TRAINING PROGRAM

## 2021-12-17 PROCEDURE — 99203 OFFICE O/P NEW LOW 30 MIN: CPT | Performed by: STUDENT IN AN ORGANIZED HEALTH CARE EDUCATION/TRAINING PROGRAM

## 2021-12-17 RX ORDER — TRIAMCINOLONE ACETONIDE 40 MG/ML
80 INJECTION, SUSPENSION INTRA-ARTICULAR; INTRAMUSCULAR ONCE
Status: COMPLETED | OUTPATIENT
Start: 2021-12-17 | End: 2021-12-17

## 2021-12-17 RX ORDER — LIDOCAINE HYDROCHLORIDE 10 MG/ML
4 INJECTION, SOLUTION INFILTRATION; PERINEURAL ONCE
Status: COMPLETED | OUTPATIENT
Start: 2021-12-17 | End: 2021-12-17

## 2021-12-17 RX ADMIN — TRIAMCINOLONE ACETONIDE 80 MG: 40 INJECTION, SUSPENSION INTRA-ARTICULAR; INTRAMUSCULAR at 10:42

## 2021-12-17 RX ADMIN — LIDOCAINE HYDROCHLORIDE 4 ML: 10 INJECTION, SOLUTION INFILTRATION; PERINEURAL at 10:42

## 2021-12-17 NOTE — PROGRESS NOTES
Date:  2021    Name:  Ji Bennett  Address:  [address] [facility] [address]    :  1955      Age:   61 y.o.    SSN:  [id number]      Medical Record Number:  [id number]    Reason for Visit:    Chief Complaint    New Patient (LEFT KNEE PAIN )      DOS:2021     HPI: Carol Swenson is a 61 y.o. female here today for new patient evaluation regarding her left knee. The patient reports that over the past month she has had worsening left knee pain, especially while at work. She has an  and she sits for long periods of time. When she goes to stand up she has pain in her knee. She denies any instability but does report some catching and locking on occasion. She also has difficulty with stairs. Her knee also bothers her at night at times. She denies any prior issues with the knee. She takes occasional Tylenol. The patient has atrial fibrillation and takes Eliquis and cannot take anti-inflammatory medications. ROS: All systems reviewed on patient intake form. Pertinent items are noted in HPI.         Past Medical History:   Diagnosis Date    Depression     GERD (gastroesophageal reflux disease)     Hyperlipidemia     Lumbar herniated disc     MVP (mitral valve prolapse)     BIJAN (obstructive sleep apnea)     Primary osteoarthritis of left knee 2020    Restless leg syndrome         Past Surgical History:   Procedure Laterality Date    BACK SURGERY  1998    LAP BAND         Family History   Problem Relation Age of Onset    Arthritis Mother     Arthritis Father     Diabetes Father     High Blood Pressure Father     High Cholesterol Father     Other Father         meniere's disease       Social History     Socioeconomic History    Marital status:      Spouse name: None    Number of children: None    Years of education: None    Highest education level: None   Occupational History    None   Tobacco Use    Smoking status: Never Smoker  Smokeless tobacco: Never Used   Vaping Use    Vaping Use: Never used   Substance and Sexual Activity    Alcohol use: No    Drug use: No    Sexual activity: None   Other Topics Concern    None   Social History Narrative    None     Social Determinants of Health     Financial Resource Strain: Low Risk     Difficulty of Paying Living Expenses: Not hard at all   Food Insecurity: No Food Insecurity    Worried About Running Out of Food in the Last Year: Never true    Vladislav of Food in the Last Year: Never true   Transportation Needs: No Transportation Needs    Lack of Transportation (Medical): No    Lack of Transportation (Non-Medical):  No   Physical Activity:     Days of Exercise per Week: Not on file    Minutes of Exercise per Session: Not on file   Stress:     Feeling of Stress : Not on file   Social Connections:     Frequency of Communication with Friends and Family: Not on file    Frequency of Social Gatherings with Friends and Family: Not on file    Attends Anabaptist Services: Not on file    Active Member of [address] [place] [address] or Organizations: Not on file    Attends Club or Organization Meetings: Not on file    Marital Status: Not on file   Intimate Partner Violence:     Fear of Current or Ex-Partner: Not on file    Emotionally Abused: Not on file    Physically Abused: Not on file    Sexually Abused: Not on file   Housing Stability:     Unable to Pay for Housing in the Last Year: Not on file    Number of Places Lived in the Last Year: Not on file    Unstable Housing in the Last Year: Not on file       Current Outpatient Medications   Medication Sig Dispense Refill    atorvastatin (LIPITOR) 10 MG tablet TAKE 1 TABLET BY MOUTH DAILY 90 tablet 1    pramipexole (MIRAPEX) 0.5 MG tablet TAKE 1 TABLET BY MOUTH NIGHTLY AS NEEDED FOR RESTLESS LEG 90 tablet 1    sertraline (ZOLOFT) 100 MG tablet TAKE 1 TABLET BY MOUTH DAILY 90 tablet 1    traZODone (DESYREL) 50 MG tablet TAKE ONE-HALF TABLET BY MOUTH EVERY NIGHT AT BEDTIME. MAY INCREASE TO 1 TABLET AFTER 3RD NIGHT 90 tablet 1    buPROPion (WELLBUTRIN XL) 150 MG extended release tablet TAKE 1 TABLET BY MOUTH EVERY MORNING 90 tablet 2    flecainide (TAMBOCOR) 100 MG tablet Take 1 tablet by mouth 2 times daily May take one additional pill per day if in AF. Do not exceed 300 mg in a 24 hour period. 195 tablet 3    dilTIAZem (DILTIAZEM CD) 180 MG extended release capsule Take 1 capsule by mouth daily 90 capsule 3    apixaban (ELIQUIS) 5 MG TABS tablet Take 1 tablet by mouth 2 times daily 180 tablet 5     No current facility-administered medications for this visit. No Known Allergies    Vital signs:  Ht 5' 5\" (1.651 m)   Wt 185 lb (83.9 kg)   BMI 30.79 kg/m²        Left knee exam    Gait: No use of assistive devices. Mild antalgic gait. Alignment: Slight valgus alignment noted. Inspection/skin: Skin is intact without erythema or ecchymosis. No gross deformity. Palpation: Moderate crepitation noted to the patellofemoral joint. Tenderness noted over the lateral aspect of the patella as well as the medial joint line. Range of Motion: There is full range of motion. Strength: Normal quadriceps development. Effusion: No effusion or swelling present. Ligamentous stability: No cruciate or collateral ligament instability. Neurologic and vascular: Skin is warm and well-perfused. Sensation is intact to light-touch. Special tests: Negative Jennifer sign. Some discomfort with deep flexion grind. Diagnostics:  Radiology:       XR Left Knee Findings:     Views:  4 views L knee  Weight bearing: Yes   Findings: 4 views of the L knee taken in the office today and interpreted by me demonstrate no acute osseous abnormalities. Valgus alignment noted. Moderate to severe joint loss noted to the lateral compartment with subchondral sclerosis, cyst formation, and osteophytes noted.   Severe arthritic change noted to the patellofemoral joint with slight lateralization of the patella and with mild medial compartment arthritic change. Appropriate patellar height and tilt. No fractures, dislocations, or radio-opaque foreign bodies noted. Previous comparison films: None    Impression:  1. Moderate to severe tricompartmental osteoarthritis Left knee with valgus alignment        Assessment: 61 y.o. female with left knee tricompartmental osteoarthritis with valgus alignment, possible degenerative medial meniscus tear    Plan: Pertinent imaging was reviewed. The etiology, natural history, and treatment options for the disorder were discussed. The roles of activity medication, antiinflammatories, injections, bracing, physical therapy, and surgical interventions were all described to the patient and questions were answered. In the setting of the patient's arthritic knee and without an injury, I will do an injection to see if I can help improve her symptoms and pain. The patient may have a medial meniscus tear, but I instructed her that the injection usually helps if it is degenerative in nature in relation to her arthritis. I will see the patient back in 1 month to check on her progress. If she is continue to have pain especially on the medial aspect I will get an MRI to evaluate the meniscus. Otherwise I will continue to treat her arthritis nonoperatively for the foreseeable future until this treatment options fail. Unfortunately she cannot take anti-inflammatory medications due to her Eliquis use for atrial fibrillation. Christie Soriano is in agreement with this plan. All questions were answered to patient's satisfaction and was encouraged to call with any further questions. The indications and risks of steroid injection as well as treatment alternatives were discussed with the patient who consented to the procedure. Under sterile conditions and after informed consent was obtained the patient was given an injection into the Left knee.  2cc 40 mg of Kenalog and 4 mL of 1% lidocaine were placed in the knee after it was prepped with chlorhexidine. This resulted in good relief of symptoms. There were no complications. The patient was advised to ice the knee this evening and to avoid vigorous activities for the next 2 days. They were advised to call us if there was any erythema, enduration, swelling or increasing pain.     Rosemarie Dobson, DO  Orthopedic Surgery and Sports Medicine  12/17/2021    Orders Placed This Encounter   Procedures    XR KNEE LEFT (MIN 4 VIEWS)     Standing Status:   Future     Number of Occurrences:   1     Standing Expiration Date:   12/17/2022

## 2021-12-17 NOTE — LETTER
[address] [place] [id number]  Phone: [phone]  Fax: [phone]    Cecily Smith DO    December 17, 2021     Rocio Roman MD  [address] [place], [place] [id number]    Patient: Alyssa Gomez   MR Number: [id number]   YOB: 1955   Date of Visit: 12/17/2021       Dear Rocio Roman: Thank you for referring Dorian Soto to me for evaluation/treatment. Below are the relevant portions of my assessment and plan of care. If you have questions, please do not hesitate to call me. I look forward to following Jocelyn Nino along with you.     Sincerely,      Cecily Smith DO

## 2022-01-12 ENCOUNTER — TELEPHONE (OUTPATIENT)
Dept: FAMILY MEDICINE CLINIC | Age: 67
End: 2022-01-12

## 2022-01-17 ENCOUNTER — OFFICE VISIT (OUTPATIENT)
Dept: ORTHOPEDIC SURGERY | Age: 67
End: 2022-01-17
Payer: COMMERCIAL

## 2022-01-17 VITALS — WEIGHT: 185 LBS | HEIGHT: 65 IN | BODY MASS INDEX: 30.82 KG/M2

## 2022-01-17 DIAGNOSIS — M17.12 PRIMARY OSTEOARTHRITIS OF LEFT KNEE: Primary | ICD-10-CM

## 2022-01-17 PROCEDURE — 99213 OFFICE O/P EST LOW 20 MIN: CPT | Performed by: STUDENT IN AN ORGANIZED HEALTH CARE EDUCATION/TRAINING PROGRAM

## 2022-01-17 NOTE — PROGRESS NOTES
Chief Complaint  Follow-up (LEFT KNEE )      History of Present Illness: Laura Nguyen is a pleasant 61 y.o. female here today for follow-up regarding her left knee. The patient received a left knee injection at her last visit on 12/17/2021. She reports that the knee injection has greatly improved her knee pain. She still has some pain going up stairs. She denies any mechanical symptoms today like locking or instability. Prior HPI 12/17/2021:  The patient reports that over the past month she has had worsening left knee pain, especially while at work. She is an  and she sits for long periods of time. When she goes to stand up she has pain in her knee. She denies any instability but does report some catching and locking on occasion. She also has difficulty with stairs. Her knee also bothers her at night at times. She denies any prior issues with the knee. She takes occasional Tylenol. The patient has atrial fibrillation and takes Eliquis and cannot take anti-inflammatory medications. Medical History:  Patient's medications, allergies, past medical, surgical, social and family histories were reviewed and updated as appropriate. Pertinent items are noted in HPI  Review of systems reviewed from Patient History Form dated on 1/17/22 and available in the patient's chart under the Media tab. Vital Signs: There were no vitals filed for this visit. Constitutional: In no apparent distress. Normal affect. Alert and oriented X3 and is cooperative. Left knee exam    Gait: No use of assistive devices. No antalgic gait. Alignment: Slight valgus alignment    Inspection/skin: Skin is intact without erythema or ecchymosis. No gross deformity. Palpation: Patellofemoral crepitation noted. Tenderness along the medial and lateral joint lines. Mental tenderness to the patellofemoral joint. Range of Motion: There is full range of motion.      Strength: Normal quadriceps development. Effusion: No effusion or swelling present. Ligamentous stability: No cruciate or collateral ligament instability. Neurologic and vascular: Skin is warm and well-perfused. Sensation is intact to light-touch. Special tests: Negative Jennifer sign. Radiology:       No new x-rays taken today. Prior imaging reviewed demonstrating valgus alignment arthritic change and patellofemoral arthritis         Assessment : 77-year-old female with left knee tricompartmental osteoarthritis with valgus alignment    Impression:  Encounter Diagnosis   Name Primary?  Primary osteoarthritis of left knee Yes       Office Procedures:  No orders of the defined types were placed in this encounter. Plan: At this point the patient has had a good response to the steroid injection. She has no mechanical symptoms at this time. She can follow-up with me as needed. I did discuss that we can do these injections every 3 months at minimum. We also have gel injections and other therapies to help her knee pain. Given her Eliquis use she cannot take anti-inflammatory medications so we discussed Tylenol use. Veveredd Clark is in agreement with this plan. All questions were answered to patient's satisfaction and was encouraged to call with any further questions. Krista Rosa, DO  Orthopedic Surgery and Sports Medicine  1/17/2022      This dictation was performed with a verbal recognition program [facility]) and it was checked for errors. It is possible that there are still dictated errors within this office note. If so, please bring any errors to my attention for an addendum. All efforts were made to ensure that this office note is accurate.

## 2022-02-07 RX ORDER — SERTRALINE HYDROCHLORIDE 100 MG/1
100 TABLET, FILM COATED ORAL DAILY
Qty: 30 TABLET | Refills: 0 | Status: SHIPPED | OUTPATIENT
Start: 2022-02-07 | End: 2022-03-09 | Stop reason: SDUPTHER

## 2022-02-07 NOTE — TELEPHONE ENCOUNTER
Zelda Ochoa [phone] (home) [phone] (work)   is requesting refill(s) of medication Sertraline to preferred pharmacy [facility]    Last OV 11/29/21 (pertaining to medication)   Last refill 8/13/21 (per medication requested)  Next office visit scheduled or attempted Yes  Date 3/9/22 Dr. Nallely Andrade  If No, reason

## 2022-02-16 RX ORDER — TRAZODONE HYDROCHLORIDE 50 MG/1
TABLET ORAL
Qty: 90 TABLET | Refills: 0 | Status: SHIPPED | OUTPATIENT
Start: 2022-02-16 | End: 2022-03-09 | Stop reason: SDUPTHER

## 2022-02-16 NOTE — TELEPHONE ENCOUNTER
Last OV 11/29/21 (pertaining to medication)   Last refill 8/13/21 (per medication requested)  Next office visit scheduled or attempted Yes  Date 3/9/22 Dr. Anne Gusman  If No, reason

## 2022-02-28 RX ORDER — BUPROPION HYDROCHLORIDE 150 MG/1
150 TABLET ORAL EVERY MORNING
Qty: 30 TABLET | Refills: 0 | Status: SHIPPED | OUTPATIENT
Start: 2022-02-28 | End: 2022-03-09 | Stop reason: SDUPTHER

## 2022-02-28 NOTE — TELEPHONE ENCOUNTER
Tasneem Velasco is requesting refill(s)   Last OV 11/29/2021 (pertaining to medication)  LR 06/18/2021 (per medication requested)  Next office visit scheduled or attempted NP appt with Sixto Hairston on 03/09/2022

## 2022-02-28 NOTE — TELEPHONE ENCOUNTER
[facility] pharmacy is requesting a drug change on pt's Eliquis. Eliquis is not covered by the pt's insurance plan. The preferred alternative Xarelto, Wafarin sodium. Please call/fax/escribe the pharmacy to change medication along with strength, directions, quantity, and refills.

## 2022-03-09 ENCOUNTER — TELEMEDICINE (OUTPATIENT)
Dept: FAMILY MEDICINE CLINIC | Age: 67
End: 2022-03-09
Payer: COMMERCIAL

## 2022-03-09 DIAGNOSIS — G25.81 RESTLESS LEG SYNDROME: ICD-10-CM

## 2022-03-09 DIAGNOSIS — F33.42 RECURRENT MAJOR DEPRESSIVE DISORDER, IN FULL REMISSION (HCC): ICD-10-CM

## 2022-03-09 DIAGNOSIS — I34.1 MVP (MITRAL VALVE PROLAPSE): ICD-10-CM

## 2022-03-09 DIAGNOSIS — I48.0 PAF (PAROXYSMAL ATRIAL FIBRILLATION) (HCC): ICD-10-CM

## 2022-03-09 DIAGNOSIS — F51.01 PRIMARY INSOMNIA: ICD-10-CM

## 2022-03-09 DIAGNOSIS — E78.00 PURE HYPERCHOLESTEROLEMIA: ICD-10-CM

## 2022-03-09 DIAGNOSIS — Z76.89 ENCOUNTER TO ESTABLISH CARE: Primary | ICD-10-CM

## 2022-03-09 DIAGNOSIS — G47.33 OSA ON CPAP: ICD-10-CM

## 2022-03-09 DIAGNOSIS — Z99.89 OSA ON CPAP: ICD-10-CM

## 2022-03-09 PROCEDURE — 99203 OFFICE O/P NEW LOW 30 MIN: CPT | Performed by: STUDENT IN AN ORGANIZED HEALTH CARE EDUCATION/TRAINING PROGRAM

## 2022-03-09 RX ORDER — BUPROPION HYDROCHLORIDE 150 MG/1
150 TABLET ORAL EVERY MORNING
Qty: 90 TABLET | Refills: 1 | Status: SHIPPED | OUTPATIENT
Start: 2022-03-09 | End: 2022-05-02

## 2022-03-09 RX ORDER — ATORVASTATIN CALCIUM 10 MG/1
10 TABLET, FILM COATED ORAL DAILY
Qty: 90 TABLET | Refills: 1 | Status: SHIPPED | OUTPATIENT
Start: 2022-03-09

## 2022-03-09 RX ORDER — SERTRALINE HYDROCHLORIDE 100 MG/1
100 TABLET, FILM COATED ORAL DAILY
Qty: 90 TABLET | Refills: 1 | Status: SHIPPED | OUTPATIENT
Start: 2022-03-09 | End: 2022-09-06

## 2022-03-09 RX ORDER — PRAMIPEXOLE DIHYDROCHLORIDE 0.5 MG/1
0.5 TABLET ORAL NIGHTLY
Qty: 90 TABLET | Refills: 1 | Status: SHIPPED | OUTPATIENT
Start: 2022-03-09 | End: 2022-10-10

## 2022-03-09 RX ORDER — TRAZODONE HYDROCHLORIDE 50 MG/1
TABLET ORAL
Qty: 90 TABLET | Refills: 1 | Status: SHIPPED | OUTPATIENT
Start: 2022-03-09 | End: 2022-05-17

## 2022-03-09 ASSESSMENT — PATIENT HEALTH QUESTIONNAIRE - PHQ9
6. FEELING BAD ABOUT YOURSELF - OR THAT YOU ARE A FAILURE OR HAVE LET YOURSELF OR YOUR FAMILY DOWN: 1
2. FEELING DOWN, DEPRESSED OR HOPELESS: 0
SUM OF ALL RESPONSES TO PHQ9 QUESTIONS 1 & 2: 0
5. POOR APPETITE OR OVEREATING: 3
SUM OF ALL RESPONSES TO PHQ QUESTIONS 1-9: 7
SUM OF ALL RESPONSES TO PHQ QUESTIONS 1-9: 7
8. MOVING OR SPEAKING SO SLOWLY THAT OTHER PEOPLE COULD HAVE NOTICED. OR THE OPPOSITE, BEING SO FIGETY OR RESTLESS THAT YOU HAVE BEEN MOVING AROUND A LOT MORE THAN USUAL: 0
7. TROUBLE CONCENTRATING ON THINGS, SUCH AS READING THE NEWSPAPER OR WATCHING TELEVISION: 0
SUM OF ALL RESPONSES TO PHQ QUESTIONS 1-9: 7
10. IF YOU CHECKED OFF ANY PROBLEMS, HOW DIFFICULT HAVE THESE PROBLEMS MADE IT FOR YOU TO DO YOUR WORK, TAKE CARE OF THINGS AT HOME, OR GET ALONG WITH OTHER PEOPLE: 0
9. THOUGHTS THAT YOU WOULD BE BETTER OFF DEAD, OR OF HURTING YOURSELF: 0
4. FEELING TIRED OR HAVING LITTLE ENERGY: 3
1. LITTLE INTEREST OR PLEASURE IN DOING THINGS: 0
3. TROUBLE FALLING OR STAYING ASLEEP: 0
SUM OF ALL RESPONSES TO PHQ QUESTIONS 1-9: 7

## 2022-03-09 ASSESSMENT — ENCOUNTER SYMPTOMS
VOMITING: 0
NAUSEA: 0
DIARRHEA: 0
SHORTNESS OF BREATH: 0
CONSTIPATION: 0

## 2022-03-09 NOTE — PROGRESS NOTES
concerns  Diltiazem    MDD  wellbutrin  zoloft    RLS  On mirapex  Working well    Insomnia  On trazodone 50mg. Previously on Ambien but able to wean off. Trouble turning off her brain. Issues since highschool. Double vision  Started over last 1 month  Increased amount of time at the computer  Glasses 1 year ago  Same on both eyes    BIJAN   Not currently using CPAP  Has difficulty with claustrophobia  Did have improvement of symptoms since losing weight    Review of Systems   Constitutional: Negative for chills and fever. Eyes: Negative for visual disturbance. Respiratory: Negative for shortness of breath. Cardiovascular: Negative for chest pain and palpitations. Gastrointestinal: Negative for constipation, diarrhea, nausea and vomiting. Genitourinary: Negative for difficulty urinating. Musculoskeletal: Negative for gait problem. Skin: Negative for rash. Neurological: Negative for dizziness and light-headedness. Psychiatric/Behavioral: Negative for confusion and decreased concentration.           Objective   Patient-Reported Vitals  No data recorded     Physical Exam  [INSTRUCTIONS:  \"[x]\" Indicates a positive item  \"[]\" Indicates a negative item  -- DELETE ALL ITEMS NOT EXAMINED]    Constitutional: [x] Appears well-developed and well-nourished [x] No apparent distress      [] Abnormal -     Mental status: [x] Alert and awake  [x] Oriented to person/place/time [x] Able to follow commands    [] Abnormal -     Eyes:   EOM    [x]  Normal    [] Abnormal -   Sclera  [x]  Normal    [] Abnormal -          Discharge [x]  None visible   [] Abnormal -     HENT: [x] Normocephalic, atraumatic  [] Abnormal -   [x] Mouth/Throat: Mucous membranes are moist    External Ears [x] Normal  [] Abnormal -    Neck: [x] No visualized mass [] Abnormal -     Pulmonary/Chest: [x] Respiratory effort normal   [x] No visualized signs of difficulty breathing or respiratory distress        [] Abnormal -      Musculoskeletal: [x] Normal gait with no signs of ataxia         [x] Normal range of motion of neck        [] Abnormal -     Neurological:        [x] No Facial Asymmetry (Cranial nerve 7 motor function) (limited exam due to video visit)          [x] No gaze palsy        [] Abnormal -          Skin:        [x] No significant exanthematous lesions or discoloration noted on facial skin         [] Abnormal -            Psychiatric:       [x] Normal Affect [] Abnormal -        [x] No Hallucinations    Other pertinent observable physical exam findings:-                 Concetta Martin was evaluated through a synchronous (real-time) audio-video encounter. The patient (or guardian if applicable) is aware that this is a billable service, which includes applicable co-pays. This Virtual Visit was conducted with patient's (and/or legal guardian's) consent. The visit was conducted pursuant to the emergency declaration under the [address] [place] [address] [facility] authority and the [email or web address] and [employer] General Act. Patient identification was verified, and a caregiver was present when appropriate. The patient was located at home in a state where the provider was licensed to provide care.        --Dawson Liang DO

## 2022-04-30 DIAGNOSIS — F33.42 RECURRENT MAJOR DEPRESSIVE DISORDER, IN FULL REMISSION (HCC): ICD-10-CM

## 2022-05-02 RX ORDER — BUPROPION HYDROCHLORIDE 150 MG/1
150 TABLET ORAL EVERY MORNING
Qty: 30 TABLET | Refills: 0 | Status: SHIPPED | OUTPATIENT
Start: 2022-05-02 | End: 2022-06-20

## 2022-05-09 DIAGNOSIS — I48.0 PAF (PAROXYSMAL ATRIAL FIBRILLATION) (HCC): ICD-10-CM

## 2022-05-16 RX ORDER — FLECAINIDE ACETATE 100 MG/1
TABLET ORAL
Qty: 180 TABLET | Refills: 1 | Status: SHIPPED | OUTPATIENT
Start: 2022-05-16 | End: 2022-05-17 | Stop reason: SDUPTHER

## 2022-05-17 ENCOUNTER — OFFICE VISIT (OUTPATIENT)
Dept: CARDIOLOGY CLINIC | Age: 67
End: 2022-05-17
Payer: COMMERCIAL

## 2022-05-17 VITALS
BODY MASS INDEX: 33.49 KG/M2 | DIASTOLIC BLOOD PRESSURE: 70 MMHG | SYSTOLIC BLOOD PRESSURE: 130 MMHG | WEIGHT: 201 LBS | HEIGHT: 65 IN | OXYGEN SATURATION: 100 % | HEART RATE: 73 BPM

## 2022-05-17 DIAGNOSIS — Z99.89 OSA ON CPAP: ICD-10-CM

## 2022-05-17 DIAGNOSIS — I48.0 PAF (PAROXYSMAL ATRIAL FIBRILLATION) (HCC): Primary | ICD-10-CM

## 2022-05-17 DIAGNOSIS — G47.33 OSA ON CPAP: ICD-10-CM

## 2022-05-17 DIAGNOSIS — F51.01 PRIMARY INSOMNIA: ICD-10-CM

## 2022-05-17 PROCEDURE — 93000 ELECTROCARDIOGRAM COMPLETE: CPT | Performed by: INTERNAL MEDICINE

## 2022-05-17 PROCEDURE — 99214 OFFICE O/P EST MOD 30 MIN: CPT | Performed by: INTERNAL MEDICINE

## 2022-05-17 RX ORDER — DILTIAZEM HYDROCHLORIDE 180 MG/1
180 CAPSULE, COATED, EXTENDED RELEASE ORAL DAILY
Qty: 90 CAPSULE | Refills: 3 | Status: SHIPPED | OUTPATIENT
Start: 2022-05-17

## 2022-05-17 RX ORDER — FLECAINIDE ACETATE 100 MG/1
TABLET ORAL
Qty: 180 TABLET | Refills: 3 | Status: SHIPPED | OUTPATIENT
Start: 2022-05-17

## 2022-05-17 RX ORDER — TRAZODONE HYDROCHLORIDE 50 MG/1
50 TABLET ORAL NIGHTLY
Qty: 90 TABLET | Refills: 1 | Status: SHIPPED | OUTPATIENT
Start: 2022-05-17 | End: 2022-11-04

## 2022-05-17 NOTE — PATIENT INSTRUCTIONS
RECOMMENDATIONS:  1. Continue to monitor chest pains. 2. Continue every 6 month labs. 3. Continue current plan of care and medications. 4. Follow up in one year, or sooner if needed.

## 2022-05-17 NOTE — PROGRESS NOTES
does not consume alcohol or use or illicit drugs. Family history is notable for carotid artery disease in her mother. She was recently evaluated by her primary care provider, Dr. Jennifer Cheney on 4/20/21 and noted to have an irregularly irregular heart rate and rhythm. ECG from that time showed atrial fibrillation with RVR in the 160s and non-specific ST-T wave changes. She was subsequently started on diltiazem  mg daily and Eliquis 5 mg BID. Patient wore a cardiac event monitor from [facility] with an average HR of 70 () BPM, 9.94% AF with an average HR of 154 () BPM, 0.42% PACs burden, 0.06% PVCs burden, 6.2 second conversion pause. On 5/18/2021, patient's ECG demonstrated AF at 170 BPM. She reported that she presented to her PCP for a routine visit in 4/2021 and was found to be in AF. She reported she has been experiencing dizziness with position changes and this has been going on for years. She reported she has no history of CAD, stents in her body, or history of CVA. She reported her brother was also diagnosed with AF recently. She reports she has lost 60lbs in the last few years and tolerates activity well. On 6/15/2021, ECG demonstrated SR (61). Flecainide 100 mg BID was started at last office visit (5/18/2021). Patient was scheduled to have CV for AF on 5/21/2021, but when she arrived for procedure, her ECG demonstrated SR (69). She stated she is feeling much better since being in normal rhythm. She does recall one instance of feeling lightheaded while she was outside in the heat doing yard work. Interval History since last office visit: Patient was switched to Xarelto for [address] [place] [address] due to insurance coverage. Today, 5/17/2022, ECG demonstrates SR (71). She reports that she is doing ok. 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 She has occasional sharp chest pains that do not radiate anywhere. She is taking her medications as prescribed.  Denies recent issues with bleeding or bruising. Patient denies current edema, sob, palpitations, dizziness or syncope. Past Medical History:   has a past medical history of Atrial fibrillation ([address] Depression, GERD (gastroesophageal reflux disease), Hyperlipidemia, Lumbar herniated disc, MVP (mitral valve prolapse), BIJAN (obstructive sleep apnea), Primary osteoarthritis of left knee, and Restless leg syndrome. Past Surgical History:   has a past surgical history that includes back surgery (2/1998) and Lap Band (2010). Allergies:  Patient has no known allergies. Social History:   reports that she has never smoked. She has never used smokeless tobacco. She reports that she does not drink alcohol and does not use drugs. Family History: family history includes Arthritis in her father and mother; Diabetes in her father; High Blood Pressure in her father; High Cholesterol in her father; Other in her father. Home Medications:    Prior to Admission medications    Medication Sig Start Date End Date Taking?  Authorizing Provider   flecainide (TAMBOCOR) 100 MG tablet TAKE 1 TABLET BY MOUTH TWICE DAILY 5/16/22  Yes VAHE Singletary - CNP   buPROPion (WELLBUTRIN XL) 150 MG extended release tablet TAKE 1 TABLET BY MOUTH EVERY MORNING 5/2/22  Yes VAHE Amaya - CNP   sertraline (ZOLOFT) 100 MG tablet Take 1 tablet by mouth daily 3/9/22  Yes Mirna Hairston DO   atorvastatin (LIPITOR) 10 MG tablet Take 1 tablet by mouth daily 3/9/22  Yes Mirna Hairston DO   traZODone (DESYREL) 50 MG tablet TAKE ONE-HALF TABLET BY MOUTH EVERY NIGHT AT BEDTIME. MAY INCREASE TO 1 TABLET AFTER THIRD NIGHT  Patient taking differently: 50 mg TAKE ONE-HALF TABLET BY MOUTH EVERY NIGHT AT BEDTIME. MAY INCREASE TO 1 TABLET AFTER THIRD NIGHT 3/9/22  Yes Mirna Hairston DO   pramipexole (MIRAPEX) 0.5 MG tablet Take 1 tablet by mouth at bedtime 3/9/22  Yes Mirna Hairston DO   rivaroxaban (XARELTO) 20 MG TABS tablet Take 1 tablet by mouth Daily with supper 3/1/22 5/30/22 Yes Dannielle Leonardo MD   dilTIAZem (DILTIAZEM CD) 180 MG extended release capsule Take 1 capsule by mouth daily 6/15/21  Yes Dannielle Leonardo MD       REVIEW OF SYSTEMS:    All 14-point review of systems are completed and  pertinent positives are mentioned in the history of present illness. Other  systems are reviewed and are negative. Physical Examination:    /70   Pulse 73   Ht 5' 5\" (1.651 m)   Wt 201 lb (91.2 kg)   SpO2 100%   BMI 33.45 kg/m²      Constitutional and General Appearance:    alert, cooperative, no distress and appears stated age  [de-identified]:    PERRLA, no cervical lymphadenopathy. No masses palpable. Normal oral  mucosa  Respiratory:  · Normal excursion and expansion without use of accessory muscles  · Resp Auscultation: Normal breath sounds without dullness or wheezing  Cardiovascular:  · The apical impulse is not displaced  · RRR S1S2 w/o M/G/R  Abdomen:  · No masses or tenderness  · Bowel sounds present  Extremities:  ·  No Cyanosis or Clubbing  ·  Lower extremity edema: No  · Skin: Warm and dry  Neurological:  · Alert and oriented. · Moves all extremities well  · No abnormalities of mood, affect, memory, mentation, or behavior are noted    DATA:      ECG 5/17/22: Personally reviewed. ECHO 5/14/2021:   Summary   Left ventricular systolic function is normal with a visually estimated   ejection fraction of 55%. The left ventricle is normal in size with mild septal hypertrophy. No obvious regional wall motion abnormalities noted. Normal left ventricular diastolic function. The left atrium appears moderately enlarged. Systolic pulmonary artery pressure (SPAP) is normal and estimated at 26 mmHg   (right atrial pressure 3 mmHg). Stress test 5/14/2021:  Summary Hypertensive response to exercise. Normal LV size and systolic function. Left ventricular ejection fraction of  81%. Normal wall motion.  There is normal isotope uptake at stress and rest.  There is no evidence of myocardial ischemia or scar. IMPRESSION:    1. Atrial fibrillation with RVR. Patient is a pleasant 79-year-old female with a medical history significant for obstructive sleep apnea, and obesity who presents from home to [facility] care. Patient recently diagnosed with atrial fibrillation with RVR. She is extremely tachycardic when she is in atrial fibrillation. This diet not being on the highest dose of rate controlling agents patient has significant pauses for several seconds on her monitor (up to 6 seconds with conversion pauses). She reports being only mildly symptomatic with palpitations. At her rates I believe patient needs to consider rhythm control with either antiarrhythmic or ablation or pace and ablate strategy. We discussed anticoagulation (NOAC and warfarin), rate control, rhythm control, AVN + pacemaker, and atrial fibrillation ablation. We reviewed risks and benefits of each approach. We discussed side effects of class IC, class II, class III, and class IV antiarrhythmics. All questions were answered. We will start patient on flecainide as her stress test was within normal limits and her wall thickness is within normal limits despite her LVH. We will schedule a cardioversion on Friday. We will follow-up with patient sooner rather than later to consider other options.  - Start flecainide 100 mg BID.  - Cardioversion with EKG on Friday. - Continue apixaban. - Continue diltiazem. - Cardiac monitor post cardioversion.  - Follow up with me in 4 weeks. 6/15/2021  Patient presents for follow-up. She presented last week for a cardioversion however had converted back into normal sinus rhythm on flecainide. She has a history of bradycardia so this will be watched closely while she is on the 1C agent. She is tolerating her flecainide without any issues. She is tolerating apixaban without issues. She continues on her diltiazem.   He can clearly tell when she is in normal rhythm feel significantly better. As she is doing well I think be reasonable patient to follow-up with us in 1 year or as needed. - Continue flecainide 100 mg BID with  mg with atrial fibrillation lasting longer than 2 hours. No more than 300 mg in a day. - Continue apixaban. - Continue diltiazem. - Follow up with EP NP in 1 year unless/until procedure/discussion required or PRN. 05/17/2022  Patient presents for follow up. She has been doing well. She has an Apple Watch and can record EKGs. - Labs every 6 months per PCP.  - Continue flecainide 100 mg BID with  mg with atrial fibrillation lasting longer than 2 hours. No more than 300 mg in a day. - Continue apixaban. - Continue diltiazem. - Follow up with EP NP in 1 year unless/until procedure/discussion required or PRN. RECOMMENDATIONS:  1. Continue to monitor chest pains. 2. Continue every 6 month labs. 3. Continue current plan of care and medications. 4. Follow up in one year, or sooner if needed. QUALITY MEASURES  1. Tobacco Cessation Counseling: NA  2. Retake of BP if >140/90:   NA  3. Documentation to PCP/referring for new patient:  Sent to PCP at close of office visit  4. CAD patient on anti-platelet: NA  5. CAD patient on STATIN therapy:  NA  6. Patient with CHF and aFib on anticoagulation:  Yes     All questions and concerns were addressed to the patient/family. Alternatives to my treatment were discussed. Bethel Brandon RN, am scribing for and in the presence of Dr. Kristen Garcia. 05/17/22 11:04 AM   Radha Cortes RN    I reviewed with the resident the medical history and the resident's findings on the physical examination. I discussed with the resident the patient's diagnosis and concur with the plan. Dr. Lesa Cancino MD  Electrophysiology  [facility]. [address]. [address].   Umesh Ramsey  Phone: [phone]  Fax: [phone]     NOTE: This report was transcribed using voice recognition software. Every effort was made to ensure accuracy, however, inadvertent computerized transcription errors may be present.

## 2022-06-16 ENCOUNTER — OFFICE VISIT (OUTPATIENT)
Dept: ORTHOPEDIC SURGERY | Age: 67
End: 2022-06-16
Payer: COMMERCIAL

## 2022-06-16 DIAGNOSIS — M17.12 PRIMARY OSTEOARTHRITIS OF LEFT KNEE: Primary | ICD-10-CM

## 2022-06-16 PROCEDURE — 20610 DRAIN/INJ JOINT/BURSA W/O US: CPT | Performed by: STUDENT IN AN ORGANIZED HEALTH CARE EDUCATION/TRAINING PROGRAM

## 2022-06-16 RX ORDER — TRIAMCINOLONE ACETONIDE 40 MG/ML
40 INJECTION, SUSPENSION INTRA-ARTICULAR; INTRAMUSCULAR ONCE
Status: COMPLETED | OUTPATIENT
Start: 2022-06-16 | End: 2022-06-16

## 2022-06-16 RX ORDER — LIDOCAINE HYDROCHLORIDE 10 MG/ML
1 INJECTION, SOLUTION INFILTRATION; PERINEURAL ONCE
Status: COMPLETED | OUTPATIENT
Start: 2022-06-16 | End: 2022-06-16

## 2022-06-16 RX ADMIN — LIDOCAINE HYDROCHLORIDE 1 ML: 10 INJECTION, SOLUTION INFILTRATION; PERINEURAL at 15:14

## 2022-06-16 RX ADMIN — TRIAMCINOLONE ACETONIDE 40 MG: 40 INJECTION, SUSPENSION INTRA-ARTICULAR; INTRAMUSCULAR at 15:17

## 2022-06-16 NOTE — PROGRESS NOTES
Chief Complaint  Knee Pain (lt knee. wants knee injection)      History of Present Illness: The patient is here today for repeat evaluation of her left knee. She got about 6 months of relief from her prior injection. She is here for another injection today. Prior HPI 1/17/2022: Chitra Pardo is a pleasant 61 y.o. female here today for follow-up regarding her left knee. The patient received a left knee injection at her last visit on 12/17/2021. She reports that the knee injection has greatly improved her knee pain. She still has some pain going up stairs. She denies any mechanical symptoms today like locking or instability. Prior HPI 12/17/2021:  The patient reports that over the past month she has had worsening left knee pain, especially while at work. She is an  and she sits for long periods of time. When she goes to stand up she has pain in her knee. She denies any instability but does report some catching and locking on occasion. She also has difficulty with stairs. Her knee also bothers her at night at times. She denies any prior issues with the knee. She takes occasional Tylenol. The patient has atrial fibrillation and takes Eliquis and cannot take anti-inflammatory medications. Pain Assessment  Location of Pain: Knee  Severity of Pain: 8  Quality of Pain: Dull  Duration of Pain: Other (Comment)  Frequency of Pain: Intermittent    Medical History:  Patient's medications, allergies, past medical, surgical, social and family histories were reviewed and updated as appropriate. Pertinent items are noted in HPI  Review of systems reviewed from Patient History Form dated on 6/16/22 and available in the patient's chart under the Media tab. Vital Signs: There were no vitals filed for this visit. Constitutional: In no apparent distress. Normal affect. Alert and oriented X3 and is cooperative. Left knee exam    Gait: No use of assistive devices.  No antalgic gait.    Alignment: Slight valgus alignment    Inspection/skin: Skin is intact without erythema or ecchymosis. No gross deformity. Palpation: Patellofemoral crepitation noted. Tenderness along the medial and lateral joint lines. Mental tenderness to the patellofemoral joint. Range of Motion: There is full range of motion. Strength: Normal quadriceps development. Effusion: No effusion or swelling present. Ligamentous stability: No cruciate or collateral ligament instability. Neurologic and vascular: Skin is warm and well-perfused. Sensation is intact to light-touch. Special tests: Negative Jennifer sign. Radiology:       No new x-rays taken today. Prior imaging reviewed demonstrating valgus alignment arthritic change and patellofemoral arthritis         Assessment : 73-year-old female with left knee tricompartmental osteoarthritis with valgus alignment    Impression:  Encounter Diagnosis   Name Primary?  Primary osteoarthritis of left knee Yes       Office Procedures:  Orders Placed This Encounter   Procedures    CO ARTHROCENTESIS ASPIR&/INJ MAJOR JT/BURSA W/O US         Plan:     I will do another injection into the patient's knees today given her exam is about the same as it was at her last visit in December. She can follow-up with me for another shot when she is ready. Prosper Navarro is in agreement with this plan. All questions were answered to patient's satisfaction and was encouraged to call with any further questions. The indications and risks of steroid injection as well as treatment alternatives were discussed with the patient who consented to the procedure. Under sterile conditions and after informed consent was obtained the patient was given an injection into the left knee. 2cc 40 mg of Kenalog and 4 mL of 1% lidocaine were placed in the knee after it was prepped with chlorhexidine. This resulted in good relief of symptoms. There were no complications.  The patient was advised to ice the knee this evening and to avoid vigorous activities for the next 2 days. They were advised to call us if there was any erythema, enduration, swelling or increasing pain. Julius Carranza DO  Orthopedic Surgery and Sports Medicine  6/16/2022      This dictation was performed with a verbal recognition program [facility]) and it was checked for errors. It is possible that there are still dictated errors within this office note. If so, please bring any errors to my attention for an addendum. All efforts were made to ensure that this office note is accurate.

## 2022-06-18 DIAGNOSIS — F33.42 RECURRENT MAJOR DEPRESSIVE DISORDER, IN FULL REMISSION (HCC): ICD-10-CM

## 2022-06-20 RX ORDER — BUPROPION HYDROCHLORIDE 150 MG/1
150 TABLET ORAL EVERY MORNING
Qty: 90 TABLET | Refills: 1 | Status: SHIPPED | OUTPATIENT
Start: 2022-06-20

## 2022-06-20 NOTE — TELEPHONE ENCOUNTER
Refill Request     CONFIRM preferrred pharmacy with the patient. If Mail Order Rx - Pend for 90 day refill.       Last Seen: Last Seen Department: 11/29/2021  Last Seen by PCP: Visit date not found    Last Written: 5/2/2022    Next Appointment:   Future Appointments   Date Time Provider Abdullahi Peres   6/28/2022 10:20 AM RYAN TOBIAS RM [facility] [place] [facility]   11/9/2022  8:00 AM DO KEYLA Benítez Cinj[facility] - [facility]       Future appointment scheduled      Requested Prescriptions     Pending Prescriptions Disp Refills    buPROPion (WELLBUTRIN XL) 150 MG extended release tablet [Pharmacy Med Name: BUPROPION XL 150MG TABLETS (24 H)] 30 tablet 0     Sig: TAKE 1 TABLET BY MOUTH EVERY MORNING

## 2022-09-02 DIAGNOSIS — F33.42 RECURRENT MAJOR DEPRESSIVE DISORDER, IN FULL REMISSION (HCC): ICD-10-CM

## 2022-09-02 NOTE — TELEPHONE ENCOUNTER
.Refill Request     CONFIRM preferrred pharmacy with the patient. If Mail Order Rx - Pend for 90 day refill.       Last Seen: Last Seen Department: 3/9/2022  Last Seen by PCP: 3/9/2022    Last Written: 3/9/22 90 with 1    Next Appointment:   Future Appointments   Date Time Provider Abdullahi Peres   11/9/2022  8:00 AM DO KEYLA Mcdermott  [place] - [facility]       Future appointment scheduled      Requested Prescriptions     Pending Prescriptions Disp Refills    sertraline (ZOLOFT) 100 MG tablet [Pharmacy Med Name: SERTRALINE 100MG TABLETS] 90 tablet 1     Sig: TAKE 1 TABLET BY MOUTH DAILY

## 2022-09-06 RX ORDER — SERTRALINE HYDROCHLORIDE 100 MG/1
100 TABLET, FILM COATED ORAL DAILY
Qty: 90 TABLET | Refills: 1 | Status: SHIPPED | OUTPATIENT
Start: 2022-09-06

## 2022-09-22 ENCOUNTER — TELEPHONE (OUTPATIENT)
Dept: ORTHOPEDIC SURGERY | Age: 67
End: 2022-09-22

## 2022-09-22 NOTE — TELEPHONE ENCOUNTER
The patient believes she has injured her knee worse than she thought and is needing to be seen sooner rather than later. She prefers the [facility] location. Please call.

## 2022-09-27 ENCOUNTER — OFFICE VISIT (OUTPATIENT)
Dept: ORTHOPEDIC SURGERY | Age: 67
End: 2022-09-27
Payer: COMMERCIAL

## 2022-09-27 VITALS — BODY MASS INDEX: 33.49 KG/M2 | HEIGHT: 65 IN | WEIGHT: 201 LBS

## 2022-09-27 DIAGNOSIS — M25.561 ACUTE PAIN OF RIGHT KNEE: Primary | ICD-10-CM

## 2022-09-27 DIAGNOSIS — M17.12 PRIMARY OSTEOARTHRITIS OF LEFT KNEE: ICD-10-CM

## 2022-09-27 PROCEDURE — L1812 KO ELASTIC W/JOINTS PRE OTS: HCPCS | Performed by: STUDENT IN AN ORGANIZED HEALTH CARE EDUCATION/TRAINING PROGRAM

## 2022-09-27 PROCEDURE — 20610 DRAIN/INJ JOINT/BURSA W/O US: CPT | Performed by: STUDENT IN AN ORGANIZED HEALTH CARE EDUCATION/TRAINING PROGRAM

## 2022-09-27 RX ORDER — TRIAMCINOLONE ACETONIDE 40 MG/ML
80 INJECTION, SUSPENSION INTRA-ARTICULAR; INTRAMUSCULAR ONCE
Status: COMPLETED | OUTPATIENT
Start: 2022-09-27 | End: 2022-09-27

## 2022-09-27 RX ORDER — LIDOCAINE HYDROCHLORIDE 10 MG/ML
4 INJECTION, SOLUTION INFILTRATION; PERINEURAL ONCE
Status: COMPLETED | OUTPATIENT
Start: 2022-09-27 | End: 2022-09-27

## 2022-09-27 RX ADMIN — LIDOCAINE HYDROCHLORIDE 4 ML: 10 INJECTION, SOLUTION INFILTRATION; PERINEURAL at 10:10

## 2022-09-27 RX ADMIN — TRIAMCINOLONE ACETONIDE 80 MG: 40 INJECTION, SUSPENSION INTRA-ARTICULAR; INTRAMUSCULAR at 10:11

## 2022-09-27 NOTE — PROGRESS NOTES
Chief Complaint  Knee Pain (FU LT knee/ new injury)      History of Present Illness: The patient is here today for repeat evaluation of her left knee. The patient had a pop about 3 to 4 weeks ago and she has had worsening pain to the knee. She has a wedding for her daughter coming up in October and she would like her knee to feel little bit better for this. She is also reporting pain with extended sitting, with stairs, and at night. Prior HPI 6/16/2022:  The patient is here today for repeat evaluation of her left knee. She got about 6 months of relief from her prior injection. She is here for another injection today. Prior HPI 1/17/2022: Justyna Dejesus is a pleasant 74 y.o. female here today for follow-up regarding her left knee. The patient received a left knee injection at her last visit on 12/17/2021. She reports that the knee injection has greatly improved her knee pain. She still has some pain going up stairs. She denies any mechanical symptoms today like locking or instability. Prior HPI 12/17/2021:  The patient reports that over the past month she has had worsening left knee pain, especially while at work. She is an  and she sits for long periods of time. When she goes to stand up she has pain in her knee. She denies any instability but does report some catching and locking on occasion. She also has difficulty with stairs. Her knee also bothers her at night at times. She denies any prior issues with the knee. She takes occasional Tylenol. The patient has atrial fibrillation and takes Eliquis and cannot take anti-inflammatory medications. Medical History:  Patient's medications, allergies, past medical, surgical, social and family histories were reviewed and updated as appropriate. Pertinent items are noted in HPI  Review of systems reviewed from Patient History Form dated on 9/27/22 and available in the patient's chart under the Media tab.        Vital Signs:  There were no vitals filed for this visit. Constitutional: In no apparent distress. Normal affect. Alert and oriented X3 and is cooperative. Left knee exam    Gait: No use of assistive devices. No antalgic gait. Alignment: Slight valgus alignment    Inspection/skin: Skin is intact without erythema or ecchymosis. No gross deformity. Palpation: Patellofemoral crepitation noted. Tenderness along the medial and lateral joint lines. Mental tenderness to the patellofemoral joint. Range of Motion: There is full range of motion. Strength: Normal quadriceps development. Effusion: No effusion or swelling present. Ligamentous stability: No cruciate or collateral ligament instability. Neurologic and vascular: Skin is warm and well-perfused. Sensation is intact to light-touch. Special tests: Negative Jennifer sign. Radiology:       4 views of the left knee taken in the office today demonstrate continued findings of severe tricompartmental osteoarthritis with valgus alignment and severe patellofemoral osteoarthritis. Assessment : 63-year-old female with left knee tricompartmental osteoarthritis with valgus alignment    Impression:  Encounter Diagnoses   Name Primary? Acute pain of right knee Yes    Primary osteoarthritis of left knee        Office Procedures:  Orders Placed This Encounter   Procedures    XR KNEE LEFT (MIN 4 VIEWS)     Standing Status:   Future     Number of Occurrences:   1     Standing Expiration Date:   9/26/2023     Order Specific Question:   Reason for exam:     Answer:   pain    [place] ARTHROCENTESIS ASPIR&/INJ MAJOR JT/BURSA W/O US    Breg [employer] Hinged Knee WrapAround Brace     Patient was prescribed a [employer] Hinged Wrap Around Knee Brace. The left knee will require stabilization / immobilization from this semi-rigid / rigid orthosis to improve their function.   The orthosis will assist in protecting the affected area, provide functional support and facilitate healing. The patient was educated and fit by a healthcare professional with expert knowledge and specialization in brace application while under the direct supervision of the treating physician. Verbal and written instructions for the use of and application of this item were provided. They were instructed to contact the office immediately should the brace result in increased pain, decreased sensation, increased swelling or worsening of the condition. Plan:     I had a long discussion with the patient today. Unfortunately looks like her shot wore off a little early but it is 3 months since her last shot in June so I will be happy to do another 1 today especially because she has her daughter's wedding coming up soon. I will also give her a knee brace to wear only when she is going to be expecting to be standing or walking for long periods of time. I do not think the brace will be helpful when she is sitting down or sleeping. I also recommend that for her job she sits a lot so she may have some hip weakness that could be contributing. I recommend she work on exercises to strengthen her hip muscles. She declined formal therapy for now. Follow-up with me in 3 to 6 months for recheck. Davion Red is in agreement with this plan. All questions were answered to patient's satisfaction and was encouraged to call with any further questions. The indications and risks of steroid injection as well as treatment alternatives were discussed with the patient who consented to the procedure. Under sterile conditions and after informed consent was obtained the patient was given an injection into the left knee. 2cc 40 mg of Kenalog and 4 mL of 1% lidocaine were placed in the knee after it was prepped with chlorhexidine. This resulted in good relief of symptoms. There were no complications.  The patient was advised to ice the knee this evening and to avoid vigorous activities for the next 2 days. They were advised to call us if there was any erythema, enduration, swelling or increasing pain. Kierra Gordon, DO  Orthopedic Surgery and Sports Medicine  9/27/2022      This dictation was performed with a verbal recognition program [facility]) and it was checked for errors. It is possible that there are still dictated errors within this office note. If so, please bring any errors to my attention for an addendum. All efforts were made to ensure that this office note is accurate.

## 2022-10-08 DIAGNOSIS — G25.81 RESTLESS LEG SYNDROME: ICD-10-CM

## 2022-10-08 NOTE — TELEPHONE ENCOUNTER
Refill Request     CONFIRM preferrred pharmacy with the patient. If Mail Order Rx - Pend for 90 day refill. Last Seen: Last Seen Department: 3/9/2022  Last Seen by PCP: 3/9/2022    Last Written: 3/9/2022    If no future appointment scheduled, route STAFF MESSAGE with patient name to the [facility] for scheduling. Next Appointment:   Future Appointments   Date Time Provider Abdullahi Peres   11/9/2022  8:00 AM DO KEYLA Gaticaci - [facility]       Message sent to [address] [place] [address] to schedule appt with patient?   NO      Requested Prescriptions     Pending Prescriptions Disp Refills    pramipexole (MIRAPEX) 0.5 MG tablet [Pharmacy Med Name: PRAMIPEXOLE 0.5MG TABLETS] 90 tablet 1     Sig: TAKE 1 TABLET BY MOUTH AT BEDTIME

## 2022-10-10 RX ORDER — PRAMIPEXOLE DIHYDROCHLORIDE 0.5 MG/1
0.5 TABLET ORAL NIGHTLY
Qty: 90 TABLET | Refills: 1 | Status: SHIPPED | OUTPATIENT
Start: 2022-10-10

## 2022-11-04 DIAGNOSIS — F51.01 PRIMARY INSOMNIA: ICD-10-CM

## 2022-11-04 RX ORDER — TRAZODONE HYDROCHLORIDE 50 MG/1
TABLET ORAL
Qty: 90 TABLET | Refills: 1 | Status: SHIPPED | OUTPATIENT
Start: 2022-11-04

## 2022-11-04 NOTE — TELEPHONE ENCOUNTER
Refill Request     CONFIRM preferrred pharmacy with the patient. If Mail Order Rx - Pend for 90 day refill. Last Seen: Last Seen Department: 11/29/2021  Last Seen by PCP: 3/9/2022    Last Written: 5/17/2022 90 tablet 1 refill    If no future appointment scheduled, route STAFF MESSAGE with patient name to the [facility] for scheduling. Next Appointment:   Future Appointments   Date Time Provider Abdullahi Peres   11/9/2022  8:00 AM DO KEYLA Morton - [facility]       Message sent to [address] [place] [address] to schedule appt with patient?   NO      Requested Prescriptions     Pending Prescriptions Disp Refills    traZODone (DESYREL) 50 MG tablet [Pharmacy Med Name: TRAZODONE 50MG TABLETS] 90 tablet 1     Sig: TAKE 1 TABLET BY MOUTH EVERY NIGHT

## 2022-11-09 ENCOUNTER — OFFICE VISIT (OUTPATIENT)
Dept: FAMILY MEDICINE CLINIC | Age: 67
End: 2022-11-09
Payer: COMMERCIAL

## 2022-11-09 VITALS
DIASTOLIC BLOOD PRESSURE: 68 MMHG | HEART RATE: 80 BPM | SYSTOLIC BLOOD PRESSURE: 116 MMHG | BODY MASS INDEX: 31.62 KG/M2 | WEIGHT: 190 LBS | OXYGEN SATURATION: 100 %

## 2022-11-09 DIAGNOSIS — Z13.220 SCREENING FOR HYPERLIPIDEMIA: ICD-10-CM

## 2022-11-09 DIAGNOSIS — Z23 NEED FOR PROPHYLACTIC VACCINATION AGAINST STREPTOCOCCUS PNEUMONIAE (PNEUMOCOCCUS): ICD-10-CM

## 2022-11-09 DIAGNOSIS — F33.42 RECURRENT MAJOR DEPRESSIVE DISORDER, IN FULL REMISSION (HCC): ICD-10-CM

## 2022-11-09 DIAGNOSIS — Z12.31 ENCOUNTER FOR SCREENING MAMMOGRAM FOR BREAST CANCER: ICD-10-CM

## 2022-11-09 DIAGNOSIS — Z23 NEED FOR SHINGLES VACCINE: ICD-10-CM

## 2022-11-09 DIAGNOSIS — Z00.00 ENCOUNTER FOR WELL ADULT EXAM WITHOUT ABNORMAL FINDINGS: Primary | ICD-10-CM

## 2022-11-09 LAB
CHOLESTEROL, TOTAL: 226 MG/DL (ref 0–199)
HDLC SERPL-MCNC: 74 MG/DL (ref 40–60)
LDL CHOLESTEROL CALCULATED: 137 MG/DL
TRIGL SERPL-MCNC: 76 MG/DL (ref 0–150)
VLDLC SERPL CALC-MCNC: 15 MG/DL

## 2022-11-09 PROCEDURE — 99214 OFFICE O/P EST MOD 30 MIN: CPT | Performed by: STUDENT IN AN ORGANIZED HEALTH CARE EDUCATION/TRAINING PROGRAM

## 2022-11-09 PROCEDURE — 99397 PER PM REEVAL EST PAT 65+ YR: CPT | Performed by: STUDENT IN AN ORGANIZED HEALTH CARE EDUCATION/TRAINING PROGRAM

## 2022-11-09 PROCEDURE — 90471 IMMUNIZATION ADMIN: CPT | Performed by: STUDENT IN AN ORGANIZED HEALTH CARE EDUCATION/TRAINING PROGRAM

## 2022-11-09 PROCEDURE — 90677 PCV20 VACCINE IM: CPT | Performed by: STUDENT IN AN ORGANIZED HEALTH CARE EDUCATION/TRAINING PROGRAM

## 2022-11-09 RX ORDER — SERTRALINE HYDROCHLORIDE 100 MG/1
150 TABLET, FILM COATED ORAL DAILY
Qty: 135 TABLET | Refills: 1 | Status: SHIPPED | OUTPATIENT
Start: 2022-11-09 | End: 2023-02-07

## 2022-11-09 ASSESSMENT — ANXIETY QUESTIONNAIRES
1. FEELING NERVOUS, ANXIOUS, OR ON EDGE: 2
3. WORRYING TOO MUCH ABOUT DIFFERENT THINGS: 2
5. BEING SO RESTLESS THAT IT IS HARD TO SIT STILL: 2
IF YOU CHECKED OFF ANY PROBLEMS ON THIS QUESTIONNAIRE, HOW DIFFICULT HAVE THESE PROBLEMS MADE IT FOR YOU TO DO YOUR WORK, TAKE CARE OF THINGS AT HOME, OR GET ALONG WITH OTHER PEOPLE: SOMEWHAT DIFFICULT
GAD7 TOTAL SCORE: 11
4. TROUBLE RELAXING: 2
7. FEELING AFRAID AS IF SOMETHING AWFUL MIGHT HAPPEN: 0
2. NOT BEING ABLE TO STOP OR CONTROL WORRYING: 2
6. BECOMING EASILY ANNOYED OR IRRITABLE: 1

## 2022-11-09 ASSESSMENT — PATIENT HEALTH QUESTIONNAIRE - PHQ9
10. IF YOU CHECKED OFF ANY PROBLEMS, HOW DIFFICULT HAVE THESE PROBLEMS MADE IT FOR YOU TO DO YOUR WORK, TAKE CARE OF THINGS AT HOME, OR GET ALONG WITH OTHER PEOPLE: 2
SUM OF ALL RESPONSES TO PHQ QUESTIONS 1-9: 16
1. LITTLE INTEREST OR PLEASURE IN DOING THINGS: 2
SUM OF ALL RESPONSES TO PHQ QUESTIONS 1-9: 16
5. POOR APPETITE OR OVEREATING: 2
4. FEELING TIRED OR HAVING LITTLE ENERGY: 3
SUM OF ALL RESPONSES TO PHQ QUESTIONS 1-9: 16
2. FEELING DOWN, DEPRESSED OR HOPELESS: 2
SUM OF ALL RESPONSES TO PHQ QUESTIONS 1-9: 16
6. FEELING BAD ABOUT YOURSELF - OR THAT YOU ARE A FAILURE OR HAVE LET YOURSELF OR YOUR FAMILY DOWN: 2
8. MOVING OR SPEAKING SO SLOWLY THAT OTHER PEOPLE COULD HAVE NOTICED. OR THE OPPOSITE, BEING SO FIGETY OR RESTLESS THAT YOU HAVE BEEN MOVING AROUND A LOT MORE THAN USUAL: 1
SUM OF ALL RESPONSES TO PHQ9 QUESTIONS 1 & 2: 4
9. THOUGHTS THAT YOU WOULD BE BETTER OFF DEAD, OR OF HURTING YOURSELF: 0
3. TROUBLE FALLING OR STAYING ASLEEP: 2
7. TROUBLE CONCENTRATING ON THINGS, SUCH AS READING THE NEWSPAPER OR WATCHING TELEVISION: 2

## 2022-11-09 ASSESSMENT — ENCOUNTER SYMPTOMS
CONSTIPATION: 0
NAUSEA: 0
VOMITING: 0
SHORTNESS OF BREATH: 0
DIARRHEA: 0

## 2022-11-09 NOTE — PATIENT INSTRUCTIONS
Ask Dr. Farzana Villanueva about possibly starting propranolol to help with tremor         Well Visit, Women 48 to 72: Care Instructions  Overview     Well visits can help you stay healthy. Your doctor has checked your overall health and may have suggested ways to take good care of yourself. Your doctor also may have recommended tests. At home, you can help prevent illness with healthy eating, regular exercise, and other steps. Follow-up care is a key part of your treatment and safety. Be sure to make and go to all appointments, and call your doctor if you are having problems. It's also a good idea to know your test results and keep a list of the medicines you take. How can you care for yourself at home? Get screening tests that you and your doctor decide on. Screening helps find diseases before any symptoms appear. Eat healthy foods. Choose fruits, vegetables, whole grains, protein, and low-fat dairy foods. Limit fat, especially saturated fat. Reduce salt in your diet. Limit alcohol. Have no more than 1 drink a day or 7 drinks a week. Get at least 30 minutes of exercise on most days of the week. Walking is a good choice. You also may want to do other activities, such as running, swimming, cycling, or playing tennis or team sports. Reach and stay at a healthy weight. This will lower your risk for many problems, such as obesity, diabetes, heart disease, and high blood pressure. Do not smoke. Smoking can make health problems worse. If you need help quitting, talk to your doctor about stop-smoking programs and medicines. These can increase your chances of quitting for good. Care for your mental health. It is easy to get weighed down by worry and stress. Learn strategies to manage stress, like deep breathing and mindfulness, and stay connected with your family and community. If you find you often feel sad or hopeless, talk with your doctor. Treatment can help.   Talk to your doctor about whether you have any risk factors for sexually transmitted infections (STIs). You can help prevent STIs if you wait to have sex with a new partner (or partners) until you've each been tested for STIs. It also helps if you use condoms (male or female condoms) and if you limit your sex partners to one person who only has sex with you. Vaccines are available for some STIs. If you think you may have a problem with alcohol or drug use, talk to your doctor. This includes prescription medicines (such as amphetamines and opioids) and illegal drugs (such as cocaine and methamphetamine). Your doctor can help you figure out what type of treatment is best for you. Protect your skin from too much sun. When you're outdoors from 10 a.m. to 4 p.m., stay in the shade or cover up with clothing and a hat with a wide brim. Wear sunglasses that block UV rays. Even when it's cloudy, put broad-spectrum sunscreen (SPF 30 or higher) on any exposed skin. See a dentist one or two times a year for checkups and to have your teeth cleaned. Wear a seat belt in the car. When should you call for help? Watch closely for changes in your health, and be sure to contact your doctor if you have any problems or symptoms that concern you. Where can you learn more? Go to https://[email or web address] org and sign in to your [employer] account. Enter I859 in the [facility] box to learn more about \"Well Visit, Women 50 to 72: Care Instructions. \"     If you do not have an account, please click on the \"Sign Up Now\" link. Current as of: June 6, 2022               Content Version: 13.4  © 4201-5053 Healthwise, Incorporated. Care instructions adapted under license by [facility] ([facility]). If you have questions about a medical condition or this instruction, always ask your healthcare professional. [place] [address] any warranty or liability for your use of this information.

## 2022-11-09 NOTE — PROGRESS NOTES
Well Adult Note  Name: Darrel Posada Date: 2022   MRN: [id number] Sex: Female   Age: 74 y.o. Ethnicity: Non- / Non    : 1955 Race: White (non-)      Moe Hammond is here for well adult exam and depression. History:    Feels that depression is worsening   Increased stress with work    Obesity  Has been working on losing weight again. Difficulty with increasing exercise due to left knee pain. Will try to swim and due water aerobics over the summer. Has had steroid injection in left knee which has helped. Down 10 lbs in last 6 months    A-fib  Following with Dr. Bg Pendleton    Colonoscopy: Due every 5 years. Later this year. Dr. Essie Geiger    Review of Systems   Constitutional:  Negative for chills and fever. Respiratory:  Negative for shortness of breath. Cardiovascular:  Negative for chest pain and palpitations. Gastrointestinal:  Negative for constipation, diarrhea, nausea and vomiting. No Known Allergies      Prior to Visit Medications    Medication Sig Taking? Authorizing Provider   Phentermine-Topiramate 15-92 MG CP24 Take by mouth daily.  Yes Historical Provider, MD   sertraline (ZOLOFT) 100 MG tablet Take 1.5 tablets by mouth daily Yes Sergei Hairston DO   traZODone (DESYREL) 50 MG tablet TAKE 1 TABLET BY MOUTH EVERY NIGHT Yes Sergei Hairston DO   pramipexole (MIRAPEX) 0.5 MG tablet TAKE 1 TABLET BY MOUTH AT BEDTIME Yes Sergei Hairston DO   buPROPion (WELLBUTRIN XL) 150 MG extended release tablet TAKE 1 TABLET BY MOUTH EVERY MORNING Yes Sergei Hairston DO   flecainide (TAMBOCOR) 100 MG tablet TAKE 1 TABLET BY MOUTH TWICE DAILY Yes Nikunj Lizarraga MD   rivaroxaban (XARELTO) 20 MG TABS tablet Take 1 tablet by mouth Daily with supper Yes Nikunj Lizarraga MD   dilTIAZem (DILTIAZEM CD) 180 MG extended release capsule Take 1 capsule by mouth daily Yes Nikunj Lizarraga MD   atorvastatin (LIPITOR) 10 MG tablet Take 1 tablet by mouth daily Yes Sergei Crews DO Yovanny         Past Medical History:   Diagnosis Date    Atrial fibrillation [facility])     April 2021    Depression     GERD (gastroesophageal reflux disease)     Hyperlipidemia     Lumbar herniated disc 2/98    MVP (mitral valve prolapse)     BIJAN (obstructive sleep apnea)     Primary osteoarthritis of left knee 9/25/2020    Restless leg syndrome        Past Surgical History:   Procedure Laterality Date    BACK SURGERY  2/1998    LAP BAND  2010         Family History   Problem Relation Age of Onset    Arthritis Mother     Arthritis Father     Diabetes Father     High Blood Pressure Father     High Cholesterol Father     Other Father         meniere's disease       Social History     Tobacco Use    Smoking status: Never    Smokeless tobacco: Never   Vaping Use    Vaping Use: Never used   Substance Use Topics    Alcohol use: No    Drug use: No       Objective   /68 (Site: Right Upper Arm, Position: Sitting, Cuff Size: Medium Adult)   Pulse 80   Wt 190 lb (86.2 kg)   SpO2 100%   BMI 31.62 kg/m²   Wt Readings from Last 3 Encounters:   11/09/22 190 lb (86.2 kg)   09/27/22 201 lb (91.2 kg)   05/17/22 201 lb (91.2 kg)     There were no vitals filed for this visit. Physical Exam  Vitals reviewed. Constitutional:       Appearance: Normal appearance. HENT:      Head: Normocephalic and atraumatic. Cardiovascular:      Rate and Rhythm: Normal rate and regular rhythm. Pulmonary:      Effort: Pulmonary effort is normal.      Breath sounds: Normal breath sounds. Neurological:      General: No focal deficit present. Mental Status: She is alert and oriented to person, place, and time. Psychiatric:         Behavior: Behavior normal.         Thought Content: Thought content normal.         Assessment   Plan   1. Encounter for well adult exam without abnormal findings  2.  Need for prophylactic vaccination against Streptococcus pneumoniae (pneumococcus)  -     Pneumococcal Conjugate PCV20, PF (Prevnar 20)  3. Screening for hyperlipidemia  -     Lipid Panel; Future  4. Encounter for screening mammogram for breast cancer  -     SRIDEVI Digital Screen Bilateral; Future  5. Need for shingles vaccine  6. Recurrent major depressive disorder, in full remission ([facility] [address]  -     sertraline (ZOLOFT) 100 MG tablet; Take 1.5 tablets by mouth daily, Disp-135 tablet, R-1Normal     Reviewed recommended preventative care evaluations as well as immunizations. Also reports worsening depression and will increase Zoloft to 150 mg.   Follow-up in 1 month if not improved    Personalized Preventive Plan   Current Health Maintenance Status  Immunization History   Administered Date(s) Administered    COVID-19, J&J, (age 18y+), IM, 0.5 mL 03/08/2021    COVID-19, PFIZER PURPLE top, DILUTE for use, (age 15 y+), 30mcg/0.3mL 04/01/2021, 04/29/2021, 11/01/2021, 01/02/2022    Influenza 10/12/2010    Influenza A ([id number]) Vaccine PF IM 10/21/2009    Influenza Vaccine, unspecified formulation 09/01/2016, 11/01/2018    Influenza Virus Vaccine 09/25/2014, 10/01/2015, 04/03/2017, 11/08/2018, 11/11/2020, 10/19/2022    Influenza, AFLURIA (age 1 yrs+), FLUZONE, (age 10 mo+), MDV, 0.5mL 04/03/2017, 10/31/2017, 11/11/2020, 11/12/2021    Influenza, FLUARIX, FLULAVAL, Colan Foil (age 10 mo+) AND AFLURIA, (age 1 y+), PF, 0.5mL 10/07/2019, 01/03/2020, 11/01/2021, 11/12/2021    Influenza, FLUCELVAX, (age 10 mo+), MDCK, PF, 0.5mL 09/12/2020    Pneumococcal conjugate PCV20, PF (Prevnar 20) 11/09/2022    Tdap (Boostrix, Adacel) 08/27/2013, 01/05/2018    Zoster Live (Zostavax) 01/05/2018    Zoster Recombinant (Shingrix) 10/07/2019        Health Maintenance   Topic Date Due    DEXA (modify frequency per FRAX score)  Never done    Shingles vaccine (3 of 3) 12/02/2019    Breast cancer screen  02/13/2022    Colorectal Cancer Screen  11/13/2022    Lipids  11/29/2022    Cervical cancer screen  07/23/2023    Depression Monitoring  11/09/2023    DTaP/Tdap/Td vaccine (3 - Td or Tdap) 01/05/2028    Flu vaccine  Completed    Pneumococcal 65+ years Vaccine  Completed    COVID-19 Vaccine  Completed    Hepatitis C screen  Completed    HIV screen  Completed    Hepatitis A vaccine  Aged Out    Hib vaccine  Aged Out    Meningococcal (ACWY) vaccine  Aged Out     Recommendations for [employer] Due: see orders and patient instructions/AVS.    No follow-ups on file.

## 2022-11-21 ENCOUNTER — TELEPHONE (OUTPATIENT)
Dept: CARDIOLOGY CLINIC | Age: 67
End: 2022-11-21

## 2022-11-21 NOTE — TELEPHONE ENCOUNTER
CARDIAC CLEARANCE REQUEST    What type of procedure are you having: Colonoscopy    Are you taking any blood thinners: Xarelto, please advise hold    Type on anesthesia: n/a    When is your procedure scheduled for: 11/23    What physician is performing your procedure: Dr. Usama Hercules    Phone Number: [phone]    Fax number to send the letter: Terrence Wilson calling to get fax number, placed on hold for over 3 mins and had other calls in queue     Please advise.      L[facility],[address] 05/17/22

## 2022-11-21 NOTE — LETTER
[address] [place] [address] Cardiology - [address]  Phone: [phone]  Fax: [phone]    Dashawn Levy MD        November 23, 2022  Abdi Carbone  1955  Hold Xarelto for no more than 2 days for colonoscopy. Call with questions.      Sincerely,        Dashawn Levy MD

## 2022-11-23 NOTE — TELEPHONE ENCOUNTER
Fam Singh MD  You 14 hours ago (5:19 PM)     Imagene Tammy for no more than 2 days. Letter created. Attempted to reach GI office for fax information, office currently closed. Will re attempt.

## 2022-12-12 DIAGNOSIS — E78.00 PURE HYPERCHOLESTEROLEMIA: ICD-10-CM

## 2022-12-12 RX ORDER — ATORVASTATIN CALCIUM 10 MG/1
10 TABLET, FILM COATED ORAL DAILY
Qty: 90 TABLET | Refills: 1 | Status: SHIPPED | OUTPATIENT
Start: 2022-12-12

## 2022-12-12 NOTE — TELEPHONE ENCOUNTER
Refill Request     CONFIRM preferrred pharmacy with the patient. If Mail Order Rx - Pend for 90 day refill. Last Seen: Last Seen Department: 11/9/2022  Last Seen by PCP: 11/9/2022    Last Written: 3/9/22 with 1 refill     If no future appointment scheduled, route STAFF MESSAGE with patient name to the [facility] for scheduling. Next Appointment:   No future appointments. Message sent to [address] [place] [address] to schedule appt with patient?   NO      Requested Prescriptions     Pending Prescriptions Disp Refills    atorvastatin (LIPITOR) 10 MG tablet 90 tablet 1     Sig: Take 1 tablet by mouth daily

## 2022-12-19 DIAGNOSIS — F33.42 RECURRENT MAJOR DEPRESSIVE DISORDER, IN FULL REMISSION (HCC): ICD-10-CM

## 2022-12-19 RX ORDER — BUPROPION HYDROCHLORIDE 150 MG/1
150 TABLET ORAL EVERY MORNING
Qty: 90 TABLET | Refills: 1 | Status: SHIPPED | OUTPATIENT
Start: 2022-12-19

## 2022-12-19 NOTE — TELEPHONE ENCOUNTER
Refill Request     CONFIRM preferrred pharmacy with the patient. If Mail Order Rx - Pend for 90 day refill. Last Seen: Last Seen Department: 11/9/2022  Last Seen by PCP: 11/9/2022    Last Written: 90 with 1 6/20/2022     If no future appointment scheduled, route STAFF MESSAGE with patient name to the [facility] for scheduling. Next Appointment:   No future appointments. Message sent to [address] [place] [address] to schedule appt with patient?   YES      Requested Prescriptions     Pending Prescriptions Disp Refills    buPROPion (WELLBUTRIN XL) 150 MG extended release tablet 90 tablet 1     Sig: Take 1 tablet by mouth every morning

## 2023-02-22 LAB
ALBUMIN SERPL-MCNC: 4.4 G/DL
ALP BLD-CCNC: 114 U/L
ALT SERPL-CCNC: 6 U/L
ANION GAP SERPL CALCULATED.3IONS-SCNC: NORMAL MMOL/L
AST SERPL-CCNC: 10 U/L
BASOPHILS ABSOLUTE: 0.1 /ΜL
BASOPHILS RELATIVE PERCENT: 1 %
BILIRUB SERPL-MCNC: 0.2 MG/DL (ref 0.1–1.4)
BUN BLDV-MCNC: 13 MG/DL
CALCIUM SERPL-MCNC: 9.4 MG/DL
CHLORIDE BLD-SCNC: 105 MMOL/L
CHOLESTEROL, TOTAL: 220 MG/DL
CHOLESTEROL/HDL RATIO: 2.8
CO2: NORMAL
CREAT SERPL-MCNC: 1.1 MG/DL
EGFR: 56
EOSINOPHILS ABSOLUTE: 0.3 /ΜL
EOSINOPHILS RELATIVE PERCENT: 3 %
GLUCOSE BLD-MCNC: NORMAL MG/DL
HCT VFR BLD CALC: 40.7 % (ref 36–46)
HDLC SERPL-MCNC: 78 MG/DL (ref 35–70)
HEMOGLOBIN: 13 G/DL (ref 12–16)
LDL CHOLESTEROL CALCULATED: 129 MG/DL (ref 0–160)
LYMPHOCYTES ABSOLUTE: 1.4 /ΜL
LYMPHOCYTES RELATIVE PERCENT: 16 %
MCH RBC QN AUTO: NORMAL PG
MCHC RBC AUTO-ENTMCNC: NORMAL G/DL
MCV RBC AUTO: NORMAL FL
MONOCYTES ABSOLUTE: 0.6 /ΜL
MONOCYTES RELATIVE PERCENT: 6 %
NEUTROPHILS ABSOLUTE: 6.3 /ΜL
NEUTROPHILS RELATIVE PERCENT: 74 %
NONHDLC SERPL-MCNC: ABNORMAL MG/DL
PLATELET # BLD: 295 K/ΜL
PMV BLD AUTO: NORMAL FL
POTASSIUM SERPL-SCNC: 4.1 MMOL/L
RBC # BLD: 4.7 10^6/ΜL
SODIUM BLD-SCNC: 142 MMOL/L
TOTAL PROTEIN: 6.7
TRIGL SERPL-MCNC: 76 MG/DL
VLDLC SERPL CALC-MCNC: 13 MG/DL
WBC # BLD: 8.7 10^3/ML

## 2023-03-23 DIAGNOSIS — I48.0 PAF (PAROXYSMAL ATRIAL FIBRILLATION) (HCC): ICD-10-CM

## 2023-03-24 DIAGNOSIS — I48.0 PAF (PAROXYSMAL ATRIAL FIBRILLATION) (HCC): ICD-10-CM

## 2023-03-24 DIAGNOSIS — G25.81 RESTLESS LEG SYNDROME: ICD-10-CM

## 2023-03-24 RX ORDER — PRAMIPEXOLE DIHYDROCHLORIDE 0.5 MG/1
0.5 TABLET ORAL NIGHTLY
Qty: 90 TABLET | Refills: 1 | Status: SHIPPED | OUTPATIENT
Start: 2023-03-24

## 2023-03-24 RX ORDER — RIVAROXABAN 20 MG/1
TABLET, FILM COATED ORAL
Qty: 90 TABLET | Refills: 1 | Status: SHIPPED | OUTPATIENT
Start: 2023-03-24

## 2023-03-24 NOTE — TELEPHONE ENCOUNTER
Refill Request     CONFIRM preferred pharmacy with the patient. If Mail Order Rx - Pend for 90 day refill. Last Seen: Last Seen Department: 11/9/2022    Last Seen by PCP: 11/9/2022      Last Written: 10/10/22 90 tablet 1 refill    If no future appointment scheduled, route STAFF MESSAGE with patient name to the [facility] for scheduling. Next Appointment: n/a  No future appointments. Message sent to [address] [place] [address] to schedule appt with patient?   NO      Requested Prescriptions     Pending Prescriptions Disp Refills    pramipexole (MIRAPEX) 0.5 MG tablet 90 tablet 1     Sig: Take 1 tablet by mouth at bedtime

## 2023-03-27 RX ORDER — RIVAROXABAN 20 MG/1
TABLET, FILM COATED ORAL
Qty: 90 TABLET | Refills: 3 | OUTPATIENT
Start: 2023-03-27

## 2023-03-27 NOTE — TELEPHONE ENCOUNTER
Medication refilled on 03/24/2023  Outpatient Medication Detail     Disp Refills Start End    XARELTO 20 MG TABS tablet 90 tablet 1 3/24/2023     Sig: TAKE 1 TABLET BY MOUTH DAILY WITH SUPPER    Sent to pharmacy as: Xarelto 20 MG Oral Tablet (rivaroxaban)    E-Prescribing Status: Receipt confirmed by pharmacy (3/24/2023  6:56 PM EDT)

## 2023-04-01 DIAGNOSIS — E78.00 PURE HYPERCHOLESTEROLEMIA: ICD-10-CM

## 2023-04-01 NOTE — TELEPHONE ENCOUNTER
Refill Request     CONFIRM preferred pharmacy with the patient. If Mail Order Rx - Pend for 90 day refill. Last Seen: Last Seen Department: 11/9/2022  Last Seen by PCP: 11/9/2022    Last Written: 12/12/2022    If no future appointment scheduled:  Review the last OV with PCP and review information for follow-up visit,  Route STAFF MESSAGE with patient name to the [facility] for scheduling with the following information:            -  Timing of next visit           -  Visit type ie Physical, OV, etc           -  Diagnoses/Reason ie. COPD, HTN - Do not use MEDICATION, Follow-up or CHECK UP - Give reason for visit      Next Appointment:   No future appointments. Message sent to [address] [place] [address] to schedule appt with patient?   N/A      Requested Prescriptions     Pending Prescriptions Disp Refills    atorvastatin (LIPITOR) 10 MG tablet [Pharmacy Med Name: ATORVASTATIN 10MG TABLETS] 90 tablet 1     Sig: TAKE 1 TABLET BY MOUTH DAILY

## 2023-04-03 RX ORDER — ATORVASTATIN CALCIUM 10 MG/1
10 TABLET, FILM COATED ORAL DAILY
Qty: 90 TABLET | Refills: 1 | Status: SHIPPED | OUTPATIENT
Start: 2023-04-03

## 2023-04-05 ENCOUNTER — OFFICE VISIT (OUTPATIENT)
Dept: FAMILY MEDICINE CLINIC | Age: 68
End: 2023-04-05
Payer: MEDICARE

## 2023-04-05 ENCOUNTER — HOSPITAL ENCOUNTER (OUTPATIENT)
Age: 68
Discharge: HOME | End: 2023-04-05
Payer: MEDICARE

## 2023-04-05 ENCOUNTER — HOSPITAL ENCOUNTER (OUTPATIENT)
Dept: CT IMAGING | Age: 68
Discharge: HOME | End: 2023-04-05
Payer: MEDICARE

## 2023-04-05 VITALS
WEIGHT: 179 LBS | DIASTOLIC BLOOD PRESSURE: 80 MMHG | HEIGHT: 65 IN | HEART RATE: 76 BPM | TEMPERATURE: 97.4 F | OXYGEN SATURATION: 98 % | SYSTOLIC BLOOD PRESSURE: 126 MMHG | BODY MASS INDEX: 29.82 KG/M2

## 2023-04-05 DIAGNOSIS — R07.9 CHEST PAIN, UNSPECIFIED TYPE: Primary | ICD-10-CM

## 2023-04-05 DIAGNOSIS — N39.9 URINARY PROBLEM IN FEMALE: ICD-10-CM

## 2023-04-05 DIAGNOSIS — R33.9 URINARY RETENTION: ICD-10-CM

## 2023-04-05 DIAGNOSIS — E66.09 CLASS 1 OBESITY DUE TO EXCESS CALORIES WITHOUT SERIOUS COMORBIDITY WITH BODY MASS INDEX (BMI) OF 31.0 TO 31.9 IN ADULT: ICD-10-CM

## 2023-04-05 DIAGNOSIS — R33.9 URINARY RETENTION: Primary | ICD-10-CM

## 2023-04-05 LAB
CREAT SERPL-MCNC: 1 MG/DL (ref 0.6–1.2)
GFR SERPLBLD CREATININE-BSD FMLA CKD-EPI: >60 ML/MIN/{1.73_M2}

## 2023-04-05 PROCEDURE — 74177 CT ABD & PELVIS W/CONTRAST: CPT

## 2023-04-05 PROCEDURE — 82565 ASSAY OF CREATININE: CPT

## 2023-04-05 PROCEDURE — 1123F ACP DISCUSS/DSCN MKR DOCD: CPT | Performed by: NURSE PRACTITIONER

## 2023-04-05 PROCEDURE — 36415 COLL VENOUS BLD VENIPUNCTURE: CPT

## 2023-04-05 PROCEDURE — 81002 URINALYSIS NONAUTO W/O SCOPE: CPT | Performed by: NURSE PRACTITIONER

## 2023-04-05 PROCEDURE — 99214 OFFICE O/P EST MOD 30 MIN: CPT | Performed by: NURSE PRACTITIONER

## 2023-04-05 PROCEDURE — 6360000004 HC RX CONTRAST MEDICATION: Performed by: NURSE PRACTITIONER

## 2023-04-05 RX ORDER — TAMSULOSIN HYDROCHLORIDE 0.4 MG/1
0.4 CAPSULE ORAL DAILY
Qty: 90 CAPSULE | OUTPATIENT
Start: 2023-04-05

## 2023-04-05 RX ORDER — TAMSULOSIN HYDROCHLORIDE 0.4 MG/1
0.4 CAPSULE ORAL DAILY
Qty: 30 CAPSULE | Refills: 0 | Status: SHIPPED | OUTPATIENT
Start: 2023-04-05

## 2023-04-05 RX ADMIN — IOHEXOL 75 ML: 350 INJECTION, SOLUTION INTRAVENOUS at 16:08

## 2023-04-05 SDOH — ECONOMIC STABILITY: FOOD INSECURITY: WITHIN THE PAST 12 MONTHS, YOU WORRIED THAT YOUR FOOD WOULD RUN OUT BEFORE YOU GOT MONEY TO BUY MORE.: NEVER TRUE

## 2023-04-05 SDOH — ECONOMIC STABILITY: FOOD INSECURITY: WITHIN THE PAST 12 MONTHS, THE FOOD YOU BOUGHT JUST DIDN'T LAST AND YOU DIDN'T HAVE MONEY TO GET MORE.: NEVER TRUE

## 2023-04-05 SDOH — ECONOMIC STABILITY: HOUSING INSECURITY
IN THE LAST 12 MONTHS, WAS THERE A TIME WHEN YOU DID NOT HAVE A STEADY PLACE TO SLEEP OR SLEPT IN A SHELTER (INCLUDING NOW)?: NO

## 2023-04-05 SDOH — ECONOMIC STABILITY: INCOME INSECURITY: HOW HARD IS IT FOR YOU TO PAY FOR THE VERY BASICS LIKE FOOD, HOUSING, MEDICAL CARE, AND HEATING?: NOT HARD AT ALL

## 2023-04-05 ASSESSMENT — ENCOUNTER SYMPTOMS: SHORTNESS OF BREATH: 0

## 2023-04-05 NOTE — PROGRESS NOTES
Gordon Mijares (:  1955) is a 74 y.o. female,Established patient, here for evaluation of the following chief complaint(s):  No chief complaint on file. ASSESSMENT/PLAN:  1. Urinary retention  -     POCT Urinalysis no Micro  -     Comprehensive Metabolic Panel; Future  -     tamsulosin (FLOMAX) 0.4 MG capsule; Take 1 capsule by mouth daily, Disp-30 capsule, R-0Normal  -     CT ABDOMEN PELVIS W IV CONTRAST Additional Contrast? Radiologist Recommendation; Future  -     Culture, Urine  -     AFL - Samuel Montelongo MD, Urology, [facility]  2. Urinary problem in female  -     CT ABDOMEN PELVIS W IV CONTRAST Additional Contrast? Radiologist Recommendation; Future  -     Igor Kent MD, Urology, [facility]    -Discussed alarm symptoms and when to go to ER  -Patient unable to give urine sample at time of visit, will bring back later   -Discussed urology vs nephrology referral. Urology referral given today, will wait for labs and imaging to determine need for nephrology referral. With family history would have low threshold for referral to nephrology   -Patient to schedule appointment with PCP for regular follow-up as she is due  -Stay hydrated  -Patient with paper work from current employer to be signed as she has not been able to produce urine for drug screen, advised patient that will give paperwork to PCP for review and he will determine if appropriate to sign and if signed she will be notified to . Return if symptoms worsen or fail to improve.          Subjective   SUBJECTIVE/OBJECTIVE:  Patient reports that she has had difficulty with starting urinating \"several months\"  Reports she does not feel like her bladder empties completely in the morning but feel it does the rest of the day   Recently went to do a urine drug screen for a new job and was unable to urinate despite sitting there for the three hour time limit for two visits   Patient reports she drank a large amount of

## 2023-04-06 LAB
ALBUMIN SERPL-MCNC: 4.4 G/DL (ref 3.4–5)
ALBUMIN/GLOB SERPL: 1.9 {RATIO} (ref 1.1–2.2)
ALP SERPL-CCNC: 111 U/L (ref 40–129)
ALT SERPL-CCNC: <5 U/L (ref 10–40)
ANION GAP SERPL CALCULATED.3IONS-SCNC: 14 MMOL/L (ref 3–16)
AST SERPL-CCNC: 10 U/L (ref 15–37)
BILIRUB SERPL-MCNC: 0.4 MG/DL (ref 0–1)
BUN SERPL-MCNC: 14 MG/DL (ref 7–20)
CALCIUM SERPL-MCNC: 9.5 MG/DL (ref 8.3–10.6)
CHLORIDE SERPL-SCNC: 105 MMOL/L (ref 99–110)
CO2 SERPL-SCNC: 22 MMOL/L (ref 21–32)
CREAT SERPL-MCNC: 1.1 MG/DL (ref 0.6–1.2)
GFR SERPLBLD CREATININE-BSD FMLA CKD-EPI: 56 ML/MIN/{1.73_M2}
GLUCOSE SERPL-MCNC: 94 MG/DL (ref 70–99)
POTASSIUM SERPL-SCNC: 3.9 MMOL/L (ref 3.5–5.1)
PROT SERPL-MCNC: 6.7 G/DL (ref 6.4–8.2)
SODIUM SERPL-SCNC: 141 MMOL/L (ref 136–145)

## 2023-04-19 ENCOUNTER — HOSPITAL ENCOUNTER (OUTPATIENT)
Dept: WOMENS IMAGING | Age: 68
Discharge: HOME | End: 2023-04-19
Payer: MEDICARE

## 2023-04-19 DIAGNOSIS — Z12.31 ENCOUNTER FOR SCREENING MAMMOGRAM FOR BREAST CANCER: ICD-10-CM

## 2023-04-19 PROCEDURE — 77063 BREAST TOMOSYNTHESIS BI: CPT

## 2023-04-20 NOTE — PATIENT INSTRUCTIONS
All above problems reviewed and the found to be unchanged except for the following :     Annual Physical Exam. Rx for Shingrix given. Primary Insomnia. To change from Ambien to Trazodone. Call if need further assistance. Restless Leg Syndrome. Continue C-pap and mirapex. Call if needs further tx. Pure Hypercholesterolemia. To improve diet and lose weight. Continue med. s will do labs. Recurrent Major Depressive Disorder, in Full Remission ([facility]). Continue meds. And call if new c/o.
Home

## 2023-04-21 ENCOUNTER — OFFICE VISIT (OUTPATIENT)
Dept: ORTHOPEDIC SURGERY | Age: 68
End: 2023-04-21

## 2023-04-21 VITALS — HEIGHT: 65 IN | BODY MASS INDEX: 29.82 KG/M2 | WEIGHT: 179 LBS

## 2023-04-21 DIAGNOSIS — M17.12 PRIMARY OSTEOARTHRITIS OF LEFT KNEE: Primary | ICD-10-CM

## 2023-04-21 RX ORDER — TRIAMCINOLONE ACETONIDE 40 MG/ML
80 INJECTION, SUSPENSION INTRA-ARTICULAR; INTRAMUSCULAR ONCE
Status: COMPLETED | OUTPATIENT
Start: 2023-04-21 | End: 2023-04-21

## 2023-04-21 RX ORDER — ROPIVACAINE HYDROCHLORIDE 5 MG/ML
4 INJECTION, SOLUTION EPIDURAL; INFILTRATION; PERINEURAL ONCE
Status: COMPLETED | OUTPATIENT
Start: 2023-04-21 | End: 2023-04-21

## 2023-04-21 RX ADMIN — ROPIVACAINE HYDROCHLORIDE 4 ML: 5 INJECTION, SOLUTION EPIDURAL; INFILTRATION; PERINEURAL at 14:04

## 2023-04-21 RX ADMIN — TRIAMCINOLONE ACETONIDE 80 MG: 40 INJECTION, SUSPENSION INTRA-ARTICULAR; INTRAMUSCULAR at 14:05

## 2023-04-21 NOTE — PROGRESS NOTES
Chief Complaint  Knee Pain (LEFT KNEE)        History of Present Illness: 4/21/2023  Patient is here today for repeat evaluation regarding left knee pain and osteoarthritis. She notes that the cortisone injections that she gets on a regular basis have helped very significantly. She notes only started wearing off about 3 to 4 weeks ago. 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 She notes she tries to stay very active in regards to her life in regards to walking as well as gardening and caring for her grandchildren. Denies any fall trauma or injury denies numbness burning tingling radiating pain down the leg. Rating overall pain at today's visit is an 8 out of 10. Prior HPI 9/27/2022  The patient is here today for repeat evaluation of her left knee. The patient had a pop about 3 to 4 weeks ago and she has had worsening pain to the knee. She has a wedding for her daughter coming up in October and she would like her knee to feel little bit better for this. She is also reporting pain with extended sitting, with stairs, and at night. Prior HPI 6/16/2022:  The patient is here today for repeat evaluation of her left knee. She got about 6 months of relief from her prior injection. She is here for another injection today. Prior HPI 1/17/2022: Bethanie Damon is a pleasant 74 y.o. female here today for follow-up regarding her left knee. The patient received a left knee injection at her last visit on 12/17/2021. She reports that the knee injection has greatly improved her knee pain. She still has some pain going up stairs. She denies any mechanical symptoms today like locking or instability. Prior HPI 12/17/2021:  The patient reports that over the past month she has had worsening left knee pain, especially while at work. She is an  and she sits for long periods of time.

## 2023-05-04 DIAGNOSIS — R33.9 URINARY RETENTION: ICD-10-CM

## 2023-05-04 RX ORDER — TAMSULOSIN HYDROCHLORIDE 0.4 MG/1
0.4 CAPSULE ORAL DAILY
Qty: 30 CAPSULE | Refills: 0 | OUTPATIENT
Start: 2023-05-04

## 2023-05-09 DIAGNOSIS — F33.42 RECURRENT MAJOR DEPRESSIVE DISORDER, IN FULL REMISSION (HCC): ICD-10-CM

## 2023-05-09 RX ORDER — SERTRALINE HYDROCHLORIDE 100 MG/1
150 TABLET, FILM COATED ORAL DAILY
Qty: 135 TABLET | Refills: 1 | Status: SHIPPED | OUTPATIENT
Start: 2023-05-09 | End: 2023-08-07

## 2023-05-09 NOTE — TELEPHONE ENCOUNTER
Refill Request     CONFIRM preferred pharmacy with the patient. If Mail Order Rx - Pend for 90 day refill. Last Seen: Last Seen Department: 4/5/2023  Last Seen by PCP: 11/9/2022    Last Written: sertraline 11/09/2022 135 tablets 1 refill     If no future appointment scheduled:  Review the last OV with PCP and review information for follow-up visit,  Route STAFF MESSAGE with patient name to the [facility] for scheduling with the following information:            -  Timing of next visit           -  Visit type ie Physical, OV, etc           -  Diagnoses/Reason ie. COPD, HTN - Do not use MEDICATION, Follow-up or CHECK UP - Give reason for visit      Next Appointment:   Future Appointments   Date Time Provider Abdullahi Peres   6/15/2023  8:00 AM DO KEYLA Sofia - [facility]       Message sent to [address] [place] [address] to schedule appt with patient?   NO      Requested Prescriptions     Pending Prescriptions Disp Refills    sertraline (ZOLOFT) 100 MG tablet 135 tablet 1     Sig: Take 1.5 tablets by mouth daily

## 2023-05-14 DIAGNOSIS — I48.0 PAF (PAROXYSMAL ATRIAL FIBRILLATION) (HCC): ICD-10-CM

## 2023-05-16 RX ORDER — DILTIAZEM HYDROCHLORIDE 180 MG/1
180 CAPSULE, COATED, EXTENDED RELEASE ORAL DAILY
Qty: 90 CAPSULE | Refills: 0 | Status: SHIPPED | OUTPATIENT
Start: 2023-05-16

## 2023-07-27 DIAGNOSIS — I48.0 PAF (PAROXYSMAL ATRIAL FIBRILLATION) (HCC): ICD-10-CM

## 2023-07-27 RX ORDER — FLECAINIDE ACETATE 100 MG/1
TABLET ORAL
Qty: 180 TABLET | Refills: 3 | Status: SHIPPED | OUTPATIENT
Start: 2023-07-27

## 2023-08-13 DIAGNOSIS — E78.00 PURE HYPERCHOLESTEROLEMIA: ICD-10-CM

## 2023-08-14 RX ORDER — ATORVASTATIN CALCIUM 10 MG/1
10 TABLET, FILM COATED ORAL DAILY
Qty: 90 TABLET | Refills: 1 | Status: SHIPPED | OUTPATIENT
Start: 2023-08-14

## 2023-08-14 NOTE — TELEPHONE ENCOUNTER
Refill Request     CONFIRM preferred pharmacy with the patient. If Mail Order Rx - Pend for 90 day refill. Last Seen: Last Seen Department: 6/15/2023  Last Seen by PCP: 4/5/2023    Last Written: 4/3/2023 90 tablet 1 refills    If no future appointment scheduled:  Review the last OV with PCP and review information for follow-up visit,  Route STAFF MESSAGE with patient name to the [facility] for scheduling with the following information:            -  Timing of next visit           -  Visit type ie Physical, OV, etc           -  Diagnoses/Reason ie. COPD, HTN - Do not use MEDICATION, Follow-up or CHECK UP - Give reason for visit      Next Appointment:   Future Appointments   Date Time Provider [address] [place] [address]   8/15/2023  8:30 AM MD Trina [facility]   12/18/2023  8:30 AM DO KEYLA [place] - [facility]       Message sent to [address] [place] [address] to schedule appt with patient?   NO      Requested Prescriptions     Pending Prescriptions Disp Refills    atorvastatin (LIPITOR) 10 MG tablet 90 tablet 1     Sig: Take 1 tablet by mouth daily

## 2023-08-15 ENCOUNTER — OFFICE VISIT (OUTPATIENT)
Dept: CARDIOLOGY CLINIC | Age: 68
End: 2023-08-15
Payer: MEDICARE

## 2023-08-15 VITALS
SYSTOLIC BLOOD PRESSURE: 128 MMHG | HEIGHT: 65 IN | HEART RATE: 64 BPM | OXYGEN SATURATION: 100 % | BODY MASS INDEX: 29.32 KG/M2 | DIASTOLIC BLOOD PRESSURE: 62 MMHG | WEIGHT: 176 LBS

## 2023-08-15 DIAGNOSIS — I48.0 PAF (PAROXYSMAL ATRIAL FIBRILLATION) (HCC): Primary | ICD-10-CM

## 2023-08-15 PROCEDURE — 93000 ELECTROCARDIOGRAM COMPLETE: CPT | Performed by: INTERNAL MEDICINE

## 2023-08-15 PROCEDURE — 1123F ACP DISCUSS/DSCN MKR DOCD: CPT | Performed by: INTERNAL MEDICINE

## 2023-08-15 PROCEDURE — 99214 OFFICE O/P EST MOD 30 MIN: CPT | Performed by: INTERNAL MEDICINE

## 2023-08-15 RX ORDER — FLECAINIDE ACETATE 100 MG/1
100 TABLET ORAL DAILY
Qty: 90 TABLET | Refills: 3 | Status: SHIPPED | OUTPATIENT
Start: 2023-08-15

## 2023-08-15 NOTE — PATIENT INSTRUCTIONS
RECOMMENDATIONS:  Continue flecainide 100 mg once daily. Ok to take an additional 100 mg as needed for AF episodes lasting longer than one hour. Do not exceed 300 mg in 24 hours. Continue Xarelto. Use [facility] or [facility] with AF episodes and send tracing through [address] [place] [address]. Follow up in one year, or sooner if needed.

## 2023-08-16 DIAGNOSIS — F33.42 RECURRENT MAJOR DEPRESSIVE DISORDER, IN FULL REMISSION (HCC): ICD-10-CM

## 2023-08-16 RX ORDER — SERTRALINE HYDROCHLORIDE 100 MG/1
100 TABLET, FILM COATED ORAL DAILY
Qty: 90 TABLET | Refills: 1 | Status: SHIPPED | OUTPATIENT
Start: 2023-08-16

## 2023-08-16 NOTE — TELEPHONE ENCOUNTER
Refill Request     CONFIRM preferred pharmacy with the patient. If Mail Order Rx - Pend for 90 day refill. Last Seen: Last Seen Department: 6/15/2023  Last Seen by PCP: 6/15/2023    Last Written: 135 with 1 5/9/2023     If no future appointment scheduled:  Review the last OV with PCP and review information for follow-up visit,  Route STAFF MESSAGE with patient name to the [facility] for scheduling with the following information:            -  Timing of next visit           -  Visit type ie Physical, OV, etc           -  Diagnoses/Reason ie. COPD, HTN - Do not use MEDICATION, Follow-up or CHECK UP - Give reason for visit      Next Appointment:   Future Appointments   Date Time Provider [address]   12/18/2023  8:30 AM DO KEYLA Davidson - [facility]       Message sent to [address] [place] [address] to schedule appt with patient?   NO      Requested Prescriptions     Pending Prescriptions Disp Refills    sertraline (ZOLOFT) 100 MG tablet       Sig: Take 1 tablet by mouth daily

## 2023-08-22 DIAGNOSIS — I48.0 PAF (PAROXYSMAL ATRIAL FIBRILLATION) (HCC): ICD-10-CM

## 2023-08-22 RX ORDER — DILTIAZEM HYDROCHLORIDE 180 MG/1
180 CAPSULE, COATED, EXTENDED RELEASE ORAL DAILY
Qty: 90 CAPSULE | Refills: 3 | Status: SHIPPED | OUTPATIENT
Start: 2023-08-22

## 2023-09-05 DIAGNOSIS — G25.81 RESTLESS LEG SYNDROME: ICD-10-CM

## 2023-09-05 RX ORDER — PRAMIPEXOLE DIHYDROCHLORIDE 0.5 MG/1
0.5 TABLET ORAL NIGHTLY
Qty: 90 TABLET | Refills: 1 | Status: SHIPPED | OUTPATIENT
Start: 2023-09-05

## 2023-09-05 NOTE — TELEPHONE ENCOUNTER
Refill Request     CONFIRM preferred pharmacy with the patient. If Mail Order Rx - Pend for 90 day refill. Last Seen: Last Seen Department: 6/15/2023  Last Seen by PCP: 6/15/2023    Last Written: 3/24/23 90 tabs 1 refill    If no future appointment scheduled:  Review the last OV with PCP and review information for follow-up visit,  Route STAFF MESSAGE with patient name to the [facility] for scheduling with the following information:            -  Timing of next visit           -  Visit type ie Physical, OV, etc           -  Diagnoses/Reason ie. COPD, HTN - Do not use MEDICATION, Follow-up or CHECK UP - Give reason for visit      Next Appointment:   Future Appointments   Date Time Provider [facility] [address]   12/18/2023  8:30 AM DO KEYLA Odell - [place]       Message sent to [employer] to schedule appt with patient?   NO      Requested Prescriptions     Pending Prescriptions Disp Refills    pramipexole (MIRAPEX) 0.5 MG tablet 90 tablet 1     Sig: Take 1 tablet by mouth at bedtime

## 2023-09-16 NOTE — ASSESSMENT & PLAN NOTE
Has gained wt over last yr. Diet not as good. PAST SURGICAL HISTORY:  H/O tubal ligation     History of 2  sections     History of appendectomy open

## 2023-09-22 ENCOUNTER — OFFICE VISIT (OUTPATIENT)
Dept: ORTHOPEDIC SURGERY | Age: 68
End: 2023-09-22

## 2023-09-22 DIAGNOSIS — M17.12 PRIMARY OSTEOARTHRITIS OF LEFT KNEE: ICD-10-CM

## 2023-09-22 DIAGNOSIS — M79.671 RIGHT FOOT PAIN: Primary | ICD-10-CM

## 2023-10-28 NOTE — TELEPHONE ENCOUNTER
Last ov 05/17/2022  RECOMMENDATIONS:  1. Continue to monitor chest pains. 2. Continue every 6 month labs. 3. Continue current plan of care and medications.    4. Follow up in one year, or sooner if needed    Front- pt needs appt Unable to obtain

## 2023-12-18 DIAGNOSIS — G25.81 RESTLESS LEG SYNDROME: ICD-10-CM

## 2023-12-18 DIAGNOSIS — E78.00 PURE HYPERCHOLESTEROLEMIA: ICD-10-CM

## 2023-12-19 LAB
ALBUMIN SERPL-MCNC: 4.8 G/DL (ref 3.4–5)
ALBUMIN/GLOB SERPL: 2 {RATIO} (ref 1.1–2.2)
ALP SERPL-CCNC: 134 U/L (ref 40–129)
ALT SERPL-CCNC: <5 U/L (ref 10–40)
ANION GAP SERPL CALCULATED.3IONS-SCNC: 13 MMOL/L (ref 3–16)
AST SERPL-CCNC: 12 U/L (ref 15–37)
BILIRUB SERPL-MCNC: 0.4 MG/DL (ref 0–1)
BUN SERPL-MCNC: 15 MG/DL (ref 7–20)
CALCIUM SERPL-MCNC: 9.6 MG/DL (ref 8.3–10.6)
CHLORIDE SERPL-SCNC: 105 MMOL/L (ref 99–110)
CHOLEST SERPL-MCNC: 207 MG/DL (ref 0–199)
CO2 SERPL-SCNC: 25 MMOL/L (ref 21–32)
CREAT SERPL-MCNC: 1.2 MG/DL (ref 0.6–1.2)
GFR SERPLBLD CREATININE-BSD FMLA CKD-EPI: 50 ML/MIN/{1.73_M2}
GLUCOSE SERPL-MCNC: 105 MG/DL (ref 70–99)
HDLC SERPL-MCNC: 77 MG/DL (ref 40–60)
LDLC SERPL CALC-MCNC: 109 MG/DL
POTASSIUM SERPL-SCNC: 3.6 MMOL/L (ref 3.5–5.1)
PROT SERPL-MCNC: 7.2 G/DL (ref 6.4–8.2)
SODIUM SERPL-SCNC: 143 MMOL/L (ref 136–145)
TRIGL SERPL-MCNC: 107 MG/DL (ref 0–150)
VLDLC SERPL CALC-MCNC: 21 MG/DL

## 2023-12-28 DIAGNOSIS — F33.42 RECURRENT MAJOR DEPRESSIVE DISORDER, IN FULL REMISSION (HCC): ICD-10-CM

## 2023-12-28 RX ORDER — BUPROPION HYDROCHLORIDE 150 MG/1
150 TABLET ORAL EVERY MORNING
Qty: 90 TABLET | Refills: 1 | Status: SHIPPED | OUTPATIENT
Start: 2023-12-28

## 2023-12-28 NOTE — TELEPHONE ENCOUNTER
Refill Request     CONFIRM preferred pharmacy with the patient. If Mail Order Rx - Pend for 90 day refill. Last Seen: Last Seen Department: 12/18/2023  Last Seen by PCP: 12/18/2023    Last Written: 12/19/2022 90 tab 1 refills     If no future appointment scheduled:  Review the last OV with PCP and review information for follow-up visit,  Route STAFF MESSAGE with patient name to the [facility] for scheduling with the following information:            -  Timing of next visit           -  Visit type ie Physical, OV, etc           -  Diagnoses/Reason ie. COPD, HTN - Do not use MEDICATION, Follow-up or CHECK UP - Give reason for visit      Next Appointment:   Future Appointments   Date Time Provider [address] [place] [address]   6/18/2024  8:00 AM Lupis Hairston, DO KEYLA Machado - [place]       Message sent to [address] [place] [address] to schedule appt with patient?   NO      Requested Prescriptions     Pending Prescriptions Disp Refills    buPROPion (WELLBUTRIN XL) 150 MG extended release tablet [Pharmacy Med Name: BUPROPION XL 150MG TABLETS (24 H)] 90 tablet 1     Sig: TAKE 1 TABLET BY MOUTH EVERY MORNING

## 2024-03-02 DIAGNOSIS — E78.00 PURE HYPERCHOLESTEROLEMIA: ICD-10-CM

## 2024-03-02 NOTE — TELEPHONE ENCOUNTER
Refill Request     CONFIRM preferred pharmacy with the patient.    If Mail Order Rx - Pend for 90 day refill.      Last Seen: Last Seen Department: 12/18/2023  Last Seen by PCP: 12/18/2023    Last Written: 8/14/2023    If no future appointment scheduled:  Review the last OV with PCP and review information for follow-up visit,  Route STAFF MESSAGE with patient name to the  Pool for scheduling with the following information:            -  Timing of next visit           -  Visit type ie Physical, OV, etc           -  Diagnoses/Reason ie. COPD, HTN - Do not use MEDICATION, Follow-up or CHECK UP - Give reason for visit      Next Appointment:   Future Appointments   Date Time Provider Department Center   3/14/2024  8:45 AM Benny Pulido DO [facility]   6/18/2024  8:00 AM Elieser Hairston DO [facility]  [place] [facility]       Message sent to  to schedule appt with patient?  NO      Requested Prescriptions     Pending Prescriptions Disp Refills    atorvastatin (LIPITOR) 10 MG tablet [Pharmacy Med Name: ATORVASTATIN 10MG TABLETS] 90 tablet 1     Sig: TAKE 1 TABLET BY MOUTH DAILY

## 2024-03-04 RX ORDER — ATORVASTATIN CALCIUM 10 MG/1
10 TABLET, FILM COATED ORAL DAILY
Qty: 90 TABLET | Refills: 1 | Status: SHIPPED | OUTPATIENT
Start: 2024-03-04

## 2024-03-14 ENCOUNTER — TELEPHONE (OUTPATIENT)
Dept: ORTHOPEDIC SURGERY | Age: 69
End: 2024-03-14

## 2024-03-14 ENCOUNTER — PREP FOR PROCEDURE (OUTPATIENT)
Dept: ORTHOPEDIC SURGERY | Age: 69
End: 2024-03-14

## 2024-03-14 ENCOUNTER — OFFICE VISIT (OUTPATIENT)
Dept: ORTHOPEDIC SURGERY | Age: 69
End: 2024-03-14
Payer: MEDICARE

## 2024-03-14 VITALS — HEIGHT: 65 IN | BODY MASS INDEX: 27.32 KG/M2 | WEIGHT: 164 LBS

## 2024-03-14 DIAGNOSIS — M17.12 PRIMARY OSTEOARTHRITIS OF LEFT KNEE: Primary | ICD-10-CM

## 2024-03-14 PROCEDURE — 99214 OFFICE O/P EST MOD 30 MIN: CPT | Performed by: STUDENT IN AN ORGANIZED HEALTH CARE EDUCATION/TRAINING PROGRAM

## 2024-03-14 PROCEDURE — 1123F ACP DISCUSS/DSCN MKR DOCD: CPT | Performed by: STUDENT IN AN ORGANIZED HEALTH CARE EDUCATION/TRAINING PROGRAM

## 2024-03-14 NOTE — PROGRESS NOTES
some pain going up stairs.  She denies any mechanical symptoms today like locking or instability.    Prior HPI 12/17/2021:  The patient reports that over the past month she has had worsening left knee pain, especially while at work.  She is an  and she sits for long periods of time.  When she goes to stand up she has pain in her knee.  She denies any instability but does report some catching and locking on occasion.  She also has difficulty with stairs.  Her knee also bothers her at night at times.  She denies any prior issues with the knee.  She takes occasional Tylenol.  The patient has atrial fibrillation and takes Eliquis and cannot take anti-inflammatory medications.    Pain Assessment  Location of Pain: Knee  Location Modifiers: Left  Severity of Pain: 10  Quality of Pain: Popping, Sharp  Duration of Pain: Persistent  Frequency of Pain: Constant    Medical History:  Patient's medications, allergies, past medical, surgical, social and family histories were reviewed and updated as appropriate.    Pertinent items are noted in HPI  Review of systems reviewed from Patient History Form dated on 3/14/24 and available in the patient's chart under the Media tab.       Vital Signs:  There were no vitals filed for this visit.      Constitutional: In no apparent distress. Normal affect. Alert and oriented X3 and is cooperative.       Left knee exam    Gait: No use of assistive devices. No antalgic gait.    Alignment: Slight valgus alignment    Inspection/skin: Skin is intact without erythema or ecchymosis. No gross deformity.     Palpation: Patellofemoral crepitation noted.  Tenderness along the medial and lateral joint lines.  Mental tenderness to the patellofemoral joint.    Range of Motion: There is full range of motion.     Strength: Normal quadriceps development.     Effusion: No effusion or swelling present.     Ligamentous stability: No cruciate or collateral ligament instability.     Neurologic and

## 2024-03-15 NOTE — TELEPHONE ENCOUNTER
Auth: # [id number]      Date Range: 4/4/2024 - 7/3/2024  Type of SX:  OUTPATIENT  Location: [facility]  CPT: 45292   DX Code: M17.12  SX area: L KNEE  Insurance: [facility]

## 2024-03-19 ENCOUNTER — ANESTHESIA EVENT (OUTPATIENT)
Dept: OPERATING ROOM | Age: 69
End: 2024-03-19
Payer: MEDICARE

## 2024-03-20 ENCOUNTER — TELEPHONE (OUTPATIENT)
Dept: ORTHOPEDIC SURGERY | Age: 69
End: 2024-03-20

## 2024-03-24 DIAGNOSIS — F51.01 PRIMARY INSOMNIA: ICD-10-CM

## 2024-03-25 ENCOUNTER — OFFICE VISIT (OUTPATIENT)
Dept: FAMILY MEDICINE CLINIC | Age: 69
End: 2024-03-25

## 2024-03-25 VITALS
WEIGHT: 170 LBS | HEART RATE: 54 BPM | SYSTOLIC BLOOD PRESSURE: 110 MMHG | TEMPERATURE: 98.5 F | BODY MASS INDEX: 30.12 KG/M2 | DIASTOLIC BLOOD PRESSURE: 70 MMHG | OXYGEN SATURATION: 99 % | HEIGHT: 63 IN

## 2024-03-25 DIAGNOSIS — M17.12 PRIMARY OSTEOARTHRITIS OF LEFT KNEE: Primary | ICD-10-CM

## 2024-03-25 DIAGNOSIS — I48.0 PAF (PAROXYSMAL ATRIAL FIBRILLATION) (HCC): ICD-10-CM

## 2024-03-25 DIAGNOSIS — Z01.818 PRE-OP EXAMINATION: ICD-10-CM

## 2024-03-25 RX ORDER — TRAZODONE HYDROCHLORIDE 50 MG/1
100 TABLET ORAL NIGHTLY PRN
Qty: 180 TABLET | Refills: 3 | Status: SHIPPED | OUTPATIENT
Start: 2024-03-25

## 2024-03-25 ASSESSMENT — PATIENT HEALTH QUESTIONNAIRE - PHQ9
10. IF YOU CHECKED OFF ANY PROBLEMS, HOW DIFFICULT HAVE THESE PROBLEMS MADE IT FOR YOU TO DO YOUR WORK, TAKE CARE OF THINGS AT HOME, OR GET ALONG WITH OTHER PEOPLE: SOMEWHAT DIFFICULT
8. MOVING OR SPEAKING SO SLOWLY THAT OTHER PEOPLE COULD HAVE NOTICED. OR THE OPPOSITE, BEING SO FIGETY OR RESTLESS THAT YOU HAVE BEEN MOVING AROUND A LOT MORE THAN USUAL: NOT AT ALL
SUM OF ALL RESPONSES TO PHQ QUESTIONS 1-9: 9
3. TROUBLE FALLING OR STAYING ASLEEP: SEVERAL DAYS
SUM OF ALL RESPONSES TO PHQ QUESTIONS 1-9: 9
9. THOUGHTS THAT YOU WOULD BE BETTER OFF DEAD, OR OF HURTING YOURSELF: NOT AT ALL
7. TROUBLE CONCENTRATING ON THINGS, SUCH AS READING THE NEWSPAPER OR WATCHING TELEVISION: SEVERAL DAYS
5. POOR APPETITE OR OVEREATING: SEVERAL DAYS
6. FEELING BAD ABOUT YOURSELF - OR THAT YOU ARE A FAILURE OR HAVE LET YOURSELF OR YOUR FAMILY DOWN: SEVERAL DAYS
4. FEELING TIRED OR HAVING LITTLE ENERGY: NEARLY EVERY DAY
SUM OF ALL RESPONSES TO PHQ QUESTIONS 1-9: 9
SUM OF ALL RESPONSES TO PHQ QUESTIONS 1-9: 9
1. LITTLE INTEREST OR PLEASURE IN DOING THINGS: MORE THAN HALF THE DAYS

## 2024-03-25 NOTE — PROGRESS NOTES
Preoperative Evaluation    Subjective:   Sally Higgins is a 68 y.o. y.o.  female who presents to the office today for a preoperative consultation.     Chief Complaint   Patient presents with    Pre-op Exam     L knee, 4/4, lauryn garcia,       Surgeon: Dr. Pulido    Location: [facility]  Performing Left knee total arthroplasty   Date: April 4.     Planned anesthesia is General.   The patient has the following known anesthesia issues: none   Patient has a bleeding risk of : no recent abnormal bleeding   Patient does not have objection to receiving blood products if needed.     Review of Systems   Pertinent items are noted in HPI.     Denies fevers, chills, diaphoresis, CP, SOB, dysphagia, abd pain, urinary complaints, new edema, rashes, cyanosis    Past Medical History:   Diagnosis Date    Atrial fibrillation (HCC)     April 2021    Depression     GERD (gastroesophageal reflux disease)     Hyperlipidemia     Lumbar herniated disc 2/98    MVP (mitral valve prolapse)     BIJAN (obstructive sleep apnea)     Primary osteoarthritis of left knee 9/25/2020    Restless leg syndrome        Past Surgical History:   Procedure Laterality Date    BACK SURGERY  2/1998    LAP BAND  2010       Social History       Tobacco History       Smoking Status  Never      Smokeless Tobacco Use  Never              Alcohol History       Alcohol Use Status  No              Drug Use       Drug Use Status  No              Sexual Activity       Sexually Active  Not Asked                    Family History   Problem Relation Age of Onset    Arthritis Mother     Arthritis Father     Diabetes Father     High Blood Pressure Father     High Cholesterol Father     Other Father         meniere's disease       Current Outpatient Medications   Medication Sig Dispense Refill    atorvastatin (LIPITOR) 10 MG tablet TAKE 1 TABLET BY MOUTH DAILY 90 tablet 1    buPROPion (WELLBUTRIN XL) 150 MG extended release tablet TAKE 1 TABLET BY MOUTH EVERY

## 2024-03-25 NOTE — PROGRESS NOTES

## 2024-03-25 NOTE — TELEPHONE ENCOUNTER
Refill Request     CONFIRM preferred pharmacy with the patient.    If Mail Order Rx - Pend for 90 day refill.      Last Seen: Last Seen Department: 12/18/2023  Last Seen by PCP: 12/18/2023    Last Written: 12/18/2023 180 tab 0 refills    If no future appointment scheduled:  Review the last OV with PCP and review information for follow-up visit,  Route STAFF MESSAGE with patient name to the  Pool for scheduling with the following information:            -  Timing of next visit           -  Visit type ie Physical, OV, etc           -  Diagnoses/Reason ie. COPD, HTN - Do not use MEDICATION, Follow-up or CHECK UP - Give reason for visit      Next Appointment:   Future Appointments   Date Time Provider Department Center   3/25/2024  9:30 AM Elieser Hairston DO [facility] [place] - [facility]   4/23/2024 10:15 AM Benny Pulido DO [facility]   6/18/2024  8:00 AM Elieser Hairston DO [facility] [place] - [facility]       Message sent to  to schedule appt with patient?  NO      Requested Prescriptions     Pending Prescriptions Disp Refills    traZODone (DESYREL) 50 MG tablet [Pharmacy Med Name: TRAZODONE 50MG TABLETS] 180 tablet 0     Sig: TAKE 2 TABLETS BY MOUTH EVERY NIGHT

## 2024-03-28 ENCOUNTER — TELEPHONE (OUTPATIENT)
Dept: FAMILY MEDICINE CLINIC | Age: 69
End: 2024-03-28

## 2024-03-28 ENCOUNTER — TELEPHONE (OUTPATIENT)
Dept: ORTHOPEDIC SURGERY | Age: 69
End: 2024-03-28

## 2024-03-28 ENCOUNTER — TELEPHONE (OUTPATIENT)
Dept: CARDIOLOGY CLINIC | Age: 69
End: 2024-03-28

## 2024-03-28 DIAGNOSIS — Z01.818 PRE-OP EXAMINATION: Primary | ICD-10-CM

## 2024-03-28 LAB
25(OH)D3 SERPL-MCNC: 32.2 NG/ML
ANION GAP SERPL CALCULATED.3IONS-SCNC: 12 MMOL/L (ref 3–16)
BASOPHILS NFR BLD: 0.8 %
BILIRUB UR QL STRIP.AUTO: NEGATIVE
CALCIUM SERPL-MCNC: 9.4 MG/DL (ref 8.3–10.6)
CHLORIDE SERPL-SCNC: 104 MMOL/L (ref 99–110)
CO2 SERPL-SCNC: 28 MMOL/L (ref 21–32)
COLOR UR: YELLOW
CREAT SERPL-MCNC: 0.8 MG/DL (ref 0.6–1.2)
DEPRECATED RDW RBC AUTO: 13.5 % (ref 12.4–15.4)
EOSINOPHIL # BLD: 0.2 K/UL (ref 0–0.6)
EOSINOPHIL NFR BLD: 3.2 %
GFR SERPLBLD CREATININE-BSD FMLA CKD-EPI: 81 ML/MIN/{1.73_M2}
GLUCOSE SERPL-MCNC: 93 MG/DL (ref 70–99)
GLUCOSE UR STRIP.AUTO-MCNC: NEGATIVE MG/DL
HCT VFR BLD AUTO: 36.9 % (ref 36–48)
HGB UR QL STRIP.AUTO: NEGATIVE
INR PPP: 1.28 (ref 0.84–1.16)
KETONES UR STRIP.AUTO-MCNC: NEGATIVE MG/DL
LEUKOCYTE ESTERASE UR QL STRIP.AUTO: NEGATIVE
LYMPHOCYTES # BLD: 1.6 K/UL (ref 1–5.1)
LYMPHOCYTES NFR BLD: 28.8 %
MCH RBC QN AUTO: 27.9 PG (ref 26–34)
MCHC RBC AUTO-ENTMCNC: 33.9 G/DL (ref 31–36)
MCV RBC AUTO: 82.3 FL (ref 80–100)
MONOCYTES # BLD: 0.4 K/UL (ref 0–1.3)
MONOCYTES NFR BLD: 7.3 %
NEUTROPHILS NFR BLD: 59.9 %
NITRITE UR QL STRIP.AUTO: NEGATIVE
PH UR STRIP.AUTO: 6 [PH] (ref 5–8)
PLATELET # BLD AUTO: 221 K/UL (ref 135–450)
POTASSIUM SERPL-SCNC: 3.8 MMOL/L (ref 3.5–5.1)
PROT UR STRIP.AUTO-MCNC: NEGATIVE MG/DL
PROTHROMBIN TIME: 16 SEC (ref 11.5–14.8)
RBC # BLD AUTO: 4.49 M/UL (ref 4–5.2)
SODIUM SERPL-SCNC: 144 MMOL/L (ref 136–145)
SP GR UR STRIP.AUTO: 1.02 (ref 1–1.03)
UA DIPSTICK W REFLEX MICRO PNL UR: NORMAL
URN SPEC COLLECT METH UR: NORMAL

## 2024-03-28 NOTE — TELEPHONE ENCOUNTER
CARDIAC CLEARANCE REQUEST    What type of procedure are you having: KNEE REPLACEMENT     Are you taking any blood thinners: XARELTO     Type on anesthesia: LOCAL     When is your procedure scheduled for: 04/04/24    What physician is performing your procedure: TOMASA CARLISLE    Phone Number: [phone]    Fax number to send the letter: [phone]

## 2024-03-29 LAB
BACTERIA UR CULT: NORMAL
EST. AVERAGE GLUCOSE BLD GHB EST-MCNC: 102.5 MG/DL
HBA1C MFR BLD: 5.2 %

## 2024-03-29 NOTE — TELEPHONE ENCOUNTER
Pt returned call. Message given.she stated that they want to hold Xarelto for 3 days. Advised that jmb stated no more than 2 days to hold AC. She v/u.

## 2024-03-29 NOTE — TELEPHONE ENCOUNTER
Zohaib Valenzuela MD   to Me       3/29/24  8:07 AM  She is at low CV risk for the planned procedure. Hold AC no more than 2 days.      Letter created and faxed. Attempted to contact pt no answer. Lmovm to call back.

## 2024-04-01 RX ORDER — SODIUM CHLORIDE 0.9 % (FLUSH) 0.9 %
5-40 SYRINGE (ML) INJECTION EVERY 12 HOURS SCHEDULED
Status: CANCELLED | OUTPATIENT
Start: 2024-04-01

## 2024-04-01 RX ORDER — SODIUM CHLORIDE 0.9 % (FLUSH) 0.9 %
5-40 SYRINGE (ML) INJECTION PRN
Status: CANCELLED | OUTPATIENT
Start: 2024-04-01

## 2024-04-01 RX ORDER — SODIUM CHLORIDE 9 MG/ML
INJECTION, SOLUTION INTRAVENOUS PRN
Status: CANCELLED | OUTPATIENT
Start: 2024-04-01

## 2024-04-04 ENCOUNTER — ANESTHESIA (OUTPATIENT)
Dept: OPERATING ROOM | Age: 69
End: 2024-04-04
Payer: MEDICARE

## 2024-04-04 ENCOUNTER — HOSPITAL ENCOUNTER (OUTPATIENT)
Age: 69
Setting detail: OBSERVATION
Discharge: HOME | End: 2024-04-05
Attending: STUDENT IN AN ORGANIZED HEALTH CARE EDUCATION/TRAINING PROGRAM | Admitting: STUDENT IN AN ORGANIZED HEALTH CARE EDUCATION/TRAINING PROGRAM
Payer: MEDICARE

## 2024-04-04 ENCOUNTER — APPOINTMENT (OUTPATIENT)
Dept: GENERAL RADIOLOGY | Age: 69
End: 2024-04-04
Attending: STUDENT IN AN ORGANIZED HEALTH CARE EDUCATION/TRAINING PROGRAM
Payer: MEDICARE

## 2024-04-04 DIAGNOSIS — M17.12 OSTEOARTHRITIS OF LEFT KNEE, UNSPECIFIED OSTEOARTHRITIS TYPE: ICD-10-CM

## 2024-04-04 DIAGNOSIS — M17.12 PRIMARY OSTEOARTHRITIS OF LEFT KNEE: Primary | ICD-10-CM

## 2024-04-04 LAB
ABO + RH BLD: NORMAL
APTT BLD: 27.4 SEC (ref 22.1–36.4)
BLD GP AB SCN SERPL QL: NORMAL
FLUAV RNA RESP QL NAA+PROBE: NOT DETECTED
FLUBV RNA RESP QL NAA+PROBE: NOT DETECTED
INR PPP: 0.9 (ref 0.85–1.15)
PROTHROMBIN TIME: 12.3 SEC (ref 11.9–14.9)
SARS-COV-2 RNA RESP QL NAA+PROBE: NOT DETECTED

## 2024-04-04 PROCEDURE — 2580000003 HC RX 258: Performed by: PHYSICIAN ASSISTANT

## 2024-04-04 PROCEDURE — 27447 TOTAL KNEE ARTHROPLASTY: CPT | Performed by: PHYSICIAN ASSISTANT

## 2024-04-04 PROCEDURE — 51798 US URINE CAPACITY MEASURE: CPT

## 2024-04-04 PROCEDURE — 86901 BLOOD TYPING SEROLOGIC RH(D): CPT

## 2024-04-04 PROCEDURE — 6360000002 HC RX W HCPCS: Performed by: NURSE ANESTHETIST, CERTIFIED REGISTERED

## 2024-04-04 PROCEDURE — 2709999900 HC NON-CHARGEABLE SUPPLY: Performed by: STUDENT IN AN ORGANIZED HEALTH CARE EDUCATION/TRAINING PROGRAM

## 2024-04-04 PROCEDURE — 2500000003 HC RX 250 WO HCPCS: Performed by: PHYSICIAN ASSISTANT

## 2024-04-04 PROCEDURE — G0378 HOSPITAL OBSERVATION PER HR: HCPCS

## 2024-04-04 PROCEDURE — 27447 TOTAL KNEE ARTHROPLASTY: CPT | Performed by: STUDENT IN AN ORGANIZED HEALTH CARE EDUCATION/TRAINING PROGRAM

## 2024-04-04 PROCEDURE — 2500000003 HC RX 250 WO HCPCS: Performed by: ANESTHESIOLOGY

## 2024-04-04 PROCEDURE — 3700000000 HC ANESTHESIA ATTENDED CARE: Performed by: STUDENT IN AN ORGANIZED HEALTH CARE EDUCATION/TRAINING PROGRAM

## 2024-04-04 PROCEDURE — 2580000003 HC RX 258: Performed by: ANESTHESIOLOGY

## 2024-04-04 PROCEDURE — 86900 BLOOD TYPING SEROLOGIC ABO: CPT

## 2024-04-04 PROCEDURE — 73560 X-RAY EXAM OF KNEE 1 OR 2: CPT

## 2024-04-04 PROCEDURE — 3600000005 HC SURGERY LEVEL 5 BASE: Performed by: STUDENT IN AN ORGANIZED HEALTH CARE EDUCATION/TRAINING PROGRAM

## 2024-04-04 PROCEDURE — 3700000001 HC ADD 15 MINUTES (ANESTHESIA): Performed by: STUDENT IN AN ORGANIZED HEALTH CARE EDUCATION/TRAINING PROGRAM

## 2024-04-04 PROCEDURE — 51701 INSERT BLADDER CATHETER: CPT

## 2024-04-04 PROCEDURE — 7100000001 HC PACU RECOVERY - ADDTL 15 MIN: Performed by: STUDENT IN AN ORGANIZED HEALTH CARE EDUCATION/TRAINING PROGRAM

## 2024-04-04 PROCEDURE — 64999 UNLISTED PX NERVOUS SYSTEM: CPT | Performed by: ANESTHESIOLOGY

## 2024-04-04 PROCEDURE — 6360000002 HC RX W HCPCS: Performed by: STUDENT IN AN ORGANIZED HEALTH CARE EDUCATION/TRAINING PROGRAM

## 2024-04-04 PROCEDURE — 3600000015 HC SURGERY LEVEL 5 ADDTL 15MIN: Performed by: STUDENT IN AN ORGANIZED HEALTH CARE EDUCATION/TRAINING PROGRAM

## 2024-04-04 PROCEDURE — 6360000002 HC RX W HCPCS: Performed by: PHYSICIAN ASSISTANT

## 2024-04-04 PROCEDURE — 2580000003 HC RX 258: Performed by: STUDENT IN AN ORGANIZED HEALTH CARE EDUCATION/TRAINING PROGRAM

## 2024-04-04 PROCEDURE — 6370000000 HC RX 637 (ALT 250 FOR IP): Performed by: PHYSICIAN ASSISTANT

## 2024-04-04 PROCEDURE — 94150 VITAL CAPACITY TEST: CPT

## 2024-04-04 PROCEDURE — 86850 RBC ANTIBODY SCREEN: CPT

## 2024-04-04 PROCEDURE — 36415 COLL VENOUS BLD VENIPUNCTURE: CPT

## 2024-04-04 PROCEDURE — 7100000000 HC PACU RECOVERY - FIRST 15 MIN: Performed by: STUDENT IN AN ORGANIZED HEALTH CARE EDUCATION/TRAINING PROGRAM

## 2024-04-04 PROCEDURE — 6360000002 HC RX W HCPCS

## 2024-04-04 PROCEDURE — C1776 JOINT DEVICE (IMPLANTABLE): HCPCS | Performed by: STUDENT IN AN ORGANIZED HEALTH CARE EDUCATION/TRAINING PROGRAM

## 2024-04-04 PROCEDURE — 64447 NJX AA&/STRD FEMORAL NRV IMG: CPT | Performed by: ANESTHESIOLOGY

## 2024-04-04 PROCEDURE — 87636 SARSCOV2 & INF A&B AMP PRB: CPT

## 2024-04-04 PROCEDURE — 85730 THROMBOPLASTIN TIME PARTIAL: CPT

## 2024-04-04 PROCEDURE — 85610 PROTHROMBIN TIME: CPT

## 2024-04-04 PROCEDURE — 20985 CPTR-ASST DIR MS PX: CPT | Performed by: STUDENT IN AN ORGANIZED HEALTH CARE EDUCATION/TRAINING PROGRAM

## 2024-04-04 PROCEDURE — 6360000002 HC RX W HCPCS: Performed by: ANESTHESIOLOGY

## 2024-04-04 PROCEDURE — C1713 ANCHOR/SCREW BN/BN,TIS/BN: HCPCS | Performed by: STUDENT IN AN ORGANIZED HEALTH CARE EDUCATION/TRAINING PROGRAM

## 2024-04-04 PROCEDURE — 6370000000 HC RX 637 (ALT 250 FOR IP): Performed by: STUDENT IN AN ORGANIZED HEALTH CARE EDUCATION/TRAINING PROGRAM

## 2024-04-04 PROCEDURE — A4216 STERILE WATER/SALINE, 10 ML: HCPCS | Performed by: ANESTHESIOLOGY

## 2024-04-04 DEVICE — JOURNEY TIBIAL BASEPLATE NONPOROUS                                    LEFT SIZE 4
Type: IMPLANTABLE DEVICE | Site: KNEE | Status: FUNCTIONAL
Brand: JOURNEY

## 2024-04-04 DEVICE — JOURNEY BCS PATELLA RESURFACING                                    ROUND 32 MM STANDARD
Type: IMPLANTABLE DEVICE | Site: KNEE | Status: FUNCTIONAL
Brand: JOURNEY

## 2024-04-04 DEVICE — CEMENT BNE 20ML 41GM FULL DOSE PMMA W/ TOBRA M VISC RADPQ: Type: IMPLANTABLE DEVICE | Site: KNEE | Status: FUNCTIONAL

## 2024-04-04 DEVICE — JOURNEY II BCS XLPE ARTICULAR                                    INSERT SIZE 3-4 LEFT 13MM
Type: IMPLANTABLE DEVICE | Site: KNEE | Status: FUNCTIONAL
Brand: JOURNEY

## 2024-04-04 DEVICE — JOURNEY II BCS FEMORAL OXINIUM                                    LEFT SIZE 7
Type: IMPLANTABLE DEVICE | Site: KNEE | Status: FUNCTIONAL
Brand: JOURNEY

## 2024-04-04 RX ORDER — SODIUM CHLORIDE 0.9 % (FLUSH) 0.9 %
5-40 SYRINGE (ML) INJECTION EVERY 12 HOURS SCHEDULED
Status: DISCONTINUED | OUTPATIENT
Start: 2024-04-04 | End: 2024-04-04 | Stop reason: HOSPADM

## 2024-04-04 RX ORDER — ACETAMINOPHEN 325 MG/1
650 TABLET ORAL EVERY 6 HOURS
Status: DISCONTINUED | OUTPATIENT
Start: 2024-04-04 | End: 2024-04-05 | Stop reason: HOSPADM

## 2024-04-04 RX ORDER — POLYETHYLENE GLYCOL 3350 17 G/17G
17 POWDER, FOR SOLUTION ORAL DAILY
Status: DISCONTINUED | OUTPATIENT
Start: 2024-04-04 | End: 2024-04-05 | Stop reason: HOSPADM

## 2024-04-04 RX ORDER — FLECAINIDE ACETATE 100 MG/1
100 TABLET ORAL DAILY
Status: DISCONTINUED | OUTPATIENT
Start: 2024-04-05 | End: 2024-04-05 | Stop reason: HOSPADM

## 2024-04-04 RX ORDER — ACETAMINOPHEN 500 MG
1000 TABLET ORAL ONCE
Status: COMPLETED | OUTPATIENT
Start: 2024-04-04 | End: 2024-04-04

## 2024-04-04 RX ORDER — DEXAMETHASONE SODIUM PHOSPHATE 10 MG/ML
10 INJECTION, SOLUTION INTRAMUSCULAR; INTRAVENOUS ONCE
Status: COMPLETED | OUTPATIENT
Start: 2024-04-04 | End: 2024-04-04

## 2024-04-04 RX ORDER — ATORVASTATIN CALCIUM 10 MG/1
10 TABLET, FILM COATED ORAL DAILY
Status: DISCONTINUED | OUTPATIENT
Start: 2024-04-04 | End: 2024-04-05 | Stop reason: HOSPADM

## 2024-04-04 RX ORDER — PROPOFOL 10 MG/ML
INJECTION, EMULSION INTRAVENOUS PRN
Status: DISCONTINUED | OUTPATIENT
Start: 2024-04-04 | End: 2024-04-04 | Stop reason: SDUPTHER

## 2024-04-04 RX ORDER — BUPIVACAINE HYDROCHLORIDE 5 MG/ML
INJECTION, SOLUTION EPIDURAL; INTRACAUDAL
Status: COMPLETED | OUTPATIENT
Start: 2024-04-04 | End: 2024-04-04

## 2024-04-04 RX ORDER — PREGABALIN 25 MG/1
75 CAPSULE ORAL DAILY
Status: DISCONTINUED | OUTPATIENT
Start: 2024-04-05 | End: 2024-04-05 | Stop reason: HOSPADM

## 2024-04-04 RX ORDER — SODIUM CHLORIDE 0.9 % (FLUSH) 0.9 %
5-40 SYRINGE (ML) INJECTION EVERY 12 HOURS SCHEDULED
Status: DISCONTINUED | OUTPATIENT
Start: 2024-04-04 | End: 2024-04-05 | Stop reason: HOSPADM

## 2024-04-04 RX ORDER — SODIUM CHLORIDE 9 MG/ML
INJECTION, SOLUTION INTRAVENOUS CONTINUOUS
Status: DISCONTINUED | OUTPATIENT
Start: 2024-04-04 | End: 2024-04-05

## 2024-04-04 RX ORDER — PRAMIPEXOLE DIHYDROCHLORIDE 0.25 MG/1
0.5 TABLET ORAL NIGHTLY
Status: DISCONTINUED | OUTPATIENT
Start: 2024-04-04 | End: 2024-04-05 | Stop reason: HOSPADM

## 2024-04-04 RX ORDER — OXYCODONE HYDROCHLORIDE 5 MG/1
10 TABLET ORAL PRN
Status: DISCONTINUED | OUTPATIENT
Start: 2024-04-04 | End: 2024-04-04 | Stop reason: HOSPADM

## 2024-04-04 RX ORDER — FENTANYL CITRATE 50 UG/ML
INJECTION, SOLUTION INTRAMUSCULAR; INTRAVENOUS PRN
Status: DISCONTINUED | OUTPATIENT
Start: 2024-04-04 | End: 2024-04-04 | Stop reason: SDUPTHER

## 2024-04-04 RX ORDER — SODIUM CHLORIDE 0.9 % (FLUSH) 0.9 %
5-40 SYRINGE (ML) INJECTION PRN
Status: DISCONTINUED | OUTPATIENT
Start: 2024-04-04 | End: 2024-04-04 | Stop reason: HOSPADM

## 2024-04-04 RX ORDER — DILTIAZEM HYDROCHLORIDE 180 MG/1
180 CAPSULE, COATED, EXTENDED RELEASE ORAL DAILY
Status: DISCONTINUED | OUTPATIENT
Start: 2024-04-04 | End: 2024-04-05 | Stop reason: HOSPADM

## 2024-04-04 RX ORDER — SODIUM CHLORIDE 9 MG/ML
INJECTION, SOLUTION INTRAVENOUS PRN
Status: DISCONTINUED | OUTPATIENT
Start: 2024-04-04 | End: 2024-04-05 | Stop reason: HOSPADM

## 2024-04-04 RX ORDER — NALOXONE HYDROCHLORIDE 0.4 MG/ML
INJECTION, SOLUTION INTRAMUSCULAR; INTRAVENOUS; SUBCUTANEOUS PRN
Status: DISCONTINUED | OUTPATIENT
Start: 2024-04-04 | End: 2024-04-04 | Stop reason: HOSPADM

## 2024-04-04 RX ORDER — BUPROPION HYDROCHLORIDE 150 MG/1
150 TABLET ORAL EVERY MORNING
Status: DISCONTINUED | OUTPATIENT
Start: 2024-04-05 | End: 2024-04-05 | Stop reason: HOSPADM

## 2024-04-04 RX ORDER — SODIUM CHLORIDE, SODIUM LACTATE, POTASSIUM CHLORIDE, CALCIUM CHLORIDE 600; 310; 30; 20 MG/100ML; MG/100ML; MG/100ML; MG/100ML
INJECTION, SOLUTION INTRAVENOUS CONTINUOUS
Status: DISCONTINUED | OUTPATIENT
Start: 2024-04-04 | End: 2024-04-04 | Stop reason: HOSPADM

## 2024-04-04 RX ORDER — ONDANSETRON 2 MG/ML
INJECTION INTRAMUSCULAR; INTRAVENOUS PRN
Status: DISCONTINUED | OUTPATIENT
Start: 2024-04-04 | End: 2024-04-04 | Stop reason: SDUPTHER

## 2024-04-04 RX ORDER — ONDANSETRON 2 MG/ML
4 INJECTION INTRAMUSCULAR; INTRAVENOUS EVERY 6 HOURS PRN
Status: DISCONTINUED | OUTPATIENT
Start: 2024-04-04 | End: 2024-04-05 | Stop reason: HOSPADM

## 2024-04-04 RX ORDER — SERTRALINE HYDROCHLORIDE 100 MG/1
100 TABLET, FILM COATED ORAL DAILY
Status: DISCONTINUED | OUTPATIENT
Start: 2024-04-04 | End: 2024-04-05 | Stop reason: HOSPADM

## 2024-04-04 RX ORDER — PREGABALIN 25 MG/1
75 CAPSULE ORAL ONCE
Status: COMPLETED | OUTPATIENT
Start: 2024-04-04 | End: 2024-04-04

## 2024-04-04 RX ORDER — KETOROLAC TROMETHAMINE 30 MG/ML
30 INJECTION, SOLUTION INTRAMUSCULAR; INTRAVENOUS ONCE
Status: COMPLETED | OUTPATIENT
Start: 2024-04-04 | End: 2024-04-04

## 2024-04-04 RX ORDER — OXYCODONE HYDROCHLORIDE 5 MG/1
10 TABLET ORAL EVERY 4 HOURS PRN
Status: DISCONTINUED | OUTPATIENT
Start: 2024-04-04 | End: 2024-04-05 | Stop reason: HOSPADM

## 2024-04-04 RX ORDER — ONDANSETRON 2 MG/ML
4 INJECTION INTRAMUSCULAR; INTRAVENOUS
Status: DISCONTINUED | OUTPATIENT
Start: 2024-04-04 | End: 2024-04-04 | Stop reason: HOSPADM

## 2024-04-04 RX ORDER — SODIUM CHLORIDE 9 MG/ML
INJECTION, SOLUTION INTRAVENOUS PRN
Status: DISCONTINUED | OUTPATIENT
Start: 2024-04-04 | End: 2024-04-04 | Stop reason: HOSPADM

## 2024-04-04 RX ORDER — TRAZODONE HYDROCHLORIDE 100 MG/1
100 TABLET ORAL NIGHTLY PRN
Status: DISCONTINUED | OUTPATIENT
Start: 2024-04-04 | End: 2024-04-05 | Stop reason: HOSPADM

## 2024-04-04 RX ORDER — MEPERIDINE HYDROCHLORIDE 25 MG/ML
12.5 INJECTION INTRAMUSCULAR; INTRAVENOUS; SUBCUTANEOUS EVERY 5 MIN PRN
Status: DISCONTINUED | OUTPATIENT
Start: 2024-04-04 | End: 2024-04-04 | Stop reason: HOSPADM

## 2024-04-04 RX ORDER — OXYCODONE HYDROCHLORIDE 5 MG/1
5 TABLET ORAL EVERY 4 HOURS PRN
Status: DISCONTINUED | OUTPATIENT
Start: 2024-04-04 | End: 2024-04-05 | Stop reason: HOSPADM

## 2024-04-04 RX ORDER — DEXAMETHASONE SODIUM PHOSPHATE 10 MG/ML
INJECTION, SOLUTION INTRAMUSCULAR; INTRAVENOUS
Status: COMPLETED | OUTPATIENT
Start: 2024-04-04 | End: 2024-04-04

## 2024-04-04 RX ORDER — ONDANSETRON 4 MG/1
4 TABLET, ORALLY DISINTEGRATING ORAL EVERY 8 HOURS PRN
Status: DISCONTINUED | OUTPATIENT
Start: 2024-04-04 | End: 2024-04-05 | Stop reason: HOSPADM

## 2024-04-04 RX ORDER — OXYCODONE HYDROCHLORIDE 5 MG/1
5 TABLET ORAL PRN
Status: DISCONTINUED | OUTPATIENT
Start: 2024-04-04 | End: 2024-04-04 | Stop reason: HOSPADM

## 2024-04-04 RX ORDER — SODIUM CHLORIDE 0.9 % (FLUSH) 0.9 %
5-40 SYRINGE (ML) INJECTION PRN
Status: DISCONTINUED | OUTPATIENT
Start: 2024-04-04 | End: 2024-04-05 | Stop reason: HOSPADM

## 2024-04-04 RX ADMIN — FENTANYL CITRATE 50 MCG: 50 INJECTION INTRAMUSCULAR; INTRAVENOUS at 14:42

## 2024-04-04 RX ADMIN — BUPIVACAINE HYDROCHLORIDE 15 ML: 5 INJECTION, SOLUTION EPIDURAL; INTRACAUDAL; PERINEURAL at 12:46

## 2024-04-04 RX ADMIN — HYDROMORPHONE HYDROCHLORIDE 0.5 MG: 1 INJECTION, SOLUTION INTRAMUSCULAR; INTRAVENOUS; SUBCUTANEOUS at 16:10

## 2024-04-04 RX ADMIN — SODIUM CHLORIDE 2000 MG: 900 INJECTION INTRAVENOUS at 13:18

## 2024-04-04 RX ADMIN — PHENYLEPHRINE HYDROCHLORIDE 120 MCG: 10 INJECTION INTRAVENOUS at 15:13

## 2024-04-04 RX ADMIN — FENTANYL CITRATE 50 MCG: 50 INJECTION INTRAMUSCULAR; INTRAVENOUS at 13:48

## 2024-04-04 RX ADMIN — ACETAMINOPHEN 650 MG: 325 TABLET ORAL at 20:57

## 2024-04-04 RX ADMIN — PRAMIPEXOLE DIHYDROCHLORIDE 0.5 MG: 0.25 TABLET ORAL at 20:57

## 2024-04-04 RX ADMIN — ACETAMINOPHEN 1000 MG: 500 TABLET ORAL at 11:56

## 2024-04-04 RX ADMIN — SODIUM CHLORIDE: 9 INJECTION, SOLUTION INTRAVENOUS at 19:09

## 2024-04-04 RX ADMIN — DEXAMETHASONE SODIUM PHOSPHATE 8 MG: 10 INJECTION, SOLUTION INTRAMUSCULAR; INTRAVENOUS at 13:36

## 2024-04-04 RX ADMIN — PREGABALIN 75 MG: 25 CAPSULE ORAL at 11:58

## 2024-04-04 RX ADMIN — FAMOTIDINE 20 MG: 10 INJECTION, SOLUTION INTRAVENOUS at 12:01

## 2024-04-04 RX ADMIN — ONDANSETRON 4 MG: 2 INJECTION INTRAMUSCULAR; INTRAVENOUS at 19:11

## 2024-04-04 RX ADMIN — DEXAMETHASONE SODIUM PHOSPHATE 10 MG: 10 INJECTION, SOLUTION INTRAMUSCULAR; INTRAVENOUS at 12:01

## 2024-04-04 RX ADMIN — ONDANSETRON 4 MG: 2 INJECTION INTRAMUSCULAR; INTRAVENOUS at 13:36

## 2024-04-04 RX ADMIN — DEXAMETHASONE SODIUM PHOSPHATE 10 MG: 10 INJECTION INTRAMUSCULAR; INTRAVENOUS at 20:57

## 2024-04-04 RX ADMIN — ACETAMINOPHEN 650 MG: 325 TABLET ORAL at 23:32

## 2024-04-04 RX ADMIN — FENTANYL CITRATE 50 MCG: 50 INJECTION INTRAMUSCULAR; INTRAVENOUS at 13:45

## 2024-04-04 RX ADMIN — FENTANYL CITRATE 50 MCG: 50 INJECTION INTRAMUSCULAR; INTRAVENOUS at 13:50

## 2024-04-04 RX ADMIN — PHENYLEPHRINE HYDROCHLORIDE 80 MCG: 10 INJECTION INTRAVENOUS at 14:15

## 2024-04-04 RX ADMIN — HYDROMORPHONE HYDROCHLORIDE 0.5 MG: 1 INJECTION, SOLUTION INTRAMUSCULAR; INTRAVENOUS; SUBCUTANEOUS at 16:00

## 2024-04-04 RX ADMIN — SODIUM CHLORIDE 2000 MG: 900 INJECTION INTRAVENOUS at 21:00

## 2024-04-04 RX ADMIN — TRANEXAMIC ACID 1000 MG: 100 INJECTION, SOLUTION INTRAVENOUS at 13:21

## 2024-04-04 RX ADMIN — PROPOFOL 30 MG: 10 INJECTION, EMULSION INTRAVENOUS at 12:46

## 2024-04-04 RX ADMIN — KETOROLAC TROMETHAMINE 30 MG: 30 INJECTION, SOLUTION INTRAMUSCULAR; INTRAVENOUS at 16:27

## 2024-04-04 RX ADMIN — PROPOFOL 120 MG: 10 INJECTION, EMULSION INTRAVENOUS at 13:20

## 2024-04-04 RX ADMIN — HYDROMORPHONE HYDROCHLORIDE 0.5 MG: 1 INJECTION, SOLUTION INTRAMUSCULAR; INTRAVENOUS; SUBCUTANEOUS at 16:49

## 2024-04-04 RX ADMIN — SODIUM CHLORIDE, POTASSIUM CHLORIDE, SODIUM LACTATE AND CALCIUM CHLORIDE: 600; 310; 30; 20 INJECTION, SOLUTION INTRAVENOUS at 14:14

## 2024-04-04 RX ADMIN — HYDROMORPHONE HYDROCHLORIDE 0.5 MG: 1 INJECTION, SOLUTION INTRAMUSCULAR; INTRAVENOUS; SUBCUTANEOUS at 23:32

## 2024-04-04 RX ADMIN — SODIUM CHLORIDE, POTASSIUM CHLORIDE, SODIUM LACTATE AND CALCIUM CHLORIDE: 600; 310; 30; 20 INJECTION, SOLUTION INTRAVENOUS at 12:00

## 2024-04-04 RX ADMIN — DEXAMETHASONE SODIUM PHOSPHATE 10 MG: 10 INJECTION, SOLUTION INTRAMUSCULAR; INTRAVENOUS at 12:50

## 2024-04-04 RX ADMIN — HYDROMORPHONE HYDROCHLORIDE 0.5 MG: 1 INJECTION, SOLUTION INTRAMUSCULAR; INTRAVENOUS; SUBCUTANEOUS at 15:52

## 2024-04-04 RX ADMIN — BUPIVACAINE HYDROCHLORIDE 15 ML: 5 INJECTION, SOLUTION EPIDURAL; INTRACAUDAL at 12:50

## 2024-04-04 ASSESSMENT — PAIN DESCRIPTION - ORIENTATION
ORIENTATION: LEFT
ORIENTATION: LEFT;MID
ORIENTATION: LEFT

## 2024-04-04 ASSESSMENT — PAIN DESCRIPTION - DESCRIPTORS
DESCRIPTORS: ACHING;DISCOMFORT;SORE
DESCRIPTORS: DISCOMFORT;ACHING
DESCRIPTORS: ACHING;SHARP;SORE;DISCOMFORT
DESCRIPTORS: ACHING;SORE;DISCOMFORT
DESCRIPTORS: ACHING;SHARP;SORE;DISCOMFORT
DESCRIPTORS: ACHING;DISCOMFORT
DESCRIPTORS: ACHING;SHARP;SORE;DISCOMFORT

## 2024-04-04 ASSESSMENT — PAIN DESCRIPTION - LOCATION
LOCATION: KNEE

## 2024-04-04 ASSESSMENT — PAIN SCALES - GENERAL
PAINLEVEL_OUTOF10: 10
PAINLEVEL_OUTOF10: 10
PAINLEVEL_OUTOF10: 5
PAINLEVEL_OUTOF10: 10
PAINLEVEL_OUTOF10: 0
PAINLEVEL_OUTOF10: 8
PAINLEVEL_OUTOF10: 10
PAINLEVEL_OUTOF10: 7
PAINLEVEL_OUTOF10: 10
PAINLEVEL_OUTOF10: 5

## 2024-04-04 ASSESSMENT — PAIN DESCRIPTION - FREQUENCY: FREQUENCY: CONTINUOUS

## 2024-04-04 ASSESSMENT — ENCOUNTER SYMPTOMS: SHORTNESS OF BREATH: 1

## 2024-04-04 ASSESSMENT — PAIN - FUNCTIONAL ASSESSMENT: PAIN_FUNCTIONAL_ASSESSMENT: 0-10

## 2024-04-04 ASSESSMENT — PAIN DESCRIPTION - ONSET: ONSET: PROGRESSIVE

## 2024-04-04 ASSESSMENT — PAIN DESCRIPTION - PAIN TYPE: TYPE: SURGICAL PAIN

## 2024-04-04 NOTE — DISCHARGE INSTR - OTHER ORDERS
[facility] is participating in Medicare's Comprehensive Care for Joint Replacement (CJR) model    [facility] is participating in a Medicare initiative called the Comprehensive Care for Joint Replacement (CJR) model. Medicare designed this model to encourage higher quality care and greater financial accountability from hospitals when Medicare beneficiaries receive lower-extremity joint replacement procedures (LEJR), typically hip or knee replacements. [facility]’s participation in the CJR model should not restrict your access to care for your medical condition or your freedom to choose your health care providers and services. All existing Medicare beneficiary protections continue to be available to you.     The CJR model aims to help give you better care.     The CJR model aims to support better and more efficient care for beneficiaries undergoing LEJR procedures. A CJR episode of care is typically defined as an admission of an eligible Medicare beneficiary to a hospital participating in the CJR model for an LEJR procedure. The LEJR procedure can take place in either an inpatient or outpatient setting and will still be considered a CJR episode of care. The CJR episode of care continues for 90 days following discharge.     This model uses episode payment and quality measurement for an episode of care associated with LEJR procedures to encourage hospitals, physicians, and post-acute care providers to work together to improve the quality and coordination of care from the initial hospitalization through recovery. Under the CJR model, [facility] can earn additional payments from Medicare if we meet the high quality goals set by Medicare, while keeping hospital costs and care spending under control. If we don’t meet these quality and cost goals, we may have to repay Medicare.     Medicare is using the CJR model to encourage [facility]

## 2024-04-04 NOTE — DISCHARGE INSTRUCTIONS
prescription medication with anyone!  Sharing is illegal.  The prescription dose is based on your age, weight, and health problems.  Sharing your narcotic prescription can lead to accidental death of the individual for which the prescription was not prescribed.  You may not know about his/her addiction problem.     [x] Always use the same pharmacy when filling your narcotic prescriptions.     [x] DO NOT mix your narcotic prescription with alcohol.  Mixing the narcotic prescription medication with alcohol causes depressive effects including breathing problems, organ malfunction, and cardiac arrest.     [x] Always keep your narcotic medication in a locked, secured location.  Keep your medication private.  This is to avoid individuals from taking your medication without your knowledge.  This medication is highly sought after and locking your prescriptions will protect you from being a target of crime.     [x] DO NOT stock pile your medication.  This also will protect you from being a target of crime.     [x] Dispose of any unused medications properly.  Do not flush the medications.  Look for appropriate waste collection programs in your community and drug take back events.     [x] DO NOT drive while taking narcotic pain medication or muscle relaxers.         Reminder:    The phases following a total knee replacement that most patients often encounter.  Briefly:    First phase: First phase is The Pain Phase.  There is plenty of pain medication (and pharmacy can be consulted if needed).  TKR is a painful operation however pain management has improved significantly.    Second phase: The second phase is Not Sleeping Phase.  It is common for total knee replacement patients not to sleep well after total knee replacement.  This can go on for several months.    Third phase:  The third phase is Depression. The \"not sleeping at night phase\" leads to the third phase in which patients often experience depression/the blues, feeling

## 2024-04-04 NOTE — ANESTHESIA PROCEDURE NOTES
Peripheral Block    Patient location during procedure: pre-op  Reason for block: post-op pain management and at surgeon's request  Start time: 4/4/2024 12:46 PM  End time: 4/4/2024 12:49 PM  Staffing  Performed: resident/CRNA   Anesthesiologist: Kvng Rodriguez MD  Resident/CRNA: Osiel Tapia APRN - CRNA  Performed by: Osiel Tapia APRN - CRNA  Authorized by: Osiel Tapia APRN - CRNA    Preanesthetic Checklist  Completed: patient identified, IV checked, site marked, risks and benefits discussed, surgical/procedural consents, equipment checked, pre-op evaluation, timeout performed, anesthesia consent given, oxygen available, monitors applied/VS acknowledged, fire risk safety assessment completed and verbalized and blood product R/B/A discussed and consented  Peripheral Block   Patient position: supine  Prep: ChloraPrep  Provider prep: mask and sterile gloves  Patient monitoring: cardiac monitor, continuous pulse ox, continuous capnometry, frequent blood pressure checks, IV access, oxygen and responsive to questions  Block type: Saphenous  Laterality: left  Injection technique: single-shot  Guidance: ultrasound guided    Needle   Needle type: insulated echogenic nerve stimulator needle   Needle localization: anatomical landmarks and ultrasound guidance  Assessment   Injection assessment: negative aspiration for heme, no paresthesia on injection, local visualized surrounding nerve on ultrasound and no intravascular symptoms  Paresthesia pain: none  Slow fractionated injection: yes  Hemodynamics: stable  Outcomes: uncomplicated and patient tolerated procedure well    Medications Administered  BUPivacaine (MARCAINE) PF injection 0.5% - Perineural   15 mL - 4/4/2024 12:46:00 PM

## 2024-04-04 NOTE — BRIEF OP NOTE
Brief Postoperative Note      Patient: Sally Higgins  YOB: 1955  MRN: [id number]    Date of Procedure: 4/4/2024    Pre-Op Diagnosis Codes:     * Osteoarthritis of left knee [M17.12]    Post-Op Diagnosis: Same       Procedure(s):  LEFT KNEE TOTAL ARTHROPLASTY WITH CORI ROBOT IPACK BLOCK, ADDUCTOR CANAL BLOCK - 23 HOUR HOLD-    Surgeon(s):  Benny Pulido DO    Assistant:  Dr. Castellanos  Physician Assistant: Benny Stallworth PA    Anesthesia: Spinal and regional    Estimated Blood Loss (mL): 200     TT: 17 min    Complications: None    Specimens:   * No specimens in log *    Implants:  Implant Name Type Inv. Item Serial No.  Lot No. LRB No. Used Action   COMPONENT PAT [id number] THK9MM STD KNEE RND NP KASH BICRUCIATE - [id number]  COMPONENT PAT [id number] THK9MM STD KNEE RND NP KASH BICRUCIATE  [employer]-WD [id number] Left 1 Implanted   BASEPLATE TIB SZ 4 AP50MM ML71MM L KNEE NP KASH JOURNEY - [id number]  BASEPLATE TIB SZ 4 AP50MM ML71MM L KNEE NP KASH JOURNEY  [employer]-WD [id number] Left 1 Implanted   COMPONENT FEM SZ 7 L KNEE OXINIUM BI CRUCE STBL JOURNEY II - [id number]  COMPONENT FEM SZ 7 L KNEE OXINIUM BI CRUCE STBL JOURNEY II  [employer]-WD [id number] Left 1 Implanted   INSERT TIB SZ 3-4 [id number] AP48MM ML68MM L ARTC KNEE XLPE BI - [id number]  INSERT TIB SZ 3-4 [id number] AP48MM ML68MM L ARTC KNEE XLPE BI  [employer]-WD [id number] Left 1 Implanted   Implants: Tapia and nephew journey 2 BCS, 7 femur, 4 tibia, 32 patella, 13 poly          Findings:  Infection Present At Time Of Surgery (PATOS) (choose all levels that have infection present):  No infection present  Other Findings: see op note    Electronically signed by Benny Pulido DO on 4/4/2024 at 3:17 PM

## 2024-04-04 NOTE — PROGRESS NOTES
CERTIFICATE OF WORK        February 7, 2018      Re:   Terese Baker  [address]  [facility] [address]                        This is to certify that Terese Baker was seen in the [facility] on 2/7/2018. Please excuse her from work from 2/5/18 through 2/7/18.        SIGNATURE:___________________________________________,   2/7/2018                                 Dr. Suni Chaudhari [facility]       [address] [place] [address]   [phone]         2W called for nurse to come down and get report for patient at this time.

## 2024-04-04 NOTE — PROGRESS NOTES
Report given to Jeanine at this time at bedside. Patient taken up per RN at this time to her room 202 in stable condition.

## 2024-04-04 NOTE — OP NOTE
Operative Note      Patient: Sally Higgins  YOB: 1955  MRN: [id number]    Date of Procedure: 4/4/2024    Pre-Op Diagnosis Codes:     * Osteoarthritis of left knee [M17.12]    Post-Op Diagnosis: Same       Procedure(s):  LEFT KNEE TOTAL ARTHROPLASTY WITH CORI ROBOT IPACK BLOCK, ADDUCTOR CANAL BLOCK - 23 HOUR HOLD-    Surgeon(s):  Benny Pulido DO    Assistant:  Dr. Castellanos  Physician Assistant: Benny Stallworth PA    Anesthesia: Spinal and regional    Estimated Blood Loss (mL): 200     TT: 17 min    Complications: None    Specimens:   * No specimens in log *    Implants:  Implant Name Type Inv. Item Serial No.  Lot No. LRB No. Used Action   COMPONENT PAT [id number] THK9MM STD KNEE RND NP KASH BICRUCIATE - [id number]  COMPONENT PAT [id number] THK9MM STD KNEE RND NP KASH BICRUCIATE  [employer]- [id number] Left 1 Implanted   BASEPLATE TIB SZ 4 AP50MM ML71MM L KNEE NP KASH JOURNEY - [id number]  BASEPLATE TIB SZ 4 AP50MM ML71MM L KNEE NP KASH JOURNEY  [employer]- [id number] Left 1 Implanted   COMPONENT FEM SZ 7 L KNEE OXINIUM BI CRUCE STBL JOURNEY II - [id number]  COMPONENT FEM SZ 7 L KNEE OXINIUM BI CRUCE STBL JOURNEY II  [employer]- [id number] Left 1 Implanted   INSERT TIB SZ 3-4 [id number] AP48MM ML68MM L ARTC KNEE XLPE BI - [id number]  INSERT TIB SZ 3-4 [id number] AP48MM ML68MM L ARTC KNEE XLPE BI  [employer]- [id number] Left 1 Implanted   Implants: Tapia and nephew journey 2 BCS, 7 femur, 4 tibia, 32 patella, 13 poly          Findings:  Infection Present At Time Of Surgery (PATOS) (choose all levels that have infection present):  No infection present    OPERATIVE INDICATIONS:  The patient is a 68F with worsening  L knee tricompartmental osteoarthritis.  The patient was  trialed with extensive nonoperative management and failed conservative  care.  The pain in the knee is affecting their activities of daily  living.  Operative

## 2024-04-04 NOTE — ANESTHESIA PROCEDURE NOTES
Peripheral Block    Patient location during procedure: pre-op  Reason for block: post-op pain management and at surgeon's request  Start time: 4/4/2024 12:50 PM  End time: 4/4/2024 12:55 PM  Staffing  Performed: resident/CRNA   Anesthesiologist: Kvng Rodriguez MD  Resident/CRNA: Osiel Tapia APRN - CRNA  Performed by: Osiel Tapia APRN - CRNA  Authorized by: Osiel Tapia APRN - CRNA    Preanesthetic Checklist  Completed: patient identified, IV checked, site marked, risks and benefits discussed, surgical/procedural consents, equipment checked, pre-op evaluation, timeout performed, anesthesia consent given, oxygen available, monitors applied/VS acknowledged, fire risk safety assessment completed and verbalized and blood product R/B/A discussed and consented  Peripheral Block   Patient position: supine  Prep: ChloraPrep  Provider prep: mask and sterile gloves  Patient monitoring: cardiac monitor, continuous pulse ox, continuous capnometry, frequent blood pressure checks, IV access, oxygen and responsive to questions  Block type: iPacks  Laterality: left  Injection technique: single-shot  Guidance: ultrasound guided    Needle   Needle type: insulated echogenic nerve stimulator needle   Needle localization: anatomical landmarks and ultrasound guidance  Assessment   Injection assessment: negative aspiration for heme, no paresthesia on injection, local visualized surrounding nerve on ultrasound and no intravascular symptoms  Paresthesia pain: none  Slow fractionated injection: yes  Hemodynamics: stable  Outcomes: uncomplicated and patient tolerated procedure well    Medications Administered  BUPivacaine (MARCAINE) PF injection 0.5% - Perineural   15 mL - 4/4/2024 12:50:00 PM  dexAMETHasone (DECADRON) (PF) 10 mg/mL injection - Other   10 mg - 4/4/2024 12:50:00 PM

## 2024-04-04 NOTE — PLAN OF CARE
Problem: Discharge Planning  Goal: Discharge to home or other facility with appropriate resources  Outcome: Progressing  Flowsheets (Taken 4/4/2024 1136 by Rachel Colon, RN)  Discharge to home or other facility with appropriate resources: Identify barriers to discharge with patient and caregiver     Problem: Pain  Goal: Verbalizes/displays adequate comfort level or baseline comfort level  Outcome: Progressing     Problem: Safety - Adult  Goal: Free from fall injury  Outcome: Progressing

## 2024-04-04 NOTE — H&P
Orthopedic Preoperative Note      CHIEF COMPLAINT:  L knee pain    HISTORY OF PRESENT ILLNESS:      The patient is a 68 y.o. female with L knee pain due to osteoarthritis.    Past Medical History:    Past Medical History:   Diagnosis Date    Atrial fibrillation (HCC)     April 2021    Depression     GERD (gastroesophageal reflux disease)     Hyperlipidemia     Lumbar herniated disc 2/98    MVP (mitral valve prolapse)     BIJAN (obstructive sleep apnea)     Primary osteoarthritis of left knee 9/25/2020    Restless leg syndrome        Past Surgical History:    Past Surgical History:   Procedure Laterality Date    BACK SURGERY  2/1998    LAP BAND  2010       Medications Prior to Admission:   Prior to Admission medications    Medication Sig Start Date End Date Taking? Authorizing Provider   traZODone (DESYREL) 50 MG tablet TAKE 2 TABLETS BY MOUTH EVERY NIGHT 3/25/24   Elieser Hairston, DO   atorvastatin (LIPITOR) 10 MG tablet TAKE 1 TABLET BY MOUTH DAILY 3/4/24   Elieser Hairston, DO   buPROPion (WELLBUTRIN XL) 150 MG extended release tablet TAKE 1 TABLET BY MOUTH EVERY MORNING 12/28/23   Elieser Hairston, DO   pramipexole (MIRAPEX) 0.5 MG tablet Take 1 tablet by mouth at bedtime 9/5/23   Elieser Hairston, DO   dilTIAZem (CARDIZEM CD) 180 MG extended release capsule TAKE 1 CAPSULE BY MOUTH DAILY 8/22/23   DAWOOD Santos Jr., MD   sertraline (ZOLOFT) 100 MG tablet Take 1 tablet by mouth daily 8/16/23   Elieser Hairston, DO   flecainide (TAMBOCOR) 100 MG tablet Take 1 tablet by mouth daily 8/15/23   DAWOOD Santos Jr., MD   rivaroxaban (XARELTO) 20 MG TABS tablet Take 1 tablet by mouth Daily with supper 8/15/23   DAWOOD Santos Jr., MD       Allergies:    Patient has no known allergies.    Social History:   Social History     Socioeconomic History    Marital status:      Spouse name: None    Number of children: None    Years of education: None    Highest education level: None   Tobacco Use    Smoking status:

## 2024-04-04 NOTE — ANESTHESIA PRE PROCEDURE
\"POCCL\", \"POCBUN\", \"POCHEMO\", \"POCHCT\" in the last 72 hours.    Coags:   Lab Results   Component Value Date/Time    PROTIME 12.3 04/04/2024 12:05 PM    INR 0.90 04/04/2024 12:05 PM    APTT 27.4 04/04/2024 12:05 PM       HCG (If Applicable): No results found for: \"PREGTESTUR\", \"PREGSERUM\", \"HCG\", \"HCGQUANT\"     ABGs: No results found for: \"PHART\", \"PO2ART\", \"YKD8UHF\", \"SYD2QJU\", \"BEART\", \"R0TDQLIU\"     Type & Screen (If Applicable):  No results found for: \"LABABO\", \"LABRH\"    Drug/Infectious Status (If Applicable):  No results found for: \"HIV\", \"HEPCAB\"    COVID-19 Screening (If Applicable):   Lab Results   Component Value Date/Time    COVID19 NOT DETECTED 04/04/2024 12:00 PM           Anesthesia Evaluation  Patient summary reviewed   history of anesthetic complications (post dural punct ha x2):   Airway: Mallampati: II  TM distance: >3 FB   Neck ROM: full  Mouth opening: > = 3 FB   Dental:          Pulmonary: breath sounds clear to auscultation  (+)   shortness of breath (w/ af):   sleep apnea: on CPAP,           (-) COPD, asthma and not a current smoker          Patient did not smoke on day of surgery.                 Cardiovascular:    (+) valvular problems/murmurs: MVP, dysrhythmias: atrial fibrillation    (-) pacemaker, hypertension, past MI, CAD, CABG/stent,  angina and  CHF    ECG reviewed  Rhythm: regular  Rate: normal           Beta Blocker:  Not on Beta Blocker         Neuro/Psych:   (+) neuromuscular disease (rls):depression/anxiety  (hx dep.)   (-) seizures, TIA, CVA and psychiatric history           GI/Hepatic/Renal:   (+) GERD: well controlled     (-) liver disease, no renal disease, bowel prep and no morbid obesity       Endo/Other:    (+) blood dyscrasia (off ac 2+ d): anticoagulation therapy, arthritis: OA., no malignancy/cancer.    (-) diabetes mellitus, hypothyroidism, hyperthyroidism, no malignancy/cancer               Abdominal:       Abdomen: soft.      Vascular: negative vascular ROS.

## 2024-04-04 NOTE — ANESTHESIA POSTPROCEDURE EVALUATION
Department of Anesthesiology  Postprocedure Note    Patient: Sally Higgins  MRN: [id number]  YOB: 1955  Date of evaluation: 4/4/2024    Procedure Summary       Date: 04/04/24 Room / Location: [address]    Anesthesia Start: 1315 Anesthesia Stop: 1546    Procedure: LEFT KNEE TOTAL ARTHROPLASTY WITH CORI ROBOT IPACK BLOCK, ADDUCTOR CANAL BLOCK - 23 HOUR HOLD- (Left) Diagnosis:       Osteoarthritis of left knee      (Osteoarthritis of left knee [M17.12])    Surgeons: Benny Pulido DO Responsible Provider: Kvng Rodriguez MD    Anesthesia Type: general ASA Status: 3            Anesthesia Type: No value filed.    Brigida Phase I: Brigida Score: 10    Brigida Phase II:      Anesthesia Post Evaluation    Patient location during evaluation: bedside  Patient participation: complete - patient participated  Level of consciousness: awake and alert  Airway patency: patent  Nausea & Vomiting: no nausea  Cardiovascular status: hemodynamically stable  Respiratory status: acceptable  Hydration status: euvolemic  Pain management: adequate      Vitals:    04/04/24 1557 04/04/24 1600 04/04/24 1610 04/04/24 1615   BP: (!) 115/58 128/70  138/67   Pulse: 66 63  64   Resp: 12 12 11 12   Temp: 97.2 °F (36.2 °C)   97.2 °F (36.2 °C)   TempSrc: Temporal   Temporal   SpO2: 93% 98%  96%   Weight:       Height:          No notable events documented.

## 2024-04-05 VITALS
OXYGEN SATURATION: 98 % | BODY MASS INDEX: 30.12 KG/M2 | TEMPERATURE: 98.5 F | SYSTOLIC BLOOD PRESSURE: 102 MMHG | RESPIRATION RATE: 18 BRPM | WEIGHT: 170 LBS | DIASTOLIC BLOOD PRESSURE: 57 MMHG | HEIGHT: 63 IN | HEART RATE: 75 BPM

## 2024-04-05 DIAGNOSIS — G25.81 RESTLESS LEG SYNDROME: ICD-10-CM

## 2024-04-05 PROBLEM — I48.91 ATRIAL FIBRILLATION (HCC): Status: ACTIVE | Noted: 2021-04-26

## 2024-04-05 PROBLEM — R33.9 URINARY RETENTION: Status: ACTIVE | Noted: 2024-04-05

## 2024-04-05 PROBLEM — Z96.652 STATUS POST LEFT KNEE REPLACEMENT: Status: ACTIVE | Noted: 2024-04-05

## 2024-04-05 LAB
ANION GAP SERPL CALCULATED.3IONS-SCNC: 9 MMOL/L (ref 3–16)
BUN SERPL-MCNC: 12 MG/DL (ref 7–20)
CALCIUM SERPL-MCNC: 8 MG/DL (ref 8.3–10.6)
CHLORIDE SERPL-SCNC: 109 MMOL/L (ref 99–110)
CO2 SERPL-SCNC: 23 MMOL/L (ref 21–32)
CREAT SERPL-MCNC: 0.8 MG/DL (ref 0.6–1.2)
GFR SERPLBLD CREATININE-BSD FMLA CKD-EPI: 81 ML/MIN/{1.73_M2}
GLUCOSE SERPL-MCNC: 142 MG/DL (ref 70–99)
HCT VFR BLD AUTO: 32.8 % (ref 36–48)
HGB BLD-MCNC: 11 G/DL (ref 12–16)
POTASSIUM SERPL-SCNC: 4.2 MMOL/L (ref 3.5–5.1)
SODIUM SERPL-SCNC: 141 MMOL/L (ref 136–145)

## 2024-04-05 PROCEDURE — 97530 THERAPEUTIC ACTIVITIES: CPT

## 2024-04-05 PROCEDURE — 6360000002 HC RX W HCPCS: Performed by: STUDENT IN AN ORGANIZED HEALTH CARE EDUCATION/TRAINING PROGRAM

## 2024-04-05 PROCEDURE — G0378 HOSPITAL OBSERVATION PER HR: HCPCS

## 2024-04-05 PROCEDURE — 36415 COLL VENOUS BLD VENIPUNCTURE: CPT

## 2024-04-05 PROCEDURE — 80048 BASIC METABOLIC PNL TOTAL CA: CPT

## 2024-04-05 PROCEDURE — 85018 HEMOGLOBIN: CPT

## 2024-04-05 PROCEDURE — 51798 US URINE CAPACITY MEASURE: CPT

## 2024-04-05 PROCEDURE — 6370000000 HC RX 637 (ALT 250 FOR IP): Performed by: STUDENT IN AN ORGANIZED HEALTH CARE EDUCATION/TRAINING PROGRAM

## 2024-04-05 PROCEDURE — 85014 HEMATOCRIT: CPT

## 2024-04-05 PROCEDURE — 97161 PT EVAL LOW COMPLEX 20 MIN: CPT

## 2024-04-05 PROCEDURE — 99024 POSTOP FOLLOW-UP VISIT: CPT | Performed by: STUDENT IN AN ORGANIZED HEALTH CARE EDUCATION/TRAINING PROGRAM

## 2024-04-05 PROCEDURE — 2580000003 HC RX 258: Performed by: STUDENT IN AN ORGANIZED HEALTH CARE EDUCATION/TRAINING PROGRAM

## 2024-04-05 PROCEDURE — 99233 SBSQ HOSP IP/OBS HIGH 50: CPT

## 2024-04-05 RX ORDER — ONDANSETRON 4 MG/1
4 TABLET, FILM COATED ORAL 3 TIMES DAILY PRN
Qty: 15 TABLET | Refills: 0 | Status: SHIPPED | OUTPATIENT
Start: 2024-04-05

## 2024-04-05 RX ORDER — POLYETHYLENE GLYCOL 3350 17 G/17G
17 POWDER, FOR SOLUTION ORAL DAILY
Qty: 510 G | Refills: 0 | Status: SHIPPED | OUTPATIENT
Start: 2024-04-05 | End: 2024-05-05

## 2024-04-05 RX ORDER — OXYCODONE HYDROCHLORIDE AND ACETAMINOPHEN 5; 325 MG/1; MG/1
1 TABLET ORAL EVERY 6 HOURS PRN
Qty: 28 TABLET | Refills: 0 | Status: SHIPPED | OUTPATIENT
Start: 2024-04-05 | End: 2024-04-12

## 2024-04-05 RX ADMIN — ACETAMINOPHEN 650 MG: 325 TABLET ORAL at 05:20

## 2024-04-05 RX ADMIN — SERTRALINE 100 MG: 100 TABLET, FILM COATED ORAL at 11:04

## 2024-04-05 RX ADMIN — DILTIAZEM HYDROCHLORIDE 180 MG: 180 CAPSULE, EXTENDED RELEASE ORAL at 11:04

## 2024-04-05 RX ADMIN — OXYCODONE 10 MG: 5 TABLET ORAL at 11:04

## 2024-04-05 RX ADMIN — POLYETHYLENE GLYCOL 3350 17 G: 17 POWDER, FOR SOLUTION ORAL at 11:05

## 2024-04-05 RX ADMIN — ATORVASTATIN CALCIUM 10 MG: 10 TABLET, FILM COATED ORAL at 11:04

## 2024-04-05 RX ADMIN — SODIUM CHLORIDE 2000 MG: 900 INJECTION INTRAVENOUS at 05:21

## 2024-04-05 RX ADMIN — ACETAMINOPHEN 650 MG: 325 TABLET ORAL at 11:04

## 2024-04-05 RX ADMIN — FLECAINIDE ACETATE 100 MG: 100 TABLET ORAL at 11:04

## 2024-04-05 RX ADMIN — BUPROPION HYDROCHLORIDE 150 MG: 150 TABLET, EXTENDED RELEASE ORAL at 11:04

## 2024-04-05 ASSESSMENT — PAIN SCALES - GENERAL
PAINLEVEL_OUTOF10: 0
PAINLEVEL_OUTOF10: 0
PAINLEVEL_OUTOF10: 10

## 2024-04-05 ASSESSMENT — PAIN DESCRIPTION - ORIENTATION: ORIENTATION: LEFT

## 2024-04-05 ASSESSMENT — PAIN DESCRIPTION - LOCATION: LOCATION: KNEE

## 2024-04-05 NOTE — PROGRESS NOTES
Inpatient Physical Therapy Evaluation & Treatment    Unit: [facility]  Date:  2024  Patient Name:    Sally Higgins  Admitting diagnosis:  Osteoarthritis of left knee [M17.12]  Osteoarthritis of left knee, unspecified osteoarthritis type [M17.12]  Admit Date:  2024  Precautions/Restrictions/WB Status/ Lines/ Wounds/ Oxygen: Fall risk, Bed/chair alarm, and Lines (IV)      Pt seen for cotreatment this date due to patient safety, patient endurance, and limited functional status information    Treatment Time:  922  Treatment Number:  1   Timed Code Treatment Minutes: 53 minutes  Total Treatment Minutes:  63  minutes    Patient Stated Goals for Therapy: \" to go home \"          Discharge Recommendations: Home with 24hr assistance and outpatient physical therapy  DME needs for discharge: Needs Met       Therapy recommendation for EMS Transport: can transport by wheelchair    Therapy recommendations for staff:   Assist of 1 for ambulation with use of rolling walker (RW) and gait belt to/from bathroom  within room  within halls    History of Present Illness:   The patient is a 68 y.o female with L knee pain due to osteoarthritis, and s/p L TKA on 24.    Home Health S4 Level Recommendation:  NA        AM-PAC Mobility Score    AM-PAC Inpatient Mobility Raw Score : 14         Subjective  Patient lying reclined in bed with no family present.  Pt agreeable to this PT session.     Cognition    A&O x4   Able to follow 2 step commands    Pain   Yes  Location: L knee  Ratin /10  Pain Medicine Status: No request made    Preadmission Environment:   Pt lives with    with family ( and 3 grandchildren 10, 6, 21months old)  Home environment:    two story home  Steps to enter first floor:   2 steps to enter, no handrail  Steps to second floor/basement: Full flight of 12-13 and hand rail: bilateral  Laundry:     Basement  Bathroom:     walk in shower, hand held shower head, grab bars in shower, shower chair , and

## 2024-04-05 NOTE — PROGRESS NOTES
VSS - afebrile. Pt is alert and oriented x 4 with no history of falls. Assessment completed as charted. Bed is in lowest position with 2/4 bed rails raised, bed alarm turned on, wheels locked and call light within reach - patient wearing non-skid socks and verbalizes understanding to call out for assistance. No further requests at this time. Plan of care ongoing    Vitals:    04/04/24 2314   BP: 122/65   Pulse: 59   Resp: 18   Temp: 97.1 °F (36.2 °C)   SpO2: 97%

## 2024-04-05 NOTE — PROGRESS NOTES
MD PAGE via [employer]:     s/p TKA today, 4/4. Experiencing urinary retention. Bladder scan 720. Complaint of feeling like she has to urinate but \"just can't go.\" Please advise. Thanks!    Orders placed    Pt straight cathed - 775mL removed from bladder. Pt tolerated well and states that she feels relieved. Plan of care ongoing.

## 2024-04-05 NOTE — TELEPHONE ENCOUNTER
.Refill Request     CONFIRM preferred pharmacy with the patient.    If Mail Order Rx - Pend for 90 day refill.      Last Seen: Last Seen Department: 3/25/2024  Last Seen by PCP: 3/25/2024    Last Written: 9-5-23 90 with 1     If no future appointment scheduled:  Review the last OV with PCP and review information for follow-up visit,  Route STAFF MESSAGE with patient name to the  Pool for scheduling with the following information:            -  Timing of next visit           -  Visit type ie Physical, OV, etc           -  Diagnoses/Reason ie. COPD, HTN - Do not use MEDICATION, Follow-up or CHECK UP - Give reason for visit      Next Appointment:   Future Appointments   Date Time Provider Department Center   4/23/2024 10:15 AM Benny Pulido DO [facility]   6/18/2024  8:00 AM Elieser Hairston DO [facility]  [place] [facility]       Message sent to  to schedule appt with patient?  NO      Requested Prescriptions     Pending Prescriptions Disp Refills    pramipexole (MIRAPEX) 0.5 MG tablet [Pharmacy Med Name: PRAMIPEXOLE 0.5MG TABLETS] 90 tablet 1     Sig: TAKE 1 TABLET BY MOUTH AT BEDTIME

## 2024-04-05 NOTE — PROGRESS NOTES
Progress Note    Patient:  Sally Higgins  YOB: 1955     68 y.o. female    Subjective:  Patient seen and examined  No complaints or concerns  Some pain issues overnight, but these have improved this morning.  Pain controlled  Denies fever, HA, CP, SOB, N/V, dysuria    Objective:   Vitals:    04/05/24 0352   BP: 121/68   Pulse: 60   Resp: 18   Temp: 97.9 °F (36.6 °C)   SpO2: 96%     Gen: NAD, cooperative   Left lower extremity: Surgical dressing in place, clean, dry, intact. Knee immobilizer removed. Compartments soft. EHL/FHL/TA/GS complex motor intact. Sural, saphenous, superificial/deep peroneal, and plantar nerve distribution sensation grossly intact. Dorsalis pedis/posterior tibial pulses 2+ with BCR.      Recent Labs     04/04/24  1205 04/05/24  0618   HGB  --  11.0*   HCT  --  32.8*   INR 0.90  --    NA  --  141   K  --  4.2   BUN  --  12   CREATININE  --  0.8   GLUCOSE  --  142*      Meds: Ancef, xarelto   See rec for complete list    Impression/plan: 68 y.o. female s/p L TKA, POD#1    -Knee immobilizer discontinued  -WB status: weight bearing as tolerated left lower extremity   -Pain control  -DVT ppx: restart home xarelto today  -Ice/Elevate  -Incentive Spirometry use  -PT/OT eval, plan for DC home after work with PT today, ok to remove ace wrap on DC, maintain mepilex dressings  -Follow up with me in 10-14 days.  -Please call with any questions    Benny Pulido, DO  Orthopedic Surgery and Sports Medicine  8:04 AM 4/5/2024

## 2024-04-05 NOTE — PROGRESS NOTES
4 Eyes Skin Assessment     NAME:  Sally Higgins  YOB: 1955  MEDICAL RECORD NUMBER:  [id number]    The patient is being assessed for  Admission    I agree that at least one RN has performed a thorough Head to Toe Skin Assessment on the patient. ALL assessment sites listed below have been assessed.      Areas assessed by both nurses:    Head, Face, Ears, Shoulders, Back, Chest, Arms, Elbows, Hands, Sacrum. Buttock, Coccyx, Ischium, Legs. Feet and Heels, and Under Medical Devices       Surgical incision to left knee  Does the Patient have a Wound? Yes wound(s) were present on assessment. LDA wound assessment was Initiated and completed by RN       Joseph Prevention initiated by RN: No  Wound Care Orders initiated by RN: No    Pressure Injury (Stage 3,4, Unstageable, DTI, NWPT, and Complex wounds) if present, place Wound referral order by RN under : No    New Ostomies, if present place, Ostomy referral order under : No     Nurse 1 eSignature: Electronically signed by Diana Reyes RN on 4/4/24 at 8:05 PM EDT    **SHARE this note so that the co-signing nurse can place an eSignature**    Nurse 2 eSignature: Electronically signed by Chuckie Blanco RN on 4/4/24 at 8:06 PM EDT

## 2024-04-05 NOTE — CARE COORDINATION
Case Management Assessment  Initial Evaluation    Date/Time of Evaluation: 4/5/2024 2:19 PM  Assessment Completed by: Keke Waldrop RN    If patient is discharged prior to next notation, then this note serves as note for discharge by case management.    Patient Name: Sally Higgins                   YOB: 1955  Diagnosis: Osteoarthritis of left knee [M17.12]  Osteoarthritis of left knee, unspecified osteoarthritis type [M17.12]                   Date / Time: 4/4/2024 10:50 AM    Patient Admission Status: Observation   Readmission Risk (Low < 19, Mod (19-27), High > 27): Readmission Risk Score: 3.9    Current PCP: Elieser Hairston, DO  PCP verified by CM? Yes (Yovanny)    Chart Reviewed: Yes      History Provided by: Patient  Patient Orientation: Alert and Oriented    Patient Cognition: Alert    Hospitalization in the last 30 days (Readmission):  No    If yes, Readmission Assessment in CM Navigator will be completed.    Advance Directives:      Code Status: Full Code   Patient's Primary Decision Maker is: Legal Next of Kin    Primary Decision Maker: Ryan Higgins - Spouse - [phone]    Discharge Planning:    Patient lives with: Spouse/Significant Other Type of Home: House  Primary Care Giver: Self  Patient Support Systems include: Spouse/Significant Other   Current Financial resources: Other (Comment) ([facility] Medicare. UMR)  Current community resources: None  Current services prior to admission: Durable Medical Equipment            Current DME: Walker            Type of Home Care services:  None    ADLS  Prior functional level: Independent in ADLs/IADLs  Current functional level: Independent in ADLs/IADLs    PT AM-PAC: 14 /24  OT AM-PAC:   /24    Family can provide assistance at DC: Yes  Would you like Case Management to discuss the discharge plan with any other family members/significant others, and if so, who? No  Plans to Return to Present Housing: Yes  Other Identified Issues/Barriers to

## 2024-04-05 NOTE — FLOWSHEET NOTE
04/05/24 1104   Vital Signs   Temp 99.1 °F (37.3 °C)   Temp Source Oral   Pulse 66   Heart Rate Source Monitor   Respirations 18   /60   MAP (Calculated) 75   BP Location Right upper arm   BP Method Automatic   Patient Position Semi fowlers   Pain Assessment   Pain Assessment 0-10   Pain Level 10   Patient's Stated Pain Goal 0 - No pain   Pain Location Knee   Pain Orientation Left   Oxygen Therapy   SpO2 98 %   O2 Device None (Room air)     AM assessment completed. No signs or symptoms of distress noted. Patient tolerated AM medications well. Respirations easy and even. Bed in lowest position, bed alarm in place and functioning properly, bed rails x2 up,  Call light within reach.     Bedside Mobility Assessment Tool (BMAT):     Assessment Level 1- Sit and Shake    1. From a semi-reclined position, ask patient to sit up and rotate to a seated position at the side of the bed. Can use the bedrail.    2. Ask patient to reach out and grab your hand and shake making sure patient reaches across his/her midline.   Pass- Patient is able to come to a seated position, maintain core strength. Maintains seated balance while reaching across midline. Move on to Assessment Level 2.     Assessment Level 2- Stretch and Point   1. With patient in seated position at the side of the bed, have patient place both feet on the floor (or stool) with knees no higher than hips.    2. Ask patient to stretch one leg and straighten the knee, then bend the ankle/flex and point the toes. If appropriate, repeat with the other leg.   Pass- Patient is able to demonstrate appropriate quad strength on intended weight bearing limb(s). Move onto Assessment Level 3.     Assessment Level 3- Stand   1. Ask patient to elevate off the bed or chair (seated to standing) using an assistive device (cane, bedrail).    2. Patient should be able to raise buttocks off be and hold for a count of five. May repeat once.   Pass- Patient maintains standing

## 2024-04-05 NOTE — CONSULTS
Hospital Medicine Consult Note    PCP: Elieser Hairston DO    Date of Admission: 4/4/2024    Date of Service: Pt seen/examined on 4/5/2024     Chief Complaint:  No chief complaint on file.        History Of Present Illness:      The patient is a 68 y.o. female with pmhx of OA, atrial fibrillation, depression, HLD, BIJAN who presented to [facility] for left knee total arthroplasty performed 4/4/24. Still having mild pain. Is also having difficulty urinating and had to be straight cathed last night. Normally does not have trouble with this at home. No burning or bleeding with urination.     Past Medical History:        Diagnosis Date    Atrial fibrillation (HCC)     April 2021    Depression     GERD (gastroesophageal reflux disease)     Hyperlipidemia     Lumbar herniated disc 2/98    MVP (mitral valve prolapse)     BIJAN (obstructive sleep apnea)     Primary osteoarthritis of left knee 9/25/2020    Restless leg syndrome        Past Surgical History:        Procedure Laterality Date    BACK SURGERY  02/1998    LAP BAND  2010    OTHER SURGICAL HISTORY  04/04/2024    LEFT KNEE TOTAL ARTHROPLASTY WITH CORI ROBOT IPACK BLOCK, ADDUCTOR CANAL BLOCK - 23 HOUR HOLD- - Left    TOTAL KNEE ARTHROPLASTY Left 4/4/2024    LEFT KNEE TOTAL ARTHROPLASTY WITH CORI ROBOT [place]enny Pulido [place] [facility] [place]       Medications Prior to Admission:    Prior to Admission medications    Medication Sig Start Date End Date Taking? Authorizing Provider   oxyCODONE-acetaminophen (PERCOCET) 5-325 MG per tablet Take 1 tablet by mouth every 6 hours as needed for Pain for up to 7 days. Intended supply: 7 days. Take lowest dose possible to manage pain Max Daily Amount: 4 tablets 4/5/24 4/12/24 Yes Benny Pulido DO   polyethylene glycol (GLYCOLAX) 17 GM/SCOOP powder Take 17 g by mouth daily 4/5/24 5/5/24 Yes Benny Pulido DO   ondansetron (ZOFRAN) 4 MG tablet Take 1 tablet by mouth 3 times daily as needed for Nausea or Vomiting 4/5/24

## 2024-04-05 NOTE — PROGRESS NOTES
Pt given written and verbal discharge instructions. Pt indicated understanding of home medication and care instructions. Prescriptions were given via meds to beds. Pt packed own belongings. Pt taken down to family car via wheelchair.       IV catheter discontinued intact. Site without signs and symptoms of complications. Dressing and pressure applied.

## 2024-04-05 NOTE — PROGRESS NOTES
Consult has been called to Dr. duffy on 4/5/24. Spoke with dr duffy via perfect serve. 8:19 AM    Catie Meehan  4/5/2024

## 2024-04-06 RX ORDER — PRAMIPEXOLE DIHYDROCHLORIDE 0.5 MG/1
0.5 TABLET ORAL NIGHTLY
Qty: 90 TABLET | Refills: 1 | Status: SHIPPED | OUTPATIENT
Start: 2024-04-06

## 2024-04-06 NOTE — DISCHARGE SUMMARY
Orthopedic Discharge Summary  Benny Pulido DO        Patient Identification:  Sally Higgins is a 68 y.o. female.  :  1955  MRN: [id number]     Acct: [id number]   Admit Date:  2024  Discharge date and time: 2024  2:39 PM     Attending Provider: Dr. Benny Pulido                                Reason for Admission: L TKA     Discharge Diagnoses:   Patient Active Problem List   Diagnosis    Pure hypercholesterolemia    Recurrent major depressive disorder, in full remission (HCC)    Closed nondisplaced fracture of fifth metatarsal bone of left foot    Foot sprain    Left foot pain    Closed nondisplaced fracture of metatarsal bone of left foot with routine healing    Foot pain, left    MVP (mitral valve prolapse)    Primary insomnia    Restless leg syndrome    BIJAN on CPAP    Class 1 obesity due to excess calories without serious comorbidity with body mass index (BMI) of 31.0 to 31.9 in adult    Left knee pain    Primary osteoarthritis of left knee    Periumbilical mass    Atrial fibrillation (HCC)    Shortness of breath    Chest pain    Osteoarthritis of left knee    Osteoarthritis of left knee, unspecified osteoarthritis type    Status post left knee replacement    Urinary retention        Consults:  Medicine    Procedures:  TKA - Cori    Hospital Course:   Sally Higgins is a 68 y.o. female who was admitted to the hospital after undergoing L TKA - Cori on  The patient tolerated the procedure well and ambulated well with PT. The patient was ok for DC on POD #1 home.    Laboratory parameters were followed and optimized when possible.    Time spend on discharge discussion and plannin minutes    Disposition:   Home     Discharged Condition:  Stable     Discharge Medications:         Medication List        START taking these medications      ondansetron 4 MG tablet  Commonly known as: ZOFRAN  Take 1 tablet by mouth 3 times daily as needed for Nausea or Vomiting

## 2024-04-08 ENCOUNTER — TELEPHONE (OUTPATIENT)
Dept: FAMILY MEDICINE CLINIC | Age: 69
End: 2024-04-08

## 2024-04-08 NOTE — TELEPHONE ENCOUNTER
Care Transitions Initial Follow Up Call    Outreach made within 2 business days of discharge: Yes    Patient: Sally Higgins Patient : 1955   MRN: [id number]  Reason for Admission: There are no discharge diagnoses documented for the most recent discharge.  Discharge Date: 24       Spoke with: Sally Birmingham, patient had a planned procedure and will be following up with the surgeon/ortho.    Discharge department/facility: [facility] Interactive Patient Contact:  Was patient able to fill all prescriptions: Yes  Was patient instructed to bring all medications to the follow-up visit: Yes  Is patient taking all medications as directed in the discharge summary? Yes  Does patient understand their discharge instructions: Yes  Does patient have questions or concerns that need addressed prior to 7-14 day follow up office visit: no    Scheduled appointment with PCP within 7-14 days    Follow Up  Future Appointments   Date Time Provider Department Center   2024 10:15 AM Benny Pulido DO [facility]   2024  8:00 AM Elieser Hairston DO [facility]  [place]  THERESE Blount LPN

## 2024-04-10 ENCOUNTER — TELEPHONE (OUTPATIENT)
Dept: ORTHOPEDIC SURGERY | Age: 69
End: 2024-04-10

## 2024-04-10 DIAGNOSIS — M17.12 PRIMARY OSTEOARTHRITIS OF LEFT KNEE: Primary | ICD-10-CM

## 2024-04-10 NOTE — TELEPHONE ENCOUNTER
General Question     Subject: patient is calling she stated she need for her PT Referral to be faxed over to the [facility] PT so that she can get schedule to start her PT. Please Advise.  Patient Sally Higgins \"Sally Birmingham\"   Contact Number: [phone]

## 2024-04-12 ENCOUNTER — HOSPITAL ENCOUNTER (OUTPATIENT)
Dept: PHYSICAL THERAPY | Age: 69
Setting detail: THERAPIES SERIES
Discharge: HOME | End: 2024-04-12
Payer: MEDICARE

## 2024-04-12 DIAGNOSIS — M25.662 STIFFNESS OF LEFT KNEE: ICD-10-CM

## 2024-04-12 DIAGNOSIS — R53.1 GENERALIZED WEAKNESS: ICD-10-CM

## 2024-04-12 DIAGNOSIS — M25.462 EFFUSION OF LEFT KNEE: ICD-10-CM

## 2024-04-12 DIAGNOSIS — M25.562 ACUTE PAIN OF LEFT KNEE: Primary | ICD-10-CM

## 2024-04-12 PROCEDURE — 97161 PT EVAL LOW COMPLEX 20 MIN: CPT

## 2024-04-12 PROCEDURE — 97140 MANUAL THERAPY 1/> REGIONS: CPT

## 2024-04-12 PROCEDURE — 97110 THERAPEUTIC EXERCISES: CPT

## 2024-04-12 PROCEDURE — 97016 VASOPNEUMATIC DEVICE THERAPY: CPT

## 2024-04-12 NOTE — PLAN OF CARE
traction) for the purpose of modulating pain, promoting relaxation,  increasing ROM, reducing/eliminating soft tissue swelling/inflammation/restriction, improving soft tissue extensibility and allowing for proper ROM for normal function with self care, mobility, lifting and ambulation  [] (10283) GAIT RE-EDUCATION - Provided training and instruction to the patient for proper joint and muscle recruitment and positioning and eccentric body weight control with ambulation re-education including up and down stairs. Therapeutic procedure, one or more areas, each 15 minutes;   [] (73498) VASOPNEUMATIC  [] (87195) Needle insertion(s) without injection; 1 or 2 muscle(s).  [] (51618) Needle insertion(s) without injection; 3 or more muscle(s)  [] (85942) ATTENDED ESTIM. Application of a modality to 1 or more areas; electrical stimulation (manual), each 15 minutes. Attended electrical stimulation requires direct (1-on-1) contact with the patient by the qualified professional/qualified personnel in providing electrical stimulation manually through the use of probes or other devices.  [] (60760) UNATTENDED ESTIM. Electrical stimulation (unattended), to 1 or more areas for indication(s) other than wound care, as part of a therapy plan of care. (\[facility] [place] [facility]\"" )      GOALS     Patient stated goal: ***  Status: [x] Progressing: [] Met: [] Not Met: [] Adjusted    Therapist goals for Patient:   Short Term Goals: To be achieved in: 2 weeks  Independent in HEP and progression per patient tolerance, in order to progress toward full function and prevent re-injury.    Status: [] Progressing: [] Met: [] Not Met: [] Adjusted  Patient will have a decrease in pain to {pain scale:10068}/10 to help  facilitate improvement in movement, function, and ADLs as indicated by functional deficits.   Status: [] Progressing: [] Met: [] Not Met: [] Adjusted    Long Term Goals: To be achieved in: {weeks:12494} weeks  Disability index score of ***% or less for

## 2024-04-16 ENCOUNTER — HOSPITAL ENCOUNTER (OUTPATIENT)
Dept: PHYSICAL THERAPY | Age: 69
Setting detail: THERAPIES SERIES
Discharge: HOME | End: 2024-04-16
Payer: MEDICARE

## 2024-04-16 DIAGNOSIS — F33.42 RECURRENT MAJOR DEPRESSIVE DISORDER, IN FULL REMISSION (HCC): ICD-10-CM

## 2024-04-16 PROCEDURE — 97140 MANUAL THERAPY 1/> REGIONS: CPT

## 2024-04-16 PROCEDURE — 97110 THERAPEUTIC EXERCISES: CPT

## 2024-04-16 PROCEDURE — 97016 VASOPNEUMATIC DEVICE THERAPY: CPT

## 2024-04-16 RX ORDER — BUPROPION HYDROCHLORIDE 150 MG/1
150 TABLET ORAL EVERY MORNING
Qty: 90 TABLET | Refills: 1 | Status: SHIPPED | OUTPATIENT
Start: 2024-04-16

## 2024-04-16 NOTE — FLOWSHEET NOTE
[facility]- Outpatient Rehabilitation and Therapy [address], [place] [address] office: [phone] fax: [phone]     Physical Therapy Daily Treatment Note      Physical Therapy: TREATMENT/PROGRESS NOTE   Patient: Sally Higgins (68 y.o. female)   Examination Date: 2024   :  1955 MRN: [id number]   Visit #: 2   Insurance Allowable Auth Needed   $40 BMN []Yes    [x]No    Insurance: Payor: [facility] MEDICARE / Plan: [facility] MEDICARE ADVANTAGE / Product Type: *No Product type* /   Insurance ID: [id number] - (Medicare Managed)  Secondary Insurance (if applicable): UMR   Treatment Diagnosis:     ICD-10-CM    1. Acute pain of left knee  M25.562       2. Effusion of left knee  M25.462       3. Stiffness of left knee  M25.662       4. Generalized weakness  R53.1          Medical Diagnosis:  Primary osteoarthritis of left knee [M17.12]   Referring Physician: Benny Pulido DO  PCP: Elieser Hairston DO       Plan of care signed (Y/N):     Date of Patient follow up with Physician:      Progress Report/POC: EVAL today  Progress note due: 5/10/2024 (OR 10 visits /OR AUTH LIMITS, whichever is less)  POC update due: 2024                                              Precautions/ Contra-indications:           Latex allergy:  NO  Pacemaker:    NO  Contraindications for Manipulation: None  Other:    Red Flags:  None    C-SSRS Triggered by Intake questionnaire:   [x] No, Questionnaire did not trigger screening.   [] Yes, Patient intake triggered further evaluation      [] C-SSRS Screening completed  [] PCP notified via Plan of Care  [] Emergency services notified     Preferred Language for Healthcare:   [x] English       [] other:    SUBJECTIVE EXAMINATION     Patient stated complaint: Pt reports really sore today. Stated was sick all weekend and unable to do her exercises.      OBJECTIVE EXAMINATION        Test used Initial score  2024   Pain Summary VAS

## 2024-04-16 NOTE — TELEPHONE ENCOUNTER
Refill Request     CONFIRM preferred pharmacy with the patient.    If Mail Order Rx - Pend for 90 day refill.      Last Seen: Last Seen Department: 3/25/2024  Last Seen by PCP: 3/25/2024    Last Written: 123/28/23 90 with 1 refill     If no future appointment scheduled:  Review the last OV with PCP and review information for follow-up visit,  Route STAFF MESSAGE with patient name to the  Pool for scheduling with the following information:            -  Timing of next visit           -  Visit type ie Physical, OV, etc           -  Diagnoses/Reason ie. COPD, HTN - Do not use MEDICATION, Follow-up or CHECK UP - Give reason for visit      Next Appointment:   Future Appointments   Date Time Provider Department Center   4/16/2024  9:00 AM Maggy Staton, PT [facility]   4/19/2024  9:40 AM Deng Staton, PT [facility]   4/23/2024  9:40 AM Deng Staton, PT [facility]   4/23/2024 10:15 AM Benny Pulido DO [facility]   4/26/2024  9:00 AM Floyd Cloud, PT [facility]   4/30/2024  9:00 AM Deng Staton, PT [facility]   5/3/2024  9:00 AM Deng Staton, PT [facility]   5/7/2024  9:00 AM Deng Staton, PT [facility]   5/10/2024  9:00 AM Deng Staton, PT [facility]   6/18/2024  8:00 AM Elieser Hairston DO [facility]  [place] [facility]       Message sent to  to schedule appt with patient?  NO      Requested Prescriptions     Pending Prescriptions Disp Refills    buPROPion (WELLBUTRIN XL) 150 MG extended release tablet [Pharmacy Med Name: BUPROPION XL 150MG TABLETS (24 H)] 90 tablet 1     Sig: TAKE 1 TABLET BY MOUTH EVERY MORNING

## 2024-04-19 ENCOUNTER — HOSPITAL ENCOUNTER (OUTPATIENT)
Dept: PHYSICAL THERAPY | Age: 69
Setting detail: THERAPIES SERIES
Discharge: HOME | End: 2024-04-19
Payer: MEDICARE

## 2024-04-19 PROCEDURE — 97110 THERAPEUTIC EXERCISES: CPT

## 2024-04-19 PROCEDURE — 97140 MANUAL THERAPY 1/> REGIONS: CPT

## 2024-04-19 PROCEDURE — 97016 VASOPNEUMATIC DEVICE THERAPY: CPT

## 2024-04-19 NOTE — FLOWSHEET NOTE
Slight OP   SAQ 2 x 10     SLR 2 x 5  MAX assist   Seated knee flex on side of table 2 x 10  Talked about using RLE over the top to help push as tolerated   Calf stretch belt 30sx3  1/2 Foam roll under knee (started with towel roll as well was able to remove towel by end of session)   HS stretch long 30sx3  1/2 Foam roll under knee         3D weight shifts 15 x ea                             Pt education - anatomy, post op progressions, importance of ROM early 10 min     Manual Intervention (68226) Time     STM and PROM (ext focused) 10 min                   Modalities:    Vasopneumatic Compression (Game Ready): Applied to Knee Left for significant edema, swelling, pain control for  10 minutes.  Relevant ICD-10 R60.9 Edema unspecified.   Girth Left Right Post Vaso   Knee: Midpatella 48 45 47.5          Education/Home Exercise Program: Patient HEP program created electronically.  Refer to "[employer] access code: [email or web address]       ASSESSMENT     Today's Assessment: ROM improved. Educated on trying to perform ROM throughout the day with good understanding. Tolerated standing therex without issue.     Medical Necessity Documentation:  I certify that this patient meets the below criteria necessary for medical necessity for care and/or justification of therapy services:  The patient has functional impairments and/or activity limitations and would benefit from continued outpatient therapy services to address the deficits outlined in the patients goals    Prognosis for POC: [x] Good [] Fair  [] Poor    Patient requires continued skilled intervention: [x] Yes  [] No      CHARGE CAPTURE     PT CHARGE GRID   CPT Code (TIMED) minutes # CPT Code (UNTIMED) #     Therex (06658)  20 1  EVAL:LOW (23753 - Typically 20 minutes face-to-face)     Neuromusc. Re-ed (61743)    Re-Eval (31299)     Manual (06417) 10 1  Estim Unattended (61266)     Ther. Act (22833)    Mech. Traction (85796)     Gait (71756)    Dry Needle 1-2 muscle (29018)

## 2024-04-23 ENCOUNTER — HOSPITAL ENCOUNTER (OUTPATIENT)
Dept: PHYSICAL THERAPY | Age: 69
Setting detail: THERAPIES SERIES
Discharge: HOME | End: 2024-04-23
Payer: MEDICARE

## 2024-04-23 ENCOUNTER — OFFICE VISIT (OUTPATIENT)
Dept: ORTHOPEDIC SURGERY | Age: 69
End: 2024-04-23

## 2024-04-23 VITALS — WEIGHT: 170 LBS | BODY MASS INDEX: 30.12 KG/M2 | HEIGHT: 63 IN

## 2024-04-23 DIAGNOSIS — M17.12 PRIMARY OSTEOARTHRITIS OF LEFT KNEE: Primary | ICD-10-CM

## 2024-04-23 PROCEDURE — 97140 MANUAL THERAPY 1/> REGIONS: CPT

## 2024-04-23 PROCEDURE — 99024 POSTOP FOLLOW-UP VISIT: CPT | Performed by: STUDENT IN AN ORGANIZED HEALTH CARE EDUCATION/TRAINING PROGRAM

## 2024-04-23 PROCEDURE — 97110 THERAPEUTIC EXERCISES: CPT

## 2024-04-23 RX ORDER — METHYLPREDNISOLONE 4 MG/1
TABLET ORAL
Qty: 21 KIT | Refills: 0 | Status: SHIPPED | OUTPATIENT
Start: 2024-04-23

## 2024-04-23 NOTE — PROGRESS NOTES
Chief Complaint  Follow-up (1ST PO LTK )      History of Present Illness:  Sally Higgins is a pleasant 68 y.o. female here today for first postop evaluation regarding her left total knee replacement.  Date of procedure 4/4/2024.  The patient overall is doing okay.  She has a good amount of pain.  She is on her home Xarelto dose.  The patient is having some difficulty with range of motion.  She was unable to get home physical therapy.  She is walking with a walker today.  She denies any fevers or chills.         Medical History:  Patient's medications, allergies, past medical, surgical, social and family histories were reviewed and updated as appropriate.    Pertinent items are noted in HPI  Review of systems reviewed from Patient History Form dated on 4/23/24 and available in the patient's chart under the Media tab.       Vital Signs:  There were no vitals filed for this visit.      Constitutional: In no apparent distress. Normal affect. Alert and oriented X3 and is cooperative.       Left knee exam    Appropriately tender to palpation.  Well-healed surgical incisions without signs of infection or drainage.  Sutures in place around the pinhole sites.  Compartments soft. EHL/FHL/TA/GS complex motor intact. Sural, saphenous, superificial/deep peroneal, and plantar nerve distribution sensation grossly intact. Dorsalis pedis/posterior tibial pulses 2+ with BCR.  Knee range of motion 15 degrees to 92 degrees        Radiology:       3 views of the left knee taken in the office today demonstrate stable total knee arthroplasty without complication           Assessment : 68-year-old female 2-week status post left total knee arthroplasty, date of procedure 4/4/24    Impression:  Encounter Diagnosis   Name Primary?    Primary osteoarthritis of left knee Yes       Office Procedures:  Orders Placed This Encounter   Procedures    XR KNEE LEFT (3 VIEWS)     Standing Status:   Future     Number of Occurrences:   1     Standing

## 2024-04-23 NOTE — FLOWSHEET NOTE
facilitate improvement in movement, function, and ADLs as indicated by functional deficits.   Status: [x] Progressing: [] Met: [] Not Met: [] Adjusted    Long Term Goals: To be achieved in:  12  weeks  Disability index score of 37% or less for the LEFS to assist with return top prior level of function.    Status: [x] Progressing: [] Met: [] Not Met: [] Adjusted  Improve knee flexion AROM to 120-130 degrees or better to allow for proper joint functioning as indicated by patients functional deficits.  Status: [x] Progressing: [] Met: [] Not Met: [] Adjusted  Pt to improve strength to 4+/5 or better of quadricepsto allow for proper muscle and joint use in functional mobility, ADLs and prior level of function  Status: [x] Progressing: [] Met: [] Not Met: [] Adjusted  Patient will return to walk 1 mile, up/down 1 flight of stairs, and bed mobility without increased symptoms or restriction to work towards return to prior level of function.  Status: [x] Progressing: [] Met: [] Not Met: [] Adjusted  Patient will be able to manage symptoms while resuming full duty at work.     Status: [x] Progressing: [] Met: [] Not Met: [] Adjusted    TREATMENT PLAN     Frequency/Duration: 1-3x/week for  12  weeks for the following treatment interventions:    Interventions:  [x] Therapeutic exercise including: strength training, ROM, including postural re-education.   [x] NMR activation and proprioception, including postural re-education.    [x] Manual therapy as indicated to include: PROM, Gr I-IV mobilizations, STM, and Dry Needling/IASTM  [x] Modalities as needed that may include: Vasoneumatic Compression  [x] Patient education on joint protection, postural re-education, activity modification, progression of HEP.        Plan: POC initiated as per evaluation    Electronically Signed by Deng Staton PT  Date: 04/23/2024    Note: Portions of this note have been templated and/or copied from initial evaluation, reassessments and prior

## 2024-04-24 ENCOUNTER — TELEPHONE (OUTPATIENT)
Dept: ORTHOPEDIC SURGERY | Age: 69
End: 2024-04-24

## 2024-04-26 ENCOUNTER — HOSPITAL ENCOUNTER (OUTPATIENT)
Dept: PHYSICAL THERAPY | Age: 69
Setting detail: THERAPIES SERIES
Discharge: HOME | End: 2024-04-26
Payer: MEDICARE

## 2024-04-26 PROCEDURE — 97140 MANUAL THERAPY 1/> REGIONS: CPT | Performed by: PHYSICAL THERAPIST

## 2024-04-26 PROCEDURE — 97110 THERAPEUTIC EXERCISES: CPT | Performed by: PHYSICAL THERAPIST

## 2024-04-26 PROCEDURE — 97016 VASOPNEUMATIC DEVICE THERAPY: CPT | Performed by: PHYSICAL THERAPIST

## 2024-04-26 NOTE — FLOWSHEET NOTE
No      CHARGE CAPTURE     PT CHARGE GRID   CPT Code (TIMED) minutes # CPT Code (UNTIMED) #     Therex (51611)  25 2  EVAL:LOW (10033 - Typically 20 minutes face-to-face)     Neuromusc. Re-ed (52269)    Re-Eval (94130)     Manual (49170) 20 1  Estim Unattended (13518)     Ther. Act (52935)    Mech. Traction (21178)     Gait (05110)    Dry Needle 1-2 muscle (57553)     Aquatic Therex (62988)    Dry Needle 3+ muscle (20561)     Iontophoresis (46806)    VASO (02899) 1    Ultrasound (92448)    Group Therapy (64875)     Estim Attended (50990)    Canalith Repositioning (77461)     Other:    Other:    Total Timed Code Tx Minutes 55 3  1     Total Treatment Minutes 55        Charge Justification:  [x] (83886) THERAPEUTIC EXERCISE - Provided verbal/tactile cueing for activities related to strengthening, flexibility, endurance, ROM performed to prevent loss of range of motion, maintain or improve muscular strength or increase flexibility, following either an injury or surgery.   [x] (59219) NEUROMUSCULAR RE-EDUCATION - Therapeutic procedure, 1 or more areas, each 15 minutes; neuromuscular reeducation of movement, balance, coordination, kinesthetic sense, posture, and/or proprioception for sitting and/or standing activities  [x] (55317) MANUAL THERAPY -  Manual therapy techniques, 1 or more regions, each 15 minutes (Mobilization/manipulation, manual lymphatic drainage, manual traction) for the purpose of modulating pain, promoting relaxation,  increasing ROM, reducing/eliminating soft tissue swelling/inflammation/restriction, improving soft tissue extensibility and allowing for proper ROM for normal function with self care, mobility, lifting and ambulation  [x] (41944) VASOPNEUMATIC    GOALS     Patient stated goal: Pain free care of children  Status: [x] Progressing: [] Met: [] Not Met: [] Adjusted    Therapist goals for Patient:   Short Term Goals: To be achieved in: 2 weeks  Independent in HEP and progression per patient

## 2024-04-30 ENCOUNTER — HOSPITAL ENCOUNTER (OUTPATIENT)
Dept: PHYSICAL THERAPY | Age: 69
Setting detail: THERAPIES SERIES
Discharge: HOME | End: 2024-04-30
Payer: MEDICARE

## 2024-04-30 PROCEDURE — 97110 THERAPEUTIC EXERCISES: CPT

## 2024-04-30 PROCEDURE — 97140 MANUAL THERAPY 1/> REGIONS: CPT

## 2024-04-30 PROCEDURE — 97016 VASOPNEUMATIC DEVICE THERAPY: CPT

## 2024-04-30 NOTE — FLOWSHEET NOTE
[facility]- Outpatient Rehabilitation and Therapy [address], [place] [address] office: [phone] fax: [phone]    Physical Therapy: TREATMENT/PROGRESS NOTE   Patient: Sally Higgins (68 y.o. female)   Examination Date: 2024   :  1955 MRN: [id number]   Visit #: 6   Insurance Allowable Auth Needed   $40 BMN []Yes    [x]No    Insurance: Payor: [facility] MEDICARE / Plan: [facility] MEDICARE ADVANTAGE / Product Type: *No Product type* /   Insurance ID: [id number] - (Medicare Managed)  Secondary Insurance (if applicable): UMR   Treatment Diagnosis:     ICD-10-CM    1. Acute pain of left knee  M25.562       2. Effusion of left knee  M25.462       3. Stiffness of left knee  M25.662       4. Generalized weakness  R53.1          Medical Diagnosis:  Primary osteoarthritis of left knee [M17.12]   Referring Physician: Benny Pulido DO  PCP: Elieser Hairston DO     Plan of care signed (Y/N):     Date of Patient follow up with Physician:      Progress Report/POC: NO  Progress note due: 5/10/2024 (OR 10 visits /OR AUTH LIMITS, whichever is less)  POC update due: 2024    Surgery date: S/P TKA 2024                                             Precautions/ Contra-indications:           Latex allergy:  NO  Pacemaker:    NO  Contraindications for Manipulation: None  Other:    Preferred Language for Healthcare:   [x] English       [] other:    SUBJECTIVE EXAMINATION     Patient stated complaint: Patient reports no new issues. Pain continues to bother her.     OBJECTIVE EXAMINATION        Test used Initial score  24     Pain Summary VAS 0-8 910   Functional questionnaire LEFS  75%    ROM Knee ext -30 -10    Knee flex 70 97   Strength Quad SLR with lag Cont. lag             Exercises/Interventions     Therapeutic Ex (47639)/NMR re-education (24392)  Sets/reps Resistance Notes/Cues/Progressions   Heel prop 30\" x 3     Quad set 3 x 10     Heel slides

## 2024-05-03 ENCOUNTER — HOSPITAL ENCOUNTER (OUTPATIENT)
Dept: PHYSICAL THERAPY | Age: 69
Setting detail: THERAPIES SERIES
Discharge: HOME | End: 2024-05-03
Payer: MEDICARE

## 2024-05-03 PROCEDURE — 97140 MANUAL THERAPY 1/> REGIONS: CPT

## 2024-05-03 PROCEDURE — 97110 THERAPEUTIC EXERCISES: CPT

## 2024-05-03 PROCEDURE — 97016 VASOPNEUMATIC DEVICE THERAPY: CPT

## 2024-05-03 NOTE — FLOWSHEET NOTE
[facility]- Outpatient Rehabilitation and Therapy [address], [place] [address] office: [phone] fax: [phone]    Physical Therapy: TREATMENT/PROGRESS NOTE   Patient: Sally Higgins (68 y.o. female)   Examination Date: 2024   :  1955 MRN: [id number]   Visit #: 7   Insurance Allowable Auth Needed   $40 BMN []Yes    [x]No    Insurance: Payor: [facility] MEDICARE / Plan: [facility] MEDICARE ADVANTAGE / Product Type: *No Product type* /   Insurance ID: [id number] - (Medicare Managed)  Secondary Insurance (if applicable): UMR   Treatment Diagnosis:     ICD-10-CM    1. Acute pain of left knee  M25.562       2. Effusion of left knee  M25.462       3. Stiffness of left knee  M25.662       4. Generalized weakness  R53.1          Medical Diagnosis:  Primary osteoarthritis of left knee [M17.12]   Referring Physician: Benny Pulido DO  PCP: Elieser Hairston DO     Plan of care signed (Y/N):     Date of Patient follow up with Physician:      Progress Report/POC: NO  Progress note due: 5/10/2024 (OR 10 visits /OR AUTH LIMITS, whichever is less)  POC update due: 2024    Surgery date: S/P TKA 2024                                             Precautions/ Contra-indications:           Latex allergy:  NO  Pacemaker:    NO  Contraindications for Manipulation: None  Other:    Preferred Language for Healthcare:   [x] English       [] other:    SUBJECTIVE EXAMINATION     Patient stated complaint: Patient reports knee still hurting but working hard,    OBJECTIVE EXAMINATION        Test used Initial score  4/12/24 5/3/2024     Pain Summary VAS 0-8 910   Functional questionnaire LEFS  75%    ROM Knee ext -30 -10    Knee flex 70 105   Strength Quad SLR with lag Cont. lag             Exercises/Interventions     Therapeutic Ex (91018)/NMR re-education (58528)  Sets/reps Resistance Notes/Cues/Progressions   Heel prop 30\" x 3  With OP   Quad set 10 x 10\"     Heel slides

## 2024-05-07 ENCOUNTER — HOSPITAL ENCOUNTER (OUTPATIENT)
Dept: PHYSICAL THERAPY | Age: 69
Setting detail: THERAPIES SERIES
Discharge: HOME | End: 2024-05-07
Payer: MEDICARE

## 2024-05-07 PROCEDURE — 97140 MANUAL THERAPY 1/> REGIONS: CPT

## 2024-05-07 PROCEDURE — 97110 THERAPEUTIC EXERCISES: CPT

## 2024-05-07 PROCEDURE — 97016 VASOPNEUMATIC DEVICE THERAPY: CPT

## 2024-05-07 NOTE — FLOWSHEET NOTE
min  Rocking   Incline stretch 3 x 30\"     Heel prop 30\" x 3  With OP   Quad set     Heel slides 20 x with A  Slight OP   SAQ     SLR 3 x 10  Mod A   Seated knee flex on side of table  LAQ manual  2 x 10 With STM to quad Talked about using RLE over the top to help push as tolerated   HL clam  Supine march 20 x  20 x Blue  Blue    Standing hip ABD/ext 20 x     HR 20 x                       Pt education - anatomy, post op progressions, importance of ROM early 5 min     Manual Intervention (88456) Time     STM and PROM (ext focused) 15 min                   Modalities:    Vasopneumatic Compression (Game Ready): Applied to Knee Left for significant edema, swelling, pain control for  10 minutes.  Relevant ICD-10 R60.9 Edema unspecified.   Girth Left Right Post Vaso   Knee: Midpatella 48.6 45.2 48.2        Education/Home Exercise Program: Patient HEP program created electronically.  Refer to [employer] access code: [email or web address]     ASSESSMENT     Today's Assessment: ROM improved ext/flexion. Strength improving but still ext lag with SLR. Continues to be limited with progressions secondary to pain. Able to push knee flexion to 110 with over pressure from PT, very painful but knee itself not stiff - limited by pain.      Medical Necessity Documentation:  I certify that this patient meets the below criteria necessary for medical necessity for care and/or justification of therapy services:  The patient has functional impairments and/or activity limitations and would benefit from continued outpatient therapy services to address the deficits outlined in the patients goals    Prognosis for POC: [x] Good [] Fair  [] Poor    Patient requires continued skilled intervention: [x] Yes  [] No      CHARGE CAPTURE     PT CHARGE GRID   CPT Code (TIMED) minutes # CPT Code (UNTIMED) #     Therex (45316)  26 2  EVAL:LOW (77359 - Typically 20 minutes face-to-face)     Neuromusc. Re-ed (78037)    Re-Eval (75234)     Manual (77908) 15 1  Estim

## 2024-05-10 ENCOUNTER — HOSPITAL ENCOUNTER (OUTPATIENT)
Dept: PHYSICAL THERAPY | Age: 69
Setting detail: THERAPIES SERIES
Discharge: HOME | End: 2024-05-10
Payer: MEDICARE

## 2024-05-10 PROCEDURE — 97016 VASOPNEUMATIC DEVICE THERAPY: CPT

## 2024-05-10 PROCEDURE — 97110 THERAPEUTIC EXERCISES: CPT

## 2024-05-10 PROCEDURE — 97140 MANUAL THERAPY 1/> REGIONS: CPT

## 2024-05-10 NOTE — PROGRESS NOTES
deficits.   Status: [x] Progressing: [] Met: [] Not Met: [] Adjusted    Long Term Goals: To be achieved in:  12  weeks  Disability index score of 37% or less for the LEFS to assist with return top prior level of function.    Status: [x] Progressing: [] Met: [] Not Met: [] Adjusted  Improve knee flexion AROM to 120-130 degrees or better to allow for proper joint functioning as indicated by patients functional deficits.  Status: [x] Progressing: [] Met: [] Not Met: [] Adjusted  Pt to improve strength to 4+/5 or better of quadricepsto allow for proper muscle and joint use in functional mobility, ADLs and prior level of function  Status: [x] Progressing: [] Met: [] Not Met: [] Adjusted  Patient will return to walk 1 mile, up/down 1 flight of stairs, and bed mobility without increased symptoms or restriction to work towards return to prior level of function.  Status: [x] Progressing: [] Met: [] Not Met: [] Adjusted  Patient will be able to manage symptoms while resuming full duty at work.     Status: [x] Progressing: [] Met: [] Not Met: [] Adjusted    TREATMENT PLAN     Frequency/Duration: 1-3x/week for  12  weeks for the following treatment interventions:    Interventions:  [x] Therapeutic exercise including: strength training, ROM, including postural re-education.   [x] NMR activation and proprioception, including postural re-education.    [x] Manual therapy as indicated to include: PROM, Gr I-IV mobilizations, STM, and Dry Needling/IASTM  [x] Modalities as needed that may include: Vasoneumatic Compression  [x] Patient education on joint protection, postural re-education, activity modification, progression of HEP.        Plan: POC initiated as per evaluation    Electronically Signed by Deng Staton PT  Date: 05/10/2024    Note: Portions of this note have been templated and/or copied from initial evaluation, reassessments and prior notes for documentation efficiency.

## 2024-05-14 ENCOUNTER — OFFICE VISIT (OUTPATIENT)
Dept: ORTHOPEDIC SURGERY | Age: 69
End: 2024-05-14

## 2024-05-14 ENCOUNTER — TELEPHONE (OUTPATIENT)
Dept: ORTHOPEDIC SURGERY | Age: 69
End: 2024-05-14

## 2024-05-14 ENCOUNTER — HOSPITAL ENCOUNTER (OUTPATIENT)
Dept: PHYSICAL THERAPY | Age: 69
Setting detail: THERAPIES SERIES
Discharge: HOME | End: 2024-05-14
Payer: MEDICARE

## 2024-05-14 DIAGNOSIS — M17.12 PRIMARY OSTEOARTHRITIS OF LEFT KNEE: Primary | ICD-10-CM

## 2024-05-14 PROCEDURE — 99024 POSTOP FOLLOW-UP VISIT: CPT | Performed by: STUDENT IN AN ORGANIZED HEALTH CARE EDUCATION/TRAINING PROGRAM

## 2024-05-14 PROCEDURE — 97016 VASOPNEUMATIC DEVICE THERAPY: CPT | Performed by: PHYSICAL THERAPIST

## 2024-05-14 PROCEDURE — 97140 MANUAL THERAPY 1/> REGIONS: CPT | Performed by: PHYSICAL THERAPIST

## 2024-05-14 PROCEDURE — 97110 THERAPEUTIC EXERCISES: CPT | Performed by: PHYSICAL THERAPIST

## 2024-05-14 NOTE — PROGRESS NOTES
[facility]- Outpatient Rehabilitation and Therapy [address], [place] [address] office: [phone] fax: [phone]    Physical Therapy: TREATMENT/PROGRESS NOTE   Patient: Sally Higgins (68 y.o. female)   Examination Date: 2024   :  1955 MRN: [id number]   Visit #: 10   Insurance Allowable Auth Needed   $40 BMN []Yes    [x]No    Insurance: Payor: [facility] MEDICARE / Plan: [facility] MEDICARE ADVANTAGE / Product Type: *No Product type* /   Insurance ID: [id number] - (Medicare Managed)  Secondary Insurance (if applicable): UMR   Treatment Diagnosis:     ICD-10-CM    1. Acute pain of left knee  M25.562       2. Effusion of left knee  M25.462       3. Stiffness of left knee  M25.662       4. Generalized weakness  R53.1          Medical Diagnosis:  Primary osteoarthritis of left knee [M17.12]   Referring Physician: Benny Pulido DO  PCP: Elieser Hairston DO     Plan of care signed (Y/N):     Date of Patient follow up with Physician:      Progress Report/POC: NO  Progress note due: 5/10/2024 (OR 10 visits /OR AUTH LIMITS, whichever is less)  POC update due: 2024    Surgery date: S/P TKA 2024                                             Precautions/ Contra-indications:           Latex allergy:  NO  Pacemaker:    NO  Contraindications for Manipulation: None  Other:    Preferred Language for Healthcare:   [x] English       [] other:    SUBJECTIVE EXAMINATION     Patient stated complaint: Patient states she is to follow up with Dr. Pulido in 3 weeks to re-assess.     OBJECTIVE EXAMINATION        Test used Initial score  24     Pain Summary VAS 0-8 6/10   Functional questionnaire LEFS  75% /80 69%   ROM Knee ext -30 -8    Knee flex 70 110 ° OP P!!   Strength Quad SLR with lag Cont. lag             Exercises/Interventions     Therapeutic Ex (16531)/NMR re-education (12850)  Sets/reps Resistance Notes/Cues/Progressions   Bike 5 min

## 2024-05-14 NOTE — PROGRESS NOTES
Primary osteoarthritis of left knee Yes       Office Procedures:  No orders of the defined types were placed in this encounter.        Plan:     The patient is still stiff.  However she is slowly improving.  Her extension is much better than it was at last visit.  She is still having difficulty with flexion and she still has some deep aching pain likely from the scar formation.  I am okay with her swimming and going into the pool and doing aerobic exercises to supplement her motion.  Follow-up in 3 to 4 weeks for another motion check.  If the patient is still stiff at that time we will do a manipulation under anesthesia.  However if she continues to improve then we may be able to avoid this.    . Sally Higgins is in agreement with this plan. All questions were answered to patient's satisfaction and was encouraged to call with any further questions.    Benny Pulido, DO  Orthopedic Surgery and Sports Medicine  5/14/2024      This dictation was performed with a verbal recognition program (DRAGON) and it was checked for errors.  It is possible that there are still dictated errors within this office note.  If so, please bring any errors to my attention for an addendum.  All efforts were made to ensure that this office note is accurate.

## 2024-05-17 ENCOUNTER — APPOINTMENT (OUTPATIENT)
Dept: PHYSICAL THERAPY | Age: 69
End: 2024-05-17
Payer: MEDICARE

## 2024-05-21 ENCOUNTER — HOSPITAL ENCOUNTER (OUTPATIENT)
Dept: PHYSICAL THERAPY | Age: 69
Setting detail: THERAPIES SERIES
Discharge: HOME | End: 2024-05-21
Payer: MEDICARE

## 2024-05-21 PROCEDURE — 97016 VASOPNEUMATIC DEVICE THERAPY: CPT

## 2024-05-21 PROCEDURE — 97140 MANUAL THERAPY 1/> REGIONS: CPT

## 2024-05-21 PROCEDURE — 97110 THERAPEUTIC EXERCISES: CPT

## 2024-05-21 NOTE — FLOWSHEET NOTE
[facility]- Outpatient Rehabilitation and Therapy [address], [place] [address] office: [phone] fax: [phone]    Physical Therapy: TREATMENT/PROGRESS NOTE   Patient: Sally Higgins (68 y.o. female)   Examination Date: 2024   :  1955 MRN: [id number]   Visit #: 11   Insurance Allowable Auth Needed   $40 BMN []Yes    [x]No    Insurance: Payor: [facility] MEDICARE / Plan: [facility] MEDICARE ADVANTAGE / Product Type: *No Product type* /   Insurance ID: [id number] - (Medicare Managed)  Secondary Insurance (if applicable): UMR   Treatment Diagnosis:     ICD-10-CM    1. Acute pain of left knee  M25.562       2. Effusion of left knee  M25.462       3. Stiffness of left knee  M25.662       4. Generalized weakness  R53.1          Medical Diagnosis:  Primary osteoarthritis of left knee [M17.12]   Referring Physician: Benny Pulido DO  PCP: Elieser Hairston DO     Plan of care signed (Y/N):     Date of Patient follow up with Physician:      Progress Report/POC: NO  Progress note due: 5/10/2024 (OR 10 visits /OR AUTH LIMITS, whichever is less)  POC update due: 2024    Surgery date: S/P TKA 2024                                             Precautions/ Contra-indications:           Latex allergy:  NO  Pacemaker:    NO  Contraindications for Manipulation: None  Other:    Preferred Language for Healthcare:   [x] English       [] other:    SUBJECTIVE EXAMINATION     Patient stated complaint: Patient reports still feels high pain levels.     OBJECTIVE EXAMINATION        Test used Initial score  24     Pain Summary VAS 0-8 610   Functional questionnaire LEFS  75% 25/80 69%   ROM Knee ext -30 -8    Knee flex 70 110 ° OP P!!   Strength Quad SLR with lag Cont. lag             Exercises/Interventions     Therapeutic Ex (62764)/NMR re-education (12006)  Sets/reps Resistance Notes/Cues/Progressions   Bike 5 min  Rocking w/ slight holds   Incline

## 2024-05-23 ENCOUNTER — HOSPITAL ENCOUNTER (OUTPATIENT)
Dept: PHYSICAL THERAPY | Age: 69
Setting detail: THERAPIES SERIES
Discharge: HOME | End: 2024-05-23
Payer: MEDICARE

## 2024-05-23 PROCEDURE — 97016 VASOPNEUMATIC DEVICE THERAPY: CPT

## 2024-05-23 PROCEDURE — 97110 THERAPEUTIC EXERCISES: CPT

## 2024-05-23 PROCEDURE — 97140 MANUAL THERAPY 1/> REGIONS: CPT

## 2024-05-23 NOTE — FLOWSHEET NOTE
[facility]- Outpatient Rehabilitation and Therapy [address], [place] [address] office: [phone] fax: [phone]    Physical Therapy: TREATMENT/PROGRESS NOTE   Patient: Sally Higgins (68 y.o. female)   Examination Date: 2024   :  1955 MRN: [id number]   Visit #: 12   Insurance Allowable Auth Needed   $40 BMN []Yes    [x]No    Insurance: Payor: [facility] MEDICARE / Plan: [facility] MEDICARE ADVANTAGE / Product Type: *No Product type* /   Insurance ID: [id number] - (Medicare Managed)  Secondary Insurance (if applicable): UMR   Treatment Diagnosis:     ICD-10-CM    1. Acute pain of left knee  M25.562       2. Effusion of left knee  M25.462       3. Stiffness of left knee  M25.662       4. Generalized weakness  R53.1          Medical Diagnosis:  Primary osteoarthritis of left knee [M17.12]   Referring Physician: Benny Pulido DO  PCP: Elieser Hairston DO     Plan of care signed (Y/N):     Date of Patient follow up with Physician:      Progress Report/POC: NO  Progress note due: 5/10/2024 (OR 10 visits /OR AUTH LIMITS, whichever is less)  POC update due: 2024    Surgery date: S/P TKA 2024                                             Precautions/ Contra-indications:           Latex allergy:  NO  Pacemaker:    NO  Contraindications for Manipulation: None  Other:    Preferred Language for Healthcare:   [x] English       [] other:    SUBJECTIVE EXAMINATION     Patient stated complaint: Patient reports no change since LV.    OBJECTIVE EXAMINATION        Test used Initial score  24     Pain Summary VAS 0-8 6/10   Functional questionnaire LEFS  75% 25/80 69%   ROM Knee ext -30 -4    Knee flex 70 114 ° OP P!!   Strength Quad SLR with lag Cont. lag             Exercises/Interventions     Therapeutic Ex (81512)/NMR re-education (07974)  Sets/reps Resistance Notes/Cues/Progressions   Bike  Prone hang 6 min  Rocking w/ slight holds   Incline

## 2024-05-31 ENCOUNTER — HOSPITAL ENCOUNTER (OUTPATIENT)
Dept: PHYSICAL THERAPY | Age: 69
Setting detail: THERAPIES SERIES
Discharge: HOME | End: 2024-05-31
Payer: MEDICARE

## 2024-05-31 PROCEDURE — 97140 MANUAL THERAPY 1/> REGIONS: CPT

## 2024-05-31 PROCEDURE — 97110 THERAPEUTIC EXERCISES: CPT

## 2024-05-31 PROCEDURE — 97016 VASOPNEUMATIC DEVICE THERAPY: CPT

## 2024-05-31 NOTE — FLOWSHEET NOTE
[facility]- Outpatient Rehabilitation and Therapy [address], [place] [address] office: [phone] fax: [phone]    Physical Therapy: TREATMENT/PROGRESS NOTE   Patient: Sally Higgins (68 y.o. female)   Examination Date: 2024   :  1955 MRN: [id number]   Visit #: 13   Insurance Allowable Auth Needed   $40 BMN []Yes    [x]No    Insurance: Payor: [facility] MEDICARE / Plan: [facility] MEDICARE ADVANTAGE / Product Type: *No Product type* /   Insurance ID: [id number] - (Medicare Managed)  Secondary Insurance (if applicable): UMR   Treatment Diagnosis:     ICD-10-CM    1. Acute pain of left knee  M25.562       2. Effusion of left knee  M25.462       3. Stiffness of left knee  M25.662       4. Generalized weakness  R53.1          Medical Diagnosis:  Primary osteoarthritis of left knee [M17.12]   Referring Physician: Benny Pulido DO  PCP: Elieser Hairston DO     Plan of care signed (Y/N):     Date of Patient follow up with Physician:      Progress Report/POC: NO  Progress note due: 6/10/2024 (OR 10 visits /OR AUTH LIMITS, whichever is less)  POC update due: 2024    Surgery date: S/P TKA 2024                                             Precautions/ Contra-indications:           Latex allergy:  NO  Pacemaker:    NO  Contraindications for Manipulation: None  Other:    Preferred Language for Healthcare:   [x] English       [] other:    SUBJECTIVE EXAMINATION     Patient stated complaint: Patient reports knee pretty stiff/sore today. Did a lot of yard work yesterday and is paying for it.     OBJECTIVE EXAMINATION        Test used Initial score  24     Pain Summary VAS 0-8 6/10   Functional questionnaire LEFS  75%  69%   ROM Knee ext -30 -4    Knee flex 70 113 OP P!!   Strength Quad SLR with lag Cont. lag             Exercises/Interventions     Therapeutic Ex (51528)/NMR re-education (57464)  Sets/reps Resistance

## 2024-06-04 ENCOUNTER — PREP FOR PROCEDURE (OUTPATIENT)
Dept: ORTHOPEDIC SURGERY | Age: 69
End: 2024-06-04

## 2024-06-04 ENCOUNTER — OFFICE VISIT (OUTPATIENT)
Dept: ORTHOPEDIC SURGERY | Age: 69
End: 2024-06-04

## 2024-06-04 VITALS — BODY MASS INDEX: 30.12 KG/M2 | WEIGHT: 170 LBS | HEIGHT: 63 IN

## 2024-06-04 DIAGNOSIS — M17.12 PRIMARY OSTEOARTHRITIS OF LEFT KNEE: Primary | ICD-10-CM

## 2024-06-04 PROCEDURE — 99024 POSTOP FOLLOW-UP VISIT: CPT | Performed by: STUDENT IN AN ORGANIZED HEALTH CARE EDUCATION/TRAINING PROGRAM

## 2024-06-04 NOTE — PROGRESS NOTES
Chief Complaint  Knee Pain      History of Present Illness:  The patient is here for 2-month evaluation regarding her left total knee replacement.  She has been working with physical therapy.  Per the physical therapy notes her range of motion with overpressure has gotten from -4 to 113 degrees flexion.  However the patient is reporting continued difficulty with the knee.  She has difficulty getting her knee past 90 degrees on her own.    Prior HPI 5/14/2024:  The patient is here for repeat evaluation regarding her left knee.  The patient has been working aggressively with physical therapy.  However she is still discouraged from her lack of progress.  She still walks with a cane.    Prior HPI 4/23/24:  Sally Higgins is a pleasant 68 y.o. female here today for first postop evaluation regarding her left total knee replacement.  Date of procedure 4/4/2024.  The patient overall is doing okay.  She has a good amount of pain.  She is on her home Xarelto dose.  The patient is having some difficulty with range of motion.  She was unable to get home physical therapy.  She is walking with a walker today.  She denies any fevers or chills.         Medical History:  Patient's medications, allergies, past medical, surgical, social and family histories were reviewed and updated as appropriate.    Pertinent items are noted in HPI  Review of systems reviewed from Patient History Form dated on 6/4/24 and available in the patient's chart under the Media tab.       Vital Signs:  There were no vitals filed for this visit.      Constitutional: In no apparent distress. Normal affect. Alert and oriented X3 and is cooperative.       Left knee exam    Tender to palpation over the anterior medial aspect of the knee.  Well-healed surgical incisions without signs of infection or drainage.  Compartments soft. EHL/FHL/TA/GS complex motor intact. Sural, saphenous, superificial/deep peroneal, and plantar nerve distribution sensation grossly

## 2024-06-05 ENCOUNTER — HOSPITAL ENCOUNTER (OUTPATIENT)
Dept: PHYSICAL THERAPY | Age: 69
Setting detail: THERAPIES SERIES
Discharge: HOME | End: 2024-06-05
Payer: MEDICARE

## 2024-06-05 PROCEDURE — 97016 VASOPNEUMATIC DEVICE THERAPY: CPT

## 2024-06-05 PROCEDURE — 97140 MANUAL THERAPY 1/> REGIONS: CPT

## 2024-06-05 PROCEDURE — 97110 THERAPEUTIC EXERCISES: CPT

## 2024-06-05 NOTE — PROGRESS NOTES
Patient arrives ambulatory to the department with complaint of SOB that started this morning after she took some pain medication for a compression fracture in her back. Worse with ambulation.   Pre-Operative:  1.  Patient/Caregiver identifies - states name and date of birth.  2.  The patient is free from signs and symptoms of injury.  3.  The patient receives appropriate medication(s), safely administered during the Perioperative period.  4.  The patient is free from signs and symptoms of infection.  5.  The patient has wound / tissue perfusion.  6.  The patients's fluid, electrolyte, and acid-base balances are established preoperatively.  7.  The patient's pulmonary function is established preoperatively.  8.  The patient's cardiovascular status is established preoperatively.  9.  The patient / caregiver demonstrates knowledge of nutritional management related to the operative or other invasive procedure.  10.  The patient/caregiver demonstrates knowledge of medication management.  11.  The patient/caregiver demonstrates knowledge of pain management.  12.  The patient participates in the rehabilitation process as applicable.  13.  The patient/caregiver participates in decisions affection his or her Perioperative plan of care.  14.  The patient's care is consistent with the individualized Perioperative plan of care.  15.  The patient's right to privacy is maintained.  16.  The patient is the recipient of competent and ethical care within legal standards of practice.  17.  The patient's value system, lifestyle, ethnicity, and culture are considered, respected, and incorporated in the Perioperative plan of care and understands special services available.  18.  The patient demonstrates and/or reports adequate pain control throughout the the Perioperative period.  19.  The patient's neurological status is established preoperatively.  20.  The patient/caregiver demonstrates knowledge of the expected responses to the operative or invasive procedure.  21.  Patient/Caregiver has reduced anxiety.  Interventions- Familiarize with environment and equipment.  22. Patient/Caregiver verbalizes understanding of Phase I

## 2024-06-05 NOTE — FLOWSHEET NOTE
function, and ADLs as indicated by functional deficits.   Status: [x] Progressing: [] Met: [] Not Met: [] Adjusted    Long Term Goals: To be achieved in:  12  weeks  Disability index score of 37% or less for the LEFS to assist with return top prior level of function.    Status: [x] Progressing: [] Met: [] Not Met: [] Adjusted  Improve knee flexion AROM to 120-130 degrees or better to allow for proper joint functioning as indicated by patients functional deficits.  Status: [x] Progressing: [] Met: [] Not Met: [] Adjusted  Pt to improve strength to 4+/5 or better of quadricepsto allow for proper muscle and joint use in functional mobility, ADLs and prior level of function  Status: [x] Progressing: [] Met: [] Not Met: [] Adjusted  Patient will return to walk 1 mile, up/down 1 flight of stairs, and bed mobility without increased symptoms or restriction to work towards return to prior level of function.  Status: [x] Progressing: [] Met: [] Not Met: [] Adjusted  Patient will be able to manage symptoms while resuming full duty at work.     Status: [x] Progressing: [] Met: [] Not Met: [] Adjusted    TREATMENT PLAN     Frequency/Duration: 1-3x/week for  12  weeks for the following treatment interventions:    Interventions:  [x] Therapeutic exercise including: strength training, ROM, including postural re-education.   [x] NMR activation and proprioception, including postural re-education.    [x] Manual therapy as indicated to include: PROM, Gr I-IV mobilizations, STM, and Dry Needling/IASTM  [x] Modalities as needed that may include: Vasoneumatic Compression  [x] Patient education on joint protection, postural re-education, activity modification, progression of HEP.        Plan: POC initiated as per evaluation    Electronically Signed by Deng Staton PT  Date: 06/05/2024    Note: Portions of this note have been templated and/or copied from initial evaluation, reassessments and prior notes for documentation efficiency.

## 2024-06-07 ENCOUNTER — APPOINTMENT (OUTPATIENT)
Dept: PHYSICAL THERAPY | Age: 69
End: 2024-06-07
Payer: MEDICARE

## 2024-06-10 ENCOUNTER — HOSPITAL ENCOUNTER (OUTPATIENT)
Age: 69
Setting detail: OUTPATIENT SURGERY
Discharge: HOME | End: 2024-06-10
Attending: STUDENT IN AN ORGANIZED HEALTH CARE EDUCATION/TRAINING PROGRAM | Admitting: STUDENT IN AN ORGANIZED HEALTH CARE EDUCATION/TRAINING PROGRAM
Payer: COMMERCIAL

## 2024-06-10 ENCOUNTER — ANESTHESIA (OUTPATIENT)
Dept: OPERATING ROOM | Age: 69
End: 2024-06-10
Payer: COMMERCIAL

## 2024-06-10 ENCOUNTER — ANESTHESIA EVENT (OUTPATIENT)
Dept: OPERATING ROOM | Age: 69
End: 2024-06-10
Payer: COMMERCIAL

## 2024-06-10 VITALS
BODY MASS INDEX: 27.49 KG/M2 | OXYGEN SATURATION: 98 % | DIASTOLIC BLOOD PRESSURE: 67 MMHG | RESPIRATION RATE: 12 BRPM | HEART RATE: 80 BPM | WEIGHT: 165 LBS | TEMPERATURE: 97 F | HEIGHT: 65 IN | SYSTOLIC BLOOD PRESSURE: 130 MMHG

## 2024-06-10 PROCEDURE — 6360000002 HC RX W HCPCS: Performed by: ANESTHESIOLOGY

## 2024-06-10 PROCEDURE — 7100000000 HC PACU RECOVERY - FIRST 15 MIN: Performed by: STUDENT IN AN ORGANIZED HEALTH CARE EDUCATION/TRAINING PROGRAM

## 2024-06-10 PROCEDURE — 3600000002 HC SURGERY LEVEL 2 BASE: Performed by: STUDENT IN AN ORGANIZED HEALTH CARE EDUCATION/TRAINING PROGRAM

## 2024-06-10 PROCEDURE — 2580000003 HC RX 258: Performed by: ANESTHESIOLOGY

## 2024-06-10 PROCEDURE — 64447 NJX AA&/STRD FEMORAL NRV IMG: CPT | Performed by: ANESTHESIOLOGY

## 2024-06-10 PROCEDURE — 7100000010 HC PHASE II RECOVERY - FIRST 15 MIN: Performed by: STUDENT IN AN ORGANIZED HEALTH CARE EDUCATION/TRAINING PROGRAM

## 2024-06-10 PROCEDURE — 3700000000 HC ANESTHESIA ATTENDED CARE: Performed by: STUDENT IN AN ORGANIZED HEALTH CARE EDUCATION/TRAINING PROGRAM

## 2024-06-10 PROCEDURE — 7100000001 HC PACU RECOVERY - ADDTL 15 MIN: Performed by: STUDENT IN AN ORGANIZED HEALTH CARE EDUCATION/TRAINING PROGRAM

## 2024-06-10 PROCEDURE — 2500000003 HC RX 250 WO HCPCS: Performed by: ANESTHESIOLOGY

## 2024-06-10 RX ORDER — OXYCODONE HYDROCHLORIDE 5 MG/1
10 TABLET ORAL PRN
Status: DISCONTINUED | OUTPATIENT
Start: 2024-06-10 | End: 2024-06-10 | Stop reason: HOSPADM

## 2024-06-10 RX ORDER — LABETALOL HYDROCHLORIDE 5 MG/ML
10 INJECTION, SOLUTION INTRAVENOUS
Status: DISCONTINUED | OUTPATIENT
Start: 2024-06-10 | End: 2024-06-10 | Stop reason: HOSPADM

## 2024-06-10 RX ORDER — BUPIVACAINE HYDROCHLORIDE 5 MG/ML
INJECTION, SOLUTION EPIDURAL; INTRACAUDAL
Status: COMPLETED
Start: 2024-06-10 | End: 2024-06-10

## 2024-06-10 RX ORDER — LIDOCAINE HYDROCHLORIDE 10 MG/ML
INJECTION, SOLUTION EPIDURAL; INFILTRATION; INTRACAUDAL; PERINEURAL
Status: DISCONTINUED
Start: 2024-06-10 | End: 2024-06-10 | Stop reason: HOSPADM

## 2024-06-10 RX ORDER — ONDANSETRON 2 MG/ML
4 INJECTION INTRAMUSCULAR; INTRAVENOUS
Status: DISCONTINUED | OUTPATIENT
Start: 2024-06-10 | End: 2024-06-10 | Stop reason: HOSPADM

## 2024-06-10 RX ORDER — SODIUM CHLORIDE 0.9 % (FLUSH) 0.9 %
5-40 SYRINGE (ML) INJECTION PRN
Status: DISCONTINUED | OUTPATIENT
Start: 2024-06-10 | End: 2024-06-10 | Stop reason: HOSPADM

## 2024-06-10 RX ORDER — MIDAZOLAM HYDROCHLORIDE 1 MG/ML
INJECTION INTRAMUSCULAR; INTRAVENOUS PRN
Status: DISCONTINUED | OUTPATIENT
Start: 2024-06-10 | End: 2024-06-10 | Stop reason: SDUPTHER

## 2024-06-10 RX ORDER — PROPOFOL 10 MG/ML
INJECTION, EMULSION INTRAVENOUS PRN
Status: DISCONTINUED | OUTPATIENT
Start: 2024-06-10 | End: 2024-06-10 | Stop reason: SDUPTHER

## 2024-06-10 RX ORDER — OXYCODONE HYDROCHLORIDE 5 MG/1
5 TABLET ORAL PRN
Status: DISCONTINUED | OUTPATIENT
Start: 2024-06-10 | End: 2024-06-10 | Stop reason: HOSPADM

## 2024-06-10 RX ORDER — SODIUM CHLORIDE, SODIUM LACTATE, POTASSIUM CHLORIDE, CALCIUM CHLORIDE 600; 310; 30; 20 MG/100ML; MG/100ML; MG/100ML; MG/100ML
INJECTION, SOLUTION INTRAVENOUS CONTINUOUS
Status: DISCONTINUED | OUTPATIENT
Start: 2024-06-10 | End: 2024-06-10 | Stop reason: HOSPADM

## 2024-06-10 RX ORDER — LIDOCAINE HYDROCHLORIDE 20 MG/ML
INJECTION, SOLUTION EPIDURAL; INFILTRATION; INTRACAUDAL; PERINEURAL PRN
Status: DISCONTINUED | OUTPATIENT
Start: 2024-06-10 | End: 2024-06-10 | Stop reason: SDUPTHER

## 2024-06-10 RX ORDER — MEPERIDINE HYDROCHLORIDE 25 MG/ML
12.5 INJECTION INTRAMUSCULAR; INTRAVENOUS; SUBCUTANEOUS EVERY 5 MIN PRN
Status: DISCONTINUED | OUTPATIENT
Start: 2024-06-10 | End: 2024-06-10 | Stop reason: HOSPADM

## 2024-06-10 RX ORDER — ONDANSETRON 4 MG/1
4 TABLET, FILM COATED ORAL 3 TIMES DAILY PRN
Qty: 15 TABLET | Refills: 0 | Status: SHIPPED | OUTPATIENT
Start: 2024-06-10

## 2024-06-10 RX ORDER — DIPHENHYDRAMINE HYDROCHLORIDE 50 MG/ML
12.5 INJECTION INTRAMUSCULAR; INTRAVENOUS
Status: DISCONTINUED | OUTPATIENT
Start: 2024-06-10 | End: 2024-06-10 | Stop reason: HOSPADM

## 2024-06-10 RX ORDER — BUPIVACAINE HYDROCHLORIDE 5 MG/ML
INJECTION, SOLUTION EPIDURAL; INTRACAUDAL PRN
Status: DISCONTINUED | OUTPATIENT
Start: 2024-06-10 | End: 2024-06-10 | Stop reason: SDUPTHER

## 2024-06-10 RX ORDER — MIDAZOLAM HYDROCHLORIDE 1 MG/ML
INJECTION INTRAMUSCULAR; INTRAVENOUS
Status: COMPLETED
Start: 2024-06-10 | End: 2024-06-10

## 2024-06-10 RX ORDER — NALOXONE HYDROCHLORIDE 0.4 MG/ML
INJECTION, SOLUTION INTRAMUSCULAR; INTRAVENOUS; SUBCUTANEOUS PRN
Status: DISCONTINUED | OUTPATIENT
Start: 2024-06-10 | End: 2024-06-10 | Stop reason: HOSPADM

## 2024-06-10 RX ORDER — POLYETHYLENE GLYCOL 3350 17 G/17G
17 POWDER, FOR SOLUTION ORAL DAILY
Qty: 510 G | Refills: 0 | Status: SHIPPED | OUTPATIENT
Start: 2024-06-10 | End: 2024-07-10

## 2024-06-10 RX ORDER — ONDANSETRON 2 MG/ML
INJECTION INTRAMUSCULAR; INTRAVENOUS PRN
Status: DISCONTINUED | OUTPATIENT
Start: 2024-06-10 | End: 2024-06-10 | Stop reason: SDUPTHER

## 2024-06-10 RX ORDER — KETOROLAC TROMETHAMINE 30 MG/ML
INJECTION, SOLUTION INTRAMUSCULAR; INTRAVENOUS
Status: DISCONTINUED
Start: 2024-06-10 | End: 2024-06-10 | Stop reason: HOSPADM

## 2024-06-10 RX ORDER — SODIUM CHLORIDE 9 MG/ML
INJECTION, SOLUTION INTRAVENOUS PRN
Status: DISCONTINUED | OUTPATIENT
Start: 2024-06-10 | End: 2024-06-10 | Stop reason: HOSPADM

## 2024-06-10 RX ORDER — DEXAMETHASONE SODIUM PHOSPHATE 4 MG/ML
INJECTION, SOLUTION INTRA-ARTICULAR; INTRALESIONAL; INTRAMUSCULAR; INTRAVENOUS; SOFT TISSUE PRN
Status: DISCONTINUED | OUTPATIENT
Start: 2024-06-10 | End: 2024-06-10 | Stop reason: SDUPTHER

## 2024-06-10 RX ORDER — KETOROLAC TROMETHAMINE 30 MG/ML
30 INJECTION, SOLUTION INTRAMUSCULAR; INTRAVENOUS ONCE
Status: COMPLETED | OUTPATIENT
Start: 2024-06-10 | End: 2024-06-10

## 2024-06-10 RX ORDER — SODIUM CHLORIDE 0.9 % (FLUSH) 0.9 %
5-40 SYRINGE (ML) INJECTION EVERY 12 HOURS SCHEDULED
Status: DISCONTINUED | OUTPATIENT
Start: 2024-06-10 | End: 2024-06-10 | Stop reason: HOSPADM

## 2024-06-10 RX ADMIN — DEXAMETHASONE SODIUM PHOSPHATE 8 MG: 4 INJECTION, SOLUTION INTRAMUSCULAR; INTRAVENOUS at 13:08

## 2024-06-10 RX ADMIN — KETOROLAC TROMETHAMINE 30 MG: 30 INJECTION, SOLUTION INTRAMUSCULAR at 13:20

## 2024-06-10 RX ADMIN — ONDANSETRON HYDROCHLORIDE 4 MG: 2 INJECTION, SOLUTION INTRAMUSCULAR; INTRAVENOUS at 13:08

## 2024-06-10 RX ADMIN — PROPOFOL 40 MG: 10 INJECTION, EMULSION INTRAVENOUS at 13:08

## 2024-06-10 RX ADMIN — LIDOCAINE HYDROCHLORIDE 5 ML: 20 INJECTION, SOLUTION EPIDURAL; INFILTRATION; INTRACAUDAL; PERINEURAL at 13:05

## 2024-06-10 RX ADMIN — MIDAZOLAM 2 MG: 1 INJECTION INTRAMUSCULAR; INTRAVENOUS at 12:46

## 2024-06-10 RX ADMIN — SODIUM CHLORIDE, POTASSIUM CHLORIDE, SODIUM LACTATE AND CALCIUM CHLORIDE: 600; 310; 30; 20 INJECTION, SOLUTION INTRAVENOUS at 12:28

## 2024-06-10 RX ADMIN — BUPIVACAINE HYDROCHLORIDE 20 ML: 5 INJECTION, SOLUTION EPIDURAL; INTRACAUDAL; PERINEURAL at 12:46

## 2024-06-10 RX ADMIN — PROPOFOL 120 MG: 10 INJECTION, EMULSION INTRAVENOUS at 13:05

## 2024-06-10 ASSESSMENT — PAIN DESCRIPTION - ORIENTATION
ORIENTATION: LEFT
ORIENTATION: LEFT

## 2024-06-10 ASSESSMENT — PAIN DESCRIPTION - LOCATION
LOCATION: KNEE
LOCATION: KNEE

## 2024-06-10 ASSESSMENT — PAIN SCALES - GENERAL: PAINLEVEL_OUTOF10: 5

## 2024-06-10 ASSESSMENT — PAIN DESCRIPTION - PAIN TYPE
TYPE: SURGICAL PAIN
TYPE: SURGICAL PAIN

## 2024-06-10 ASSESSMENT — ENCOUNTER SYMPTOMS: SHORTNESS OF BREATH: 1

## 2024-06-10 ASSESSMENT — PAIN - FUNCTIONAL ASSESSMENT
PAIN_FUNCTIONAL_ASSESSMENT: 0-10
PAIN_FUNCTIONAL_ASSESSMENT: 0-10

## 2024-06-10 NOTE — ANESTHESIA POSTPROCEDURE EVALUATION
Department of Anesthesiology  Postprocedure Note    Patient: Sally Higgins  MRN: [id number]  YOB: 1955  Date of evaluation: 6/10/2024    Procedure Summary       Date: 06/10/24 Room / Location: [address]    Anesthesia Start: 1303 Anesthesia Stop: 1315    Procedure: LEFT KNEE MANIPULATION UNDER ANESTHESIA - (Left) Diagnosis:       Osteoarthritis of left knee      (Osteoarthritis of left knee [M17.12])    Surgeons: Benny Pulido DO Responsible Provider: Manuel Gregory MD    Anesthesia Type: MAC ASA Status: 3            Anesthesia Type: No value filed.    Brigida Phase I: Brigida Score: 10    Brigida Phase II: Brigida Score: 10    Anesthesia Post Evaluation    Patient location during evaluation: PACU  Patient participation: complete - patient participated  Level of consciousness: awake and alert  Airway patency: patent  Nausea & Vomiting: no nausea and no vomiting  Cardiovascular status: blood pressure returned to baseline  Respiratory status: acceptable  Hydration status: euvolemic  Comments: VSS on transfer to phase 2 recovery.  No anesthetic complications.  Pain management: adequate    No notable events documented.

## 2024-06-10 NOTE — BRIEF OP NOTE
Brief Postoperative Note      Patient: Sally Higgins  YOB: 1955  MRN: [id number]    Date of Procedure: 6/10/2024    Pre-Op Diagnosis Codes:  Left total knee arthrofibrosis    Post-Op Diagnosis: Same       Procedure(s):  LEFT KNEE MANIPULATION UNDER ANESTHESIA -    Surgeon(s):  Benny Pulido DO    Assistant:  * No surgical staff found *    Anesthesia: General    Estimated Blood Loss (mL): Minimal    Complications: None    Findings:  Infection Present At Time Of Surgery (PATOS) (choose all levels that have infection present):  No infection present  Other Findings: see op note    Electronically signed by Benny Pulido DO on 6/10/2024 at 1:09 PM

## 2024-06-10 NOTE — ANESTHESIA PROCEDURE NOTES
Peripheral Block    Patient location during procedure: pre-op  Reason for block: post-op pain management and at surgeon's request  Start time: 6/10/2024 12:46 PM  End time: 6/10/2024 12:50 PM  Staffing  Performed: anesthesiologist   Anesthesiologist: Manule Gregory MD  Performed by: Manuel Gregory MD  Authorized by: Manuel Gregory MD    Preanesthetic Checklist  Completed: patient identified, IV checked, site marked, risks and benefits discussed, surgical/procedural consents, equipment checked, pre-op evaluation, timeout performed, anesthesia consent given, oxygen available and monitors applied/VS acknowledged  Peripheral Block   Patient position: supine  Prep: ChloraPrep  Provider prep: mask and sterile gloves  Patient monitoring: cardiac monitor, continuous pulse ox, frequent blood pressure checks and IV access  Block type: Femoral  Adductor canal (Low Femoral)  Laterality: left  Injection technique: single-shot  Guidance: ultrasound guided  Local infiltration: lidocaine  Infiltration strength: 1 %  Local infiltration: lidocaine  Dose: 3 mL    Needle   Needle type: insulated echogenic nerve stimulator needle   Needle gauge: 21 G  Needle localization: ultrasound guidance  Needle length: 10 cm  Assessment   Injection assessment: negative aspiration for heme, no paresthesia on injection and local visualized surrounding nerve on ultrasound  Paresthesia pain: none  Slow fractionated injection: yes  Hemodynamics: stable  Outcomes: uncomplicated and patient tolerated procedure well    Additional Notes  Immediately prior to procedure a \"time out\" was called to verify the correct patient, allergies, laterality, procedure and equipment. Time out performed with  RN    Local Anesthetic: 0.5 %  Bupivacaine  plus epi 1 to 200K Amount: 20 ml  in 5 ml increments after negative aspiration each time.

## 2024-06-10 NOTE — H&P
meniere's disease         REVIEW OF SYSTEMS:  Review of Systems   Constitutional: Negative for fever and chills.   HENT: Negative for congestion and eye pain.    Eyes: Negative for blurred vision and double vision.   Respiratory: Negative for cough, shortness of breath and wheezing.    Cardiovascular: Negative for chest pain and palpitations.   Gastrointestinal: Negative for nausea. Negative for vomiting.   Musculoskeletal: Positive for myalgias and joint pain L leg.   Skin: Negative for itching and rash.   Neurological: Negative for dizziness, sensory change and headaches.   Psychiatric/Behavioral: Negative for depression and suicidal ideas.       PHYSICAL EXAM:  not currently breastfeeding.  Gen: alert and oriented  Chest: symmetric chest excursion, non labored breathing  Heart: RRR   Left lower extremity: No ecchymoses, abrasions, deformity, or lacerations.  Skin intact.  Non tender to palpation. Compartments soft. EHL/FHL/TA/GS complex motor intact. Sural, saphenous, superificial/deep peroneal, and plantar nerve distribution sensation grossly intact. Dorsalis pedis/posterior tibial pulses 2+ with BCR.          LABS:  No results for input(s): \"WBC\", \"HGB\", \"HCT\", \"PLT\", \"INR\", \"NA\", \"K\", \"BUN\", \"CREATININE\", \"GLUCOSE\" in the last 72 hours.    Invalid input(s): \"PT\", \"PTT\"       A/P: 68 y.o. female L TKA arthrofibrosis  - OR for L knee manipulation under anesthesia  - NPO since midnight  - site marked  - abx on hold for OR  - consent in chart    Benny Pulido DO   12:15 PM 6/10/2024

## 2024-06-10 NOTE — ANESTHESIA PRE PROCEDURE
Department of Anesthesiology  Preprocedure Note       Name:  Sally Higgins   Age:  68 y.o.  :  1955                                          MRN:  [id number]         Date:  6/10/2024      Surgeon: Surgeon(s):  Benny Pulido DO    Procedure: Procedure(s):  LEFT KNEE MANIPULATION UNDER ANESTHESIA --BLOCK--    Medications prior to admission:   Prior to Admission medications    Medication Sig Start Date End Date Taking? Authorizing Provider   polyethylene glycol (GLYCOLAX) 17 GM/SCOOP powder Take 17 g by mouth daily 6/10/24 7/10/24 Yes Benny Pulido DO   ondansetron (ZOFRAN) 4 MG tablet Take 1 tablet by mouth 3 times daily as needed for Nausea or Vomiting 6/10/24  Yes Benny Pulido DO   methylPREDNISolone (MEDROL, DWIGHT,) 4 MG tablet Take by mouth as directed.  Patient not taking: Reported on 6/10/2024 4/23/24   Benny Pulido DO   buPROPion (WELLBUTRIN XL) 150 MG extended release tablet TAKE 1 TABLET BY MOUTH EVERY MORNING 24   Marsha Beebe APRN - CNP   pramipexole (MIRAPEX) 0.5 MG tablet TAKE 1 TABLET BY MOUTH AT BEDTIME 24   Elieser Hairston DO   ondansetron (ZOFRAN) 4 MG tablet Take 1 tablet by mouth 3 times daily as needed for Nausea or Vomiting 24   Benny Pulido DO   traZODone (DESYREL) 50 MG tablet TAKE 2 TABLETS BY MOUTH EVERY NIGHT 3/25/24   Elieser Hairston DO   atorvastatin (LIPITOR) 10 MG tablet TAKE 1 TABLET BY MOUTH DAILY 3/4/24   Elieser Hairston DO   dilTIAZem (CARDIZEM CD) 180 MG extended release capsule TAKE 1 CAPSULE BY MOUTH DAILY 23   DAWOOD Santos Jr., MD   sertraline (ZOLOFT) 100 MG tablet Take 1 tablet by mouth daily  Patient not taking: Reported on 6/10/2024 8/16/23   Elieser Hairston DO   flecainide (TAMBOCOR) 100 MG tablet Take 1 tablet by mouth daily 8/15/23   DAWOOD Santos Jr., MD   rivaroxaban (XARELTO) 20 MG TABS tablet Take 1 tablet by mouth Daily with supper 8/15/23   DAWOOD Santos Jr., MD       Current medications:    Current

## 2024-06-10 NOTE — DISCHARGE INSTRUCTIONS
your surgeon if it has been more than 8 hrs  Bruising or soreness at the IV site - call if it remains red, firm or there is drainage             FEMALES OF CHILDBEARING AGE WHO ARE TAKING BIRTH CONTROL PILLS:  You may have received a medication during your procedure that interferes with the   actions of birth control pills (Bridion or Emend). Use some other kind of birth control in addition to your pills, like a condom, for 1 month after your procedure to prevent unwanted pregnancy.    The following instructions are to be followed if you have a known history or diagnosis of sleep apnea:  For all sleep apnea patients:  ? Sleep on your side or sitting up in a chair whenever possible, especially the first 24 hours after surgery.  ? Use only medicines prescribed by your doctor.    ? Do not drink alcohol.  ? If you have a dental device to assist you while at rest, use it at all times for the first 24 hours.  For patients using CPAP machines:  ? Use your CPAP machine during all periods of sleep as usual.  ? Use your CPAP machine during all periods of daytime rest while on pain medicines.  ** Follow up with your primary care doctor for continued care.    IF YOU DO NOT TAKE ALL OF YOUR NARCOTIC PAIN MEDICATION, please dispose of them responsibly. There are drop off boxes in the Emergency Departments 24/7 at both [facility] and [facility]. If these locations are not convenient, other options for discarding them can be found at:  [email or web address]    Hospital or office staff may NOT accept any medications to drop off in the cabinet for you.  PERIPHERAL NERVE BLOCK INSTRUCTIONS     Please remember while having a nerve block you are at an increased risk for BALANCE ISSUES AND FALLS!!  You were given a nerve block today from the anesthesiologist. Most nerve blocks last anywhere from 6-36 hours.  You should start taking your pain medication before the block wears off or when you first begin feeling discomfort. It takes

## 2024-06-10 NOTE — OP NOTE
Operative Note      Patient: Sally Higgins  YOB: 1955  MRN: [id number]    Date of Procedure: 6/10/2024    Pre-Op Diagnosis Codes:  Left total knee arthrofibrosis    Post-Op Diagnosis: Same       Procedure(s):  LEFT KNEE MANIPULATION UNDER ANESTHESIA -    Surgeon(s):  Benny Pulido DO    Assistant:  * No surgical staff found *    Anesthesia: General    Estimated Blood Loss (mL): Minimal    Complications: None    Findings:  Infection Present At Time Of Surgery (PATOS) (choose all levels that have infection present):  No infection present    Detailed Description of Procedure:   The patient is a 68F here today for L knee stiffness following a total knee arthroplasty on 4/4/24. Despite extensive therapy they were unable to achieve satisfactory motion. Indications were met for L knee manipulation under anesthesia.    Operative Summary:  The patient was met in the preoperative area where the appropriate surgical site was marked. Written consent was obtained after discussing the risks, benefits, and alternative treatment options with the patient in detail. The patient agreed to move forward with the proposed procedure.    The patient was wheeled back to the OR and MAC anesthesia was induced by anesthesia without complication. A timeout was performed. Pre-manipulation motion was noted to be:  degrees, and pictures were taken for the chart.    Gentle flexion, extension, and patellar mobilization was performed with a short lever arm near the proximal tibia to prevent fracture. The leg was also held at the distal femur and rocked up and down to allow the weight of the leg and foot to gain further flexion safely.    Post manipulation motion demonstrated: 5-140 degrees.     The patient was then awoken from anesthesia without complication and transferred to the PACU in stable condition.    Post op Plan:  The patient will be full weightbearing as tolerated with immediate motion with PT, to begin later this

## 2024-06-11 ENCOUNTER — HOSPITAL ENCOUNTER (OUTPATIENT)
Dept: PHYSICAL THERAPY | Age: 69
Setting detail: THERAPIES SERIES
Discharge: HOME | End: 2024-06-11
Payer: MEDICARE

## 2024-06-11 PROCEDURE — 97164 PT RE-EVAL EST PLAN CARE: CPT

## 2024-06-11 PROCEDURE — 97016 VASOPNEUMATIC DEVICE THERAPY: CPT

## 2024-06-11 PROCEDURE — 97110 THERAPEUTIC EXERCISES: CPT

## 2024-06-11 PROCEDURE — 97140 MANUAL THERAPY 1/> REGIONS: CPT

## 2024-06-11 NOTE — FLOWSHEET NOTE
[facility]- Outpatient Rehabilitation and Therapy [address], [place] [address] office: [phone] fax: [phone]    Physical Therapy: TREATMENT/PROGRESS NOTE   Patient: Sally Higgins (68 y.o. female)   Examination Date: 2024   :  1955 MRN: [id number]   Visit #: 15   Insurance Allowable Auth Needed   $40 BMN []Yes    [x]No    Insurance: Payor: [facility] MEDICARE / Plan: [facility] MEDICARE ADVANTAGE / Product Type: *No Product type* /   Insurance ID: [id number] - (Medicare Managed)  Secondary Insurance (if applicable): UMR   Treatment Diagnosis:     ICD-10-CM    1. Acute pain of left knee  M25.562       2. Effusion of left knee  M25.462       3. Stiffness of left knee  M25.662       4. Generalized weakness  R53.1          Medical Diagnosis:  Primary osteoarthritis of left knee [M17.12]   Referring Physician: Benny Pulido DO  PCP: Elieser Hairston DO     Plan of care signed (Y/N):     Date of Patient follow up with Physician:      Progress Report/POC: NO  Progress note due: 7/10/2024 (OR 10 visits /OR AUTH LIMITS, whichever is less)  POC update due: 2024    Surgery date: S/P TKA 2024 / manip 2024                                            Precautions/ Contra-indications:           Latex allergy:  NO  Pacemaker:    NO  Contraindications for Manipulation: None  Other:    Preferred Language for Healthcare:   [x] English       [] other:    SUBJECTIVE EXAMINATION     Patient stated complaint: Patient reports she had manip yesterday. Nerve block wearing off and pain getting worse again. Knee bent 5-140 while under    OBJECTIVE EXAMINATION        Test used Initial score  24     Pain Summary VAS 0-8 6/10   Functional questionnaire LEFS  75% /80 69%   ROM Knee ext -30 -4    Knee flex 70 90  OP P!!   Strength Quad SLR with lag Cont. lag             Exercises/Interventions     Therapeutic Ex (52810)/NMR re-education (04593)

## 2024-06-12 ENCOUNTER — HOSPITAL ENCOUNTER (OUTPATIENT)
Dept: PHYSICAL THERAPY | Age: 69
Setting detail: THERAPIES SERIES
Discharge: HOME | End: 2024-06-12
Payer: MEDICARE

## 2024-06-12 PROCEDURE — 97110 THERAPEUTIC EXERCISES: CPT

## 2024-06-12 PROCEDURE — 97140 MANUAL THERAPY 1/> REGIONS: CPT

## 2024-06-12 PROCEDURE — 97016 VASOPNEUMATIC DEVICE THERAPY: CPT

## 2024-06-12 NOTE — FLOWSHEET NOTE
facilitate improvement in movement, function, and ADLs as indicated by functional deficits.   Status: [x] Progressing: [] Met: [] Not Met: [] Adjusted    Long Term Goals: To be achieved in:  12  weeks  Disability index score of 37% or less for the LEFS to assist with return top prior level of function.    Status: [x] Progressing: [] Met: [] Not Met: [] Adjusted  Improve knee flexion AROM to 120-130 degrees or better to allow for proper joint functioning as indicated by patients functional deficits.  Status: [x] Progressing: [] Met: [] Not Met: [] Adjusted  Pt to improve strength to 4+/5 or better of quadricepsto allow for proper muscle and joint use in functional mobility, ADLs and prior level of function  Status: [x] Progressing: [] Met: [] Not Met: [] Adjusted  Patient will return to walk 1 mile, up/down 1 flight of stairs, and bed mobility without increased symptoms or restriction to work towards return to prior level of function.  Status: [x] Progressing: [] Met: [] Not Met: [] Adjusted  Patient will be able to manage symptoms while resuming full duty at work.     Status: [x] Progressing: [] Met: [] Not Met: [] Adjusted    TREATMENT PLAN     Frequency/Duration: 1-3x/week for  12  weeks for the following treatment interventions:    Interventions:  [x] Therapeutic exercise including: strength training, ROM, including postural re-education.   [x] NMR activation and proprioception, including postural re-education.    [x] Manual therapy as indicated to include: PROM, Gr I-IV mobilizations, STM, and Dry Needling/IASTM  [x] Modalities as needed that may include: Vasoneumatic Compression  [x] Patient education on joint protection, postural re-education, activity modification, progression of HEP.        Plan: POC initiated as per evaluation    Electronically Signed by Deng Staton PT  Date: 06/12/2024    Note: Portions of this note have been templated and/or copied from initial evaluation, reassessments and prior

## 2024-06-13 ENCOUNTER — HOSPITAL ENCOUNTER (OUTPATIENT)
Dept: PHYSICAL THERAPY | Age: 69
Setting detail: THERAPIES SERIES
Discharge: HOME | End: 2024-06-13
Payer: MEDICARE

## 2024-06-13 ENCOUNTER — TELEPHONE (OUTPATIENT)
Dept: ORTHOPEDIC SURGERY | Age: 69
End: 2024-06-13

## 2024-06-13 PROCEDURE — 97110 THERAPEUTIC EXERCISES: CPT

## 2024-06-13 PROCEDURE — 97016 VASOPNEUMATIC DEVICE THERAPY: CPT

## 2024-06-13 PROCEDURE — 97140 MANUAL THERAPY 1/> REGIONS: CPT

## 2024-06-13 NOTE — FLOWSHEET NOTE
improvement in movement, function, and ADLs as indicated by functional deficits.   Status: [x] Progressing: [] Met: [] Not Met: [] Adjusted    Long Term Goals: To be achieved in:  12  weeks  Disability index score of 37% or less for the LEFS to assist with return top prior level of function.    Status: [x] Progressing: [] Met: [] Not Met: [] Adjusted  Improve knee flexion AROM to 120-130 degrees or better to allow for proper joint functioning as indicated by patients functional deficits.  Status: [x] Progressing: [] Met: [] Not Met: [] Adjusted  Pt to improve strength to 4+/5 or better of quadricepsto allow for proper muscle and joint use in functional mobility, ADLs and prior level of function  Status: [x] Progressing: [] Met: [] Not Met: [] Adjusted  Patient will return to walk 1 mile, up/down 1 flight of stairs, and bed mobility without increased symptoms or restriction to work towards return to prior level of function.  Status: [x] Progressing: [] Met: [] Not Met: [] Adjusted  Patient will be able to manage symptoms while resuming full duty at work.     Status: [x] Progressing: [] Met: [] Not Met: [] Adjusted    TREATMENT PLAN     Frequency/Duration: 1-3x/week for  12  weeks for the following treatment interventions:    Interventions:  [x] Therapeutic exercise including: strength training, ROM, including postural re-education.   [x] NMR activation and proprioception, including postural re-education.    [x] Manual therapy as indicated to include: PROM, Gr I-IV mobilizations, STM, and Dry Needling/IASTM  [x] Modalities as needed that may include: Vasoneumatic Compression  [x] Patient education on joint protection, postural re-education, activity modification, progression of HEP.        Plan: POC initiated as per evaluation    Electronically Signed by Deng Staton PT  Date: 06/13/2024    Note: Portions of this note have been templated and/or copied from initial evaluation, reassessments and prior notes for

## 2024-06-14 ENCOUNTER — HOSPITAL ENCOUNTER (OUTPATIENT)
Dept: PHYSICAL THERAPY | Age: 69
Setting detail: THERAPIES SERIES
Discharge: HOME | End: 2024-06-14
Payer: MEDICARE

## 2024-06-14 PROCEDURE — 97110 THERAPEUTIC EXERCISES: CPT

## 2024-06-14 PROCEDURE — 97140 MANUAL THERAPY 1/> REGIONS: CPT

## 2024-06-14 PROCEDURE — 97016 VASOPNEUMATIC DEVICE THERAPY: CPT

## 2024-06-14 NOTE — FLOWSHEET NOTE
[facility]- Outpatient Rehabilitation and Therapy [address], [place] [address] office: [phone] fax: [phone]    Physical Therapy: TREATMENT/PROGRESS NOTE   Patient: Sally Higgins (68 y.o. female)   Examination Date: 2024   :  1955 MRN: [id number]   Visit #: 18   Insurance Allowable Auth Needed   $40 BMN []Yes    [x]No    Insurance: Payor: [facility] MEDICARE / Plan: [facility] MEDICARE ADVANTAGE / Product Type: *No Product type* /   Insurance ID: [id number] - (Medicare Managed)  Secondary Insurance (if applicable): UMR   Treatment Diagnosis:     ICD-10-CM    1. Acute pain of left knee  M25.562       2. Effusion of left knee  M25.462       3. Stiffness of left knee  M25.662       4. Generalized weakness  R53.1          Medical Diagnosis:  Primary osteoarthritis of left knee [M17.12]   Referring Physician: Benny Pulido DO  PCP: Elieser Hairston DO     Plan of care signed (Y/N):     Date of Patient follow up with Physician:      Progress Report/POC: NO  Progress note due: 7/10/2024 (OR 10 visits /OR AUTH LIMITS, whichever is less)  POC update due: 2024    Surgery date: S/P TKA 2024 / manip 2024                                            Precautions/ Contra-indications:           Latex allergy:  NO  Pacemaker:    NO  Contraindications for Manipulation: None  Other:    Preferred Language for Healthcare:   [x] English       [] other:    SUBJECTIVE EXAMINATION     Patient stated complaint: Patient reports no new issues, doing about the same.    OBJECTIVE EXAMINATION        Test used Initial score  24     Pain Summary VAS 0-8 6/10   Functional questionnaire LEFS  75% 80 69%   ROM Knee ext -30 -4    Knee flex 70 102  OP P!!   Strength Quad SLR with lag Cont. lag             Exercises/Interventions     Therapeutic Ex (89155)/NMR re-education (17435)  Sets/reps Resistance Notes/Cues/Progressions   Bike  Prone hang 10 min

## 2024-06-17 ENCOUNTER — APPOINTMENT (OUTPATIENT)
Dept: PHYSICAL THERAPY | Age: 69
End: 2024-06-17
Payer: MEDICARE

## 2024-06-17 ENCOUNTER — HOSPITAL ENCOUNTER (OUTPATIENT)
Dept: PHYSICAL THERAPY | Age: 69
Setting detail: THERAPIES SERIES
Discharge: HOME | End: 2024-06-17
Payer: MEDICARE

## 2024-06-17 PROCEDURE — 97110 THERAPEUTIC EXERCISES: CPT

## 2024-06-17 PROCEDURE — 97016 VASOPNEUMATIC DEVICE THERAPY: CPT

## 2024-06-17 PROCEDURE — 97112 NEUROMUSCULAR REEDUCATION: CPT

## 2024-06-17 NOTE — FLOWSHEET NOTE
[facility]- Outpatient Rehabilitation and Therapy [address], [place] [address] office: [phone] fax: [phone]    Physical Therapy: TREATMENT/PROGRESS NOTE   Patient: Sally Higgins (68 y.o. female)   Examination Date: 2024   :  1955 MRN: [id number]   Visit #: 19   Insurance Allowable Auth Needed   $40 BMN []Yes    [x]No    Insurance: Payor: [facility] MEDICARE / Plan: [facility] MEDICARE ADVANTAGE / Product Type: *No Product type* /   Insurance ID: [id number] - (Medicare Managed)  Secondary Insurance (if applicable): UMR   Treatment Diagnosis:     ICD-10-CM    1. Acute pain of left knee  M25.562       2. Effusion of left knee  M25.462       3. Stiffness of left knee  M25.662       4. Generalized weakness  R53.1          Medical Diagnosis:  Primary osteoarthritis of left knee [M17.12]   Referring Physician: Benny Pulido DO  PCP: Elieser Hairston DO     Plan of care signed (Y/N):     Date of Patient follow up with Physician:      Progress Report/POC: NO  Progress note due: 7/10/2024 (OR 10 visits /OR AUTH LIMITS, whichever is less)  POC update due: 2024    Surgery date: S/P TKA 2024 / manip 2024                                            Precautions/ Contra-indications:           Latex allergy:  NO  Pacemaker:    NO  Contraindications for Manipulation: None  Other:    Preferred Language for Healthcare:   [x] English       [] other:    SUBJECTIVE EXAMINATION     Patient stated complaint: Patient reports continues to feel stiffness.    OBJECTIVE EXAMINATION        Test used Initial score  24     Pain Summary VAS 0-8 6/10   Functional questionnaire LEFS  75% /80 69%   ROM Knee ext -30 -3    Knee flex 70 107  OP P!!   Strength Quad SLR with lag Cont. lag             Exercises/Interventions     Therapeutic Ex (97520)/NMR re-education (81578)  Sets/reps Resistance Notes/Cues/Progressions   Bike  Prone hang 10 min  Rocking w/

## 2024-06-18 ENCOUNTER — HOSPITAL ENCOUNTER (OUTPATIENT)
Dept: PHYSICAL THERAPY | Age: 69
Setting detail: THERAPIES SERIES
Discharge: HOME | End: 2024-06-18
Payer: MEDICARE

## 2024-06-18 ENCOUNTER — OFFICE VISIT (OUTPATIENT)
Dept: FAMILY MEDICINE CLINIC | Age: 69
End: 2024-06-18
Payer: MEDICARE

## 2024-06-18 VITALS
BODY MASS INDEX: 27.96 KG/M2 | SYSTOLIC BLOOD PRESSURE: 110 MMHG | DIASTOLIC BLOOD PRESSURE: 66 MMHG | HEART RATE: 62 BPM | WEIGHT: 168 LBS | OXYGEN SATURATION: 99 %

## 2024-06-18 DIAGNOSIS — E83.51 HYPOCALCEMIA: Primary | ICD-10-CM

## 2024-06-18 DIAGNOSIS — E78.00 PURE HYPERCHOLESTEROLEMIA: ICD-10-CM

## 2024-06-18 DIAGNOSIS — M17.12 PRIMARY OSTEOARTHRITIS OF LEFT KNEE: ICD-10-CM

## 2024-06-18 DIAGNOSIS — G25.81 RESTLESS LEG SYNDROME: ICD-10-CM

## 2024-06-18 DIAGNOSIS — I48.0 PAF (PAROXYSMAL ATRIAL FIBRILLATION) (HCC): ICD-10-CM

## 2024-06-18 DIAGNOSIS — Z78.0 POSTMENOPAUSAL: ICD-10-CM

## 2024-06-18 DIAGNOSIS — E78.2 MIXED HYPERLIPIDEMIA: ICD-10-CM

## 2024-06-18 DIAGNOSIS — F33.42 RECURRENT MAJOR DEPRESSIVE DISORDER, IN FULL REMISSION (HCC): ICD-10-CM

## 2024-06-18 DIAGNOSIS — E83.51 HYPOCALCEMIA: ICD-10-CM

## 2024-06-18 DIAGNOSIS — F51.01 PRIMARY INSOMNIA: ICD-10-CM

## 2024-06-18 LAB
ALBUMIN SERPL-MCNC: 4.4 G/DL (ref 3.4–5)
ANION GAP SERPL CALCULATED.3IONS-SCNC: 12 MMOL/L (ref 3–16)
BUN SERPL-MCNC: 12 MG/DL (ref 7–20)
CALCIUM SERPL-MCNC: 9.7 MG/DL (ref 8.3–10.6)
CHLORIDE SERPL-SCNC: 102 MMOL/L (ref 99–110)
CHOLEST SERPL-MCNC: 181 MG/DL (ref 0–199)
CO2 SERPL-SCNC: 26 MMOL/L (ref 21–32)
CREAT SERPL-MCNC: 0.9 MG/DL (ref 0.6–1.2)
GFR SERPLBLD CREATININE-BSD FMLA CKD-EPI: 70 ML/MIN/{1.73_M2}
GLUCOSE SERPL-MCNC: 97 MG/DL (ref 70–99)
HDLC SERPL-MCNC: 72 MG/DL (ref 40–60)
LDLC SERPL CALC-MCNC: 93 MG/DL
PHOSPHATE SERPL-MCNC: 4.5 MG/DL (ref 2.5–4.9)
POTASSIUM SERPL-SCNC: 3.8 MMOL/L (ref 3.5–5.1)
SODIUM SERPL-SCNC: 140 MMOL/L (ref 136–145)
TRIGL SERPL-MCNC: 82 MG/DL (ref 0–150)
VLDLC SERPL CALC-MCNC: 16 MG/DL

## 2024-06-18 PROCEDURE — 97110 THERAPEUTIC EXERCISES: CPT

## 2024-06-18 PROCEDURE — 97016 VASOPNEUMATIC DEVICE THERAPY: CPT

## 2024-06-18 PROCEDURE — 97140 MANUAL THERAPY 1/> REGIONS: CPT

## 2024-06-18 PROCEDURE — 99214 OFFICE O/P EST MOD 30 MIN: CPT | Performed by: STUDENT IN AN ORGANIZED HEALTH CARE EDUCATION/TRAINING PROGRAM

## 2024-06-18 PROCEDURE — 1123F ACP DISCUSS/DSCN MKR DOCD: CPT | Performed by: STUDENT IN AN ORGANIZED HEALTH CARE EDUCATION/TRAINING PROGRAM

## 2024-06-18 RX ORDER — PRAMIPEXOLE DIHYDROCHLORIDE 0.5 MG/1
0.5 TABLET ORAL NIGHTLY
Qty: 90 TABLET | Refills: 3 | Status: SHIPPED | OUTPATIENT
Start: 2024-06-18

## 2024-06-18 RX ORDER — BUPROPION HYDROCHLORIDE 150 MG/1
150 TABLET ORAL EVERY MORNING
Qty: 90 TABLET | Refills: 3 | Status: SHIPPED | OUTPATIENT
Start: 2024-06-18

## 2024-06-18 RX ORDER — ONDANSETRON 4 MG/1
4 TABLET, FILM COATED ORAL 3 TIMES DAILY PRN
Qty: 15 TABLET | Refills: 0 | Status: CANCELLED | OUTPATIENT
Start: 2024-06-18

## 2024-06-18 RX ORDER — SERTRALINE HYDROCHLORIDE 100 MG/1
100 TABLET, FILM COATED ORAL DAILY
Qty: 90 TABLET | Refills: 3 | Status: SHIPPED | OUTPATIENT
Start: 2024-06-18

## 2024-06-18 RX ORDER — TRAZODONE HYDROCHLORIDE 50 MG/1
100 TABLET ORAL NIGHTLY PRN
Qty: 180 TABLET | Refills: 3 | Status: SHIPPED | OUTPATIENT
Start: 2024-06-18

## 2024-06-18 RX ORDER — ATORVASTATIN CALCIUM 10 MG/1
10 TABLET, FILM COATED ORAL DAILY
Qty: 90 TABLET | Refills: 3 | Status: SHIPPED | OUTPATIENT
Start: 2024-06-18

## 2024-06-18 RX ORDER — POLYETHYLENE GLYCOL 3350 17 G/17G
17 POWDER, FOR SOLUTION ORAL DAILY
Qty: 510 G | Refills: 0 | Status: CANCELLED | OUTPATIENT
Start: 2024-06-18 | End: 2024-07-18

## 2024-06-18 RX ORDER — METHYLPREDNISOLONE 4 MG/1
TABLET ORAL
Qty: 21 KIT | Refills: 0 | Status: CANCELLED | OUTPATIENT
Start: 2024-06-18

## 2024-06-18 RX ORDER — FLECAINIDE ACETATE 100 MG/1
100 TABLET ORAL DAILY
Qty: 90 TABLET | Refills: 3 | Status: SHIPPED | OUTPATIENT
Start: 2024-06-18

## 2024-06-18 RX ORDER — DILTIAZEM HYDROCHLORIDE 180 MG/1
180 CAPSULE, COATED, EXTENDED RELEASE ORAL DAILY
Qty: 90 CAPSULE | Refills: 3 | Status: SHIPPED | OUTPATIENT
Start: 2024-06-18

## 2024-06-18 SDOH — ECONOMIC STABILITY: FOOD INSECURITY: WITHIN THE PAST 12 MONTHS, YOU WORRIED THAT YOUR FOOD WOULD RUN OUT BEFORE YOU GOT MONEY TO BUY MORE.: NEVER TRUE

## 2024-06-18 SDOH — ECONOMIC STABILITY: FOOD INSECURITY: WITHIN THE PAST 12 MONTHS, THE FOOD YOU BOUGHT JUST DIDN'T LAST AND YOU DIDN'T HAVE MONEY TO GET MORE.: NEVER TRUE

## 2024-06-18 SDOH — ECONOMIC STABILITY: INCOME INSECURITY: HOW HARD IS IT FOR YOU TO PAY FOR THE VERY BASICS LIKE FOOD, HOUSING, MEDICAL CARE, AND HEATING?: NOT HARD AT ALL

## 2024-06-18 ASSESSMENT — PATIENT HEALTH QUESTIONNAIRE - PHQ9
SUM OF ALL RESPONSES TO PHQ QUESTIONS 1-9: 14
6. FEELING BAD ABOUT YOURSELF - OR THAT YOU ARE A FAILURE OR HAVE LET YOURSELF OR YOUR FAMILY DOWN: MORE THAN HALF THE DAYS
3. TROUBLE FALLING OR STAYING ASLEEP: NEARLY EVERY DAY
8. MOVING OR SPEAKING SO SLOWLY THAT OTHER PEOPLE COULD HAVE NOTICED. OR THE OPPOSITE, BEING SO FIGETY OR RESTLESS THAT YOU HAVE BEEN MOVING AROUND A LOT MORE THAN USUAL: NOT AT ALL
9. THOUGHTS THAT YOU WOULD BE BETTER OFF DEAD, OR OF HURTING YOURSELF: NOT AT ALL
10. IF YOU CHECKED OFF ANY PROBLEMS, HOW DIFFICULT HAVE THESE PROBLEMS MADE IT FOR YOU TO DO YOUR WORK, TAKE CARE OF THINGS AT HOME, OR GET ALONG WITH OTHER PEOPLE: VERY DIFFICULT
5. POOR APPETITE OR OVEREATING: SEVERAL DAYS
1. LITTLE INTEREST OR PLEASURE IN DOING THINGS: NEARLY EVERY DAY
2. FEELING DOWN, DEPRESSED OR HOPELESS: MORE THAN HALF THE DAYS
SUM OF ALL RESPONSES TO PHQ QUESTIONS 1-9: 14
7. TROUBLE CONCENTRATING ON THINGS, SUCH AS READING THE NEWSPAPER OR WATCHING TELEVISION: NOT AT ALL
4. FEELING TIRED OR HAVING LITTLE ENERGY: NEARLY EVERY DAY
SUM OF ALL RESPONSES TO PHQ9 QUESTIONS 1 & 2: 5

## 2024-06-18 NOTE — FLOWSHEET NOTE
[facility]- Outpatient Rehabilitation and Therapy [address], [place] [address] office: [phone] fax: [phone]    Physical Therapy: TREATMENT/PROGRESS NOTE   Patient: Sally Higgins (68 y.o. female)   Examination Date: 2024   :  1955 MRN: [id number]   Visit #: 20   Insurance Allowable Auth Needed   $40 BMN []Yes    [x]No    Insurance: Payor: [facility] MEDICARE / Plan: [facility] MEDICARE ADVANTAGE / Product Type: *No Product type* /   Insurance ID: [id number] - (Medicare Managed)  Secondary Insurance (if applicable): UMR   Treatment Diagnosis:     ICD-10-CM    1. Acute pain of left knee  M25.562       2. Effusion of left knee  M25.462       3. Stiffness of left knee  M25.662       4. Generalized weakness  R53.1          Medical Diagnosis:  Primary osteoarthritis of left knee [M17.12]   Referring Physician: Benny Pulido DO  PCP: Elieser Hairston DO     Plan of care signed (Y/N):     Date of Patient follow up with Physician:      Progress Report/POC: NO  Progress note due: 7/10/2024 (OR 10 visits /OR AUTH LIMITS, whichever is less)  POC update due: 2024    Surgery date: S/P TKA 2024 / manip 2024                                            Precautions/ Contra-indications:           Latex allergy:  NO  Pacemaker:    NO  Contraindications for Manipulation: None  Other:    Preferred Language for Healthcare:   [x] English       [] other:    SUBJECTIVE EXAMINATION     Patient stated complaint: Patient reports continues to feel stiffness.    OBJECTIVE EXAMINATION        Test used Initial score  24     Pain Summary VAS 0-8 6/10   Functional questionnaire LEFS  75% /80 69%   ROM Knee ext -30 -3    Knee flex 70 108  OP P!!   Strength Quad SLR with lag Cont. lag             Exercises/Interventions     Therapeutic Ex (29457)/NMR re-education (83821)  Sets/reps Resistance Notes/Cues/Progressions   Bike  Prone hang 10 min  Rocking w/

## 2024-06-18 NOTE — PROGRESS NOTES
Assessment:  Encounter Diagnoses   Name Primary?    Recurrent major depressive disorder, in full remission ([facility])     Pure hypercholesterolemia     PAF (paroxysmal atrial fibrillation) ([facility])     Primary osteoarthritis of left knee     Restless leg syndrome     Primary insomnia     Hypocalcemia Yes    Mixed hyperlipidemia        Plan:  1. Hypocalcemia  -     Renal Function Panel; Future  2. Recurrent major depressive disorder, in full remission ([facility])  -     buPROPion (WELLBUTRIN XL) 150 MG extended release tablet; Take 1 tablet by mouth every morning, Disp-90 tablet, R-3Normal  -     sertraline (ZOLOFT) 100 MG tablet; Take 1 tablet by mouth daily, Disp-90 tablet, R-3Normal  3. Pure hypercholesterolemia  -     atorvastatin (LIPITOR) 10 MG tablet; Take 1 tablet by mouth daily, Disp-90 tablet, R-3Normal  4. PAF (paroxysmal atrial fibrillation) ([facility])  -     dilTIAZem (CARDIZEM CD) 180 MG extended release capsule; Take 1 capsule by mouth daily, Disp-90 capsule, R-3Normal  -     flecainide (TAMBOCOR) 100 MG tablet; Take 1 tablet by mouth daily, Disp-90 tablet, R-3Normal  -     rivaroxaban (XARELTO) 20 MG TABS tablet; Take 1 tablet by mouth Daily with supper, Disp-90 tablet, R-3Normal  5. Primary osteoarthritis of left knee  6. Restless leg syndrome  -     pramipexole (MIRAPEX) 0.5 MG tablet; Take 1 tablet by mouth at bedtime, Disp-90 tablet, R-3Normal  7. Primary insomnia  -     traZODone (DESYREL) 50 MG tablet; Take 2 tablets by mouth nightly as needed for Sleep, Disp-180 tablet, R-3Normal  8. Mixed hyperlipidemia  -     Lipid Panel; Future  Patient has been without Zoloft for 2 months.  Has noticed worsening mood as well as insomnia.  Anxious with recent surgery.  Restart Zoloft at 100 mg will take half dose for 2 weeks prior to increasing to full 100 mg.  Continue other chronic medications.  Recheck renal function panel and lipid panel.  Completed wellness paperwork for work.      No follow-ups on file.      Patient:

## 2024-06-19 ENCOUNTER — HOSPITAL ENCOUNTER (OUTPATIENT)
Dept: PHYSICAL THERAPY | Age: 69
Setting detail: THERAPIES SERIES
Discharge: HOME | End: 2024-06-19
Payer: MEDICARE

## 2024-06-19 PROCEDURE — 97016 VASOPNEUMATIC DEVICE THERAPY: CPT | Performed by: SPECIALIST/TECHNOLOGIST

## 2024-06-19 PROCEDURE — 97110 THERAPEUTIC EXERCISES: CPT | Performed by: SPECIALIST/TECHNOLOGIST

## 2024-06-19 PROCEDURE — 97140 MANUAL THERAPY 1/> REGIONS: CPT | Performed by: SPECIALIST/TECHNOLOGIST

## 2024-06-19 NOTE — FLOWSHEET NOTE
[facility]- Outpatient Rehabilitation and Therapy [address], [place] [address] office: [phone] fax: [phone]    Physical Therapy: TREATMENT/PROGRESS NOTE   Patient: Sally Higgins (68 y.o. female)   Examination Date: 2024   :  1955 MRN: [id number]   Visit #: 21   Insurance Allowable Auth Needed   $40 BMN []Yes    [x]No    Insurance: Payor: [facility] MEDICARE / Plan: [facility] MEDICARE ADVANTAGE / Product Type: *No Product type* /   Insurance ID: [id number] - (Medicare Managed)  Secondary Insurance (if applicable): UMR   Treatment Diagnosis:     ICD-10-CM    1. Acute pain of left knee  M25.562       2. Effusion of left knee  M25.462       3. Stiffness of left knee  M25.662       4. Generalized weakness  R53.1          Medical Diagnosis:  Primary osteoarthritis of left knee [M17.12]   Referring Physician: Benny Pulido DO  PCP: Elieser Hairston DO     Plan of care signed (Y/N):     Date of Patient follow up with Physician:      Progress Report/POC: NO  Progress note due: 7/10/2024 (OR 10 visits /OR AUTH LIMITS, whichever is less)  POC update due: 2024    Surgery date: S/P TKA 2024 / manip 2024                                            Precautions/ Contra-indications:           Latex allergy:  NO  Pacemaker:    NO  Contraindications for Manipulation: None  Other:    Preferred Language for Healthcare:   [x] English       [] other:    SUBJECTIVE EXAMINATION     Patient stated complaint: Patient reports continues to feel stiffness especially in the morning.     OBJECTIVE EXAMINATION        Test used Initial score  24     Pain Summary VAS 0-8 6/10   Functional questionnaire LEFS  75% 80 69%   ROM Knee ext -30 -3    Knee flex 70 110  OP    Strength Quad SLR with lag Cont. lag             Exercises/Interventions     Therapeutic Ex (08592)/NMR re-education (12082)  Sets/reps Resistance Notes/Cues/Progressions   Bike  Prone

## 2024-06-20 ENCOUNTER — HOSPITAL ENCOUNTER (OUTPATIENT)
Dept: PHYSICAL THERAPY | Age: 69
Setting detail: THERAPIES SERIES
Discharge: HOME | End: 2024-06-20
Payer: MEDICARE

## 2024-06-20 PROCEDURE — 97112 NEUROMUSCULAR REEDUCATION: CPT | Performed by: SPECIALIST/TECHNOLOGIST

## 2024-06-20 PROCEDURE — 97110 THERAPEUTIC EXERCISES: CPT | Performed by: SPECIALIST/TECHNOLOGIST

## 2024-06-20 PROCEDURE — 97016 VASOPNEUMATIC DEVICE THERAPY: CPT | Performed by: SPECIALIST/TECHNOLOGIST

## 2024-06-20 NOTE — FLOWSHEET NOTE
continued skilled intervention: [x] Yes  [] No      CHARGE CAPTURE     PT CHARGE GRID   CPT Code (TIMED) minutes # CPT Code (UNTIMED) #     Therex (59580)  30 2  EVAL:LOW (19405 - Typically 20 minutes face-to-face)     Neuromusc. Re-ed (05584)    Re-Eval (39758)     Manual (51528) 8 1  Estim Unattended (73297)     Ther. Act (97570)    Mech. Traction (45565)     Gait (36076)    Dry Needle 1-2 muscle (20560)     Aquatic Therex (34938)    Dry Needle 3+ muscle (20561)     Iontophoresis (31159)    VASO (99786) 1    Ultrasound (71396)    Group Therapy (98146)     Estim Attended (95582)    Canalith Repositioning (70441)     Other:    Other:    Total Timed Code Tx Minutes 38 3       Total Treatment Minutes 48        Charge Justification:  [x] (00363) THERAPEUTIC EXERCISE - Provided verbal/tactile cueing for activities related to strengthening, flexibility, endurance, ROM performed to prevent loss of range of motion, maintain or improve muscular strength or increase flexibility, following either an injury or surgery.   [x] (64497) NEUROMUSCULAR RE-EDUCATION - Therapeutic procedure, 1 or more areas, each 15 minutes; neuromuscular reeducation of movement, balance, coordination, kinesthetic sense, posture, and/or proprioception for sitting and/or standing activities  [x] (97649) MANUAL THERAPY -  Manual therapy techniques, 1 or more regions, each 15 minutes (Mobilization/manipulation, manual lymphatic drainage, manual traction) for the purpose of modulating pain, promoting relaxation,  increasing ROM, reducing/eliminating soft tissue swelling/inflammation/restriction, improving soft tissue extensibility and allowing for proper ROM for normal function with self care, mobility, lifting and ambulation  [x] (59112) VASOPNEUMATIC    GOALS     Patient stated goal: Pain free care of children  Status: [x] Progressing: [] Met: [] Not Met: [] Adjusted    Therapist goals for Patient:   Short Term Goals: To be achieved in: 2

## 2024-06-21 ENCOUNTER — HOSPITAL ENCOUNTER (OUTPATIENT)
Dept: PHYSICAL THERAPY | Age: 69
Setting detail: THERAPIES SERIES
Discharge: HOME | End: 2024-06-21
Payer: MEDICARE

## 2024-06-21 PROCEDURE — 97016 VASOPNEUMATIC DEVICE THERAPY: CPT | Performed by: SPECIALIST/TECHNOLOGIST

## 2024-06-21 PROCEDURE — 97140 MANUAL THERAPY 1/> REGIONS: CPT | Performed by: SPECIALIST/TECHNOLOGIST

## 2024-06-21 PROCEDURE — 97110 THERAPEUTIC EXERCISES: CPT | Performed by: SPECIALIST/TECHNOLOGIST

## 2024-06-21 NOTE — FLOWSHEET NOTE
[facility]- Outpatient Rehabilitation and Therapy [address], [place] [address] office: [phone] fax: [phone]    Physical Therapy: TREATMENT/PROGRESS NOTE   Patient: Sally Higgins (68 y.o. female)   Examination Date: 2024   :  1955 MRN: [id number]   Visit #: 23   Insurance Allowable Auth Needed   $40 BMN []Yes    [x]No    Insurance: Payor: [facility] MEDICARE / Plan: [facility] MEDICARE ADVANTAGE / Product Type: *No Product type* /   Insurance ID: [id number] - (Medicare Managed)  Secondary Insurance (if applicable): UMR   Treatment Diagnosis:     ICD-10-CM    1. Acute pain of left knee  M25.562       2. Effusion of left knee  M25.462       3. Stiffness of left knee  M25.662       4. Generalized weakness  R53.1          Medical Diagnosis:  Primary osteoarthritis of left knee [M17.12]   Referring Physician: Benny Pulido DO  PCP: Elieser Hairston DO     Plan of care signed (Y/N):     Date of Patient follow up with Physician:      Progress Report/POC: NO  Progress note due: 7/10/2024 (OR 10 visits /OR AUTH LIMITS, whichever is less)  POC update due: 2024    Surgery date: S/P TKA 2024 / manip 2024                                            Precautions/ Contra-indications:           Latex allergy:  NO  Pacemaker:    NO  Contraindications for Manipulation: None  Other:    Preferred Language for Healthcare:   [x] English       [] other:    SUBJECTIVE EXAMINATION     Patient stated complaint: Patient notes she is stiff in the am. Notes she has been trying to bend her knee more walking.     OBJECTIVE EXAMINATION        Test used Initial score  24     Pain Summary VAS 0-8 6/10   Functional questionnaire LEFS  75%  69%   ROM Knee ext -30 -2 after mobs    Knee flex 70 120 OP (tight end feel)   Strength Quad SLR with lag Cont. lag             Exercises/Interventions     Therapeutic Ex (74937)/NMR re-education (96440)

## 2024-06-25 ENCOUNTER — OFFICE VISIT (OUTPATIENT)
Dept: ORTHOPEDIC SURGERY | Age: 69
End: 2024-06-25

## 2024-06-25 ENCOUNTER — APPOINTMENT (OUTPATIENT)
Dept: PHYSICAL THERAPY | Age: 69
End: 2024-06-25
Payer: MEDICARE

## 2024-06-25 DIAGNOSIS — M17.12 PRIMARY OSTEOARTHRITIS OF LEFT KNEE: Primary | ICD-10-CM

## 2024-06-25 PROCEDURE — 99024 POSTOP FOLLOW-UP VISIT: CPT | Performed by: STUDENT IN AN ORGANIZED HEALTH CARE EDUCATION/TRAINING PROGRAM

## 2024-06-25 NOTE — PROGRESS NOTES
Chief Complaint  Knee Pain (CK L KNEE)      History of Present Illness:  Sally Higgins is a pleasant 68 y.o. female here today for her first postop evaluation regarding her left knee.  She underwent a left knee her first postop evaluation regarding her left knee.  She underwent a left knee manipulation under anesthesia on 6/10/2024.  She is doing better.  She is working on knee range of motion.  With physical therapy.        Medical History:  Patient's medications, allergies, past medical, surgical, social and family histories were reviewed and updated as appropriate.    Pertinent items are noted in HPI  Review of systems reviewed from Patient History Form dated on 6/25/24 and available in the patient's chart under the Media tab.       Vital Signs:  There were no vitals filed for this visit.      Constitutional: In no apparent distress. Normal affect. Alert and oriented X3 and is cooperative.       Left knee exam    Knee range of motion 0 to 100 degrees in the office today.  Improved tenderness.      Radiology:       No new x-rays today           Assessment : 68-year-old female 2-week status post left knee manipulation under anesthesia.  Date of procedure 6/10/2024, total knee replacement 4/4/24    Impression:  Encounter Diagnosis   Name Primary?    Primary osteoarthritis of left knee Yes       Office Procedures:  No orders of the defined types were placed in this encounter.        Plan:     The patient is doing better.  I showed her pictures of her range of motion that were achieved during the manipulation.  She is going to continue working on her range of motion and strengthening exercises.  Follow-up in 2 months for recheck.  I will allow her to return to work next week without restrictions.    . Sally Higgins is in agreement with this plan. All questions were answered to patient's satisfaction and was encouraged to call with any further questions.    Benny Pulido, DO  Orthopedic Surgery and Sports

## 2024-06-26 ENCOUNTER — HOSPITAL ENCOUNTER (OUTPATIENT)
Dept: PHYSICAL THERAPY | Age: 69
Setting detail: THERAPIES SERIES
Discharge: HOME | End: 2024-06-26
Payer: MEDICARE

## 2024-06-26 PROCEDURE — 97112 NEUROMUSCULAR REEDUCATION: CPT

## 2024-06-26 PROCEDURE — 97110 THERAPEUTIC EXERCISES: CPT

## 2024-06-26 PROCEDURE — 97016 VASOPNEUMATIC DEVICE THERAPY: CPT

## 2024-06-26 NOTE — FLOWSHEET NOTE
deficits outlined in the patients goals    Prognosis for POC: [x] Good [] Fair  [] Poor    Patient requires continued skilled intervention: [x] Yes  [] No      CHARGE CAPTURE     PT CHARGE GRID   CPT Code (TIMED) minutes # CPT Code (UNTIMED) #     Therex (16259)  30 2  EVAL:LOW (24754 - Typically 20 minutes face-to-face)     Neuromusc. Re-ed (56876) 8 1  Re-Eval (59092)     Manual (53918)    Estim Unattended (39810)     Ther. Act (65509)    Mech. Traction (28096)     Gait (77871)    Dry Needle 1-2 muscle (85297)     Aquatic Therex (71171)    Dry Needle 3+ muscle (20561)     Iontophoresis (93424)    VASO (72063) 1    Ultrasound (83958)    Group Therapy (82042)     Estim Attended (57751)    Canalith Repositioning (22465)     Other:    Other:    Total Timed Code Tx Minutes 38 3       Total Treatment Minutes 48        Charge Justification:  [x] (04400) THERAPEUTIC EXERCISE - Provided verbal/tactile cueing for activities related to strengthening, flexibility, endurance, ROM performed to prevent loss of range of motion, maintain or improve muscular strength or increase flexibility, following either an injury or surgery.   [x] (57299) NEUROMUSCULAR RE-EDUCATION - Therapeutic procedure, 1 or more areas, each 15 minutes; neuromuscular reeducation of movement, balance, coordination, kinesthetic sense, posture, and/or proprioception for sitting and/or standing activities  [x] (40683) MANUAL THERAPY -  Manual therapy techniques, 1 or more regions, each 15 minutes (Mobilization/manipulation, manual lymphatic drainage, manual traction) for the purpose of modulating pain, promoting relaxation,  increasing ROM, reducing/eliminating soft tissue swelling/inflammation/restriction, improving soft tissue extensibility and allowing for proper ROM for normal function with self care, mobility, lifting and ambulation  [x] (16636) VASOPNEUMATIC    GOALS     Patient stated goal: Pain free care of children  Status: [x] Progressing: [] Met: []

## 2024-06-27 ENCOUNTER — HOSPITAL ENCOUNTER (OUTPATIENT)
Dept: WOMENS IMAGING | Age: 69
Discharge: HOME | End: 2024-06-27
Payer: COMMERCIAL

## 2024-06-27 ENCOUNTER — TELEPHONE (OUTPATIENT)
Dept: FAMILY MEDICINE CLINIC | Age: 69
End: 2024-06-27

## 2024-06-27 VITALS — HEIGHT: 64 IN | BODY MASS INDEX: 27.83 KG/M2 | WEIGHT: 163 LBS

## 2024-06-27 DIAGNOSIS — Z12.31 VISIT FOR SCREENING MAMMOGRAM: ICD-10-CM

## 2024-06-27 DIAGNOSIS — Z78.0 POSTMENOPAUSAL: ICD-10-CM

## 2024-06-27 PROCEDURE — 77063 BREAST TOMOSYNTHESIS BI: CPT

## 2024-06-27 PROCEDURE — 77080 DXA BONE DENSITY AXIAL: CPT

## 2024-07-03 ENCOUNTER — HOSPITAL ENCOUNTER (OUTPATIENT)
Dept: PHYSICAL THERAPY | Age: 69
Setting detail: THERAPIES SERIES
Discharge: HOME | End: 2024-07-03
Payer: MEDICARE

## 2024-07-03 PROCEDURE — 97016 VASOPNEUMATIC DEVICE THERAPY: CPT

## 2024-07-03 PROCEDURE — 97112 NEUROMUSCULAR REEDUCATION: CPT

## 2024-07-03 PROCEDURE — 97110 THERAPEUTIC EXERCISES: CPT

## 2024-07-03 NOTE — FLOWSHEET NOTE
[facility]- Outpatient Rehabilitation and Therapy [address], [place] [address] office: [phone] fax: [phone]    Physical Therapy: TREATMENT/PROGRESS NOTE   Patient: Sally Higgins (68 y.o. female)   Examination Date: 2024   :  1955 MRN: [id number]   Visit #: 25   Insurance Allowable Auth Needed   $40 BMN []Yes    [x]No    Insurance: Payor: [facility] MEDICARE / Plan: [facility] MEDICARE ADVANTAGE / Product Type: *No Product type* /   Insurance ID: [id number] - (Medicare Managed)  Secondary Insurance (if applicable): UMR   Treatment Diagnosis:     ICD-10-CM    1. Acute pain of left knee  M25.562       2. Effusion of left knee  M25.462       3. Stiffness of left knee  M25.662       4. Generalized weakness  R53.1          Medical Diagnosis:  Primary osteoarthritis of left knee [M17.12]   Referring Physician: Benny Pulido DO  PCP: Elieser Hairston DO     Plan of care signed (Y/N):     Date of Patient follow up with Physician:      Progress Report/POC: NO  Progress note due: 7/10/2024 (OR 10 visits /OR AUTH LIMITS, whichever is less)  POC update due: 2024    Surgery date: S/P TKA 2024 / manip 2024                                            Precautions/ Contra-indications:           Latex allergy:  NO  Pacemaker:    NO  Contraindications for Manipulation: None  Other:    Preferred Language for Healthcare:   [x] English       [] other:    SUBJECTIVE EXAMINATION     Patient stated complaint: Patient states knee swelled up on Monday after first day of work, made walking difficult.    OBJECTIVE EXAMINATION        Test used Initial score  4/12/24 7/3/2024     Pain Summary VAS 0-8 3/10   Functional questionnaire LEFS  75%  69%   ROM Knee ext -30 -2 after mobs    Knee flex 70    Strength Quad SLR with lag Cont. lag             Exercises/Interventions     Therapeutic Ex (88423)/NMR re-education (75857)  Sets/reps Resistance

## 2024-07-11 ENCOUNTER — TELEMEDICINE (OUTPATIENT)
Dept: FAMILY MEDICINE CLINIC | Age: 69
End: 2024-07-11

## 2024-07-11 DIAGNOSIS — M81.0 AGE-RELATED OSTEOPOROSIS WITHOUT CURRENT PATHOLOGICAL FRACTURE: Primary | ICD-10-CM

## 2024-07-11 RX ORDER — ALENDRONATE SODIUM 70 MG/1
70 TABLET ORAL
Qty: 13 TABLET | Refills: 3 | Status: SHIPPED | OUTPATIENT
Start: 2024-07-11

## 2024-07-11 NOTE — PATIENT INSTRUCTIONS
Please start supplementing 1200mg of calcium and 2000iu (50mcg) Vitamin D daily. This can be purchased over the counter at any pharmacy.

## 2024-07-11 NOTE — PROGRESS NOTES
Sally Higgins, was evaluated through a synchronous (real-time) audio-video encounter. The patient (or guardian if applicable) is aware that this is a billable service, which includes applicable co-pays. This Virtual Visit was conducted with patient's (and/or legal guardian's) consent. Patient identification was verified, and a caregiver was present when appropriate.   The patient was located at Home: [address]  [facility] [address]  Provider was located at Facility (Appt Dept): [address]  [address]  [place] [address]  Confirm you are appropriately licensed, registered, or certified to deliver care in the state where the patient is located as indicated above. If you are not or unsure, please re-schedule the visit: Yes, I confirm.     Sally Higgins (:  1955) is a Established patient, presenting virtually for evaluation of the following:    Assessment & Plan   Below is the assessment and plan developed based on review of pertinent history, physical exam, labs, studies, and medications.  1. Age-related osteoporosis without current pathological fracture  -     alendronate (FOSAMAX) 70 MG tablet; Take 1 tablet by mouth every 7 days, Disp-13 tablet, R-3Normal  Discussed risk benefits of multiple medication options.  Will start alendronate.  Also encouraged to start calcium and vitamin D supplementation.  Most recent vitamin D borderline at 32.    No follow-ups on file.       Subjective   HPI    Patient following up on DEXA scan with osteoporosis    Review of Systems       Objective   Patient-Reported Vitals  No data recorded     Physical Exam             --Elieser Hairston DO

## 2024-07-12 ENCOUNTER — HOSPITAL ENCOUNTER (OUTPATIENT)
Dept: PHYSICAL THERAPY | Age: 69
Setting detail: THERAPIES SERIES
Discharge: HOME | End: 2024-07-12
Payer: MEDICARE

## 2024-07-12 PROCEDURE — 97112 NEUROMUSCULAR REEDUCATION: CPT

## 2024-07-12 PROCEDURE — 97016 VASOPNEUMATIC DEVICE THERAPY: CPT

## 2024-07-12 PROCEDURE — 97110 THERAPEUTIC EXERCISES: CPT

## 2024-07-12 NOTE — PROGRESS NOTES
[facility]- Outpatient Rehabilitation and Therapy [address], [place] [address] office: [phone] fax: [phone]    Physical Therapy: TREATMENT/PROGRESS NOTE   Patient: Sally Higgins (68 y.o. female)   Examination Date: 2024   :  1955 MRN: [id number]   Visit #: 26   Insurance Allowable Auth Needed   $40 BMN []Yes    [x]No    Insurance: Payor: [facility] MEDICARE / Plan: [facility] MEDICARE ADVANTAGE / Product Type: *No Product type* /   Insurance ID: [id number] - (Medicare Managed)  Secondary Insurance (if applicable): UMR   Treatment Diagnosis:     ICD-10-CM    1. Acute pain of left knee  M25.562       2. Effusion of left knee  M25.462       3. Stiffness of left knee  M25.662       4. Generalized weakness  R53.1          Medical Diagnosis:  Primary osteoarthritis of left knee [M17.12]   Referring Physician: Benny Pulido DO  PCP: Elieser Hairston DO     Plan of care signed (Y/N):     Date of Patient follow up with Physician:      Progress Report/POC: NO  Progress note due: 8/10/2024 (OR 10 visits /OR AUTH LIMITS, whichever is less)  POC update due: 2024    Surgery date: S/P TKA 2024 / manip 2024                                            Precautions/ Contra-indications:           Latex allergy:  NO  Pacemaker:    NO  Contraindications for Manipulation: None  Other:    Preferred Language for Healthcare:   [x] English       [] other:    SUBJECTIVE EXAMINATION     Patient stated complaint: Patient reports no new issues    OBJECTIVE EXAMINATION        Test used Initial score  24     Pain Summary VAS 0-8 3/10   Functional questionnaire LEFS  75% 35/80 56%   ROM Knee ext -30 -2 after mobs    Knee flex 70 95   Strength Quad SLR with lag Cont. lag             Exercises/Interventions     Therapeutic Ex (13550)/NMR re-education (98155)  Sets/reps Resistance Notes/Cues/Progressions   Bike 10 min  Rocking w/ slight holds   Incline

## 2024-07-19 ENCOUNTER — APPOINTMENT (OUTPATIENT)
Dept: PHYSICAL THERAPY | Age: 69
End: 2024-07-19
Payer: MEDICARE

## 2024-07-19 ENCOUNTER — TELEPHONE (OUTPATIENT)
Dept: FAMILY MEDICINE CLINIC | Age: 69
End: 2024-07-19

## 2024-07-19 DIAGNOSIS — M81.0 AGE-RELATED OSTEOPOROSIS WITHOUT CURRENT PATHOLOGICAL FRACTURE: Primary | ICD-10-CM

## 2024-07-19 DIAGNOSIS — M81.0 SENILE OSTEOPOROSIS: Primary | ICD-10-CM

## 2024-07-19 RX ORDER — ZOLEDRONIC ACID 0.04 MG/ML
4 INJECTION, SOLUTION INTRAVENOUS ONCE
Qty: 100 ML | Refills: 0 | Status: SHIPPED | OUTPATIENT
Start: 2024-07-19 | End: 2024-07-19

## 2024-07-19 RX ORDER — ZOLEDRONIC ACID 0.04 MG/ML
4 INJECTION, SOLUTION INTRAVENOUS ONCE
OUTPATIENT
Start: 2024-07-19 | End: 2024-07-19

## 2024-07-19 NOTE — TELEPHONE ENCOUNTER
Agree with stopping medication.  Order placed for infusion. Please fax to infusion center. Thank you

## 2024-07-19 NOTE — TELEPHONE ENCOUNTER
Telephone Call regarding Medication Reaction - Side Effects     Issue - Medication Side Effects   Medication & Dose:  Alendronate 70mg once weekly (Mondays)  Medication Start Date:  7/15/24  Last dose taken - Date & Time: 7/15/24    Symptoms: Nausea/Vomiting - Pt has vomited once in last 24 hours, Diarrhea, Joint pain / Sore muscles, Other - Severe heartburn, unable to eat or drink without feeling like she is going to vomit.     Date of Onset of symptoms:   7/17/24  Actions taken / Relieving factors: Rest    Patient wanted to let PCP know she will not be taking this medication again, is there something else you can prescribe to control Osteoporosis?    Please advise.    Thanks.

## 2024-07-19 NOTE — TELEPHONE ENCOUNTER
Spoke to patient, voiced understanding.     Will fax to [facility], informed they will call with scheduling.    Please advise on how often infusion needed?

## 2024-07-22 ENCOUNTER — OFFICE VISIT (OUTPATIENT)
Dept: FAMILY MEDICINE CLINIC | Age: 69
End: 2024-07-22
Payer: MEDICARE

## 2024-07-22 VITALS
SYSTOLIC BLOOD PRESSURE: 122 MMHG | OXYGEN SATURATION: 97 % | WEIGHT: 163 LBS | HEART RATE: 71 BPM | DIASTOLIC BLOOD PRESSURE: 70 MMHG | HEIGHT: 64 IN | BODY MASS INDEX: 27.83 KG/M2

## 2024-07-22 DIAGNOSIS — K57.92 DIVERTICULITIS: ICD-10-CM

## 2024-07-22 DIAGNOSIS — R10.9 ACUTE ABDOMINAL PAIN: Primary | ICD-10-CM

## 2024-07-22 DIAGNOSIS — R07.9 CHEST PAIN, UNSPECIFIED TYPE: ICD-10-CM

## 2024-07-22 PROCEDURE — 99214 OFFICE O/P EST MOD 30 MIN: CPT | Performed by: STUDENT IN AN ORGANIZED HEALTH CARE EDUCATION/TRAINING PROGRAM

## 2024-07-22 PROCEDURE — 1123F ACP DISCUSS/DSCN MKR DOCD: CPT | Performed by: STUDENT IN AN ORGANIZED HEALTH CARE EDUCATION/TRAINING PROGRAM

## 2024-07-22 PROCEDURE — 36415 COLL VENOUS BLD VENIPUNCTURE: CPT | Performed by: STUDENT IN AN ORGANIZED HEALTH CARE EDUCATION/TRAINING PROGRAM

## 2024-07-22 PROCEDURE — 93000 ELECTROCARDIOGRAM COMPLETE: CPT | Performed by: STUDENT IN AN ORGANIZED HEALTH CARE EDUCATION/TRAINING PROGRAM

## 2024-07-22 RX ORDER — AMOXICILLIN AND CLAVULANATE POTASSIUM 875; 125 MG/1; MG/1
1 TABLET, FILM COATED ORAL 2 TIMES DAILY
Qty: 14 TABLET | Refills: 0 | Status: SHIPPED | OUTPATIENT
Start: 2024-07-22 | End: 2024-07-29

## 2024-07-22 NOTE — PROGRESS NOTES
water or eating  Lower abdominal pain at the time of symptoms as well  TUMS helps a little  Current Outpatient Medications   Medication Sig Dispense Refill    amoxicillin-clavulanate (AUGMENTIN) 875-125 MG per tablet Take 1 tablet by mouth 2 times daily for 7 days 14 tablet 0    zoledronic acid (ZOMETA) 4 MG/100ML SOLN infusion Infuse 100 mLs intravenously once for 1 dose 100 mL 0    atorvastatin (LIPITOR) 10 MG tablet Take 1 tablet by mouth daily 90 tablet 3    buPROPion (WELLBUTRIN XL) 150 MG extended release tablet Take 1 tablet by mouth every morning 90 tablet 3    dilTIAZem (CARDIZEM CD) 180 MG extended release capsule Take 1 capsule by mouth daily 90 capsule 3    flecainide (TAMBOCOR) 100 MG tablet Take 1 tablet by mouth daily 90 tablet 3    pramipexole (MIRAPEX) 0.5 MG tablet Take 1 tablet by mouth at bedtime 90 tablet 3    rivaroxaban (XARELTO) 20 MG TABS tablet Take 1 tablet by mouth Daily with supper 90 tablet 3    sertraline (ZOLOFT) 100 MG tablet Take 1 tablet by mouth daily 90 tablet 3    traZODone (DESYREL) 50 MG tablet Take 2 tablets by mouth nightly as needed for Sleep 180 tablet 3    alendronate (FOSAMAX) 70 MG tablet Take 1 tablet by mouth every 7 days (Patient not taking: Reported on 7/22/2024) 13 tablet 3     No current facility-administered medications for this visit.       Patient's past medical history, surgical history, family history, medications,  andallergies  were all reviewed and updated as appropriate today.      Review of Systems  All other systems reviewed and negative    Physical Exam  Vitals reviewed.   Constitutional:       Appearance: Normal appearance.   HENT:      Head: Normocephalic and atraumatic.   Cardiovascular:      Rate and Rhythm: Normal rate and regular rhythm.   Pulmonary:      Effort: Pulmonary effort is normal.      Breath sounds: Normal breath sounds.   Abdominal:      Palpations: There is no mass.      Tenderness: There is abdominal tenderness (LLQ). There is no

## 2024-07-23 ENCOUNTER — TELEPHONE (OUTPATIENT)
Dept: CARDIOLOGY CLINIC | Age: 69
End: 2024-07-23

## 2024-07-23 ENCOUNTER — TELEPHONE (OUTPATIENT)
Dept: FAMILY MEDICINE CLINIC | Age: 69
End: 2024-07-23

## 2024-07-23 LAB
ALBUMIN SERPL-MCNC: 4.6 G/DL (ref 3.4–5)
ALBUMIN/GLOB SERPL: 1.5 {RATIO} (ref 1.1–2.2)
ALP SERPL-CCNC: 148 U/L (ref 40–129)
ALT SERPL-CCNC: <5 U/L (ref 10–40)
ANION GAP SERPL CALCULATED.3IONS-SCNC: 15 MMOL/L (ref 3–16)
AST SERPL-CCNC: 9 U/L (ref 15–37)
BILIRUB SERPL-MCNC: 0.3 MG/DL (ref 0–1)
BUN SERPL-MCNC: 17 MG/DL (ref 7–20)
CALCIUM SERPL-MCNC: 10.1 MG/DL (ref 8.3–10.6)
CHLORIDE SERPL-SCNC: 100 MMOL/L (ref 99–110)
CO2 SERPL-SCNC: 24 MMOL/L (ref 21–32)
CREAT SERPL-MCNC: 1.1 MG/DL (ref 0.6–1.2)
DEPRECATED RDW RBC AUTO: 15.5 % (ref 12.4–15.4)
GFR SERPLBLD CREATININE-BSD FMLA CKD-EPI: 55 ML/MIN/{1.73_M2}
GLUCOSE SERPL-MCNC: 100 MG/DL (ref 70–99)
HCT VFR BLD AUTO: 39.4 % (ref 36–48)
HGB BLD-MCNC: 12.7 G/DL (ref 12–16)
LIPASE SERPL-CCNC: 40 U/L (ref 13–60)
MCH RBC QN AUTO: 25.1 PG (ref 26–34)
MCHC RBC AUTO-ENTMCNC: 32.2 G/DL (ref 31–36)
MCV RBC AUTO: 78 FL (ref 80–100)
PLATELET # BLD AUTO: 381 K/UL (ref 135–450)
PMV BLD AUTO: 8.6 FL (ref 5–10.5)
POTASSIUM SERPL-SCNC: 4.2 MMOL/L (ref 3.5–5.1)
PROT SERPL-MCNC: 7.7 G/DL (ref 6.4–8.2)
RBC # BLD AUTO: 5.05 M/UL (ref 4–5.2)
SODIUM SERPL-SCNC: 139 MMOL/L (ref 136–145)
WBC # BLD AUTO: 8.8 K/UL (ref 4–11)

## 2024-07-26 ENCOUNTER — HOSPITAL ENCOUNTER (OUTPATIENT)
Dept: PHYSICAL THERAPY | Age: 69
Setting detail: THERAPIES SERIES
End: 2024-07-26
Payer: MEDICARE

## 2024-08-01 ENCOUNTER — HOSPITAL ENCOUNTER (OUTPATIENT)
Dept: NURSING | Age: 69
Setting detail: INFUSION SERIES
Discharge: HOME | End: 2024-08-01
Payer: MEDICARE

## 2024-08-01 VITALS
HEIGHT: 64 IN | BODY MASS INDEX: 28.17 KG/M2 | DIASTOLIC BLOOD PRESSURE: 61 MMHG | TEMPERATURE: 97 F | SYSTOLIC BLOOD PRESSURE: 116 MMHG | WEIGHT: 165 LBS | OXYGEN SATURATION: 100 % | HEART RATE: 55 BPM | RESPIRATION RATE: 16 BRPM

## 2024-08-01 DIAGNOSIS — M81.0 SENILE OSTEOPOROSIS: Primary | ICD-10-CM

## 2024-08-01 PROCEDURE — 6360000002 HC RX W HCPCS: Performed by: STUDENT IN AN ORGANIZED HEALTH CARE EDUCATION/TRAINING PROGRAM

## 2024-08-01 PROCEDURE — 96365 THER/PROPH/DIAG IV INF INIT: CPT

## 2024-08-01 PROCEDURE — 2580000003 HC RX 258: Performed by: STUDENT IN AN ORGANIZED HEALTH CARE EDUCATION/TRAINING PROGRAM

## 2024-08-01 RX ORDER — ZOLEDRONIC ACID 0.04 MG/ML
4 INJECTION, SOLUTION INTRAVENOUS ONCE
Status: CANCELLED | OUTPATIENT
Start: 2024-08-01 | End: 2024-08-01

## 2024-08-01 RX ADMIN — ZOLEDRONIC ACID 4 MG: 4 INJECTION, SOLUTION, CONCENTRATE INTRAVENOUS at 08:08

## 2024-08-01 ASSESSMENT — PAIN - FUNCTIONAL ASSESSMENT: PAIN_FUNCTIONAL_ASSESSMENT: 0-10

## 2024-08-01 ASSESSMENT — PAIN DESCRIPTION - DESCRIPTORS: DESCRIPTORS: ACHING

## 2024-08-01 NOTE — PROGRESS NOTES
Pt tolerates infusion well. Discharge instructions given and patient verbalizes understanding, discharged to home in stable condition

## 2024-08-09 ENCOUNTER — TELEPHONE (OUTPATIENT)
Dept: FAMILY MEDICINE CLINIC | Age: 69
End: 2024-08-09

## 2024-08-09 NOTE — TELEPHONE ENCOUNTER
ENTRY FOR [facility] MAMMOGRAM RAFFLE    Completion of mammogram before Oct 31st equals 1 entry. Completion same day as order/visit with EFM will equal 2 entries.    Mammogram Completion date: 6/27/2024      RAFFLE TIX #: [id number]      [facility] Raffle will be held on Friday Nov 1st.  4 winners will be selected. (One from each office POD)  If chosen office staff will contact patient to coordinate raffle basket collection.

## 2024-08-22 ENCOUNTER — OFFICE VISIT (OUTPATIENT)
Dept: CARDIOLOGY CLINIC | Age: 69
End: 2024-08-22
Payer: COMMERCIAL

## 2024-08-22 VITALS
DIASTOLIC BLOOD PRESSURE: 78 MMHG | WEIGHT: 168.4 LBS | HEART RATE: 53 BPM | OXYGEN SATURATION: 99 % | HEIGHT: 64 IN | SYSTOLIC BLOOD PRESSURE: 130 MMHG | BODY MASS INDEX: 28.75 KG/M2

## 2024-08-22 DIAGNOSIS — I48.0 PAF (PAROXYSMAL ATRIAL FIBRILLATION) (HCC): Primary | ICD-10-CM

## 2024-08-22 DIAGNOSIS — I49.9 IRREGULAR HEART RATE: ICD-10-CM

## 2024-08-22 DIAGNOSIS — I48.0 PAROXYSMAL ATRIAL FIBRILLATION (HCC): ICD-10-CM

## 2024-08-22 PROCEDURE — 1123F ACP DISCUSS/DSCN MKR DOCD: CPT | Performed by: INTERNAL MEDICINE

## 2024-08-22 PROCEDURE — 99214 OFFICE O/P EST MOD 30 MIN: CPT | Performed by: INTERNAL MEDICINE

## 2024-08-22 PROCEDURE — 93000 ELECTROCARDIOGRAM COMPLETE: CPT | Performed by: INTERNAL MEDICINE

## 2024-08-22 RX ORDER — DILTIAZEM HYDROCHLORIDE 120 MG/1
120 CAPSULE, COATED, EXTENDED RELEASE ORAL DAILY
Qty: 30 CAPSULE | Refills: 3 | Status: SHIPPED | OUTPATIENT
Start: 2024-08-22

## 2024-08-22 NOTE — PATIENT INSTRUCTIONS
RECOMMENDATIONS:  Continue current medications.   Decrease diltiazem to 120 mg nightly.   Monitor for any worsening symptoms. Consider event monitor if symptoms persist.   Follow up in one year.

## 2024-08-22 NOTE — PROGRESS NOTES
[facility]   Electrophysiology Consult Note            Date: 8/22/24  Patient Name: Sally Higgins  YOB: 1955    Primary Care Physician: Elieser Hairston DO    CHIEF COMPLAINT:   Chief Complaint   Patient presents with    1 Year Follow Up    Atrial Fibrillation     HISTORY OF PRESENT ILLNESS: Sally Higgins is a 68 y.o. female  with a history of mitral valve prolapse, diastolic dysfunction, hyperlipidemia, BIJAN, and restless legs syndrome referred for further evaluation and management of newly diagnosed atrial fibrillation.        The patient reported that she occasionally has palpitations where she feels like her heart is beating fasts or irregularly.  For the last several months, she has also been having substernal chest pressure and shortness of breath with relatively minimal exertion.   She says that that the chest pressure will also sometimes come on at rest without any specific trigger and last for approximately one minutes.  She also has also been having some lightheadedness, typically when standing.  This morning, while taking a shower she says that she felt very lightheaded and had to sit down.  She denies recent syncope, but says that she did pass out once in her youth after a long day in the heat.  She does not get any regular physical exercise.  She works for [employer] within her company and says that she has been working 70-80 hours per week.    She formerly received her cardiology care through Dr. Noland at [facility] and was previously diagnosed with mitral valve prolpase.  Her last TTE from 2017 showed an LVEF of 55% with normal wall motion, grade 2 diastolic dysfunction, normal RV size and systolic function, mildly dilated left atrium, mild late systolic prolapse of the mitral valve with mild mitral regurgitation, and mild tricuspid regurgitation.  She has never had a cardiac stress test or undergone cardiac catheterization.  She denied use of tobacco products and does not

## 2024-08-27 ENCOUNTER — OFFICE VISIT (OUTPATIENT)
Dept: ORTHOPEDIC SURGERY | Age: 69
End: 2024-08-27

## 2024-08-27 VITALS — WEIGHT: 163 LBS | HEIGHT: 64 IN | BODY MASS INDEX: 27.83 KG/M2

## 2024-08-27 DIAGNOSIS — Z96.652 PAIN DUE TO TOTAL LEFT KNEE REPLACEMENT, INITIAL ENCOUNTER (HCC): Primary | ICD-10-CM

## 2024-08-27 DIAGNOSIS — T84.84XA PAIN DUE TO TOTAL LEFT KNEE REPLACEMENT, INITIAL ENCOUNTER (HCC): Primary | ICD-10-CM

## 2024-08-27 PROCEDURE — 99024 POSTOP FOLLOW-UP VISIT: CPT | Performed by: STUDENT IN AN ORGANIZED HEALTH CARE EDUCATION/TRAINING PROGRAM

## 2024-08-27 NOTE — PROGRESS NOTES
Chief Complaint  Knee Pain (Ck Lt. knee)      History of Present Illness:  The patient is here for repeat evaluation regarding her left knee.  The patient is 2-1/2 months out from her left knee manipulation under anesthesia.  She still has some occasional discomfort and difficulty with bending.  She is still taking stairs slowly.  She is having difficulty keeping up with her grandchildren.  The patient does report that she has minimal to no pain when she is working out in her pool but the pain comes back when she gets out of the pool.  She denies fevers or chills.    Prior HPI 6/25/2024:  Sally Higgins is a pleasant 68 y.o. female here today for her first postop evaluation regarding her left knee.  She underwent a left knee her first postop evaluation regarding her left knee.  She underwent a left knee manipulation under anesthesia on 6/10/2024.  She is doing better.  She is working on knee range of motion.  With physical therapy.        Medical History:  Patient's medications, allergies, past medical, surgical, social and family histories were reviewed and updated as appropriate.    Pertinent items are noted in HPI  Review of systems reviewed from Patient History Form dated on 8/27/24 and available in the patient's chart under the Media tab.       Vital Signs:  There were no vitals filed for this visit.      Constitutional: In no apparent distress. Normal affect. Alert and oriented X3 and is cooperative.       Left knee exam    Knee range of motion 0 to 100 degrees in the office today.  Mild tenderness to the anterior interval today.  Stable anterior shuck at 90 degrees      Radiology:       No new x-rays today           Assessment : 68-year-old female 2.5 months status post left knee manipulation under anesthesia.  Date of procedure 6/10/2024, total knee replacement 4/4/24    Impression:  Encounter Diagnosis   Name Primary?    Pain due to total left knee replacement, initial encounter (HCC) Yes         Office

## 2024-09-16 ENCOUNTER — TELEPHONE (OUTPATIENT)
Dept: ORTHOPEDIC SURGERY | Age: 69
End: 2024-09-16

## 2024-09-16 ENCOUNTER — APPOINTMENT (OUTPATIENT)
Dept: VASCULAR LAB | Age: 69
DRG: 565 | End: 2024-09-16
Attending: EMERGENCY MEDICINE
Payer: COMMERCIAL

## 2024-09-16 ENCOUNTER — APPOINTMENT (OUTPATIENT)
Dept: GENERAL RADIOLOGY | Age: 69
DRG: 565 | End: 2024-09-16
Payer: COMMERCIAL

## 2024-09-16 ENCOUNTER — HOSPITAL ENCOUNTER (INPATIENT)
Age: 69
LOS: 1 days | Discharge: HOME | DRG: 565 | End: 2024-09-17
Attending: EMERGENCY MEDICINE | Admitting: INTERNAL MEDICINE
Payer: COMMERCIAL

## 2024-09-16 DIAGNOSIS — T84.50XA INFECTION OR INFLAMMATORY REACTION DUE TO INTERNAL JOINT PROSTHESIS, INITIAL ENCOUNTER (HCC): ICD-10-CM

## 2024-09-16 DIAGNOSIS — M25.462 KNEE EFFUSION, LEFT: Primary | ICD-10-CM

## 2024-09-16 PROBLEM — M00.9 SEPTIC ARTHRITIS (HCC): Status: ACTIVE | Noted: 2024-09-16

## 2024-09-16 LAB
ALBUMIN SERPL-MCNC: 4.2 G/DL (ref 3.4–5)
ALBUMIN/GLOB SERPL: 1.4 {RATIO} (ref 1.1–2.2)
ALP SERPL-CCNC: 113 U/L (ref 40–129)
ALT SERPL-CCNC: <5 U/L (ref 10–40)
ANION GAP SERPL CALCULATED.3IONS-SCNC: 13 MMOL/L (ref 3–16)
ANTI-XA UNFRAC HEPARIN: >1.1 IU/ML (ref 0.3–0.7)
APPEARANCE FLUID: NORMAL
AST SERPL-CCNC: 11 U/L (ref 15–37)
BASOPHILS # BLD: 0 K/UL (ref 0–0.2)
BASOPHILS NFR BLD: 0.4 %
BDY FLUID QUALITY: NORMAL
BILIRUB SERPL-MCNC: 0.3 MG/DL (ref 0–1)
BUN SERPL-MCNC: 17 MG/DL (ref 7–20)
CALCIUM SERPL-MCNC: 9.5 MG/DL (ref 8.3–10.6)
CELL COUNT FLUID TYPE: NORMAL
CHLORIDE SERPL-SCNC: 102 MMOL/L (ref 99–110)
CO2 SERPL-SCNC: 23 MMOL/L (ref 21–32)
COLOR FLUID: NORMAL
CREAT SERPL-MCNC: 0.9 MG/DL (ref 0.6–1.2)
CRP SERPL-MCNC: 12.9 MG/L (ref 0–5.1)
CRYSTALS FLD MICRO: NORMAL
DEPRECATED RDW RBC AUTO: 16.7 % (ref 12.4–15.4)
ECHO BSA: 1.84 M2
EOSINOPHIL # BLD: 0.2 K/UL (ref 0–0.6)
EOSINOPHIL NFR BLD: 1.9 %
EOSINOPHIL NFR FLD MANUAL: 3 %
ERYTHROCYTE [SEDIMENTATION RATE] IN BLOOD BY WESTERGREN METHOD: 48 MM/HR (ref 0–30)
GFR SERPLBLD CREATININE-BSD FMLA CKD-EPI: 70 ML/MIN/{1.73_M2}
GLUCOSE SERPL-MCNC: 122 MG/DL (ref 70–99)
HCT VFR BLD AUTO: 37.9 % (ref 36–48)
HGB BLD-MCNC: 12 G/DL (ref 12–16)
LACTATE BLDV-SCNC: 1 MMOL/L (ref 0.4–1.9)
LYMPHOCYTES # BLD: 1.1 K/UL (ref 1–5.1)
LYMPHOCYTES NFR BLD: 9.9 %
LYMPHOCYTES NFR FLD: 19 %
MCH RBC QN AUTO: 24.9 PG (ref 26–34)
MCHC RBC AUTO-ENTMCNC: 31.7 G/DL (ref 31–36)
MCV RBC AUTO: 78.6 FL (ref 80–100)
MONOCYTES # BLD: 0.8 K/UL (ref 0–1.3)
MONOCYTES NFR BLD: 7.4 %
MONOCYTES NFR FLD: 5 %
NEUTROPHIL, FLUID: 73 %
NEUTROPHILS # BLD: 8.9 K/UL (ref 1.7–7.7)
NEUTROPHILS NFR BLD: 80.4 %
NUC CELL # FLD: 6044 /CUMM
PATH INTERP FLD-IMP: YES
PLATELET # BLD AUTO: 313 K/UL (ref 135–450)
PMV BLD AUTO: 7.5 FL (ref 5–10.5)
POTASSIUM SERPL-SCNC: 3.9 MMOL/L (ref 3.5–5.1)
PROT SERPL-MCNC: 7.3 G/DL (ref 6.4–8.2)
RBC # BLD AUTO: 4.82 M/UL (ref 4–5.2)
RBC FLUID: NORMAL /CUMM
SODIUM SERPL-SCNC: 138 MMOL/L (ref 136–145)
SPECIMEN SOURCE FLD: NORMAL
TOTAL CELLS COUNTED FLD: 100
URATE SERPL-MCNC: 3.3 MG/DL (ref 2.6–6)
WBC # BLD AUTO: 11 K/UL (ref 4–11)

## 2024-09-16 PROCEDURE — 36415 COLL VENOUS BLD VENIPUNCTURE: CPT

## 2024-09-16 PROCEDURE — 6370000000 HC RX 637 (ALT 250 FOR IP): Performed by: INTERNAL MEDICINE

## 2024-09-16 PROCEDURE — 2580000003 HC RX 258: Performed by: EMERGENCY MEDICINE

## 2024-09-16 PROCEDURE — 85520 HEPARIN ASSAY: CPT

## 2024-09-16 PROCEDURE — G0378 HOSPITAL OBSERVATION PER HR: HCPCS

## 2024-09-16 PROCEDURE — 96366 THER/PROPH/DIAG IV INF ADDON: CPT

## 2024-09-16 PROCEDURE — 0S9D3ZZ DRAINAGE OF LEFT KNEE JOINT, PERCUTANEOUS APPROACH: ICD-10-PCS | Performed by: EMERGENCY MEDICINE

## 2024-09-16 PROCEDURE — 93971 EXTREMITY STUDY: CPT

## 2024-09-16 PROCEDURE — 87040 BLOOD CULTURE FOR BACTERIA: CPT

## 2024-09-16 PROCEDURE — 80053 COMPREHEN METABOLIC PANEL: CPT

## 2024-09-16 PROCEDURE — 96367 TX/PROPH/DG ADDL SEQ IV INF: CPT

## 2024-09-16 PROCEDURE — 6370000000 HC RX 637 (ALT 250 FOR IP): Performed by: EMERGENCY MEDICINE

## 2024-09-16 PROCEDURE — 84550 ASSAY OF BLOOD/URIC ACID: CPT

## 2024-09-16 PROCEDURE — 87070 CULTURE OTHR SPECIMN AEROBIC: CPT

## 2024-09-16 PROCEDURE — 73562 X-RAY EXAM OF KNEE 3: CPT

## 2024-09-16 PROCEDURE — 96365 THER/PROPH/DIAG IV INF INIT: CPT

## 2024-09-16 PROCEDURE — 6360000002 HC RX W HCPCS: Performed by: EMERGENCY MEDICINE

## 2024-09-16 PROCEDURE — 99285 EMERGENCY DEPT VISIT HI MDM: CPT

## 2024-09-16 PROCEDURE — 20610 DRAIN/INJ JOINT/BURSA W/O US: CPT

## 2024-09-16 PROCEDURE — 86140 C-REACTIVE PROTEIN: CPT

## 2024-09-16 PROCEDURE — 87205 SMEAR GRAM STAIN: CPT

## 2024-09-16 PROCEDURE — 85652 RBC SED RATE AUTOMATED: CPT

## 2024-09-16 PROCEDURE — 89060 EXAM SYNOVIAL FLUID CRYSTALS: CPT

## 2024-09-16 PROCEDURE — 2580000003 HC RX 258: Performed by: INTERNAL MEDICINE

## 2024-09-16 PROCEDURE — 83605 ASSAY OF LACTIC ACID: CPT

## 2024-09-16 PROCEDURE — 89051 BODY FLUID CELL COUNT: CPT

## 2024-09-16 PROCEDURE — 93971 EXTREMITY STUDY: CPT | Performed by: INTERNAL MEDICINE

## 2024-09-16 PROCEDURE — 96375 TX/PRO/DX INJ NEW DRUG ADDON: CPT

## 2024-09-16 PROCEDURE — 85025 COMPLETE CBC W/AUTO DIFF WBC: CPT

## 2024-09-16 PROCEDURE — 1200000000 HC SEMI PRIVATE

## 2024-09-16 RX ORDER — FLECAINIDE ACETATE 100 MG/1
100 TABLET ORAL DAILY
Status: DISCONTINUED | OUTPATIENT
Start: 2024-09-17 | End: 2024-09-17 | Stop reason: HOSPADM

## 2024-09-16 RX ORDER — OXYCODONE HYDROCHLORIDE 5 MG/1
10 TABLET ORAL EVERY 4 HOURS PRN
Status: DISCONTINUED | OUTPATIENT
Start: 2024-09-16 | End: 2024-09-17 | Stop reason: HOSPADM

## 2024-09-16 RX ORDER — ATORVASTATIN CALCIUM 10 MG/1
10 TABLET, FILM COATED ORAL DAILY
Status: DISCONTINUED | OUTPATIENT
Start: 2024-09-17 | End: 2024-09-17 | Stop reason: HOSPADM

## 2024-09-16 RX ORDER — SODIUM CHLORIDE 0.9 % (FLUSH) 0.9 %
5-40 SYRINGE (ML) INJECTION EVERY 12 HOURS SCHEDULED
Status: DISCONTINUED | OUTPATIENT
Start: 2024-09-16 | End: 2024-09-17 | Stop reason: HOSPADM

## 2024-09-16 RX ORDER — SODIUM CHLORIDE 9 MG/ML
INJECTION, SOLUTION INTRAVENOUS PRN
Status: DISCONTINUED | OUTPATIENT
Start: 2024-09-16 | End: 2024-09-17 | Stop reason: HOSPADM

## 2024-09-16 RX ORDER — ONDANSETRON 2 MG/ML
4 INJECTION INTRAMUSCULAR; INTRAVENOUS EVERY 6 HOURS PRN
Status: DISCONTINUED | OUTPATIENT
Start: 2024-09-16 | End: 2024-09-17 | Stop reason: HOSPADM

## 2024-09-16 RX ORDER — MORPHINE SULFATE 4 MG/ML
4 INJECTION, SOLUTION INTRAMUSCULAR; INTRAVENOUS EVERY 4 HOURS PRN
Status: DISCONTINUED | OUTPATIENT
Start: 2024-09-16 | End: 2024-09-17 | Stop reason: HOSPADM

## 2024-09-16 RX ORDER — TRAZODONE HYDROCHLORIDE 50 MG/1
100 TABLET, FILM COATED ORAL NIGHTLY PRN
Status: DISCONTINUED | OUTPATIENT
Start: 2024-09-16 | End: 2024-09-17 | Stop reason: HOSPADM

## 2024-09-16 RX ORDER — ONDANSETRON 4 MG/1
4 TABLET, ORALLY DISINTEGRATING ORAL EVERY 8 HOURS PRN
Status: DISCONTINUED | OUTPATIENT
Start: 2024-09-16 | End: 2024-09-17 | Stop reason: HOSPADM

## 2024-09-16 RX ORDER — ACETAMINOPHEN 650 MG/1
650 SUPPOSITORY RECTAL EVERY 6 HOURS PRN
Status: DISCONTINUED | OUTPATIENT
Start: 2024-09-16 | End: 2024-09-17 | Stop reason: HOSPADM

## 2024-09-16 RX ORDER — POLYETHYLENE GLYCOL 3350 17 G/17G
17 POWDER, FOR SOLUTION ORAL DAILY PRN
Status: DISCONTINUED | OUTPATIENT
Start: 2024-09-16 | End: 2024-09-17

## 2024-09-16 RX ORDER — MORPHINE SULFATE 2 MG/ML
2 INJECTION, SOLUTION INTRAMUSCULAR; INTRAVENOUS EVERY 4 HOURS PRN
Status: DISCONTINUED | OUTPATIENT
Start: 2024-09-16 | End: 2024-09-17 | Stop reason: HOSPADM

## 2024-09-16 RX ORDER — DILTIAZEM HYDROCHLORIDE 120 MG/1
120 CAPSULE, COATED, EXTENDED RELEASE ORAL DAILY
Status: DISCONTINUED | OUTPATIENT
Start: 2024-09-16 | End: 2024-09-17 | Stop reason: HOSPADM

## 2024-09-16 RX ORDER — ACETAMINOPHEN 325 MG/1
650 TABLET ORAL EVERY 6 HOURS PRN
Status: DISCONTINUED | OUTPATIENT
Start: 2024-09-16 | End: 2024-09-17

## 2024-09-16 RX ORDER — PRAMIPEXOLE DIHYDROCHLORIDE 0.25 MG/1
0.5 TABLET ORAL NIGHTLY
Status: DISCONTINUED | OUTPATIENT
Start: 2024-09-16 | End: 2024-09-17 | Stop reason: HOSPADM

## 2024-09-16 RX ORDER — SODIUM CHLORIDE 0.9 % (FLUSH) 0.9 %
5-40 SYRINGE (ML) INJECTION PRN
Status: DISCONTINUED | OUTPATIENT
Start: 2024-09-16 | End: 2024-09-17 | Stop reason: HOSPADM

## 2024-09-16 RX ORDER — BUPROPION HYDROCHLORIDE 150 MG/1
150 TABLET ORAL EVERY MORNING
Status: DISCONTINUED | OUTPATIENT
Start: 2024-09-17 | End: 2024-09-17 | Stop reason: HOSPADM

## 2024-09-16 RX ORDER — OXYCODONE HYDROCHLORIDE 5 MG/1
5 TABLET ORAL EVERY 4 HOURS PRN
Status: DISCONTINUED | OUTPATIENT
Start: 2024-09-16 | End: 2024-09-17 | Stop reason: HOSPADM

## 2024-09-16 RX ORDER — OXYCODONE AND ACETAMINOPHEN 5; 325 MG/1; MG/1
1 TABLET ORAL ONCE
Status: COMPLETED | OUTPATIENT
Start: 2024-09-16 | End: 2024-09-16

## 2024-09-16 RX ADMIN — SODIUM CHLORIDE, PRESERVATIVE FREE 10 ML: 5 INJECTION INTRAVENOUS at 22:42

## 2024-09-16 RX ADMIN — VANCOMYCIN HYDROCHLORIDE 1750 MG: 10 INJECTION, POWDER, LYOPHILIZED, FOR SOLUTION INTRAVENOUS at 20:31

## 2024-09-16 RX ADMIN — TRAZODONE HYDROCHLORIDE 100 MG: 50 TABLET ORAL at 21:58

## 2024-09-16 RX ADMIN — OXYCODONE HYDROCHLORIDE AND ACETAMINOPHEN 1 TABLET: 5; 325 TABLET ORAL at 17:36

## 2024-09-16 RX ADMIN — PRAMIPEXOLE DIHYDROCHLORIDE 0.5 MG: 0.25 TABLET ORAL at 21:58

## 2024-09-16 RX ADMIN — CEFTRIAXONE SODIUM 1000 MG: 1 INJECTION, POWDER, FOR SOLUTION INTRAMUSCULAR; INTRAVENOUS at 19:50

## 2024-09-16 RX ADMIN — OXYCODONE HYDROCHLORIDE 10 MG: 5 TABLET ORAL at 21:58

## 2024-09-16 RX ADMIN — DILTIAZEM HYDROCHLORIDE 120 MG: 120 CAPSULE, EXTENDED RELEASE ORAL at 21:58

## 2024-09-16 ASSESSMENT — PAIN DESCRIPTION - ORIENTATION
ORIENTATION: LEFT
ORIENTATION: LEFT

## 2024-09-16 ASSESSMENT — PAIN SCALES - GENERAL
PAINLEVEL_OUTOF10: 8
PAINLEVEL_OUTOF10: 10
PAINLEVEL_OUTOF10: 10
PAINLEVEL_OUTOF10: 7

## 2024-09-16 ASSESSMENT — LIFESTYLE VARIABLES
HOW MANY STANDARD DRINKS CONTAINING ALCOHOL DO YOU HAVE ON A TYPICAL DAY: 1 OR 2
HOW OFTEN DO YOU HAVE A DRINK CONTAINING ALCOHOL: MONTHLY OR LESS

## 2024-09-16 ASSESSMENT — PAIN DESCRIPTION - LOCATION
LOCATION: KNEE
LOCATION: KNEE

## 2024-09-16 ASSESSMENT — PAIN DESCRIPTION - DESCRIPTORS
DESCRIPTORS: ACHING
DESCRIPTORS: THROBBING

## 2024-09-16 ASSESSMENT — PAIN - FUNCTIONAL ASSESSMENT: PAIN_FUNCTIONAL_ASSESSMENT: 0-10

## 2024-09-17 VITALS
OXYGEN SATURATION: 96 % | HEIGHT: 64 IN | WEIGHT: 169.6 LBS | SYSTOLIC BLOOD PRESSURE: 97 MMHG | TEMPERATURE: 97.9 F | DIASTOLIC BLOOD PRESSURE: 55 MMHG | BODY MASS INDEX: 28.95 KG/M2 | RESPIRATION RATE: 16 BRPM | HEART RATE: 65 BPM

## 2024-09-17 PROBLEM — T84.50XA INFECTION AND INFLAMMATORY REACTION DUE TO INTERNAL JOINT PROSTHESIS (HCC): Status: ACTIVE | Noted: 2024-09-17

## 2024-09-17 LAB
ALBUMIN SERPL-MCNC: 3.6 G/DL (ref 3.4–5)
ANION GAP SERPL CALCULATED.3IONS-SCNC: 10 MMOL/L (ref 3–16)
BUN SERPL-MCNC: 11 MG/DL (ref 7–20)
CALCIUM SERPL-MCNC: 8.5 MG/DL (ref 8.3–10.6)
CHLORIDE SERPL-SCNC: 105 MMOL/L (ref 99–110)
CO2 SERPL-SCNC: 25 MMOL/L (ref 21–32)
CREAT SERPL-MCNC: 0.8 MG/DL (ref 0.6–1.2)
DEPRECATED RDW RBC AUTO: 16.5 % (ref 12.4–15.4)
GFR SERPLBLD CREATININE-BSD FMLA CKD-EPI: 81 ML/MIN/{1.73_M2}
GLUCOSE SERPL-MCNC: 93 MG/DL (ref 70–99)
HCT VFR BLD AUTO: 34.7 % (ref 36–48)
HGB BLD-MCNC: 11.4 G/DL (ref 12–16)
MAGNESIUM SERPL-MCNC: 2.2 MG/DL (ref 1.8–2.4)
MCH RBC QN AUTO: 25.9 PG (ref 26–34)
MCHC RBC AUTO-ENTMCNC: 33 G/DL (ref 31–36)
MCV RBC AUTO: 78.5 FL (ref 80–100)
PATH CONSULT FLUID: NORMAL
PHOSPHATE SERPL-MCNC: 3.7 MG/DL (ref 2.5–4.9)
PLATELET # BLD AUTO: 257 K/UL (ref 135–450)
PMV BLD AUTO: 7.5 FL (ref 5–10.5)
POTASSIUM SERPL-SCNC: 3.9 MMOL/L (ref 3.5–5.1)
RBC # BLD AUTO: 4.42 M/UL (ref 4–5.2)
SODIUM SERPL-SCNC: 140 MMOL/L (ref 136–145)
VANCOMYCIN SERPL-MCNC: 22.3 UG/ML
WBC # BLD AUTO: 5.7 K/UL (ref 4–11)

## 2024-09-17 PROCEDURE — 2580000003 HC RX 258: Performed by: NURSE PRACTITIONER

## 2024-09-17 PROCEDURE — 6360000002 HC RX W HCPCS: Performed by: NURSE PRACTITIONER

## 2024-09-17 PROCEDURE — G0378 HOSPITAL OBSERVATION PER HR: HCPCS

## 2024-09-17 PROCEDURE — 0S9D3ZZ DRAINAGE OF LEFT KNEE JOINT, PERCUTANEOUS APPROACH: ICD-10-PCS | Performed by: SPECIALIST/TECHNOLOGIST

## 2024-09-17 PROCEDURE — 83735 ASSAY OF MAGNESIUM: CPT

## 2024-09-17 PROCEDURE — 2580000003 HC RX 258: Performed by: INTERNAL MEDICINE

## 2024-09-17 PROCEDURE — 96372 THER/PROPH/DIAG INJ SC/IM: CPT

## 2024-09-17 PROCEDURE — 80069 RENAL FUNCTION PANEL: CPT

## 2024-09-17 PROCEDURE — 6370000000 HC RX 637 (ALT 250 FOR IP): Performed by: SPECIALIST/TECHNOLOGIST

## 2024-09-17 PROCEDURE — 96366 THER/PROPH/DIAG IV INF ADDON: CPT

## 2024-09-17 PROCEDURE — 96376 TX/PRO/DX INJ SAME DRUG ADON: CPT

## 2024-09-17 PROCEDURE — 80202 ASSAY OF VANCOMYCIN: CPT

## 2024-09-17 PROCEDURE — 99223 1ST HOSP IP/OBS HIGH 75: CPT | Performed by: STUDENT IN AN ORGANIZED HEALTH CARE EDUCATION/TRAINING PROGRAM

## 2024-09-17 PROCEDURE — 36415 COLL VENOUS BLD VENIPUNCTURE: CPT

## 2024-09-17 PROCEDURE — 85027 COMPLETE CBC AUTOMATED: CPT

## 2024-09-17 PROCEDURE — 6360000002 HC RX W HCPCS: Performed by: INTERNAL MEDICINE

## 2024-09-17 PROCEDURE — 6370000000 HC RX 637 (ALT 250 FOR IP): Performed by: INTERNAL MEDICINE

## 2024-09-17 RX ORDER — ENOXAPARIN SODIUM 150 MG/ML
1.5 INJECTION SUBCUTANEOUS DAILY
Status: DISCONTINUED | OUTPATIENT
Start: 2024-09-17 | End: 2024-09-17

## 2024-09-17 RX ORDER — OXYCODONE HYDROCHLORIDE 5 MG/1
5 TABLET ORAL EVERY 6 HOURS PRN
Qty: 15 TABLET | Refills: 0 | Status: SHIPPED | OUTPATIENT
Start: 2024-09-17 | End: 2024-09-22

## 2024-09-17 RX ORDER — POLYETHYLENE GLYCOL 3350 17 G/17G
17 POWDER, FOR SOLUTION ORAL DAILY
Status: DISCONTINUED | OUTPATIENT
Start: 2024-09-17 | End: 2024-09-17 | Stop reason: HOSPADM

## 2024-09-17 RX ORDER — ACETAMINOPHEN 325 MG/1
650 TABLET ORAL EVERY 6 HOURS
COMMUNITY
Start: 2024-09-17 | End: 2024-10-17

## 2024-09-17 RX ORDER — ACETAMINOPHEN 325 MG/1
650 TABLET ORAL EVERY 6 HOURS
Status: DISCONTINUED | OUTPATIENT
Start: 2024-09-17 | End: 2024-09-17 | Stop reason: HOSPADM

## 2024-09-17 RX ORDER — METHOCARBAMOL 500 MG/1
500 TABLET, FILM COATED ORAL 4 TIMES DAILY
Status: DISCONTINUED | OUTPATIENT
Start: 2024-09-17 | End: 2024-09-17 | Stop reason: HOSPADM

## 2024-09-17 RX ORDER — METHOCARBAMOL 500 MG/1
500 TABLET, FILM COATED ORAL 4 TIMES DAILY
Qty: 40 TABLET | Refills: 0 | Status: SHIPPED | OUTPATIENT
Start: 2024-09-17 | End: 2024-09-27

## 2024-09-17 RX ADMIN — FLECAINIDE ACETATE 100 MG: 100 TABLET ORAL at 09:34

## 2024-09-17 RX ADMIN — SERTRALINE 100 MG: 50 TABLET, FILM COATED ORAL at 09:34

## 2024-09-17 RX ADMIN — CEFTRIAXONE SODIUM 2000 MG: 2 INJECTION, POWDER, FOR SOLUTION INTRAMUSCULAR; INTRAVENOUS at 12:49

## 2024-09-17 RX ADMIN — BUPROPION HYDROCHLORIDE 150 MG: 150 TABLET, EXTENDED RELEASE ORAL at 09:35

## 2024-09-17 RX ADMIN — OXYCODONE HYDROCHLORIDE 10 MG: 5 TABLET ORAL at 10:03

## 2024-09-17 RX ADMIN — ENOXAPARIN SODIUM 120 MG: 150 INJECTION SUBCUTANEOUS at 11:18

## 2024-09-17 RX ADMIN — ACETAMINOPHEN 650 MG: 325 TABLET ORAL at 12:43

## 2024-09-17 RX ADMIN — VANCOMYCIN HYDROCHLORIDE 750 MG: 750 INJECTION, POWDER, LYOPHILIZED, FOR SOLUTION INTRAVENOUS at 07:04

## 2024-09-17 RX ADMIN — ATORVASTATIN CALCIUM 10 MG: 10 TABLET, FILM COATED ORAL at 09:35

## 2024-09-17 RX ADMIN — METHOCARBAMOL 500 MG: 500 TABLET ORAL at 12:43

## 2024-09-17 ASSESSMENT — PAIN SCALES - GENERAL
PAINLEVEL_OUTOF10: 8
PAINLEVEL_OUTOF10: 7
PAINLEVEL_OUTOF10: 8

## 2024-09-17 ASSESSMENT — PAIN DESCRIPTION - ORIENTATION
ORIENTATION: LEFT

## 2024-09-17 ASSESSMENT — PAIN DESCRIPTION - LOCATION
LOCATION: KNEE

## 2024-09-17 ASSESSMENT — PAIN DESCRIPTION - DESCRIPTORS: DESCRIPTORS: THROBBING

## 2024-09-18 ENCOUNTER — TELEPHONE (OUTPATIENT)
Dept: FAMILY MEDICINE CLINIC | Age: 69
End: 2024-09-18

## 2024-09-19 ENCOUNTER — TELEPHONE (OUTPATIENT)
Dept: ORTHOPEDIC SURGERY | Age: 69
End: 2024-09-19

## 2024-09-19 LAB
BACTERIA FLD AEROBE CULT: NORMAL
GRAM STN SPEC: NORMAL

## 2024-09-20 ENCOUNTER — CLINICAL DOCUMENTATION (OUTPATIENT)
Dept: SPIRITUAL SERVICES | Age: 69
End: 2024-09-20

## 2024-09-20 LAB
BACTERIA BLD CULT ORG #2: NORMAL
BACTERIA BLD CULT: NORMAL

## 2024-09-23 LAB
BACTERIA FLD AEROBE CULT: NORMAL
GRAM STN SPEC: NORMAL

## 2024-09-24 ENCOUNTER — TELEPHONE (OUTPATIENT)
Dept: FAMILY MEDICINE CLINIC | Age: 69
End: 2024-09-24

## 2024-09-24 ENCOUNTER — OFFICE VISIT (OUTPATIENT)
Dept: ORTHOPEDIC SURGERY | Age: 69
End: 2024-09-24
Payer: MEDICARE

## 2024-09-24 DIAGNOSIS — M17.12 PRIMARY OSTEOARTHRITIS OF LEFT KNEE: ICD-10-CM

## 2024-09-24 DIAGNOSIS — T84.84XA PAIN DUE TO TOTAL LEFT KNEE REPLACEMENT, INITIAL ENCOUNTER (HCC): Primary | ICD-10-CM

## 2024-09-24 DIAGNOSIS — Z96.652 PAIN DUE TO TOTAL LEFT KNEE REPLACEMENT, INITIAL ENCOUNTER (HCC): Primary | ICD-10-CM

## 2024-09-24 PROCEDURE — 1123F ACP DISCUSS/DSCN MKR DOCD: CPT | Performed by: STUDENT IN AN ORGANIZED HEALTH CARE EDUCATION/TRAINING PROGRAM

## 2024-09-24 PROCEDURE — 99214 OFFICE O/P EST MOD 30 MIN: CPT | Performed by: STUDENT IN AN ORGANIZED HEALTH CARE EDUCATION/TRAINING PROGRAM

## 2024-09-24 RX ORDER — METHYLPREDNISOLONE 4 MG
TABLET, DOSE PACK ORAL
Qty: 21 KIT | Refills: 0 | Status: SHIPPED | OUTPATIENT
Start: 2024-09-24

## 2024-09-24 NOTE — TELEPHONE ENCOUNTER
S/w patient regarding scheduling AWV- she deferred for now till after she finds out if having possible knee surgery. She will call back to schedule possible preop and AWV  when has more details

## 2024-09-30 LAB
BACTERIA FLD AEROBE CULT: NORMAL
GRAM STN SPEC: NORMAL

## 2024-10-02 ENCOUNTER — OFFICE VISIT (OUTPATIENT)
Dept: ORTHOPEDIC SURGERY | Age: 69
End: 2024-10-02
Payer: MEDICARE

## 2024-10-02 DIAGNOSIS — T84.84XA PAIN DUE TO TOTAL LEFT KNEE REPLACEMENT, INITIAL ENCOUNTER (HCC): Primary | ICD-10-CM

## 2024-10-02 DIAGNOSIS — Z96.652 PAIN DUE TO TOTAL LEFT KNEE REPLACEMENT, INITIAL ENCOUNTER (HCC): Primary | ICD-10-CM

## 2024-10-02 PROCEDURE — 1123F ACP DISCUSS/DSCN MKR DOCD: CPT | Performed by: STUDENT IN AN ORGANIZED HEALTH CARE EDUCATION/TRAINING PROGRAM

## 2024-10-02 PROCEDURE — 99213 OFFICE O/P EST LOW 20 MIN: CPT | Performed by: STUDENT IN AN ORGANIZED HEALTH CARE EDUCATION/TRAINING PROGRAM

## 2024-10-02 SDOH — HEALTH STABILITY: PHYSICAL HEALTH: ON AVERAGE, HOW MANY MINUTES DO YOU ENGAGE IN EXERCISE AT THIS LEVEL?: 0 MIN

## 2024-10-02 SDOH — HEALTH STABILITY: PHYSICAL HEALTH: ON AVERAGE, HOW MANY DAYS PER WEEK DO YOU ENGAGE IN MODERATE TO STRENUOUS EXERCISE (LIKE A BRISK WALK)?: 0 DAYS

## 2024-10-02 NOTE — PROGRESS NOTES
Chief Complaint  Knee Pain (Ck lt knee)      History of Present Illness:  The patient is here for repeat evaluation regarding her left knee.  The patient is still having about the same amount of pain.  She reports her pain is a 7 out of 10 today.  She is still having some difficulty straightening the knee and bending it deep.  She denies any fevers or chills or feeling ill.    Prior HPI 9/24/2024:  The patient is here for repeat evaluation regarding her left knee.  The patient was admitted to the hospital a week ago for concerns for periprosthetic left knee joint infection.  The initial aspiration was concerning for an infection, however the Synovasure results are much more encouraging.  Both aspirates demonstrated a good amount of blood.  There is concern for possible hemarthrosis and a lower concern for infection.  She reports her pain is about an 8 today.  Range of motion has been slowly getting worse    Prior HPI 8/27/2024:  The patient is here for repeat evaluation regarding her left knee.  The patient is 2-1/2 months out from her left knee manipulation under anesthesia.  She still has some occasional discomfort and difficulty with bending.  She is still taking stairs slowly.  She is having difficulty keeping up with her grandchildren.  The patient does report that she has minimal to no pain when she is working out in her pool but the pain comes back when she gets out of the pool.  She denies fevers or chills.    Prior HPI 6/25/2024:  Sally Higgins is a pleasant 69 y.o. female here today for her first postop evaluation regarding her left knee.  She underwent a left knee her first postop evaluation regarding her left knee.  She underwent a left knee manipulation under anesthesia on 6/10/2024.  She is doing better.  She is working on knee range of motion.  With physical therapy.        Medical History:  Patient's medications, allergies, past medical, surgical, social and family histories were reviewed and

## 2024-10-04 ENCOUNTER — OFFICE VISIT (OUTPATIENT)
Dept: ORTHOPEDIC SURGERY | Age: 69
End: 2024-10-04
Payer: COMMERCIAL

## 2024-10-04 VITALS — HEIGHT: 64 IN | WEIGHT: 169 LBS | BODY MASS INDEX: 28.85 KG/M2

## 2024-10-04 DIAGNOSIS — T84.84XA PAIN DUE TO TOTAL LEFT KNEE REPLACEMENT, INITIAL ENCOUNTER (HCC): Primary | ICD-10-CM

## 2024-10-04 DIAGNOSIS — Z96.652 PAIN DUE TO TOTAL LEFT KNEE REPLACEMENT, INITIAL ENCOUNTER (HCC): Primary | ICD-10-CM

## 2024-10-04 DIAGNOSIS — M17.12 PRIMARY OSTEOARTHRITIS OF LEFT KNEE: ICD-10-CM

## 2024-10-04 PROCEDURE — 1123F ACP DISCUSS/DSCN MKR DOCD: CPT | Performed by: STUDENT IN AN ORGANIZED HEALTH CARE EDUCATION/TRAINING PROGRAM

## 2024-10-04 PROCEDURE — 99215 OFFICE O/P EST HI 40 MIN: CPT | Performed by: STUDENT IN AN ORGANIZED HEALTH CARE EDUCATION/TRAINING PROGRAM

## 2024-10-04 NOTE — PROGRESS NOTES
Dr Sai Villalpando      Date /Time 10/4/2024       9:05 AM EDT  Name Sally Higgins             1955   Location  [facility] ORTHOPEDIC SURG  MRN [id number]                Chief Complaint   Patient presents with    New Patient     Left knee  Ref Tess  Has imaging        History of Present Illness  Sally Higgins is a 69 y.o. female who is referred for evaluation of painful left knee replacement.  Her knee replacement was done with  Call to off beginning of June 2024.  She did very well for the first 2 months was back to work walking without assistive devices off pain medicine.  She had acute increase in pain driving to work on September 16 inability to ambulate.  She emergency department where initial aspiration including cell count and culture was borderline for infection.  Synovasure was therefore performed which did not support infection.  We therefore elected to observe this over the last couple weeks.  Unfortunately her motion has gotten worse and her pain has been severe and persistent.  She has not been able to do her activities of daily living due to this.  She is referred for further evaluation recommendations.          Past History  Past Medical History:   Diagnosis Date    Atrial fibrillation (HCC)     April 2021    Chronic back pain     Depression     GERD (gastroesophageal reflux disease)     Hyperlipidemia     Lumbar herniated disc 02/1998    MVP (mitral valve prolapse)     BIJAN (obstructive sleep apnea)     Primary osteoarthritis of left knee 09/25/2020    Restless leg syndrome      Past Surgical History:   Procedure Laterality Date    BACK SURGERY  02/1998    HERNIA REPAIR  2010    KNEE SURGERY Left 06/10/2024    LEFT KNEE MANIPULATION UNDER ANESTHESIA - performed by Benny Pulido DO at [facility]    LAP BAND  2010    OTHER SURGICAL HISTORY  04/04/2024    LEFT KNEE TOTAL ARTHROPLASTY WITH CORI ROBOT IPACK BLOCK, ADDUCTOR CANAL BLOCK - 23 HOUR HOLD- - Left    TOTAL KNEE ARTHROPLASTY Left

## 2024-10-07 LAB
BACTERIA FLD AEROBE CULT: NORMAL
GRAM STN SPEC: NORMAL

## 2024-10-08 ENCOUNTER — TELEPHONE (OUTPATIENT)
Dept: ORTHOPEDIC SURGERY | Age: 69
End: 2024-10-08

## 2024-10-19 ENCOUNTER — PREP FOR PROCEDURE (OUTPATIENT)
Dept: ORTHOPEDIC SURGERY | Age: 69
End: 2024-10-19

## 2024-10-19 DIAGNOSIS — R73.03 PREDIABETES: ICD-10-CM

## 2024-10-19 DIAGNOSIS — R94.5 ABNORMAL RESULTS OF LIVER FUNCTION STUDIES: ICD-10-CM

## 2024-10-19 DIAGNOSIS — R82.994 HYPERCALCIURIA: ICD-10-CM

## 2024-10-19 DIAGNOSIS — R79.9 ABNORMAL FINDING OF BLOOD CHEMISTRY, UNSPECIFIED: ICD-10-CM

## 2024-10-19 DIAGNOSIS — Z96.652 PAIN DUE TO TOTAL LEFT KNEE REPLACEMENT, INITIAL ENCOUNTER (HCC): Primary | ICD-10-CM

## 2024-10-19 DIAGNOSIS — R79.1 ABNORMAL COAGULATION PROFILE: ICD-10-CM

## 2024-10-19 DIAGNOSIS — T84.84XA PAIN DUE TO TOTAL LEFT KNEE REPLACEMENT, INITIAL ENCOUNTER (HCC): Primary | ICD-10-CM

## 2024-10-19 PROBLEM — Z96.659 PAIN DUE TO TOTAL KNEE REPLACEMENT (HCC): Status: ACTIVE | Noted: 2024-10-19

## 2024-10-21 NOTE — PROGRESS NOTES
Sally Higgins    Age 69 y.o.    female    1955    MRN [id number]    11/19/2024  Arrival Time_____________  OR Time____________101 Min     Procedure(s):  LEFT KNEE SYNOVECTOMY, ANTERIOR AND POSTERIOR NOTE:  PREOP ADDUCTOR CANAL BLOCK                      General   Surgeon(s):  Sai Villalpando, MD      DAY ADMIT ___  SDS/OP ___  OUTPT IN BED ___        Phone [phone] (home) [phone] (work)                 PCP _____________________ Phone_________________ Epic ( ) Epic CE ( ) Appt ________    NOTES: _________________________________________ Consult/Cardio _______________    ____________________________________________________________________________    ____________________________________________________________________________  PAT APPT DATE:________ TIME: ________  FAXED QAD: _______  (__) H&P w/ Hospitalist    (__) PAT orders in EPIC    (__) Meet with PAT nurse  __________________________________________________________________________  Preop Nurse phone screen complete: _____________  (__) CBC     (__) W/ DIFF ___________  (__) CT CHEST  __________   (__) Hgb A1C    ___________  (__) CHEST X RAY   __________  (__) LIPID PROFILE  ___________  (__) EKG   __________  (__) PT-INR / APTT  ___________  (__) PFT's   __________  (__) BMP   ___________  (__) CAROTIDS  __________  (__) CMP   ___________  (__) VEIN MAPPING  __________  (__) U/A   ___________  ( X ) HISTORY & PHYSICAL __________  (__) URINE C & S  ___________  (__) CARDIAC CLEARANCE __________  (__) U/A W/ FLEX  ___________  (__) PULM. CLEARANCE __________  (__) SERUM PREGNANCY ___________  (__) Preop Orders in EPIC __________  (__) TYPE & SCREEN __________repeat ( ) (__)  __________________ __________  (__) Albumin   ___________  (__)  __________________ __________  (__) TRANSFERRIN  ___________  (__)  __________________ __________  (__) LIVER PROFILE  ___________  (__) URINE PREG DOS __________  (__) MRSA NASAL SWAB ___________  (__) BLOOD SUGAR

## 2024-10-28 ENCOUNTER — TELEPHONE (OUTPATIENT)
Dept: PHARMACY | Facility: CLINIC | Age: 69
End: 2024-10-28

## 2024-10-28 NOTE — TELEPHONE ENCOUNTER
[facility] CLINICAL PHARMACY: ADHERENCE REVIEW  Identified care gap per [employer] fills at [facility]: Statin adherence    Patient also appears to be prescribed: Statin    ASSESSMENT  STATIN ADHERENCE    Insurance Records claims through 10/07/2024 (Prior Year PDC = not reported; YTD PDC = 77%; Potential Fail Date: 10/29/24):   ATORVASTATIN TAB 10MG last filled on 24 for 30 day supply. Next refill due: 10/21/24    Prescribed si tablet/capsule daily    Per Reconcile Dispense History: last filled on 24 for 30 day supply.     Per [facility]: last picked up on 10/16/24 for 30 day supply.    Lab Results   Component Value Date    CHOL 181 2024    TRIG 82 2024    HDL 72 (H) 2024     Lab Results   Component Value Date    LDL 93 2024      ALT   Date Value Ref Range Status   2024 <5 (L) 10 - 40 U/L Final     AST   Date Value Ref Range Status   2024 11 (L) 15 - 37 U/L Final     The 10-year ASCVD risk score (Kareem MIRANDA, et al., 2019) is: 3.6%    Values used to calculate the score:      Age: 69 years      Sex: Female      Is Non- : No      Diabetic: No      Tobacco smoker: No      Systolic Blood Pressure: 97 mmHg      Is BP treated: No      HDL Cholesterol: 72 mg/dL      Total Cholesterol: 181 mg/dL     PLAN  No claim in the portal   No patient outreach planned at this time.    Recent Visits  Date Type Provider Dept   24 Office Visit Elieser Hairston DO [facility]   24 Office Visit Elieser Hairston DO [facility]   24 Office Visit Elieser Hairston, DO [facility]   23 Office Visit Elieser Hairston DO [facility]   06/15/23 Office Visit Elieser Hairston, DO [facility]   Showing recent visits within past 540 days with a meds authorizing provider and meeting all other requirements  Future Appointments  Date Type Provider Dept   24 Appointment Elieser Hairston DO [facility]

## 2024-10-30 ENCOUNTER — TELEPHONE (OUTPATIENT)
Dept: FAMILY MEDICINE CLINIC | Age: 69
End: 2024-10-30

## 2024-10-30 NOTE — TELEPHONE ENCOUNTER
Patient wanting to know if provider can order Pre Op labs before patients appointment on 11/1/24 at 8:30 am, patient would like to have blood drawn right before appointment.     Please advise.  Thank you

## 2024-11-01 ENCOUNTER — OFFICE VISIT (OUTPATIENT)
Dept: FAMILY MEDICINE CLINIC | Age: 69
End: 2024-11-01

## 2024-11-01 VITALS
SYSTOLIC BLOOD PRESSURE: 124 MMHG | OXYGEN SATURATION: 98 % | HEART RATE: 52 BPM | TEMPERATURE: 96.9 F | BODY MASS INDEX: 28.85 KG/M2 | WEIGHT: 169 LBS | DIASTOLIC BLOOD PRESSURE: 72 MMHG | HEIGHT: 64 IN

## 2024-11-01 DIAGNOSIS — Z01.818 PRE-OP EXAMINATION: Primary | ICD-10-CM

## 2024-11-01 DIAGNOSIS — M17.12 PRIMARY OSTEOARTHRITIS OF LEFT KNEE: ICD-10-CM

## 2024-11-01 NOTE — PROGRESS NOTES
Physical Exam   /72 (Site: Right Upper Arm, Position: Sitting, Cuff Size: Medium Adult)   Pulse 52   Temp 96.9 °F (36.1 °C) (Temporal)   Ht 1.626 m (5' 4.02\")   Wt 76.7 kg (169 lb)   SpO2 98%   BMI 28.99 kg/m²   General appearance: alert, appears stated age, and cooperative  Throat: lips, mucosa, and tongue normal; teeth and gums normal  Lungs: clear to auscultation bilaterally  Heart: regular rate and rhythm, S1, S2 normal, no murmur, click, rub or gallop  Abdomen: soft, non-tender; bowel sounds normal; no masses,  no organomegaly  Extremities: extremities normal, atraumatic, no cyanosis or edema  Pulses: 2+ and symmetric  Skin: Skin color, texture, turgor normal. No rashes or lesions  Neurologic: Grossly normal     Predictors of intubation difficulty:   Morbid obesity? no   Anatomically abnormal facies? no   Short, thick neck? no   Neck range of motion: normal   Dentition: No chipped, loose, or missing teeth.     Cardiographics   ECG: normal sinus bradycardia rhythm, no blocks or conduction defects, no ischemic changes     Lab Review   Admission on 09/16/2024, Discharged on 09/17/2024   Component Date Value    Body Surface Area 09/16/2024 1.84     Anti-XA Unfrac Heparin 09/16/2024 >1.10 (HH)     Uric Acid 09/16/2024 3.3     Sodium 09/16/2024 138     Potassium 09/16/2024 3.9     Chloride 09/16/2024 102     CO2 09/16/2024 23     Anion Gap 09/16/2024 13     Glucose 09/16/2024 122 (H)     BUN 09/16/2024 17     Creatinine 09/16/2024 0.9     Est, Glom Filt Rate 09/16/2024 70     Calcium 09/16/2024 9.5     Total Protein 09/16/2024 7.3     Albumin 09/16/2024 4.2     Albumin/Globulin Ratio 09/16/2024 1.4     Total Bilirubin 09/16/2024 0.3     Alkaline Phosphatase 09/16/2024 113     ALT 09/16/2024 <5 (L)     AST 09/16/2024 11 (L)     WBC 09/16/2024 11.0     RBC 09/16/2024 4.82     Hemoglobin 09/16/2024 12.0     Hematocrit 09/16/2024 37.9     MCV 09/16/2024 78.6 (L)     MCH 09/16/2024 24.9 (L)     MCHC

## 2024-11-04 RX ORDER — SODIUM CHLORIDE 9 MG/ML
INJECTION, SOLUTION INTRAVENOUS PRN
Status: CANCELLED | OUTPATIENT
Start: 2024-11-04

## 2024-11-04 RX ORDER — SODIUM CHLORIDE 0.9 % (FLUSH) 0.9 %
5-40 SYRINGE (ML) INJECTION EVERY 12 HOURS SCHEDULED
Status: CANCELLED | OUTPATIENT
Start: 2024-11-04

## 2024-11-04 RX ORDER — SODIUM CHLORIDE 0.9 % (FLUSH) 0.9 %
5-40 SYRINGE (ML) INJECTION PRN
Status: CANCELLED | OUTPATIENT
Start: 2024-11-04

## 2024-11-04 RX ORDER — CELECOXIB 100 MG/1
100 CAPSULE ORAL ONCE
Status: CANCELLED | OUTPATIENT
Start: 2024-11-04 | End: 2024-11-04

## 2024-11-04 RX ORDER — ACETAMINOPHEN 325 MG/1
1000 TABLET ORAL ONCE
Status: CANCELLED | OUTPATIENT
Start: 2024-11-04 | End: 2024-11-04

## 2024-11-09 ENCOUNTER — OFFICE VISIT (OUTPATIENT)
Age: 69
End: 2024-11-09

## 2024-11-09 VITALS
HEIGHT: 65 IN | HEART RATE: 65 BPM | DIASTOLIC BLOOD PRESSURE: 74 MMHG | OXYGEN SATURATION: 97 % | TEMPERATURE: 97.9 F | SYSTOLIC BLOOD PRESSURE: 118 MMHG | WEIGHT: 172.2 LBS | BODY MASS INDEX: 28.69 KG/M2

## 2024-11-09 DIAGNOSIS — R31.9 URINARY TRACT INFECTION WITH HEMATURIA, SITE UNSPECIFIED: Primary | ICD-10-CM

## 2024-11-09 DIAGNOSIS — N39.0 URINARY TRACT INFECTION WITH HEMATURIA, SITE UNSPECIFIED: Primary | ICD-10-CM

## 2024-11-09 DIAGNOSIS — R31.9 HEMATURIA, UNSPECIFIED TYPE: ICD-10-CM

## 2024-11-09 LAB
BILIRUBIN, POC: NORMAL
BLOOD URINE, POC: NORMAL
CLARITY, POC: NORMAL
COLOR, POC: NORMAL
GLUCOSE URINE, POC: NORMAL MG/DL
KETONES, POC: NORMAL MG/DL
LEUKOCYTE EST, POC: NORMAL
NITRITE, POC: NORMAL
PH, POC: NORMAL
PROTEIN, POC: NORMAL MG/DL
SPECIFIC GRAVITY, POC: NORMAL
UROBILINOGEN, POC: NORMAL MG/DL

## 2024-11-09 RX ORDER — SULFAMETHOXAZOLE AND TRIMETHOPRIM 800; 160 MG/1; MG/1
1 TABLET ORAL 2 TIMES DAILY
Qty: 14 TABLET | Refills: 0 | Status: SHIPPED | OUTPATIENT
Start: 2024-11-09 | End: 2024-11-16

## 2024-11-09 ASSESSMENT — ENCOUNTER SYMPTOMS
SHORTNESS OF BREATH: 0
VOMITING: 0
ABDOMINAL PAIN: 0
NAUSEA: 0
COUGH: 0

## 2024-11-09 NOTE — PATIENT INSTRUCTIONS
Take medication as prescribed.   Rest and Increase fluids.  Use heating pad to back as needed.  Unable to perform urine culture due to lack of sample.     Follow up with your PCP in the next 1 week.

## 2024-11-09 NOTE — PROGRESS NOTES
Sally Higgins (:  1955) is a 69 y.o. female,Established patient, here for evaluation of the following chief complaint(s):  Urinary Tract Infection (Blood in urine, flank pain. Sx started yesterday )      ASSESSMENT/PLAN:    ICD-10-CM    1. Urinary tract infection with hematuria, site unspecified  N39.0 sulfamethoxazole-trimethoprim (BACTRIM DS;SEPTRA DS) 800-160 MG per tablet    R31.9       2. Hematuria, unspecified type  R31.9 POCT Urinalysis no Micro     CANCELED: POCT Urinalysis no Micro          2-3 drops of urine were given in sample. Unable to send for culture. Pipette was used to put urine drops to leuks, nitrates, and blood squares. Not enough urine for jamari dip. Pt unable to use bathroom again during visit.  Low suspicion of kidney stone as patient is in no acute pain, ambulating normally, no CVA tenderness. Has bilateral lower back pain but no different from her \"usual\" back pain.   Pt has f/u on Monday with her PCP, will do urine culture there. If blood persists f/u with Urology    SUBJECTIVE/OBJECTIVE:    History provided by:  Patient   used: No    Urinary Tract Infection  Associated symptoms include hematuria.     HPI:   69 y.o. female presents with symptoms of hematuria, frequency ongoing since yesterday. Pt has some pain to lower back but she states it is not different from her usual chronic back pain. No kidney stone hx. Denies fever, dysuria. Has taken nothing for symptoms so far.    Vitals:    24 1516   BP: 118/74   Pulse: 65   Temp: 97.9 °F (36.6 °C)   SpO2: 97%   Weight: 78.1 kg (172 lb 3.2 oz)   Height: 1.651 m (5' 5\")       Review of Systems   Constitutional:  Negative for fatigue and fever.   Respiratory:  Negative for cough and shortness of breath.    Gastrointestinal:  Negative for abdominal pain, nausea and vomiting.   Genitourinary:  Positive for flank pain, frequency and hematuria. Negative for dysuria, pelvic pain, vaginal bleeding and vaginal

## 2024-11-13 ENCOUNTER — TELEPHONE (OUTPATIENT)
Dept: ORTHOPEDIC SURGERY | Age: 69
End: 2024-11-13

## 2024-11-13 NOTE — PROGRESS NOTES
Surgery Date and Time: 11/19/24 0730 am    Arrival Time: 0600 am        The instructions given when and if a patient needs to stop oral intake prior to surgery varies. Follow the instructions you were      given by your Surgeon or RN during the Pre-op call.      __X__Do not eat anything after Midnight the night before the surgery. NO gum, mints, candy or ice chips the day of surgery.        __X____ Carbo-loading or ENHANCED RECOVERY instructions will be given to select patients.  Please do the following:    The evening before your surgery (after dinner before midnight) drink 40 ounces (2 - 20 ounce bottles) of Gatorade.      If you   are diabetic use  sugar free. The morning of surgery drink two (2) - 20 ounce bottle water. This needs to be finished      2 hours prior to your surgery start time.         Only take the following medications with a small sip of water the morning of surgery: wellbutrin, sertraline, and atorvastatin               Aspirin, Ibuprofen, Advil, Naproxen, Vitamin E and other Anti-inflammatory products and supplements should be stopped        for 5 -7days before surgery or as directed by your physician.         Check with your Surgeon/PCP/Cardiologist regarding stopping Plavix, Coumadin, Eliquis, Lovenox, Effient, Pradaxa, Xarelto, Fragmin or        other blood thinners and follow their instructions.  Medication: xarelto           Last dose: 11/15/24 pm dose                - If you take a long acting-insulin, take your normal dose the night before surgery & half the dose if taken in the morning.     - Do not smoke or vape, and do not drink any alcoholic beverages 24 hours prior to surgery, this includes NA Beer. Refrain from using     any recreational drugs, including non-prescribed prescription drugs.     -You may brush your teeth and gargle the morning of surgery.  DO NOT SWALLOW WATER.    -You MUST plan for a responsible adult to stay on site while you are here and take you home after

## 2024-11-13 NOTE — TELEPHONE ENCOUNTER
Other    PATIENT IS MATTEO FOR SURGERY ON 11/19/24 AND WOULD LIKE TO KNOW WHAT RX TO STOP TAKING    PLEASE CALL PATIENT -[phone]

## 2024-11-13 NOTE — TELEPHONE ENCOUNTER
Spoke with pt advised 3-5 days blood thinner stop prior     Nurse navigator will go over all meds and medical history prior to surgery and all pre op instructions. celina

## 2024-11-15 ENCOUNTER — ANESTHESIA EVENT (OUTPATIENT)
Dept: OPERATING ROOM | Age: 69
DRG: 489 | End: 2024-11-15
Payer: MEDICARE

## 2024-11-15 ENCOUNTER — TELEPHONE (OUTPATIENT)
Dept: ORTHOPEDIC SURGERY | Age: 69
End: 2024-11-15

## 2024-11-15 DIAGNOSIS — R82.994 HYPERCALCIURIA: ICD-10-CM

## 2024-11-15 DIAGNOSIS — T84.84XA PAIN DUE TO TOTAL LEFT KNEE REPLACEMENT, INITIAL ENCOUNTER (HCC): ICD-10-CM

## 2024-11-15 DIAGNOSIS — Z96.652 PAIN DUE TO TOTAL LEFT KNEE REPLACEMENT, INITIAL ENCOUNTER (HCC): ICD-10-CM

## 2024-11-15 DIAGNOSIS — R79.9 ABNORMAL FINDING OF BLOOD CHEMISTRY, UNSPECIFIED: ICD-10-CM

## 2024-11-15 DIAGNOSIS — R79.1 ABNORMAL COAGULATION PROFILE: ICD-10-CM

## 2024-11-15 DIAGNOSIS — R73.03 PREDIABETES: ICD-10-CM

## 2024-11-15 DIAGNOSIS — R94.5 ABNORMAL RESULTS OF LIVER FUNCTION STUDIES: ICD-10-CM

## 2024-11-15 LAB
25(OH)D3 SERPL-MCNC: 25 NG/ML
ALBUMIN SERPL-MCNC: 4.3 G/DL (ref 3.4–5)
ALBUMIN/GLOB SERPL: 2 {RATIO} (ref 1.1–2.2)
ALP SERPL-CCNC: 85 U/L (ref 40–129)
ALT SERPL-CCNC: 6 U/L (ref 10–40)
ANION GAP SERPL CALCULATED.3IONS-SCNC: 10 MMOL/L (ref 3–16)
APTT BLD: 43.1 SEC (ref 22.1–36.4)
AST SERPL-CCNC: 15 U/L (ref 15–37)
BASOPHILS # BLD: 0.1 K/UL (ref 0–0.2)
BASOPHILS NFR BLD: 1.4 %
BILIRUB SERPL-MCNC: 0.3 MG/DL (ref 0–1)
BILIRUB UR QL STRIP.AUTO: NEGATIVE
BUN SERPL-MCNC: 15 MG/DL (ref 7–20)
CALCIUM SERPL-MCNC: 9.4 MG/DL (ref 8.3–10.6)
CHLORIDE SERPL-SCNC: 104 MMOL/L (ref 99–110)
CLARITY UR: CLEAR
CO2 SERPL-SCNC: 26 MMOL/L (ref 21–32)
COLOR UR: YELLOW
CREAT SERPL-MCNC: 1 MG/DL (ref 0.6–1.2)
CRP SERPL-MCNC: <3 MG/L (ref 0–5.1)
DEPRECATED RDW RBC AUTO: 17.1 % (ref 12.4–15.4)
EOSINOPHIL # BLD: 0.1 K/UL (ref 0–0.6)
EOSINOPHIL NFR BLD: 3 %
ERYTHROCYTE [SEDIMENTATION RATE] IN BLOOD BY WESTERGREN METHOD: 9 MM/HR (ref 0–30)
EST. AVERAGE GLUCOSE BLD GHB EST-MCNC: 96.8 MG/DL
GFR SERPLBLD CREATININE-BSD FMLA CKD-EPI: 62 ML/MIN/{1.73_M2}
GLUCOSE SERPL-MCNC: 81 MG/DL (ref 70–99)
GLUCOSE UR STRIP.AUTO-MCNC: NEGATIVE MG/DL
HBA1C MFR BLD: 5 %
HCT VFR BLD AUTO: 38.4 % (ref 36–48)
HGB BLD-MCNC: 12.7 G/DL (ref 12–16)
HGB UR QL STRIP.AUTO: NEGATIVE
INR PPP: 1.93 (ref 0.85–1.15)
KETONES UR STRIP.AUTO-MCNC: NEGATIVE MG/DL
LEUKOCYTE ESTERASE UR QL STRIP.AUTO: NEGATIVE
LYMPHOCYTES # BLD: 1.2 K/UL (ref 1–5.1)
LYMPHOCYTES NFR BLD: 28 %
MCH RBC QN AUTO: 26.3 PG (ref 26–34)
MCHC RBC AUTO-ENTMCNC: 33 G/DL (ref 31–36)
MCV RBC AUTO: 79.8 FL (ref 80–100)
MONOCYTES # BLD: 0.4 K/UL (ref 0–1.3)
MONOCYTES NFR BLD: 8.3 %
NEUTROPHILS # BLD: 2.6 K/UL (ref 1.7–7.7)
NEUTROPHILS NFR BLD: 59.3 %
NITRITE UR QL STRIP.AUTO: NEGATIVE
PH UR STRIP.AUTO: 6 [PH] (ref 5–8)
PLATELET # BLD AUTO: 210 K/UL (ref 135–450)
PMV BLD AUTO: 8.2 FL (ref 5–10.5)
POTASSIUM SERPL-SCNC: 4 MMOL/L (ref 3.5–5.1)
PROT SERPL-MCNC: 6.4 G/DL (ref 6.4–8.2)
PROT UR STRIP.AUTO-MCNC: NEGATIVE MG/DL
PROTHROMBIN TIME: 22.1 SEC (ref 11.9–14.9)
RBC # BLD AUTO: 4.81 M/UL (ref 4–5.2)
SODIUM SERPL-SCNC: 140 MMOL/L (ref 136–145)
SP GR UR STRIP.AUTO: 1.02 (ref 1–1.03)
TRANSFERRIN SERPL-MCNC: 268 MG/DL (ref 200–360)
UA COMPLETE W REFLEX CULTURE PNL UR: NORMAL
UA DIPSTICK W REFLEX MICRO PNL UR: NORMAL
URN SPEC COLLECT METH UR: NORMAL
UROBILINOGEN UR STRIP-ACNC: 0.2 E.U./DL
WBC # BLD AUTO: 4.5 K/UL (ref 4–11)

## 2024-11-16 LAB — BACTERIA UR CULT: NORMAL

## 2024-11-17 LAB — MRSA SPEC QL CULT: NORMAL

## 2024-11-19 ENCOUNTER — HOSPITAL ENCOUNTER (INPATIENT)
Age: 69
LOS: 1 days | Discharge: HOME HEALTH | DRG: 489 | End: 2024-11-20
Attending: STUDENT IN AN ORGANIZED HEALTH CARE EDUCATION/TRAINING PROGRAM | Admitting: STUDENT IN AN ORGANIZED HEALTH CARE EDUCATION/TRAINING PROGRAM
Payer: MEDICARE

## 2024-11-19 ENCOUNTER — ANESTHESIA (OUTPATIENT)
Dept: OPERATING ROOM | Age: 69
DRG: 489 | End: 2024-11-19
Payer: MEDICARE

## 2024-11-19 ENCOUNTER — APPOINTMENT (OUTPATIENT)
Dept: GENERAL RADIOLOGY | Age: 69
DRG: 489 | End: 2024-11-19
Attending: STUDENT IN AN ORGANIZED HEALTH CARE EDUCATION/TRAINING PROGRAM
Payer: MEDICARE

## 2024-11-19 DIAGNOSIS — T84.50XA INFECTION OR INFLAMMATORY REACTION DUE TO INTERNAL JOINT PROSTHESIS, INITIAL ENCOUNTER (HCC): Primary | ICD-10-CM

## 2024-11-19 DIAGNOSIS — T84.84XA PAIN DUE TO TOTAL KNEE REPLACEMENT (HCC): ICD-10-CM

## 2024-11-19 DIAGNOSIS — Z96.652 S/P REVISION OF TOTAL KNEE, LEFT: ICD-10-CM

## 2024-11-19 DIAGNOSIS — Z96.659 PAIN DUE TO TOTAL KNEE REPLACEMENT, INITIAL ENCOUNTER (HCC): ICD-10-CM

## 2024-11-19 DIAGNOSIS — Z96.659 PAIN DUE TO TOTAL KNEE REPLACEMENT (HCC): ICD-10-CM

## 2024-11-19 DIAGNOSIS — T84.84XA PAIN DUE TO TOTAL KNEE REPLACEMENT, INITIAL ENCOUNTER (HCC): ICD-10-CM

## 2024-11-19 LAB
GLUCOSE BLD-MCNC: 85 MG/DL (ref 70–99)
PERFORMED ON: NORMAL

## 2024-11-19 PROCEDURE — 0SBD0ZZ EXCISION OF LEFT KNEE JOINT, OPEN APPROACH: ICD-10-PCS | Performed by: STUDENT IN AN ORGANIZED HEALTH CARE EDUCATION/TRAINING PROGRAM

## 2024-11-19 PROCEDURE — A4217 STERILE WATER/SALINE, 500 ML: HCPCS | Performed by: STUDENT IN AN ORGANIZED HEALTH CARE EDUCATION/TRAINING PROGRAM

## 2024-11-19 PROCEDURE — 3600000015 HC SURGERY LEVEL 5 ADDTL 15MIN: Performed by: STUDENT IN AN ORGANIZED HEALTH CARE EDUCATION/TRAINING PROGRAM

## 2024-11-19 PROCEDURE — 6360000002 HC RX W HCPCS: Performed by: STUDENT IN AN ORGANIZED HEALTH CARE EDUCATION/TRAINING PROGRAM

## 2024-11-19 PROCEDURE — 7100000000 HC PACU RECOVERY - FIRST 15 MIN: Performed by: STUDENT IN AN ORGANIZED HEALTH CARE EDUCATION/TRAINING PROGRAM

## 2024-11-19 PROCEDURE — 1200000000 HC SEMI PRIVATE

## 2024-11-19 PROCEDURE — 0SUW09Z SUPPLEMENT LEFT KNEE JOINT, TIBIAL SURFACE WITH LINER, OPEN APPROACH: ICD-10-PCS | Performed by: STUDENT IN AN ORGANIZED HEALTH CARE EDUCATION/TRAINING PROGRAM

## 2024-11-19 PROCEDURE — 0SPD09Z REMOVAL OF LINER FROM LEFT KNEE JOINT, OPEN APPROACH: ICD-10-PCS | Performed by: STUDENT IN AN ORGANIZED HEALTH CARE EDUCATION/TRAINING PROGRAM

## 2024-11-19 PROCEDURE — 2580000003 HC RX 258: Performed by: STUDENT IN AN ORGANIZED HEALTH CARE EDUCATION/TRAINING PROGRAM

## 2024-11-19 PROCEDURE — 6370000000 HC RX 637 (ALT 250 FOR IP): Performed by: STUDENT IN AN ORGANIZED HEALTH CARE EDUCATION/TRAINING PROGRAM

## 2024-11-19 PROCEDURE — 73560 X-RAY EXAM OF KNEE 1 OR 2: CPT

## 2024-11-19 PROCEDURE — 97161 PT EVAL LOW COMPLEX 20 MIN: CPT

## 2024-11-19 PROCEDURE — 2500000003 HC RX 250 WO HCPCS

## 2024-11-19 PROCEDURE — 64447 NJX AA&/STRD FEMORAL NRV IMG: CPT | Performed by: STUDENT IN AN ORGANIZED HEALTH CARE EDUCATION/TRAINING PROGRAM

## 2024-11-19 PROCEDURE — 27486 REVISE/REPLACE KNEE JOINT: CPT | Performed by: STUDENT IN AN ORGANIZED HEALTH CARE EDUCATION/TRAINING PROGRAM

## 2024-11-19 PROCEDURE — 2709999900 HC NON-CHARGEABLE SUPPLY: Performed by: STUDENT IN AN ORGANIZED HEALTH CARE EDUCATION/TRAINING PROGRAM

## 2024-11-19 PROCEDURE — 97116 GAIT TRAINING THERAPY: CPT

## 2024-11-19 PROCEDURE — 3700000000 HC ANESTHESIA ATTENDED CARE: Performed by: STUDENT IN AN ORGANIZED HEALTH CARE EDUCATION/TRAINING PROGRAM

## 2024-11-19 PROCEDURE — 87070 CULTURE OTHR SPECIMN AEROBIC: CPT

## 2024-11-19 PROCEDURE — 7100000001 HC PACU RECOVERY - ADDTL 15 MIN: Performed by: STUDENT IN AN ORGANIZED HEALTH CARE EDUCATION/TRAINING PROGRAM

## 2024-11-19 PROCEDURE — 6370000000 HC RX 637 (ALT 250 FOR IP): Performed by: ANESTHESIOLOGY

## 2024-11-19 PROCEDURE — 2500000003 HC RX 250 WO HCPCS: Performed by: STUDENT IN AN ORGANIZED HEALTH CARE EDUCATION/TRAINING PROGRAM

## 2024-11-19 PROCEDURE — 94761 N-INVAS EAR/PLS OXIMETRY MLT: CPT

## 2024-11-19 PROCEDURE — 3600000005 HC SURGERY LEVEL 5 BASE: Performed by: STUDENT IN AN ORGANIZED HEALTH CARE EDUCATION/TRAINING PROGRAM

## 2024-11-19 PROCEDURE — 87176 TISSUE HOMOGENIZATION CULTR: CPT

## 2024-11-19 PROCEDURE — 2580000003 HC RX 258

## 2024-11-19 PROCEDURE — 6360000002 HC RX W HCPCS

## 2024-11-19 PROCEDURE — C1776 JOINT DEVICE (IMPLANTABLE): HCPCS | Performed by: STUDENT IN AN ORGANIZED HEALTH CARE EDUCATION/TRAINING PROGRAM

## 2024-11-19 PROCEDURE — 3700000001 HC ADD 15 MINUTES (ANESTHESIA): Performed by: STUDENT IN AN ORGANIZED HEALTH CARE EDUCATION/TRAINING PROGRAM

## 2024-11-19 PROCEDURE — 87205 SMEAR GRAM STAIN: CPT

## 2024-11-19 PROCEDURE — 27334 REMOVE KNEE JOINT LINING: CPT | Performed by: STUDENT IN AN ORGANIZED HEALTH CARE EDUCATION/TRAINING PROGRAM

## 2024-11-19 PROCEDURE — C1713 ANCHOR/SCREW BN/BN,TIS/BN: HCPCS | Performed by: STUDENT IN AN ORGANIZED HEALTH CARE EDUCATION/TRAINING PROGRAM

## 2024-11-19 PROCEDURE — 2700000000 HC OXYGEN THERAPY PER DAY

## 2024-11-19 DEVICE — JOURNEY II BCS CONSTRAINED                                    ARTICULAR INSERT SIZE 3-4 LEFT 15MM
Type: IMPLANTABLE DEVICE | Site: KNEE | Status: FUNCTIONAL
Brand: JOURNEY

## 2024-11-19 RX ORDER — SODIUM CHLORIDE 9 MG/ML
INJECTION, SOLUTION INTRAVENOUS PRN
Status: DISCONTINUED | OUTPATIENT
Start: 2024-11-19 | End: 2024-11-20 | Stop reason: HOSPADM

## 2024-11-19 RX ORDER — LABETALOL HYDROCHLORIDE 5 MG/ML
5 INJECTION, SOLUTION INTRAVENOUS EVERY 10 MIN PRN
Status: DISCONTINUED | OUTPATIENT
Start: 2024-11-19 | End: 2024-11-19 | Stop reason: HOSPADM

## 2024-11-19 RX ORDER — TRAZODONE HYDROCHLORIDE 50 MG/1
100 TABLET, FILM COATED ORAL NIGHTLY PRN
Status: DISCONTINUED | OUTPATIENT
Start: 2024-11-19 | End: 2024-11-20 | Stop reason: HOSPADM

## 2024-11-19 RX ORDER — SODIUM CHLORIDE 0.9 % (FLUSH) 0.9 %
5-40 SYRINGE (ML) INJECTION PRN
Status: DISCONTINUED | OUTPATIENT
Start: 2024-11-19 | End: 2024-11-19 | Stop reason: HOSPADM

## 2024-11-19 RX ORDER — DEXAMETHASONE SODIUM PHOSPHATE 10 MG/ML
6 INJECTION INTRAMUSCULAR; INTRAVENOUS EVERY 8 HOURS
Status: COMPLETED | OUTPATIENT
Start: 2024-11-19 | End: 2024-11-20

## 2024-11-19 RX ORDER — SENNOSIDES A AND B 8.6 MG/1
1 TABLET, FILM COATED ORAL DAILY PRN
Status: DISCONTINUED | OUTPATIENT
Start: 2024-11-19 | End: 2024-11-20 | Stop reason: HOSPADM

## 2024-11-19 RX ORDER — DEXAMETHASONE SODIUM PHOSPHATE 10 MG/ML
INJECTION, SOLUTION INTRAMUSCULAR; INTRAVENOUS
Status: COMPLETED | OUTPATIENT
Start: 2024-11-19 | End: 2024-11-19

## 2024-11-19 RX ORDER — ATORVASTATIN CALCIUM 10 MG/1
10 TABLET, FILM COATED ORAL DAILY
Status: DISCONTINUED | OUTPATIENT
Start: 2024-11-20 | End: 2024-11-20 | Stop reason: HOSPADM

## 2024-11-19 RX ORDER — PROPOFOL 10 MG/ML
INJECTION, EMULSION INTRAVENOUS
Status: DISCONTINUED | OUTPATIENT
Start: 2024-11-19 | End: 2024-11-19 | Stop reason: SDUPTHER

## 2024-11-19 RX ORDER — ROCURONIUM BROMIDE 10 MG/ML
INJECTION, SOLUTION INTRAVENOUS
Status: DISCONTINUED | OUTPATIENT
Start: 2024-11-19 | End: 2024-11-19 | Stop reason: SDUPTHER

## 2024-11-19 RX ORDER — SODIUM CHLORIDE, SODIUM LACTATE, POTASSIUM CHLORIDE, CALCIUM CHLORIDE 600; 310; 30; 20 MG/100ML; MG/100ML; MG/100ML; MG/100ML
INJECTION, SOLUTION INTRAVENOUS CONTINUOUS
Status: DISCONTINUED | OUTPATIENT
Start: 2024-11-19 | End: 2024-11-19

## 2024-11-19 RX ORDER — LIDOCAINE HYDROCHLORIDE 20 MG/ML
INJECTION, SOLUTION INFILTRATION; PERINEURAL
Status: DISCONTINUED | OUTPATIENT
Start: 2024-11-19 | End: 2024-11-19 | Stop reason: SDUPTHER

## 2024-11-19 RX ORDER — FENTANYL CITRATE 50 UG/ML
INJECTION, SOLUTION INTRAMUSCULAR; INTRAVENOUS
Status: COMPLETED | OUTPATIENT
Start: 2024-11-19 | End: 2024-11-19

## 2024-11-19 RX ORDER — SODIUM CHLORIDE 0.9 % (FLUSH) 0.9 %
5-40 SYRINGE (ML) INJECTION PRN
Status: DISCONTINUED | OUTPATIENT
Start: 2024-11-19 | End: 2024-11-20 | Stop reason: HOSPADM

## 2024-11-19 RX ORDER — ACETAMINOPHEN 325 MG/1
650 TABLET ORAL EVERY 6 HOURS
COMMUNITY
Start: 2024-11-19

## 2024-11-19 RX ORDER — SODIUM CHLORIDE 0.9 % (FLUSH) 0.9 %
5-40 SYRINGE (ML) INJECTION EVERY 12 HOURS SCHEDULED
Status: DISCONTINUED | OUTPATIENT
Start: 2024-11-19 | End: 2024-11-19 | Stop reason: HOSPADM

## 2024-11-19 RX ORDER — SODIUM CHLORIDE, SODIUM LACTATE, POTASSIUM CHLORIDE, CALCIUM CHLORIDE 600; 310; 30; 20 MG/100ML; MG/100ML; MG/100ML; MG/100ML
INJECTION, SOLUTION INTRAVENOUS CONTINUOUS
Status: DISCONTINUED | OUTPATIENT
Start: 2024-11-19 | End: 2024-11-19 | Stop reason: HOSPADM

## 2024-11-19 RX ORDER — MORPHINE SULFATE 2 MG/ML
2 INJECTION, SOLUTION INTRAMUSCULAR; INTRAVENOUS
Status: DISCONTINUED | OUTPATIENT
Start: 2024-11-19 | End: 2024-11-20 | Stop reason: HOSPADM

## 2024-11-19 RX ORDER — MAGNESIUM HYDROXIDE 1200 MG/15ML
LIQUID ORAL CONTINUOUS PRN
Status: COMPLETED | OUTPATIENT
Start: 2024-11-19 | End: 2024-11-19

## 2024-11-19 RX ORDER — TRANEXAMIC ACID 10 MG/ML
1000 INJECTION, SOLUTION INTRAVENOUS
Status: COMPLETED | OUTPATIENT
Start: 2024-11-19 | End: 2024-11-19

## 2024-11-19 RX ORDER — BUPIVACAINE HYDROCHLORIDE 5 MG/ML
INJECTION, SOLUTION EPIDURAL; INTRACAUDAL PRN
Status: DISCONTINUED | OUTPATIENT
Start: 2024-11-19 | End: 2024-11-19 | Stop reason: ALTCHOICE

## 2024-11-19 RX ORDER — DEXAMETHASONE SODIUM PHOSPHATE 10 MG/ML
INJECTION INTRAMUSCULAR; INTRAVENOUS
Status: DISCONTINUED | OUTPATIENT
Start: 2024-11-19 | End: 2024-11-19

## 2024-11-19 RX ORDER — CELECOXIB 100 MG/1
100 CAPSULE ORAL 2 TIMES DAILY
Status: DISCONTINUED | OUTPATIENT
Start: 2024-11-19 | End: 2024-11-20 | Stop reason: HOSPADM

## 2024-11-19 RX ORDER — SENNOSIDES A AND B 8.6 MG/1
1 TABLET, FILM COATED ORAL DAILY PRN
COMMUNITY
Start: 2024-11-19 | End: 2024-12-19

## 2024-11-19 RX ORDER — CELECOXIB 100 MG/1
100 CAPSULE ORAL 2 TIMES DAILY
Qty: 60 CAPSULE | Refills: 0 | Status: SHIPPED | OUTPATIENT
Start: 2024-11-19 | End: 2024-12-19

## 2024-11-19 RX ORDER — OXYCODONE HYDROCHLORIDE 5 MG/1
5 TABLET ORAL PRN
Status: DISCONTINUED | OUTPATIENT
Start: 2024-11-19 | End: 2024-11-19 | Stop reason: HOSPADM

## 2024-11-19 RX ORDER — ACETAMINOPHEN 325 MG/1
650 TABLET ORAL EVERY 6 HOURS
Status: DISCONTINUED | OUTPATIENT
Start: 2024-11-19 | End: 2024-11-20 | Stop reason: HOSPADM

## 2024-11-19 RX ORDER — DILTIAZEM HYDROCHLORIDE 120 MG/1
120 CAPSULE, COATED, EXTENDED RELEASE ORAL DAILY
Status: DISCONTINUED | OUTPATIENT
Start: 2024-11-19 | End: 2024-11-20 | Stop reason: HOSPADM

## 2024-11-19 RX ORDER — ACETAMINOPHEN 500 MG
1000 TABLET ORAL ONCE
Status: DISCONTINUED | OUTPATIENT
Start: 2024-11-19 | End: 2024-11-19 | Stop reason: HOSPADM

## 2024-11-19 RX ORDER — POLYETHYLENE GLYCOL 3350 17 G/17G
17 POWDER, FOR SOLUTION ORAL DAILY
COMMUNITY
Start: 2024-11-20 | End: 2024-12-20

## 2024-11-19 RX ORDER — METHOCARBAMOL 750 MG/1
750 TABLET, FILM COATED ORAL EVERY 8 HOURS PRN
Status: DISCONTINUED | OUTPATIENT
Start: 2024-11-19 | End: 2024-11-20 | Stop reason: HOSPADM

## 2024-11-19 RX ORDER — OXYCODONE HYDROCHLORIDE 5 MG/1
5 TABLET ORAL EVERY 6 HOURS PRN
Qty: 28 TABLET | Refills: 0 | Status: SHIPPED | OUTPATIENT
Start: 2024-11-19 | End: 2024-11-26

## 2024-11-19 RX ORDER — ACETAMINOPHEN 500 MG
1000 TABLET ORAL ONCE
Status: COMPLETED | OUTPATIENT
Start: 2024-11-19 | End: 2024-11-19

## 2024-11-19 RX ORDER — SODIUM CHLORIDE 0.9 % (FLUSH) 0.9 %
5-40 SYRINGE (ML) INJECTION EVERY 12 HOURS SCHEDULED
Status: DISCONTINUED | OUTPATIENT
Start: 2024-11-19 | End: 2024-11-20 | Stop reason: HOSPADM

## 2024-11-19 RX ORDER — OXYCODONE HYDROCHLORIDE 5 MG/1
5 TABLET ORAL EVERY 4 HOURS PRN
Status: DISCONTINUED | OUTPATIENT
Start: 2024-11-19 | End: 2024-11-20 | Stop reason: HOSPADM

## 2024-11-19 RX ORDER — BUPROPION HYDROCHLORIDE 150 MG/1
150 TABLET ORAL EVERY MORNING
Status: DISCONTINUED | OUTPATIENT
Start: 2024-11-20 | End: 2024-11-20 | Stop reason: HOSPADM

## 2024-11-19 RX ORDER — POLYETHYLENE GLYCOL 3350 17 G/17G
17 POWDER, FOR SOLUTION ORAL DAILY
Status: DISCONTINUED | OUTPATIENT
Start: 2024-11-19 | End: 2024-11-20 | Stop reason: HOSPADM

## 2024-11-19 RX ORDER — METHOCARBAMOL 750 MG/1
750 TABLET, FILM COATED ORAL EVERY 8 HOURS PRN
Status: DISCONTINUED | OUTPATIENT
Start: 2024-11-19 | End: 2024-11-19

## 2024-11-19 RX ORDER — DEXAMETHASONE SODIUM PHOSPHATE 4 MG/ML
8 INJECTION, SOLUTION INTRA-ARTICULAR; INTRALESIONAL; INTRAMUSCULAR; INTRAVENOUS; SOFT TISSUE ONCE
Status: COMPLETED | OUTPATIENT
Start: 2024-11-19 | End: 2024-11-19

## 2024-11-19 RX ORDER — LIDOCAINE HYDROCHLORIDE 10 MG/ML
1 INJECTION, SOLUTION EPIDURAL; INFILTRATION; INTRACAUDAL; PERINEURAL
Status: DISCONTINUED | OUTPATIENT
Start: 2024-11-19 | End: 2024-11-19 | Stop reason: HOSPADM

## 2024-11-19 RX ORDER — MORPHINE SULFATE 4 MG/ML
4 INJECTION, SOLUTION INTRAMUSCULAR; INTRAVENOUS
Status: DISCONTINUED | OUTPATIENT
Start: 2024-11-19 | End: 2024-11-20 | Stop reason: HOSPADM

## 2024-11-19 RX ORDER — DIPHENHYDRAMINE HYDROCHLORIDE 50 MG/ML
12.5 INJECTION INTRAMUSCULAR; INTRAVENOUS
Status: DISCONTINUED | OUTPATIENT
Start: 2024-11-19 | End: 2024-11-19 | Stop reason: HOSPADM

## 2024-11-19 RX ORDER — ONDANSETRON 2 MG/ML
INJECTION INTRAMUSCULAR; INTRAVENOUS
Status: DISCONTINUED | OUTPATIENT
Start: 2024-11-19 | End: 2024-11-19 | Stop reason: SDUPTHER

## 2024-11-19 RX ORDER — GLYCOPYRROLATE 0.2 MG/ML
INJECTION INTRAMUSCULAR; INTRAVENOUS
Status: DISCONTINUED | OUTPATIENT
Start: 2024-11-19 | End: 2024-11-19 | Stop reason: SDUPTHER

## 2024-11-19 RX ORDER — DROPERIDOL 2.5 MG/ML
0.62 INJECTION, SOLUTION INTRAMUSCULAR; INTRAVENOUS
Status: DISCONTINUED | OUTPATIENT
Start: 2024-11-19 | End: 2024-11-19 | Stop reason: HOSPADM

## 2024-11-19 RX ORDER — DEXAMETHASONE SODIUM PHOSPHATE 4 MG/ML
INJECTION, SOLUTION INTRA-ARTICULAR; INTRALESIONAL; INTRAMUSCULAR; INTRAVENOUS; SOFT TISSUE
Status: DISCONTINUED | OUTPATIENT
Start: 2024-11-19 | End: 2024-11-19

## 2024-11-19 RX ORDER — SODIUM CHLORIDE 9 MG/ML
INJECTION, SOLUTION INTRAVENOUS
Status: DISCONTINUED | OUTPATIENT
Start: 2024-11-19 | End: 2024-11-19 | Stop reason: SDUPTHER

## 2024-11-19 RX ORDER — PRAMIPEXOLE DIHYDROCHLORIDE 0.25 MG/1
0.5 TABLET ORAL NIGHTLY
Status: DISCONTINUED | OUTPATIENT
Start: 2024-11-19 | End: 2024-11-20 | Stop reason: HOSPADM

## 2024-11-19 RX ORDER — CELECOXIB 100 MG/1
100 CAPSULE ORAL ONCE
Status: COMPLETED | OUTPATIENT
Start: 2024-11-19 | End: 2024-11-19

## 2024-11-19 RX ORDER — SODIUM CHLORIDE 9 MG/ML
INJECTION, SOLUTION INTRAVENOUS PRN
Status: DISCONTINUED | OUTPATIENT
Start: 2024-11-19 | End: 2024-11-19 | Stop reason: HOSPADM

## 2024-11-19 RX ORDER — OXYCODONE HYDROCHLORIDE 5 MG/1
10 TABLET ORAL EVERY 4 HOURS PRN
Status: DISCONTINUED | OUTPATIENT
Start: 2024-11-19 | End: 2024-11-20 | Stop reason: HOSPADM

## 2024-11-19 RX ORDER — MIDAZOLAM HYDROCHLORIDE 1 MG/ML
INJECTION, SOLUTION INTRAMUSCULAR; INTRAVENOUS
Status: COMPLETED | OUTPATIENT
Start: 2024-11-19 | End: 2024-11-19

## 2024-11-19 RX ORDER — METHOCARBAMOL 750 MG/1
750 TABLET, FILM COATED ORAL EVERY 8 HOURS PRN
Qty: 30 TABLET | Refills: 0 | Status: SHIPPED | OUTPATIENT
Start: 2024-11-19 | End: 2024-11-29

## 2024-11-19 RX ORDER — FLECAINIDE ACETATE 100 MG/1
100 TABLET ORAL DAILY
Status: DISCONTINUED | OUTPATIENT
Start: 2024-11-20 | End: 2024-11-20 | Stop reason: HOSPADM

## 2024-11-19 RX ORDER — VANCOMYCIN HYDROCHLORIDE 1 G/20ML
INJECTION, POWDER, LYOPHILIZED, FOR SOLUTION INTRAVENOUS PRN
Status: DISCONTINUED | OUTPATIENT
Start: 2024-11-19 | End: 2024-11-19 | Stop reason: ALTCHOICE

## 2024-11-19 RX ORDER — NALOXONE HYDROCHLORIDE 0.4 MG/ML
INJECTION, SOLUTION INTRAMUSCULAR; INTRAVENOUS; SUBCUTANEOUS PRN
Status: DISCONTINUED | OUTPATIENT
Start: 2024-11-19 | End: 2024-11-19 | Stop reason: HOSPADM

## 2024-11-19 RX ORDER — OXYCODONE HYDROCHLORIDE 5 MG/1
10 TABLET ORAL PRN
Status: DISCONTINUED | OUTPATIENT
Start: 2024-11-19 | End: 2024-11-19 | Stop reason: HOSPADM

## 2024-11-19 RX ORDER — BUPIVACAINE HYDROCHLORIDE AND EPINEPHRINE 5; 5 MG/ML; UG/ML
INJECTION, SOLUTION EPIDURAL; INTRACAUDAL; PERINEURAL
Status: COMPLETED | OUTPATIENT
Start: 2024-11-19 | End: 2024-11-19

## 2024-11-19 RX ADMIN — POLYETHYLENE GLYCOL 3350 17 G: 17 POWDER, FOR SOLUTION ORAL at 12:09

## 2024-11-19 RX ADMIN — LIDOCAINE HYDROCHLORIDE 100 MG: 20 INJECTION, SOLUTION INFILTRATION; PERINEURAL at 07:48

## 2024-11-19 RX ADMIN — WATER 2000 MG: 1 INJECTION INTRAMUSCULAR; INTRAVENOUS; SUBCUTANEOUS at 15:37

## 2024-11-19 RX ADMIN — BUPIVACAINE HYDROCHLORIDE AND EPINEPHRINE 25 ML: 5; 5 INJECTION, SOLUTION EPIDURAL; INTRACAUDAL; PERINEURAL at 07:25

## 2024-11-19 RX ADMIN — METHOCARBAMOL 250 MG: 100 INJECTION INTRAMUSCULAR; INTRAVENOUS at 08:12

## 2024-11-19 RX ADMIN — SODIUM CHLORIDE: 9 INJECTION, SOLUTION INTRAVENOUS at 07:42

## 2024-11-19 RX ADMIN — ACETAMINOPHEN 650 MG: 325 TABLET ORAL at 23:58

## 2024-11-19 RX ADMIN — DEXAMETHASONE SODIUM PHOSPHATE 8 MG: 4 INJECTION, SOLUTION INTRAMUSCULAR; INTRAVENOUS at 07:53

## 2024-11-19 RX ADMIN — ONDANSETRON 4 MG: 2 INJECTION INTRAMUSCULAR; INTRAVENOUS at 08:30

## 2024-11-19 RX ADMIN — GLYCOPYRROLATE 0.2 MG: 0.2 INJECTION, SOLUTION INTRAMUSCULAR; INTRAVENOUS at 08:31

## 2024-11-19 RX ADMIN — PHENYLEPHRINE HYDROCHLORIDE 50 MCG: 10 INJECTION INTRAVENOUS at 08:49

## 2024-11-19 RX ADMIN — OXYCODONE 10 MG: 5 TABLET ORAL at 18:08

## 2024-11-19 RX ADMIN — PROPOFOL 120 MG: 10 INJECTION, EMULSION INTRAVENOUS at 07:48

## 2024-11-19 RX ADMIN — CELECOXIB 100 MG: 100 CAPSULE ORAL at 19:48

## 2024-11-19 RX ADMIN — OXYCODONE 10 MG: 5 TABLET ORAL at 23:58

## 2024-11-19 RX ADMIN — PRAMIPEXOLE DIHYDROCHLORIDE 0.5 MG: 0.25 TABLET ORAL at 19:48

## 2024-11-19 RX ADMIN — SUGAMMADEX 160 MG: 100 INJECTION, SOLUTION INTRAVENOUS at 08:55

## 2024-11-19 RX ADMIN — SODIUM CHLORIDE, PRESERVATIVE FREE 10 ML: 5 INJECTION INTRAVENOUS at 19:49

## 2024-11-19 RX ADMIN — METHOCARBAMOL 250 MG: 100 INJECTION INTRAMUSCULAR; INTRAVENOUS at 08:21

## 2024-11-19 RX ADMIN — FENTANYL CITRATE 100 MCG: 50 INJECTION, SOLUTION INTRAMUSCULAR; INTRAVENOUS at 07:15

## 2024-11-19 RX ADMIN — PHENYLEPHRINE HYDROCHLORIDE 50 MCG: 10 INJECTION INTRAVENOUS at 08:44

## 2024-11-19 RX ADMIN — ROCURONIUM BROMIDE 50 MG: 50 INJECTION, SOLUTION INTRAVENOUS at 07:48

## 2024-11-19 RX ADMIN — CEFAZOLIN 2000 MG: 2 INJECTION, POWDER, FOR SOLUTION INTRAVENOUS at 07:54

## 2024-11-19 RX ADMIN — ACETAMINOPHEN 650 MG: 325 TABLET ORAL at 11:22

## 2024-11-19 RX ADMIN — OXYCODONE 10 MG: 5 TABLET ORAL at 11:22

## 2024-11-19 RX ADMIN — PROPOFOL 20 MG: 10 INJECTION, EMULSION INTRAVENOUS at 08:55

## 2024-11-19 RX ADMIN — DEXAMETHASONE SODIUM PHOSPHATE 4 MG: 10 INJECTION, SOLUTION INTRAMUSCULAR; INTRAVENOUS at 07:25

## 2024-11-19 RX ADMIN — Medication 2 AMPULE: at 07:28

## 2024-11-19 RX ADMIN — TRANEXAMIC ACID 1000 MG: 10 INJECTION, SOLUTION INTRAVENOUS at 07:53

## 2024-11-19 RX ADMIN — ACETAMINOPHEN 1000 MG: 500 TABLET ORAL at 06:46

## 2024-11-19 RX ADMIN — CELECOXIB 100 MG: 100 CAPSULE ORAL at 06:45

## 2024-11-19 RX ADMIN — TRANEXAMIC ACID 1000 MG: 10 INJECTION, SOLUTION INTRAVENOUS at 08:28

## 2024-11-19 RX ADMIN — ACETAMINOPHEN 650 MG: 325 TABLET ORAL at 18:05

## 2024-11-19 RX ADMIN — DEXAMETHASONE SODIUM PHOSPHATE 6 MG: 10 INJECTION INTRAMUSCULAR; INTRAVENOUS at 15:35

## 2024-11-19 RX ADMIN — MIDAZOLAM 2 MG: 1 INJECTION INTRAMUSCULAR; INTRAVENOUS at 07:15

## 2024-11-19 ASSESSMENT — PAIN DESCRIPTION - DESCRIPTORS
DESCRIPTORS: ACHING
DESCRIPTORS: ACHING
DESCRIPTORS: SHARP
DESCRIPTORS: ACHING
DESCRIPTORS: ACHING

## 2024-11-19 ASSESSMENT — PAIN - FUNCTIONAL ASSESSMENT
PAIN_FUNCTIONAL_ASSESSMENT: ACTIVITIES ARE NOT PREVENTED
PAIN_FUNCTIONAL_ASSESSMENT: PREVENTS OR INTERFERES WITH MANY ACTIVE NOT PASSIVE ACTIVITIES
PAIN_FUNCTIONAL_ASSESSMENT: ACTIVITIES ARE NOT PREVENTED
PAIN_FUNCTIONAL_ASSESSMENT: ACTIVITIES ARE NOT PREVENTED
PAIN_FUNCTIONAL_ASSESSMENT: 0-10

## 2024-11-19 ASSESSMENT — PAIN SCALES - GENERAL
PAINLEVEL_OUTOF10: 9
PAINLEVEL_OUTOF10: 0
PAINLEVEL_OUTOF10: 10
PAINLEVEL_OUTOF10: 5
PAINLEVEL_OUTOF10: 6
PAINLEVEL_OUTOF10: 0
PAINLEVEL_OUTOF10: 5
PAINLEVEL_OUTOF10: 8

## 2024-11-19 ASSESSMENT — PAIN DESCRIPTION - ORIENTATION
ORIENTATION: LEFT

## 2024-11-19 ASSESSMENT — PAIN DESCRIPTION - LOCATION
LOCATION: KNEE

## 2024-11-19 ASSESSMENT — PAIN DESCRIPTION - PAIN TYPE: TYPE: ACUTE PAIN;SURGICAL PAIN

## 2024-11-19 ASSESSMENT — PAIN DESCRIPTION - ONSET: ONSET: ON-GOING

## 2024-11-19 ASSESSMENT — PAIN DESCRIPTION - FREQUENCY: FREQUENCY: CONTINUOUS

## 2024-11-19 NOTE — PROGRESS NOTES
Time out performed with Benny OSBORNE for left adductor canal nerve block. Patient placed on telemetry, continuous pulse oximetry, and 2L NC for the procedure. BP cycled every five minutes. Cardiac rhythm is SR. Patient tolerated well, VSS stable throughout. Will continue to monitor VS every 15 minutes post procedure. Side rails in place, call light within reach, once alert patient instructed to press call button if assistance is need, verbalized understanding.

## 2024-11-19 NOTE — DISCHARGE INSTR - COC
Continuity of Care Form    Patient Name: Sally Higgins   :  1955  MRN:  [id number]    Admit date:  2024  Discharge date:  24    Code Status Order: Full Code   Advance Directives:   Advance Care Flowsheet Documentation        Date/Time Healthcare Directive Type of Healthcare Directive Copy in Chart Healthcare Agent Appointed Healthcare Agent's Name Healthcare Agent's Phone Number    24 0617 No, patient does not have an advance directive for healthcare treatment  --  --  --  --  --                     Admitting Physician:  Sai Villalpando MD  PCP: Elieser Hairston DO    Discharging Nurse: Charli  Discharging Hospital Unit/Room#: 0540/0540-01  Discharging Unit Phone Number: [phone]    Emergency Contact:   Extended Emergency Contact Information  Primary Emergency Contact: Ryan Higgins  Address: [facility] [employer] [place]  Home Phone: [phone]  Mobile Phone: [phone]  Relation: Spouse    Past Surgical History:  Past Surgical History:   Procedure Laterality Date    BACK SURGERY  02/1998    x2    HERNIA REPAIR  2010    KNEE ARTHROSCOPY Left 2024    LEFT KNEE SYNOVECTOMY, ANTERIOR AND POSTERIOR, POLY EXCHANGE performed by Sai Villalpando MD at [facility] OR    KNEE SURGERY Left 06/10/2024    LEFT KNEE MANIPULATION UNDER ANESTHESIA - performed [place] Pulido DO [place] [facility] [place]    LAP BAND      OTHER SURGICAL HISTORY  2024    LEFT KNEE TOTAL ARTHROPLASTY WITH CORI ROBOT IPACK BLOCK, ADDUCTOR CANAL BLOCK - 23 HOUR HOLD- - Left    TOTAL KNEE ARTHROPLASTY Left 2024    LEFT KNEE TOTAL ARTHROPLASTY WITH CORI ROBOT performed [place] [facility] Pulido DO [place] [facility]       Immunization History:   Immunization History   Administered Date(s) Administered    COVID-19, J&J, (age 18y+), IM, 0.5 mL 2021    COVID-19, PFIZER Bivalent, DO NOT Dilute, (age 12y+), IM, 30 mcg/0.3 mL 2022    COVID-19, PFIZER PURPLE top, DILUTE for use,

## 2024-11-19 NOTE — ANESTHESIA PRE PROCEDURE
Department of Anesthesiology  Preprocedure Note       Name:  Sally Higgins   Age:  69 y.o.  :  1955                                          MRN:  [id number]         Date:  2024      Surgeon: Surgeon(s):  Sai Villalpando MD    Procedure: Procedure(s):  LEFT KNEE SYNOVECTOMY, ANTERIOR AND POSTERIOR NOTE:  PREOP ADDUCTOR CANAL BLOCK    Medications prior to admission:   Prior to Admission medications    Medication Sig Start Date End Date Taking? Authorizing Provider   dilTIAZem (CARDIZEM CD) 120 MG extended release capsule Take 1 capsule by mouth daily  Patient taking differently: Take 1 capsule by mouth daily In evening 24  Yes DAWOOD Santos Jr., MD   atorvastatin (LIPITOR) 10 MG tablet Take 1 tablet by mouth daily  Patient taking differently: Take 1 tablet by mouth daily In am 24  Yes Elieser Hairston,    buPROPion (WELLBUTRIN XL) 150 MG extended release tablet Take 1 tablet by mouth every morning 24  Yes Elieser Hairston DO   flecainide (TAMBOCOR) 100 MG tablet Take 1 tablet by mouth daily  Patient taking differently: Take 1 tablet by mouth daily In evening 24  Yes Elieser Hairston DO   pramipexole (MIRAPEX) 0.5 MG tablet Take 1 tablet by mouth at bedtime 24  Yes Elieser Hairston DO   traZODone (DESYREL) 50 MG tablet Take 2 tablets by mouth nightly as needed for Sleep 24  Yes Elieser Hairston DO   zoledronic acid (ZOMETA) 4 MG/100ML SOLN infusion Infuse 100 mLs intravenously once for 1 dose 24  Elieser Hairston DO   rivaroxaban (XARELTO) 20 MG TABS tablet Take 1 tablet by mouth Daily with supper 24   Elieser Hairston DO   sertraline (ZOLOFT) 100 MG tablet Take 1 tablet by mouth daily  Patient taking differently: Take 1 tablet by mouth daily In am 24   Elieser Hairston DO       Current medications:    Current Facility-Administered Medications   Medication Dose Route Frequency Provider Last Rate Last Admin    lidocaine PF 1 % injection 1
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
## 2024-11-19 NOTE — PROGRESS NOTES
Patient admitted to [facility] room 8 in preparation for surgery, VSS. Consent confirmed. IV inserted into right FA, LR infusing. Belongings on [facility] cart. NPO since 1800. Family at bedside, phone number in system for text updates, call light within reach.

## 2024-11-19 NOTE — PLAN OF CARE
Problem: Discharge Planning  Goal: Discharge to home or other facility with appropriate resources  Outcome: Progressing   Admission complete.   Problem: Pain  Goal: Verbalizes/displays adequate comfort level or baseline comfort level  Outcome: Progressing   Pt call appropriately for pain medication.

## 2024-11-19 NOTE — DISCHARGE INSTRUCTIONS
Please contact [facility] Nurse Navigator with any questions or concerns after your discharge. Mon- Fri 9am- 5pm [phone]. If you have any issues or concerns after 5pm or on the weekend please call your surgeon's office. I will be contacting you in a few days to follow up.    If you need a pain medication refill please contact your surgeon's office.        Dr. Villalpando Total Knee Replacement  Discharge Instructions      Activity: The first week after surgery is all about Ice, Elevation and Rest!        [place] way to elevate knee above heart, while keeping the knee straight.     Pillows should be placed under the FOOT and leg, such that the leg is held straight.   You should feel stretching in the back of the knee. If there is too much pain add pillows under the knee, but the leg should be as straight as possible  Placing pillows under the knee only and keeping the knee bent will make it very difficult to get it straight with physical therapy.  Use a walker when ambulating.    Physical therapy.   Typically starts 10-14 days after surgery unless otherwise instructed  Referral placed to outpatient location discussed with surgeon or for home PT if you do not have transportation for outpatient PT    To prevent Clot formation, you have been placed on the following medication:  Resume home dose xarelto 20mg daily    Surgical Site Care:  Keep dressing in place until follow up visit, Call the office if drainage  Showering is permitted with waterproof Mepilex dressing   Will remove dressing at first follow up appointment    Pain Medications  First and foremost you should take Tylenol and Celebrex as prescribed for baseline pain control  If you have medical reasons not to take either medicine they were not prescribed  You were also given Oxycodone as needed  This for is pain despite the other medications and is typically needed for the first 3-5 days after surgery  Be sure to drink plenty of fluids  (recommend water) while taking narcotic pain medications to prevent constipation   You may take an over the counter laxative or stool softener as needed to prevent/treat constipation as well, we recommend Senokot S OTC.  We recommend that you consider taking these medications the entire time you are taking pain medication.    Cold packs/Ice packs/Machine  May be used as much as necessary to control swelling/inflammation/soreness.   This is typically, approximately 20 minutes per hour    Contact [facility]'s office if  Increased redness, swelling, drainage of any kind as well as new onset fevers and or chills.  These could signify an infection.  Calf or thigh tenderness to touch as well as increased swelling or redness.  This could signify a clot formation.  Any rash appears, increased  or new onset nausea/vomiting occur.  This may indicate a reaction to a medication.    Phone # [phone].  [facility].    Follow up with Surgeon at scheduled appointment time.

## 2024-11-19 NOTE — PROGRESS NOTES
Physical Therapy  Facility/Department: [place] [id number] - MED SURG/ORTHO  Physical Therapy Initial Assessment    Name: Sally Higgins  : 1955  MRN: [id number]  Date of Service: 2024    Discharge Recommendations:  Home with assist PRN, Outpatient PT   PT Equipment Recommendations  Equipment Needed: No      Patient Diagnosis(es): The primary encounter diagnosis was S/P revision of total knee, left. Diagnoses of Pain due to total knee replacement (HCC) and Pain due to total knee replacement, initial encounter (HCC) were also pertinent to this visit.  Past Medical History:  has a past medical history of Atrial fibrillation (HCC), Chronic back pain, Depression, GERD (gastroesophageal reflux disease), Hx of blood clots, Hyperlipidemia, Lumbar herniated disc, MVP (mitral valve prolapse), IBJAN (obstructive sleep apnea), Primary osteoarthritis of left knee, and Restless leg syndrome.  Past Surgical History:  has a past surgical history that includes back surgery (1998); Lap Band (); other surgical history (2024); Total knee arthroplasty (Left, 2024); knee surgery (Left, 06/10/2024); hernia repair (); and Knee arthroscopy (Left, 2024).    Assessment  Body Structures, Functions, Activity Limitations Requiring Skilled Therapeutic Intervention: Decreased functional mobility ;Decreased endurance;Decreased ROM;Decreased tolerance to work activity;Decreased strength  Assessment: Pt is a 68 y/o female who presents s/p L knee synovectomy and anterior/posterior polyexchange. Pt was independent prior to admit ambulating without device living with spouse and 3 grandchildren in 2 story home with 1 ITALO. Pt currently requires SBA for transfers and ambulation with RW. Pt would benefit from continued skilled PT to address these limitations. Recommend home with PRN A and OP PT.  Treatment Diagnosis: impaired functional mobility  Therapy Prognosis: Good  Decision Making: Low Complexity  Requires PT Follow-Up:

## 2024-11-19 NOTE — PROGRESS NOTES
Department of Orthopedic Surgery  Progress Note      SUBJECTIVE: doing well, minimal pain, has ambulated and feeling better compared to pre op    OBJECTIVE    Physical: dressing c/d/I NVID      VITALS:  /71   Pulse 61   Temp 97.1 °F (36.2 °C) (Oral)   Resp 14   Ht 1.651 m (5' 5\")   Wt 79.4 kg (175 lb)   SpO2 96%   BMI 29.12 kg/m²   24HR INTAKE/OUTPUT:      Intake/Output Summary (Last 24 hours) at 11/19/2024 1632  Last data filed at 11/19/2024 0858  Gross per 24 hour   Intake 800 ml   Output 100 ml   Net 700 ml     URINARY CATHETER OUTPUT (Jacobson):         Data    CBC:   Lab Results   Component Value Date/Time    WBC 4.5 11/15/2024 07:40 AM    RBC 4.81 11/15/2024 07:40 AM    HGB 12.7 11/15/2024 07:40 AM    HCT 38.4 11/15/2024 07:40 AM    MCV 79.8 11/15/2024 07:40 AM    MCH 26.3 11/15/2024 07:40 AM    MCHC 33.0 11/15/2024 07:40 AM    RDW 17.1 11/15/2024 07:40 AM     11/15/2024 07:40 AM    MPV 8.2 11/15/2024 07:40 AM     BMP:    Lab Results   Component Value Date/Time     11/15/2024 07:40 AM    K 4.0 11/15/2024 07:40 AM    K 3.9 07/29/2021 03:16 AM     11/15/2024 07:40 AM    CO2 26 11/15/2024 07:40 AM    BUN 15 11/15/2024 07:40 AM    CREATININE 1.0 11/15/2024 07:40 AM    CALCIUM 9.4 11/15/2024 07:40 AM    GFRAA >60 11/29/2021 09:20 AM    GFRAA >60 11/21/2012 10:58 AM    LABGLOM 62 11/15/2024 07:40 AM    LABGLOM 81 04/05/2024 06:18 AM    LABGLOM 56 02/22/2023 12:00 AM    GLUCOSE 81 11/15/2024 07:40 AM     Current Inpatient Medications    Current Facility-Administered Medications: [START ON 11/20/2024] atorvastatin (LIPITOR) tablet 10 mg, 10 mg, Oral, Daily  [START ON 11/20/2024] buPROPion (WELLBUTRIN XL) extended release tablet 150 mg, 150 mg, Oral, QAM  [START ON 11/20/2024] flecainide (TAMBOCOR) tablet 100 mg, 100 mg, Oral, Daily  pramipexole (MIRAPEX) tablet 0.5 mg, 0.5 mg, Oral, Nightly  [START ON 11/20/2024] rivaroxaban (XARELTO) tablet 20 mg, 20 mg, Oral, Dinner  [START ON

## 2024-11-19 NOTE — ANESTHESIA PROCEDURE NOTES
Peripheral Block    Patient location during procedure: pre-op  Reason for block: post-op pain management and at surgeon's request  Start time: 11/19/2024 7:15 AM  End time: 11/19/2024 7:25 AM  Staffing  Performed: resident/CRNA and other anesthesia staff   Resident/CRNA: Benny Joe APRN - CRNA  Other anesthesia staff: Destini Alba RN  Performed by: Benny Joe APRN - CRNA  Authorized by: Benny Joe APRN - CRNA    Preanesthetic Checklist  Completed: patient identified, IV checked, site marked, risks and benefits discussed, surgical/procedural consents, equipment checked, pre-op evaluation, timeout performed, anesthesia consent given, oxygen available, monitors applied/VS acknowledged, fire risk safety assessment completed and verbalized and blood product R/B/A discussed and consented  Peripheral Block   Patient position: supine  Prep: ChloraPrep  Provider prep: mask and sterile gloves  Patient monitoring: cardiac monitor, continuous pulse ox, frequent blood pressure checks, IV access, oxygen and responsive to questions  Block type: Femoral  Adductor canal  Laterality: left  Injection technique: single-shot  Guidance: ultrasound guided    Needle   Needle type: insulated echogenic nerve stimulator needle   Needle gauge: 21 G  Needle localization: ultrasound guidance and anatomical landmarks  Needle length: 8 cm  Assessment   Injection assessment: negative aspiration for heme, no paresthesia on injection, local visualized surrounding nerve on ultrasound and no intravascular symptoms  Paresthesia pain: none  Slow fractionated injection: yes  Hemodynamics: stable  Outcomes: uncomplicated and patient tolerated procedure well    Medications Administered  fentaNYL (SUBLIMAZE) injection - IntraVENous   100 mcg - 11/19/2024 7:15:00 AM  midazolam (VERSED) injection 2 mg/2mL - IntraVENous   2 mg - 11/19/2024 7:15:00 AM  BUPivacaine 0.5%-EPINEPHrine injection 1:600242 - Perineural   25 mL - 11/19/2024
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
## 2024-11-19 NOTE — PROGRESS NOTES
Occupational Therapy  OT orders received, chart reviewed.   Per discussion with PT, pt has no OT needs at this time, will sign-off. If pt's condition changes or if OT needs arise, please re-order .     Shobha Ortiz, OTR/L

## 2024-11-19 NOTE — H&P
The patient's History and Physical of  11/1  was reviewed with the patient, and I examined the patient. There were no changes - see below for exam of affected area.  Today's exam of affected area, in reference to the planned operation today, is unchanged from surgeon's office note on 10//4 (see note)    Both the patient and I confirmed the surgical site.    Plan: The risks, benefits, expected outcome, and alternative to the recommended procedure have been discussed with the patient / family. Patient understands and wants to proceed with the procedure.     Sai Villalpando MD

## 2024-11-19 NOTE — PLAN OF CARE
Ortho Care Plan    Patient had a partial knee replacement on L knee synovectomy.  .    Comorbidities Reviewed Yes   Review completed by Sandy Mccallum RN      Patient has a past medical history of Atrial fibrillation (HCC), Chronic back pain, Depression, GERD (gastroesophageal reflux disease), Hx of blood clots, Hyperlipidemia, Lumbar herniated disc, MVP (mitral valve prolapse), BIJAN (obstructive sleep apnea), Primary osteoarthritis of left knee, and Restless leg syndrome.       Commodities addressed via home medications ordered, PRN medications ordered, education provided, and interdisciplinary team members involved      Patient and/or Family's stated Goal of Care this Admission: reduce pain, increase activity tolerance, improve mobility, Understand the effects of joint replacement on commodities, understand that blood glucose levels may fluctuate and need closer monitoring, understand use and effect of cough and deep breath/incentive spirometer, and Other:5 min  prior to discharge.     >>For Ortho and Comorbidity documentation on Education, please see Education Tab.

## 2024-11-19 NOTE — ANESTHESIA POSTPROCEDURE EVALUATION
Department of Anesthesiology  Postprocedure Note    Patient: Sally Higgins  MRN: [id number]  YOB: 1955  Date of evaluation: 11/19/2024    Procedure Summary       Date: 11/19/24 Room / Location: [address]    Anesthesia Start: 0742 Anesthesia Stop: 0908    Procedure: LEFT KNEE SYNOVECTOMY, ANTERIOR AND POSTERIOR, POLY EXCHANGE (Left: Knee) Diagnosis:       Pain due to total knee replacement (HCC)      (Pain due to total knee replacement (HCC) [T84.84XA, Z96.659])    Surgeons: Sai Villalpando MD Responsible Provider: Osbaldo Benitez MD    Anesthesia Type: general, regional ASA Status: 2            Anesthesia Type: No value filed.    Brigida Phase I: Brigida Score: 10    Brigida Phase II:      Anesthesia Post Evaluation    Comments: Postoperative Anesthesia Note    Name:    Sally Higgins  MRN:      [id number]    Patient Vitals in the past 12 hrs:  11/19/24 0940, BP:134/68, Pulse:73, Resp:(!) 8, SpO2:97 %  11/19/24 0935, BP:134/73, Pulse:76, Resp:12, SpO2:100 %  11/19/24 0930, BP:125/70, Temp:97.7 °F (36.5 °C), Temp src:Temporal, Pulse:78, Resp:12, SpO2:99 %  11/19/24 0925, BP:129/75, Pulse:79, Resp:12, SpO2:100 %  11/19/24 0920, BP:125/66, Pulse:80, Resp:19, SpO2:99 %  11/19/24 0915, BP:120/63, Pulse:88, Resp:11, SpO2:96 %  11/19/24 0910, BP:121/62, Pulse:80, Resp:(!) 9, SpO2:99 %  11/19/24 0905, BP:(!) 117/59, Temp:97.8 °F (36.6 °C), Temp src:Temporal, Pulse:81, Resp:20, SpO2:97 %  11/19/24 0726, BP:(!) 113/50, Pulse:59, Resp:18, SpO2:98 %  11/19/24 0721, BP:(!) 109/48, Pulse:54, Resp:12, SpO2:100 %  11/19/24 0716, BP:(!) 105/55, Pulse:55, Resp:14, SpO2:98 %  11/19/24 0711, BP:(!) 121/57, Pulse:56, Resp:14, SpO2:97 %  11/19/24 0706, BP:137/66, Pulse:61, Resp:14, SpO2:100 %  11/19/24 0701, BP:(!) 149/68, Pulse:69, Resp:14, SpO2:100 %  11/19/24 0611, BP:132/66, Temp:97.7 °F (36.5 °C), Temp src:Oral, Pulse:53, Resp:16, SpO2:98 %, Height:1.651 m (5' 5\"), Weight:79.4 kg (175

## 2024-11-19 NOTE — CARE COORDINATION
Case Management Assessment  Initial Evaluation    Date/Time of Evaluation: 11/19/2024 2:32 PM  Assessment Completed by: Fina Coyle RN    If patient is discharged prior to next notation, then this note serves as note for discharge by case management.    Patient Name: Sally Higgins                   YOB: 1955  Diagnosis: Pain due to total knee replacement (HCC) [T84.84XA, Z96.659]  Pain due to total knee replacement, initial encounter ([facility]) [T84.84XA, Z96.659]  S/P revision of total knee, left [Z96.652]                   Date / Time: 11/19/2024  5:36 AM    Patient Admission Status: Inpatient   Readmission Risk (Low < 19, Mod (19-27), High > 27): Readmission Risk Score: 5.5    Current PCP: Elieser Hairston, DO  PCP verified by CM? Yes    Chart Reviewed: Yes      History Provided by: Patient, Medical Record  Patient Orientation: Alert and Oriented    Patient Cognition: Alert    Hospitalization in the last 30 days (Readmission):  No    If yes, Readmission Assessment in  Navigator will be completed.    Advance Directives:      Code Status: Full Code   Patient's Primary Decision Maker is: Legal Next of Kin    Primary Decision Maker: Ryan Higgins - Spouse - [phone]    Discharge Planning:    Patient lives with: Spouse/Significant Other, Children Type of Home: House  Primary Care Giver: Self  Patient Support Systems include: Spouse/Significant Other   Current Financial resources: Medicare  Current community resources: None  Current services prior to admission: None            Current DME:              Type of Home Care services:  None    ADLS  Prior functional level: Independent in ADLs/IADLs  Current functional level: Assistance with the following:, Shopping, Housework, Cooking    PT AM-PAC:   /24  OT AM-PAC:   /24    Family can provide assistance at DC: Yes  Would you like Case Management to discuss the discharge plan with any other family members/significant others, and if so, who? No  Plans to  Return to Present Housing: Yes  Other Identified Issues/Barriers to RETURNING to current housing: None  Potential Assistance needed at discharge: Outpatient PT/OT            Potential DME:    Patient expects to discharge to: House  Plan for transportation at discharge:      Financial    Payor: [facility] MEDICARE / Plan: [facility] MEDICARE ADVANTAGE / Product Type: *No Product type* /     Does insurance require precert for SNF: Yes    Potential assistance Purchasing Medications: No  Meds-to-Beds request:        [id number] - [place], [place] - [address] - P [phone] - F [phone]  [address]  [facility] [id number]  Phone: [phone] Fax: [phone]      Notes:    Factors facilitating achievement of predicted outcomes: Family support, Motivated, Cooperative, Pleasant, Sense of humor, Good insight into deficits, and Has needed Durable Medical Equipment at home    Barriers to discharge: none    Additional Case Management Notes: Patient lives in a 2 story house with her spouse and children. Patient is IPTA, she is an active , and has a PCP. Plan is OP therapy at [place].     The Plan for Transition of Care is related to the following treatment goals of Pain due to total knee replacement (HCC) [T84.84XA, Z96.659]  Pain due to total knee replacement, initial encounter (HCC) [T84.84XA, Z96.659]  S/P revision of total knee, left [Z96.652]    IF APPLICABLE: The Patient and/or patient representative Sally and her family were provided with a choice of provider and agrees with the discharge plan. Freedom of choice list with basic dialogue that supports the patient's individualized plan of care/goals and shares the quality data associated with the providers was provided to: Patient   Patient Representative Name:       The Patient and/or Patient Representative Agree with the Discharge Plan? Yes    Fina Coyle RN  Case Management Department  Ph: [phone] Fax: [phone]

## 2024-11-20 VITALS
HEIGHT: 65 IN | OXYGEN SATURATION: 94 % | RESPIRATION RATE: 16 BRPM | SYSTOLIC BLOOD PRESSURE: 132 MMHG | DIASTOLIC BLOOD PRESSURE: 73 MMHG | WEIGHT: 175 LBS | HEART RATE: 60 BPM | TEMPERATURE: 97.9 F | BODY MASS INDEX: 29.16 KG/M2

## 2024-11-20 LAB
ANION GAP SERPL CALCULATED.3IONS-SCNC: 10 MMOL/L (ref 3–16)
BASOPHILS # BLD: 0 K/UL (ref 0–0.2)
BASOPHILS NFR BLD: 0.1 %
BUN SERPL-MCNC: 14 MG/DL (ref 7–20)
CALCIUM SERPL-MCNC: 8.5 MG/DL (ref 8.3–10.6)
CHLORIDE SERPL-SCNC: 104 MMOL/L (ref 99–110)
CO2 SERPL-SCNC: 22 MMOL/L (ref 21–32)
CREAT SERPL-MCNC: 0.9 MG/DL (ref 0.6–1.2)
DEPRECATED RDW RBC AUTO: 16.5 % (ref 12.4–15.4)
EOSINOPHIL # BLD: 0 K/UL (ref 0–0.6)
EOSINOPHIL NFR BLD: 0 %
GFR SERPLBLD CREATININE-BSD FMLA CKD-EPI: 70 ML/MIN/{1.73_M2}
GLUCOSE SERPL-MCNC: 143 MG/DL (ref 70–99)
HCT VFR BLD AUTO: 35.6 % (ref 36–48)
HGB BLD-MCNC: 11.4 G/DL (ref 12–16)
LYMPHOCYTES # BLD: 0.6 K/UL (ref 1–5.1)
LYMPHOCYTES NFR BLD: 5.4 %
MCH RBC QN AUTO: 26 PG (ref 26–34)
MCHC RBC AUTO-ENTMCNC: 32.2 G/DL (ref 31–36)
MCV RBC AUTO: 80.7 FL (ref 80–100)
MONOCYTES # BLD: 0.3 K/UL (ref 0–1.3)
MONOCYTES NFR BLD: 2.5 %
NEUTROPHILS # BLD: 10 K/UL (ref 1.7–7.7)
NEUTROPHILS NFR BLD: 92 %
PLATELET # BLD AUTO: 232 K/UL (ref 135–450)
PMV BLD AUTO: 8.3 FL (ref 5–10.5)
POTASSIUM SERPL-SCNC: 4.3 MMOL/L (ref 3.5–5.1)
RBC # BLD AUTO: 4.4 M/UL (ref 4–5.2)
SODIUM SERPL-SCNC: 136 MMOL/L (ref 136–145)
WBC # BLD AUTO: 10.9 K/UL (ref 4–11)

## 2024-11-20 PROCEDURE — 97110 THERAPEUTIC EXERCISES: CPT

## 2024-11-20 PROCEDURE — 6360000002 HC RX W HCPCS: Performed by: STUDENT IN AN ORGANIZED HEALTH CARE EDUCATION/TRAINING PROGRAM

## 2024-11-20 PROCEDURE — 2580000003 HC RX 258: Performed by: STUDENT IN AN ORGANIZED HEALTH CARE EDUCATION/TRAINING PROGRAM

## 2024-11-20 PROCEDURE — 97530 THERAPEUTIC ACTIVITIES: CPT

## 2024-11-20 PROCEDURE — 97116 GAIT TRAINING THERAPY: CPT

## 2024-11-20 PROCEDURE — 85025 COMPLETE CBC W/AUTO DIFF WBC: CPT

## 2024-11-20 PROCEDURE — 6370000000 HC RX 637 (ALT 250 FOR IP): Performed by: STUDENT IN AN ORGANIZED HEALTH CARE EDUCATION/TRAINING PROGRAM

## 2024-11-20 PROCEDURE — 99024 POSTOP FOLLOW-UP VISIT: CPT | Performed by: SPECIALIST/TECHNOLOGIST

## 2024-11-20 PROCEDURE — 80048 BASIC METABOLIC PNL TOTAL CA: CPT

## 2024-11-20 PROCEDURE — 36415 COLL VENOUS BLD VENIPUNCTURE: CPT

## 2024-11-20 RX ADMIN — ACETAMINOPHEN 650 MG: 325 TABLET ORAL at 11:38

## 2024-11-20 RX ADMIN — DEXAMETHASONE SODIUM PHOSPHATE 6 MG: 10 INJECTION INTRAMUSCULAR; INTRAVENOUS at 00:00

## 2024-11-20 RX ADMIN — SERTRALINE HYDROCHLORIDE 100 MG: 50 TABLET ORAL at 08:54

## 2024-11-20 RX ADMIN — DEXAMETHASONE SODIUM PHOSPHATE 6 MG: 10 INJECTION INTRAMUSCULAR; INTRAVENOUS at 08:55

## 2024-11-20 RX ADMIN — WATER 2000 MG: 1 INJECTION INTRAMUSCULAR; INTRAVENOUS; SUBCUTANEOUS at 00:00

## 2024-11-20 RX ADMIN — ACETAMINOPHEN 650 MG: 325 TABLET ORAL at 05:18

## 2024-11-20 RX ADMIN — FLECAINIDE ACETATE 100 MG: 100 TABLET ORAL at 08:55

## 2024-11-20 RX ADMIN — OXYCODONE 5 MG: 5 TABLET ORAL at 09:05

## 2024-11-20 RX ADMIN — SODIUM CHLORIDE, PRESERVATIVE FREE 10 ML: 5 INJECTION INTRAVENOUS at 08:56

## 2024-11-20 RX ADMIN — BUPROPION HYDROCHLORIDE 150 MG: 150 TABLET, EXTENDED RELEASE ORAL at 08:55

## 2024-11-20 RX ADMIN — ATORVASTATIN CALCIUM 10 MG: 10 TABLET, FILM COATED ORAL at 08:55

## 2024-11-20 RX ADMIN — POLYETHYLENE GLYCOL 3350 17 G: 17 POWDER, FOR SOLUTION ORAL at 08:55

## 2024-11-20 RX ADMIN — CELECOXIB 100 MG: 100 CAPSULE ORAL at 08:54

## 2024-11-20 ASSESSMENT — PAIN SCALES - GENERAL
PAINLEVEL_OUTOF10: 0
PAINLEVEL_OUTOF10: 6
PAINLEVEL_OUTOF10: 6
PAINLEVEL_OUTOF10: 0

## 2024-11-20 ASSESSMENT — PAIN DESCRIPTION - PAIN TYPE: TYPE: ACUTE PAIN;SURGICAL PAIN

## 2024-11-20 ASSESSMENT — PAIN - FUNCTIONAL ASSESSMENT
PAIN_FUNCTIONAL_ASSESSMENT: ACTIVITIES ARE NOT PREVENTED
PAIN_FUNCTIONAL_ASSESSMENT: ACTIVITIES ARE NOT PREVENTED

## 2024-11-20 ASSESSMENT — PAIN DESCRIPTION - FREQUENCY: FREQUENCY: INTERMITTENT

## 2024-11-20 ASSESSMENT — PAIN DESCRIPTION - LOCATION
LOCATION: KNEE
LOCATION: KNEE

## 2024-11-20 ASSESSMENT — PAIN SCALES - WONG BAKER
WONGBAKER_NUMERICALRESPONSE: NO HURT
WONGBAKER_NUMERICALRESPONSE: NO HURT

## 2024-11-20 ASSESSMENT — PAIN DESCRIPTION - DESCRIPTORS
DESCRIPTORS: SHARP
DESCRIPTORS: SHARP

## 2024-11-20 ASSESSMENT — PAIN DESCRIPTION - ORIENTATION
ORIENTATION: LEFT
ORIENTATION: LEFT

## 2024-11-20 NOTE — PLAN OF CARE
Problem: Discharge Planning  Goal: Discharge to home or other facility with appropriate resources  11/19/2024 1945 by Hans Farrar, RN  Outcome: Progressing  11/19/2024 1217 by Sandy Mccallum RN  Outcome: Progressing  Flowsheets (Taken 11/19/2024 1200)  Discharge to home or other facility with appropriate resources: Identify barriers to discharge with patient and caregiver     Problem: Pain  Goal: Verbalizes/displays adequate comfort level or baseline comfort level  11/19/2024 1945 by Hans Farrar, RN  Outcome: Progressing  11/19/2024 1217 by Sandy Mccallum RN  Outcome: Progressing     Problem: ABCDS Injury Assessment  Goal: Absence of physical injury  Outcome: Progressing     Problem: Safety - Adult  Goal: Free from fall injury  Outcome: Progressing

## 2024-11-20 NOTE — PROGRESS NOTES
IV removed. D/C instructions given. Pt verbalizes understanding and all questions answered. Medications to be picked up at preferred pharmacy. Pt wheeled to main entrance and assisted into vehicle by staff.

## 2024-11-20 NOTE — DISCHARGE SUMMARY
Department of Orthopedic Surgery  Physician Assistant   Discharge Summary    The Sally Higgins is a 69 y.o. female underwent total knee replacement procedure without complication.  Sally Higgins was admitted to the floor following Her recovery in the PACU.     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    Current Inpatient Medications    Current Facility-Administered Medications: atorvastatin (LIPITOR) tablet 10 mg, 10 mg, Oral, Daily  buPROPion (WELLBUTRIN XL) extended release tablet 150 mg, 150 mg, Oral, QAM  flecainide (TAMBOCOR) tablet 100 mg, 100 mg, Oral, Daily  pramipexole (MIRAPEX) tablet 0.5 mg, 0.5 mg, Oral, Nightly  rivaroxaban (XARELTO) tablet 20 mg, 20 mg, Oral, Dinner  sertraline (ZOLOFT) tablet 100 mg, 100 mg, Oral, Daily  traZODone (DESYREL) tablet 100 mg, 100 mg, Oral, Nightly PRN  dilTIAZem (CARDIZEM CD) extended release capsule 120 mg, 120 mg, Oral, Daily  sodium chloride flush 0.9 % injection 5-40 mL, 5-40 mL, IntraVENous, 2 times per day  sodium chloride flush 0.9 % injection 5-40 mL, 5-40 mL, IntraVENous, PRN  0.9 % sodium chloride infusion, , IntraVENous, PRN  acetaminophen (TYLENOL) tablet 650 mg, 650 mg, Oral, Q6H  oxyCODONE (ROXICODONE) immediate release tablet 5 mg, 5 mg, Oral, Q4H PRN **OR** oxyCODONE (ROXICODONE) immediate release tablet 10 mg, 10 mg, Oral, Q4H PRN  morphine (PF) injection 2 mg, 2 mg, IntraVENous, Q2H PRN **OR** morphine sulfate (PF) injection 4 mg, 4 mg, IntraVENous, Q2H PRN  polyethylene glycol (GLYCOLAX) packet 17 g, 17 g, Oral, Daily  senna (SENOKOT) tablet 8.6 mg, 1 tablet, Oral, Daily PRN  celecoxib (CELEBREX) capsule 100 mg, 100 mg, Oral, BID  methocarbamol (ROBAXIN) tablet 750 mg, 750 mg, Oral, Q8H PRN **OR** [DISCONTINUED] methocarbamol (ROBAXIN) 1,000 mg in sodium chloride 0.9 % 100 mL IVPB, 1,000 mg, IntraVENous, Q8H PRN    Post-operatively the patient’s diet was advanced as tolerated and their incision was checked

## 2024-11-20 NOTE — PROGRESS NOTES
Physical Therapy  Facility/Department: [facility] C5 - MED SURG/ORTHO  Daily Treatment Note  NAME: Sally Higgins  : 1955  MRN: [id number]    Date of Service: 2024    Discharge Recommendations:  Home with assist PRN, Outpatient PT   PT Equipment Recommendations  Equipment Needed: No    Patient Diagnosis(es): The primary encounter diagnosis was Infection or inflammatory reaction due to internal joint prosthesis, initial encounter ([facility]). Diagnoses of Pain due to total knee replacement ([facility]), S/P revision of total knee, left, and Pain due to total knee replacement, initial encounter ([facility]) were also pertinent to this visit.    Assessment  Assessment: Pt demos grossly mod indep for transfers and ambulation with RW. Pt benefits from skilled PT to address limitations. Recommend home with PRN A and OP PT.  Treatment Diagnosis: impaired functional mobility  Activity Tolerance: Patient tolerated treatment well  Equipment Needed: No    Plan  Physical Therapy Plan  General Plan: 2 times a day 7 days a week  Current Treatment Recommendations: Strengthening;Balance training;Functional mobility training;Transfer training;Gait training;Stair training;Home exercise program;Therapeutic activities;Safety education & training;Patient/Caregiver education & training;Endurance training    Restrictions  Restrictions/Precautions  Restrictions/Precautions: Fall Risk, Weight Bearing  Lower Extremity Weight Bearing Restrictions  Left Lower Extremity Weight Bearing: Weight Bearing As Tolerated     Subjective   Subjective  Subjective: Pt agrees to PT session  Pain: unrated soreness in L knee  Orientation  Overall Orientation Status: Within Normal Limits    Objective  Vitals  Pulse: 75  BP: (!) 141/79  BP Location: Left upper arm  MAP (Calculated): 100  SpO2: 97 %  O2 Device: None (Room air)  Bed Mobility Training  Bed Mobility Training: Yes  Interventions: Verbal cues  Supine to Sit: Modified independent  Balance  Sitting:

## 2024-11-20 NOTE — CONSULTS
Hospital Medicine Consult History & Physical    Date of Service: Pt seen/examined in consultation on 11/19/24 at the request of Sai Villalpando MD for medical management of periop medical issues.    Chief Complaint: left knee pain    Presenting Admission History:   69 y.o. female who presented to [facility] with left [facility] pain.  PMHx significant for prior left TKA earlier this year. Pt was noted to have pain and instability with midflexion and arthrofibrosis. Pt was brought in for elective ortho surgery(left knee synovectomy, poly exchange)  -pt notes pain is well controlled and does not complain of any n/v.    IM service was consulted to assist in medical mgmt during periop state.      Assessment/Plan:    Current Principal Problem:  Pain due to total knee replacement (HCC)    Left knee pain- 2/2 left TKA prosthesis instability  -s/p revision surgery on 11/19/24  -mmgt and pain control per ortho  -on iv decadron x 3 doses    Afib- rate controlled  , sees [facility]  -on flecainide  -on xarelto  -on Diltiazem    Depression- on wellbutrin, zoloft    BIJAN -mmgt as outpt    HLD-on statin      CXR: I have reviewed the CXR with the following interpretation: na  EKG:  I have reviewed the EKG with the following interpretation: na    Physical Exam Performed:  /71   Pulse 61   Temp 97.1 °F (36.2 °C) (Oral)   Resp 16   Ht 1.651 m (5' 5\")   Wt 79.4 kg (175 lb)   SpO2 96%   BMI 29.12 kg/m²   General appearance:  No apparent distress, appears stated age and cooperative.  HEENT:  Pupils equal, round, and reactive to light. Conjunctivae/corneas clear.  Respiratory:  Normal respiratory effort. Clear to auscultation, bilaterally without Rales/Wheezes/Rhonchi.  Cardiovascular:  Regular rate and rhythm with normal S1/S2 without murmurs, rubs or gallops.  Abdomen:  Soft, non-tender, non-distended with normal bowel sounds.  Musculoskeletal:  No clubbing, cyanosis or edema bilaterally.  Full range of motion without

## 2024-11-20 NOTE — PLAN OF CARE
Ortho Care Plan    Patient had a  LEFT KNEE SYNOVECTOMY, ANTERIOR AND POSTERIOR, POLY EXCHANGE  on 11/19/24.  .    Comorbidities Reviewed Yes   Review completed by Charli Colin RN      Patient has a past medical history of Atrial fibrillation (HCC), Chronic back pain, Depression, GERD (gastroesophageal reflux disease), Hx of blood clots, Hyperlipidemia, Lumbar herniated disc, MVP (mitral valve prolapse), BIJAN (obstructive sleep apnea), Primary osteoarthritis of left knee, and Restless leg syndrome.       Commodities addressed via home medications ordered, PRN medications ordered, education provided, and interdisciplinary team members involved      Patient and/or Family's stated Goal of Care this Admission: reduce pain, increase activity tolerance, improve mobility, Understand the effects of joint replacement on commodities, understand that blood glucose levels may fluctuate and need closer monitoring, and understand use and effect of cough and deep breath/incentive spirometer prior to discharge.     >>For Ortho and Comorbidity documentation on Education, please see Education Tab.

## 2024-11-20 NOTE — PROGRESS NOTES
[facility] Medicine Progress Note  V 10.25      Date of Admission: 11/19/2024    Hospital Day: 2      Chief Admission Complaint:  left knee pain    Subjective:  pain controlled, hoping to go home today    Presenting Admission History:         69 y.o. female who presented to [facility] with left knee pain.  PMHx significant for prior left TKA earlier this year. Pt was noted to have pain and instability with midflexion and arthrofibrosis. Pt was brought in for elective ortho surgery(left knee synovectomy, poly exchange)  -pt notes pain is well controlled and does not complain of any n/v.    IM service was consulted to assist in medical mgmt during periop state.    Assessment/Plan:      Current Principal Problem:  Pain due to total knee replacement (HCC)    Left knee pain- 2/2 left TKA prosthesis instability  -s/p revision surgery on 11/19/24  -mmgt and pain control per ortho  -started on iv decadron x 3 doses     Afib- rate controlled  , sees [facility]  -on flecainide  -on xarelto  -on Diltiazem     Depression- on wellbutrin, zoloft     BIJAN -mmgt as outpt     HLD-defer to outpt mgmt  -on statin    Ongoing threat to life and/or bodily function without ongoing treatment due to:  na    Consults:      IP CONSULT TO HOSPITALIST        --------------------------------------------------      Medications:        Infusion Medications    sodium chloride       Scheduled Medications    atorvastatin  10 mg Oral Daily    buPROPion  150 mg Oral QAM    flecainide  100 mg Oral Daily    pramipexole  0.5 mg Oral Nightly    rivaroxaban  20 mg Oral Dinner    sertraline  100 mg Oral Daily    dilTIAZem  120 mg Oral Daily    sodium chloride flush  5-40 mL IntraVENous 2 times per day    acetaminophen  650 mg Oral Q6H    polyethylene glycol  17 g Oral Daily    celecoxib  100 mg Oral BID     PRN Meds: traZODone, sodium chloride flush, sodium chloride, oxyCODONE **OR** oxyCODONE, morphine **OR** morphine, senna, methocarbamol **OR**

## 2024-11-20 NOTE — CARE COORDINATION
CASE MANAGEMENT DISCHARGE SUMMARY      Discharge to: Home w/ OP Therapy    Precertification completed: N/A  Hospital Exemption Notification (HENS) completed: N/A    IMM given: (date) 11/20/24    New Durable Medical Equipment ordered/agency: Pt owns    Transportation:    Family/car: Personal      Confirmed discharge plan with:     Patient: yes     Family:  yes           RN, name: Charli    Note: Discharging nurse to complete HOA, reconcile AVS, and place final copy with patient's discharge packet. RN to ensure that written prescriptions for  Level II medications are sent with patient to the facility as per protocol.      Fina Coyle, RN

## 2024-11-20 NOTE — PLAN OF CARE
Problem: Discharge Planning  Goal: Discharge to home or other facility with appropriate resources  Outcome: Progressing     Problem: Pain  Goal: Verbalizes/displays adequate comfort level or baseline comfort level  Outcome: Progressing     Problem: ABCDS Injury Assessment  Goal: Absence of physical injury  Outcome: Progressing     Problem: Safety - Adult  Goal: Free from fall injury  Outcome: Progressing     Problem: Musculoskeletal - Adult  Goal: Return mobility to safest level of function  Outcome: Progressing

## 2024-11-20 NOTE — PROGRESS NOTES
Department of Orthopedic Surgery  Physician Assistant   Progress Note    Subjective:       Systemic or Specific Complaints:No Complaints and doing well post op, worked with therapy. Tolerating PO, voiding. No family at bedside    Objective:     Patient Vitals for the past 24 hrs:   BP Temp Temp src Pulse Resp SpO2   11/20/24 0905 -- -- -- -- 18 --   11/20/24 0834 (!) 141/79 98.6 °F (37 °C) Oral 75 16 97 %   11/20/24 0328 119/70 97.8 °F (36.6 °C) Oral 67 16 94 %   11/19/24 2349 137/74 97.9 °F (36.6 °C) Oral 68 16 95 %   11/19/24 1943 118/68 98.6 °F (37 °C) Oral 66 16 98 %   11/19/24 1808 -- -- -- -- 16 --   11/19/24 1200 126/71 97.1 °F (36.2 °C) Oral 61 14 96 %   11/19/24 1100 136/74 97.5 °F (36.4 °C) Oral 66 14 98 %   11/19/24 0940 134/68 -- -- 73 (!) 8 97 %   11/19/24 0935 134/73 -- -- 76 12 100 %   11/19/24 0930 125/70 97.7 °F (36.5 °C) Temporal 78 12 99 %   11/19/24 0925 129/75 -- -- 79 12 100 %   11/19/24 0920 125/66 -- -- 80 19 99 %       General: alert, appears stated age, cooperative, and no distress   Wound: Wound clean and dry no evidence of infection., No Erythema, No Drainage, and Positive for Edema   Motion: Painful range of Motion in affected extremity   DVT Exam: No evidence of DVT seen on physical exam.  No cords or calf tenderness.  No significant calf/ankle edema.     Additional exam: Patient seen sitting up in bed at time of interview  Leg lengths equal, rotational alignment neutral  Thigh and knee swelling as expected, compartments compressible  Mepilex clean, dry, intact  EHL, FHL, gastroc, and anterior tibialis motor intact  Sensation intact to light touch  DP and PT pulses 2+      Data Review  CBC:   Lab Results   Component Value Date/Time    WBC 10.9 11/20/2024 05:12 AM    RBC 4.40 11/20/2024 05:12 AM    HGB 11.4 11/20/2024 05:12 AM    HCT 35.6 11/20/2024 05:12 AM     11/20/2024 05:12 AM       Renal:   Lab Results   Component Value Date/Time     11/20/2024 05:12 AM    K 4.3

## 2024-11-21 ENCOUNTER — TELEPHONE (OUTPATIENT)
Dept: FAMILY MEDICINE CLINIC | Age: 69
End: 2024-11-21

## 2024-11-21 ENCOUNTER — CARE COORDINATION (OUTPATIENT)
Dept: CASE MANAGEMENT | Age: 69
End: 2024-11-21

## 2024-11-21 DIAGNOSIS — Z96.652 S/P REVISION OF TOTAL KNEE, LEFT: Primary | ICD-10-CM

## 2024-11-21 PROCEDURE — 1111F DSCHRG MED/CURRENT MED MERGE: CPT | Performed by: STUDENT IN AN ORGANIZED HEALTH CARE EDUCATION/TRAINING PROGRAM

## 2024-11-21 NOTE — CARE COORDINATION
Care Transitions Note    Initial Call - Call within 2 business days of discharge: Yes    Patient Current Location:  Home: [address] [place] [id number]    Care Transition Nurse contacted the patient by telephone to perform post hospital discharge assessment, verified name and  as identifiers. Provided introduction to self, and explanation of the Care Transition Nurse role.     Patient: Sally Higgins    Patient : 1955   MRN: [id number]    Reason for Admission: left knee synovectomy  Discharge Date: 24  RURS: Readmission Risk Score: 8.8      Last Discharge Facility       Date Complaint Diagnosis Description Type Department Provider    24  Infection or inflammatory reaction due to internal joint prosthesis, initial encounter ([facility]) ... Admission (Discharged) [place] C5 Sai Villalpando MD            Was this an external facility discharge? No    Additional needs identified to be addressed with provider   High priority: Please assist with HFU scheduling. The first available appt was 24 and scheduled patient, but not within the 7 day timeframe. Please assist with moving up appt. Thank you         Method of communication with provider: chart routing.    Patients top risk factors for readmission: functional physical ability    Interventions to address risk factors:   Review of patient management of conditions/medications: see note    Care Summary Note: Patient answered call and verified . Patient pleasant and agreeable to transition call. Stated she is \"having a lot of pain this morning\". Patient discussed recent hospitalization and PMH of knee surgeries. Patient stated she has  had 3 surgeries on the same knee. The most recent procedure was a couple days ago.   Reviewed AVS and medications noted in system. Scheduled HFU with PCP for first available appt of 24. Routed message to provider to assist with scheduling sooner appt to meet the 7 day timeframe. Patient stated

## 2024-11-21 NOTE — TELEPHONE ENCOUNTER
Care Transitions Initial Follow Up Call    Outreach made within 2 business days of discharge: Yes    Patient: Sally Higgins Patient : 1955   MRN: [id number]  Reason for Admission: Left total knee replacement   Discharge Date: 24       Spoke with: called patient left message for the patient to return the call to the office.    Discharge department/facility: [facility]        Scheduled appointment with PCP within 7-14 days    Follow Up  Future Appointments   Date Time Provider Department Center   2024 10:15 AM Sai Villalpando MD [facility]       Dana Blount LPN

## 2024-11-22 NOTE — TELEPHONE ENCOUNTER
Care Transitions Initial Follow Up Call    Outreach made within 2 business days of discharge: Yes    Patient: Sally Higgins Patient : 1955   MRN: [id number]  Reason for Admission: Left total knee replacement   Discharge Date: 24       Spoke with: Sally, patient will be following up with Ortho.    Discharge department/facility: [facility] [place]    TCM Interactive Patient Contact:  Was patient able to fill all prescriptions: Yes  Was patient instructed to bring all medications to the follow-up visit: Yes  Is patient taking all medications as directed in the discharge summary? Yes  Does patient understand their discharge instructions: Yes  Does patient have questions or concerns that need addressed prior to 7-14 day follow up office visit: no        Scheduled appointment with PCP within 7-14 days    Follow Up  Future Appointments   Date Time Provider Department Center   2024 10:00 AM Catie Munoz, PT [facility]   2024 10:15 AM Sai Villalpando MD [facility]   2024  9:00 AM Elieser Hairston DO [facility]       Dana Blount LPN

## 2024-11-24 LAB
BACTERIA SPEC AEROBE CULT: NORMAL
BACTERIA SPEC ANAEROBE CULT: NORMAL
GRAM STN SPEC: NORMAL

## 2024-11-25 LAB
BACTERIA SPEC AEROBE CULT: NORMAL
BACTERIA SPEC ANAEROBE CULT: NORMAL
GRAM STN SPEC: NORMAL

## 2024-11-26 ENCOUNTER — HOSPITAL ENCOUNTER (OUTPATIENT)
Dept: PHYSICAL THERAPY | Age: 69
Setting detail: THERAPIES SERIES
Discharge: HOME | End: 2024-11-26
Attending: STUDENT IN AN ORGANIZED HEALTH CARE EDUCATION/TRAINING PROGRAM
Payer: COMMERCIAL

## 2024-11-26 DIAGNOSIS — M25.662 KNEE STIFFNESS, LEFT: Primary | ICD-10-CM

## 2024-11-26 DIAGNOSIS — G89.18 ACUTE POSTOPERATIVE PAIN OF LEFT KNEE: ICD-10-CM

## 2024-11-26 DIAGNOSIS — M25.562 ACUTE POSTOPERATIVE PAIN OF LEFT KNEE: ICD-10-CM

## 2024-11-26 DIAGNOSIS — R26.2 DIFFICULTY WALKING: ICD-10-CM

## 2024-11-26 DIAGNOSIS — M25.469 EDEMA OF KNEE: ICD-10-CM

## 2024-11-26 PROCEDURE — 97016 VASOPNEUMATIC DEVICE THERAPY: CPT | Performed by: PHYSICAL THERAPIST

## 2024-11-26 PROCEDURE — 97110 THERAPEUTIC EXERCISES: CPT | Performed by: PHYSICAL THERAPIST

## 2024-11-26 PROCEDURE — 97161 PT EVAL LOW COMPLEX 20 MIN: CPT | Performed by: PHYSICAL THERAPIST

## 2024-11-26 NOTE — PLAN OF CARE
[facility]- Outpatient Rehabilitation and Therapy  [address], [place] [address] office: [phone] fax: [phone]     Physical Therapy Initial Evaluation Certification      Dear Sai Villalpando MD ,    We had the pleasure of evaluating the following patient for physical therapy services at [facility].  A summary of our findings can be found in the initial assessment below.  This includes our plan of care.  If you have any questions or concerns regarding these findings, please do not hesitate to contact me at the office phone number listed /above.  Thank you for the referral.     Physician Signature:_______________________________Date:__________________  By signing above (or electronic signature), therapist’s plan is approved by physician       Physical Therapy: TREATMENT/PROGRESS NOTE   Patient: Sally Higgins (69 y.o. female)   Examination Date: 2024   :  1955 MRN: [id number]   Visit #: 1   Insurance Allowable Auth Needed   Unknown incorrect primary/secondary []Yes    []No    Insurance: Payor: UMR / Plan: UMR / Product Type: *No Product type* /   Insurance ID: [id number] - (Commercial)  Secondary Insurance (if applicable): [facility] MEDICARE   Treatment Diagnosis:     ICD-10-CM    1. Knee stiffness, left  M25.662       2. Edema of knee  M25.469       3. Acute postoperative pain of left knee  G89.18     M25.562       4. Difficulty walking  R26.2          Medical Diagnosis:  Infection or inflammatory reaction due to internal joint prosthesis, initial encounter ([facility]) [T84.50XA]   Referring Physician: Sai Villalpando MD  PCP: Elieser Hairston DO     Plan of care signed (Y/N): NO    Date of Patient follow up with Physician: 24     Plan of Care Report: EVAL today  POC update due: (10 visits /OR AUTH LIMITS, whichever is less)  2024                                             Medical

## 2024-11-27 ENCOUNTER — CARE COORDINATION (OUTPATIENT)
Dept: CASE MANAGEMENT | Age: 69
End: 2024-11-27

## 2024-11-27 NOTE — CARE COORDINATION
Therapy [phone]    12/6/2024 10:15 AM Sai Villalpando MD Orthopedic Surgery [phone]    12/9/2024 9:00 AM Elieser Hairston, [facility] [phone]    12/10/2024 10:00 AM Catie Munoz PT Physical Therapy [phone]    12/12/2024 10:00 AM Yogesh Bridges PT Physical Therapy [phone]    12/16/2024 8:40 AM Yogesh Bridges PT Physical Therapy [phone]    12/19/2024 10:00 AM Yogesh Bridges PT Physical Therapy [phone]    12/23/2024 10:00 AM Yogesh Bridges PT Physical Therapy [phone]            Care Transition Nurse provided contact information.  Plan for follow-up call in 6-10 days based on severity of symptoms and risk factors.  Plan for next call: self management-       Dai Peña RN

## 2024-12-02 ENCOUNTER — TELEPHONE (OUTPATIENT)
Dept: ORTHOPEDIC SURGERY | Age: 69
End: 2024-12-02

## 2024-12-02 LAB
BACTERIA SPEC AEROBE CULT: NORMAL
BACTERIA SPEC ANAEROBE CULT: NORMAL
GRAM STN SPEC: NORMAL

## 2024-12-02 NOTE — TELEPHONE ENCOUNTER
General Question     Subject: REQUESTING A NOTE STATING SHE CAN WORK FROM HOME.  Patient and /or Facility Request: Sally Higgins \"Sally [place]\"   Contact Number: [phone]

## 2024-12-03 ENCOUNTER — HOSPITAL ENCOUNTER (OUTPATIENT)
Dept: PHYSICAL THERAPY | Age: 69
Setting detail: THERAPIES SERIES
Discharge: HOME | End: 2024-12-03
Attending: STUDENT IN AN ORGANIZED HEALTH CARE EDUCATION/TRAINING PROGRAM
Payer: COMMERCIAL

## 2024-12-03 PROCEDURE — 97110 THERAPEUTIC EXERCISES: CPT | Performed by: PHYSICAL THERAPIST

## 2024-12-03 PROCEDURE — 97016 VASOPNEUMATIC DEVICE THERAPY: CPT | Performed by: PHYSICAL THERAPIST

## 2024-12-03 PROCEDURE — 97112 NEUROMUSCULAR REEDUCATION: CPT | Performed by: PHYSICAL THERAPIST

## 2024-12-03 PROCEDURE — 97140 MANUAL THERAPY 1/> REGIONS: CPT | Performed by: PHYSICAL THERAPIST

## 2024-12-03 NOTE — FLOWSHEET NOTE
Disability index score of 15% or less for the WOMAC to assist with reaching prior level of function with activities such as ascending stairs.  [] Progressing: [] Met: [] Not Met: [] Adjusted  2. Patient will demonstrate increased AROM of L knee to -5 to 120 without pain to allow for proper joint functioning to enable patient to get in and out of car w/o difficulty.   [] Progressing: [] Met: [] Not Met: [] Adjusted  3. Patient will demonstrate increased Strength of L KE/KF to at least 4+/5 throughout without pain to allow for proper functional mobility to enable patient to return to sit to stand with no UE needs.   [] Progressing: [] Met: [] Not Met: [] Adjusted  4. Patient will return to bending to pick an item less than 5 lbs from the floor without increased symptoms or restriction.   [] Progressing: [] Met: [] Not Met: [] Adjusted  5. Pt will be able to play with her grandchildren for 30 mins w/o restriction from s/s(patient specific functional goal)    [] Progressing: [] Met: [] Not Met: [] Adjusted   6. Patient will demonstrate decreased edema of L knee by 1.5 cm to allow for proper functional mobility and muscle recruitment to enable patient to return to ambulate w/o AD.   [] Progressing: [] Met: [] Not Met: [] Adjusted       Overall Progression Towards Functional goals/ Treatment Progress Update:  [] Patient is progressing as expected towards functional goals listed.    [] Progression is slowed due to complexities/Impairments listed.  [] Progression has been slowed due to co-morbidities.  [x] Plan just implemented, too soon (<30days) to assess goals progression   [] Goals require adjustment due to lack of progress  [] Patient is not progressing as expected and requires additional follow up with physician  [] Other:     TREATMENT PLAN     Frequency/Duration: 2x/week for 8 weeks for the following treatment interventions:      Plan: Cont POC- Continue emphasis/focus on increasing ROM and reduce/eliminate soft

## 2024-12-05 ENCOUNTER — CARE COORDINATION (OUTPATIENT)
Dept: CASE MANAGEMENT | Age: 69
End: 2024-12-05

## 2024-12-05 NOTE — CARE COORDINATION
Care Transitions Note    Follow Up Call     Attempted to reach patient for transitions of care follow up.  Unable to reach patient.      Outreach Attempts:   HIPAA compliant voicemail left for patient.         Follow Up Appointment:   Future Appointments         Provider Specialty Dept Phone    12/6/2024 9:40 AM Yogesh Bridges, PT Physical Therapy [phone]    12/6/2024 10:15 AM Sai Villalpando MD Orthopedic Surgery [phone]    12/9/2024 9:00 AM Elieser Hairston DO Family Medicine [phone]    12/10/2024 10:00 AM Catie Munoz PT Physical Therapy [phone]    12/12/2024 10:00 AM Yogesh Bridges PT Physical Therapy [phone]    12/16/2024 8:40 AM Yogesh Bridges, PT Physical Therapy [phone]    12/19/2024 10:00 AM Yogesh Bridges PT Physical Therapy [phone]    12/23/2024 10:00 AM Yogesh Bridges, PT Physical Therapy [phone]          Dai Peña RN

## 2024-12-06 ENCOUNTER — HOSPITAL ENCOUNTER (OUTPATIENT)
Dept: PHYSICAL THERAPY | Age: 69
Setting detail: THERAPIES SERIES
Discharge: HOME | End: 2024-12-06
Attending: STUDENT IN AN ORGANIZED HEALTH CARE EDUCATION/TRAINING PROGRAM
Payer: COMMERCIAL

## 2024-12-06 ENCOUNTER — OFFICE VISIT (OUTPATIENT)
Dept: ORTHOPEDIC SURGERY | Age: 69
End: 2024-12-06

## 2024-12-06 VITALS — WEIGHT: 175 LBS | BODY MASS INDEX: 29.16 KG/M2 | HEIGHT: 65 IN

## 2024-12-06 DIAGNOSIS — Z09 POSTOP CHECK: Primary | ICD-10-CM

## 2024-12-06 DIAGNOSIS — Z98.890 S/P LEFT KNEE SURGERY: ICD-10-CM

## 2024-12-06 PROCEDURE — 97112 NEUROMUSCULAR REEDUCATION: CPT

## 2024-12-06 PROCEDURE — 99024 POSTOP FOLLOW-UP VISIT: CPT | Performed by: PHYSICIAN ASSISTANT

## 2024-12-06 PROCEDURE — 97110 THERAPEUTIC EXERCISES: CPT

## 2024-12-06 NOTE — PROGRESS NOTES
Date:  2024    Name:  Sally Higgins  Address:  [address] [place] [address]    :  1955      Age:   69 y.o.        Chief Complaint    Post-Op Check (#1PO lt knee/Sx 24)      History of Present Illness:  Sally Higgins is a 69 y.o. female who presents for a post-operative check.  This patient is approximately 2 weeks status post a left knee anterior posterior synovectomy with polyethylene exchange by Dr. Sai Villalpando MD on 24.  Patient has been adhering to our postoperative protocol and conducting a home exercise program. Over treatment includes; rest, ice, elevation, bracing, physical therapy, home exercises, and activity modification. Pain is well-controlled on OTC medication.  She is no longer ambulating with an assistive device they deny any signs or symptoms of infection. The patient denies any new injury.  The patient denies any catching, giving way, and joint locking.                 Vital Signs:   Ht 1.651 m (5' 5\")   Wt 79.4 kg (175 lb)   BMI 29.12 kg/m²     General Exam:    General: Sally Higgins is a healthy and well appearing 69 y.o. year old female who is sitting comfortably in our office in no acute distress.   Neuro: Alert & Oriented x 3,  normal,  no focal deficits noted. Normal mood & affect.  Eyes: Sclera clear  Ears: Normal external ear  Pulm: Respirations unlabored and regular   Skin: Warm, well perfused      Knee Examination: Left    Inspection: Well-healing scar from surgery.  Mild quadricep atrophy.  No effusion, ecchymosis, discoloration, erythema, excessive warmth. no gross deformities, no signs of infection.     Palpation: There is no patellofemoral crepitus, there is no joint line tenderness.  No other osseous or soft tissue tenderness.  Negative calf tenderness    Range of Motion:  0-90 degrees. Appropriate patellar mobility.    Strength: Grossly intact     Special Tests: Valgus and varus stress test are stable at 0 and 30°.  Lachman's exhibits a

## 2024-12-06 NOTE — FLOWSHEET NOTE
orthotic ()     Other:    Other:    Total Timed Code Tx Minutes 43 3       Total Treatment Minutes 43        Charge Justification:  (57005) THERAPEUTIC EXERCISE - Provided verbal/tactile cueing for activities related to strengthening, flexibility, endurance, ROM performed to prevent loss of range of motion, maintain or improve muscular strength or increase flexibility, following either an injury or surgery.   (09281) NEUROMUSCULAR RE-EDUCATION - Therapeutic procedure, 1 or more areas, each 15 minutes; neuromuscular reeducation of movement, balance, coordination, kinesthetic sense, posture, and/or proprioception for sitting and/or standing activities    GOALS     Patient stated goal: to be able to walk w/o a limp  [] Progressing: [] Met: [] Not Met: [] Adjusted    Therapist goals for Patient:   Short Term Goals: To be achieved in: 2 weeks  1. Independent in HEP and progression per patient tolerance, in order to prevent re-injury.   [x] Progressing: [] Met: [] Not Met: [] Adjusted  2. Patient will have a decrease in pain to <5/10 to facilitate improvement in movement, function, and ADLs as indicated by Functional Deficits.  [x] Progressing: [] Met: [] Not Met: [] Adjusted      Long Term Goals: To be achieved in: 8 weeks  1. Disability index score of 15% or less for the WOMAC to assist with reaching prior level of function with activities such as ascending stairs.  [] Progressing: [] Met: [] Not Met: [] Adjusted  2. Patient will demonstrate increased AROM of L knee to -5 to 120 without pain to allow for proper joint functioning to enable patient to get in and out of car w/o difficulty.   [] Progressing: [] Met: [] Not Met: [] Adjusted  3. Patient will demonstrate increased Strength of L KE/KF to at least 4+/5 throughout without pain to allow for proper functional mobility to enable patient to return to sit to stand with no UE needs.   [] Progressing: [] Met: [] Not Met: [] Adjusted  4. Patient will return to

## 2024-12-09 ENCOUNTER — OFFICE VISIT (OUTPATIENT)
Dept: FAMILY MEDICINE CLINIC | Age: 69
End: 2024-12-09

## 2024-12-09 VITALS
BODY MASS INDEX: 28.66 KG/M2 | HEART RATE: 70 BPM | HEIGHT: 65 IN | SYSTOLIC BLOOD PRESSURE: 118 MMHG | DIASTOLIC BLOOD PRESSURE: 72 MMHG | OXYGEN SATURATION: 100 % | WEIGHT: 172 LBS

## 2024-12-09 DIAGNOSIS — Z96.652 PAIN DUE TO TOTAL LEFT KNEE REPLACEMENT, SUBSEQUENT ENCOUNTER: ICD-10-CM

## 2024-12-09 DIAGNOSIS — T84.84XD PAIN DUE TO TOTAL LEFT KNEE REPLACEMENT, SUBSEQUENT ENCOUNTER: ICD-10-CM

## 2024-12-09 DIAGNOSIS — T84.50XD INFECTION OR INFLAMMATORY REACTION DUE TO INTERNAL JOINT PROSTHESIS, SUBSEQUENT ENCOUNTER: ICD-10-CM

## 2024-12-09 DIAGNOSIS — Z09 HOSPITAL DISCHARGE FOLLOW-UP: Primary | ICD-10-CM

## 2024-12-09 DIAGNOSIS — M25.511 ACUTE PAIN OF RIGHT SHOULDER: ICD-10-CM

## 2024-12-09 DIAGNOSIS — Z96.652 S/P REVISION OF TOTAL KNEE, LEFT: ICD-10-CM

## 2024-12-09 PROBLEM — T84.50XA INFECTION AND INFLAMMATORY REACTION DUE TO INTERNAL JOINT PROSTHESIS (HCC): Status: RESOLVED | Noted: 2024-09-17 | Resolved: 2024-12-09

## 2024-12-09 PROBLEM — Z96.659 PAIN DUE TO TOTAL KNEE REPLACEMENT, INITIAL ENCOUNTER (HCC): Status: RESOLVED | Noted: 2024-11-19 | Resolved: 2024-12-09

## 2024-12-09 PROBLEM — T84.84XA PAIN DUE TO TOTAL KNEE REPLACEMENT, INITIAL ENCOUNTER (HCC): Status: RESOLVED | Noted: 2024-11-19 | Resolved: 2024-12-09

## 2024-12-09 LAB
BACTERIA SPEC AEROBE CULT: NORMAL
BACTERIA SPEC ANAEROBE CULT: NORMAL
GRAM STN SPEC: NORMAL

## 2024-12-09 NOTE — PROGRESS NOTES
Post-Discharge Transitional Care  Follow Up      Sally Higgins   YOB: 1955    Date of Office Visit:  12/9/2024  Date of Hospital Admission: 11/19/24  Date of Hospital Discharge: 11/20/24  Risk of hospital readmission (high >=14%. Medium >=10%) :Readmission Risk Score: 8.8      Care management risk score Rising risk (score 2-5) and Complex Care (Scores >=6): No Risk Score On File     Non face to face  following discharge, date last encounter closed (first attempt may have been earlier): *No documented post hospital discharge outreach found in the last 14 days    Call initiated 2 business days of discharge: *No response recorded in the last 14 days    ASSESSMENT/PLAN:   Hospital discharge follow-up  -     DC DISCHARGE MEDS RECONCILED W/ CURRENT OUTPATIENT MED LIST  Pain due to total left knee replacement, subsequent encounter  S/P revision of total knee, left  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 Compliant with PT and ortho follow up. Incision appears to be healing appropriately. Experiencing some right shoulder pain consistent with possible subscapularis strain. Will try self guided therapy and if not improving then discuss further with ortho. Pain controlled without use of opiates.     Medical Decision Making: moderate complexity  Return in about 6 months (around 6/9/2025).           Subjective:   HPI:  Follow up of Hospital problems/diagnosis(es): As above    Inpatient course: Discharge summary reviewed- see chart.    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

## 2024-12-10 ENCOUNTER — HOSPITAL ENCOUNTER (OUTPATIENT)
Dept: PHYSICAL THERAPY | Age: 69
Setting detail: THERAPIES SERIES
Discharge: HOME | End: 2024-12-10
Attending: STUDENT IN AN ORGANIZED HEALTH CARE EDUCATION/TRAINING PROGRAM
Payer: COMMERCIAL

## 2024-12-10 ENCOUNTER — CARE COORDINATION (OUTPATIENT)
Dept: CASE MANAGEMENT | Age: 69
End: 2024-12-10

## 2024-12-10 DIAGNOSIS — I48.0 PAF (PAROXYSMAL ATRIAL FIBRILLATION) (HCC): ICD-10-CM

## 2024-12-10 PROCEDURE — 97110 THERAPEUTIC EXERCISES: CPT | Performed by: PHYSICAL THERAPIST

## 2024-12-10 PROCEDURE — 97016 VASOPNEUMATIC DEVICE THERAPY: CPT | Performed by: PHYSICAL THERAPIST

## 2024-12-10 PROCEDURE — 97140 MANUAL THERAPY 1/> REGIONS: CPT | Performed by: PHYSICAL THERAPIST

## 2024-12-10 PROCEDURE — 97112 NEUROMUSCULAR REEDUCATION: CPT | Performed by: PHYSICAL THERAPIST

## 2024-12-10 RX ORDER — DILTIAZEM HYDROCHLORIDE 120 MG/1
120 CAPSULE, COATED, EXTENDED RELEASE ORAL DAILY
Qty: 90 CAPSULE | Refills: 1 | Status: SHIPPED | OUTPATIENT
Start: 2024-12-10

## 2024-12-10 NOTE — CARE COORDINATION
Care Transitions Note    Follow Up Call     Patient Current Location:  Home: [address] [place] [address]    Care Transition Nurse contacted the patient by telephone. Verified name and  as identifiers.    Additional needs identified to be addressed with provider   No needs identified         Method of communication with provider: none.    Care Summary Note: Patient answered call and verified . Patient pleasant and agreeable to transition call. Doing well since last contact. Stated her knee is getting better and stronger. Continues with physical therapy and showing increased flexibility. Seen by PCP for HFU yesterday and provider discussed medications. Taking as directed. Denied any acute needs at present time.  Agreeable to f/u calls.  Educated on the use of urgent care or physician’s 24 hr access line if assistance is needed after hours.      Plan of care updates since last contact:  Review of patient management of conditions/medications: see note       Advance Care Planning:   Does patient have an Advance Directive: reviewed during previous call, see note. .    Medication Review:  Full medication reconciliation completed during previous call.    Remote Patient Monitoring:  Offered patient enrollment in the Remote Patient Monitoring (RPM) program for in-home monitoring: Patient is not eligible for RPM program because: patient does not have qualifying diagnosis.    Assessments:  Care Transitions Subsequent and Final Call    Subsequent and Final Calls  Care Transitions Interventions  Other Interventions:              Follow Up Appointment:   MARCIE appointment attended as scheduled   Future Appointments         Provider Specialty Dept Phone    2024 10:00 AM Catie Munoz, PT Physical Therapy [phone]    2024 11:20 AM Catie Munoz PT Physical Therapy [phone]    2024 10:00 AM Yogesh Bridges PT Physical Therapy [phone]    2024 10:00 AM Catie Munoz, PT Physical

## 2024-12-10 NOTE — FLOWSHEET NOTE
[facility]- Outpatient Rehabilitation and Therapy  [address], [place] [address] office: [phone] fax: [phone]    Physical Therapy: TREATMENT/PROGRESS NOTE   Patient: Sally Higgins (69 y.o. female)   Examination Date: 12/10/2024   :  1955 MRN: [id number]   Visit #: 4   Insurance Allowable Auth Needed   $40copay,  [x]Yes    []No    Insurance: Payor: UMR / Plan: UMR / Product Type: *No Product type* /   Insurance ID: [id number] - (Commercial)  Secondary Insurance (if applicable): [facility] MEDICARE   Treatment Diagnosis:     ICD-10-CM    1. Knee stiffness, left  M25.662       2. Edema of knee  M25.469       3. Acute postoperative pain of left knee  G89.18     M25.562       4. Difficulty walking  R26.2          Medical Diagnosis:  Infection or inflammatory reaction due to internal joint prosthesis, initial encounter ([facility]) [T84.50XA]   Referring Physician: Sai Villalpando MD  PCP: Elieser Hairston DO     Plan of care signed (Y/N): NO    Date of Patient follow up with Physician: 24     Plan of Care Report: NO  POC update due: (10 visits /OR AUTH LIMITS, whichever is less)  2024                                             Medical History:  Comorbidities:  Osteoporosis/Osteopenia  Osteoarthritis  Anxiety  Depression  Relevant Medical History: L DULCE MARIA /L  manip 2024                                         Precautions/ Contra-indications:           Latex allergy:  NO  Pacemaker:    NO  Contraindications for Manipulation: recent surgical history (relative)  Date of Surgery: 24 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    Preferred Language for Healthcare:   [x] English       [] other:    SUBJECTIVE EXAMINATION     Patient stated complaint: Pt reports her knee is

## 2024-12-12 ENCOUNTER — HOSPITAL ENCOUNTER (OUTPATIENT)
Dept: PHYSICAL THERAPY | Age: 69
Setting detail: THERAPIES SERIES
Discharge: HOME | End: 2024-12-12
Attending: STUDENT IN AN ORGANIZED HEALTH CARE EDUCATION/TRAINING PROGRAM
Payer: COMMERCIAL

## 2024-12-12 PROCEDURE — 97110 THERAPEUTIC EXERCISES: CPT | Performed by: PHYSICAL THERAPIST

## 2024-12-12 PROCEDURE — 97140 MANUAL THERAPY 1/> REGIONS: CPT | Performed by: PHYSICAL THERAPIST

## 2024-12-12 PROCEDURE — 97016 VASOPNEUMATIC DEVICE THERAPY: CPT | Performed by: PHYSICAL THERAPIST

## 2024-12-12 PROCEDURE — 97112 NEUROMUSCULAR REEDUCATION: CPT | Performed by: PHYSICAL THERAPIST

## 2024-12-12 NOTE — FLOWSHEET NOTE
[facility]- Outpatient Rehabilitation and Therapy  [address], [place] [address] office: [phone] fax: [phone]    Physical Therapy: TREATMENT/PROGRESS NOTE   Patient: Sally Higgins (69 y.o. female)   Examination Date: 2024   :  1955 MRN: [id number]   Visit #: 5   Insurance Allowable Auth Needed   30 []Yes    [x]No    Insurance: Payor: UMR / Plan: UMR / Product Type: *No Product type* /   Insurance ID: [id number] - (Commercial)  Secondary Insurance (if applicable): [facility] MEDICARE   Treatment Diagnosis:     ICD-10-CM    1. Knee stiffness, left  M25.662       2. Edema of knee  M25.469       3. Acute postoperative pain of left knee  G89.18     M25.562       4. Difficulty walking  R26.2          Medical Diagnosis:  Infection or inflammatory reaction due to internal joint prosthesis, initial encounter ([facility]) [T84.50XA]   Referring Physician: Sai Villalpando MD  PCP: Elieser Hairston DO     Plan of care signed (Y/N): NO    Date of Patient follow up with Physician: 24     Plan of Care Report: NO  POC update due: (10 visits /OR AUTH LIMITS, whichever is less)  2024                                             Medical History:  Comorbidities:  Osteoporosis/Osteopenia  Osteoarthritis  Anxiety  Depression  Relevant Medical History: L DULCE MARIA /L  manip 2024                                         Precautions/ Contra-indications:           Latex allergy:  NO  Pacemaker:    NO  Contraindications for Manipulation: recent surgical history (relative)  Date of Surgery: 24 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    Preferred Language for Healthcare:   [x] English       [] other:    SUBJECTIVE EXAMINATION     Patient stated complaint: Pt reports her knee is really

## 2024-12-16 ENCOUNTER — HOSPITAL ENCOUNTER (OUTPATIENT)
Dept: PHYSICAL THERAPY | Age: 69
Setting detail: THERAPIES SERIES
Discharge: HOME | End: 2024-12-16
Attending: STUDENT IN AN ORGANIZED HEALTH CARE EDUCATION/TRAINING PROGRAM
Payer: COMMERCIAL

## 2024-12-16 PROCEDURE — 97110 THERAPEUTIC EXERCISES: CPT | Performed by: PHYSICAL THERAPIST

## 2024-12-16 PROCEDURE — 97140 MANUAL THERAPY 1/> REGIONS: CPT | Performed by: PHYSICAL THERAPIST

## 2024-12-16 PROCEDURE — 97112 NEUROMUSCULAR REEDUCATION: CPT | Performed by: PHYSICAL THERAPIST

## 2024-12-16 PROCEDURE — 97016 VASOPNEUMATIC DEVICE THERAPY: CPT | Performed by: PHYSICAL THERAPIST
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
## 2024-12-17 ENCOUNTER — CARE COORDINATION (OUTPATIENT)
Dept: CASE MANAGEMENT | Age: 69
End: 2024-12-17

## 2024-12-17 NOTE — CARE COORDINATION
Care Transitions Note    Final Call     Patient Current Location:  Home: [address] [place] [id number]    Care Transition Nurse contacted the patient by telephone. Verified name and  as identifiers.    Patient graduated from the Care Transitions program.  Patient/family verbalizes confidence in the ability to self-manage at this time. has the ability to self manage at this time..      Advance Care Planning:   Does patient have an Advance Directive: reviewed during previous call, see note. .    Handoff:   Patient was not referred to the ACM team due to no additional needs identified.       Care Summary Note: Patient answered call and verified . Patient pleasant and agreeable to final transition call. Continues to do well. Knee is getting stronger and more flexible. Continues to be active with physical therapy. Knee is stable and she is happy with progress. Discussed upcoming appts and asking for release to work at [email or web address] appt. Patient to RTW on 25  Denied any acute needs at present time. No further support needed.  Educated on the use of urgent care or physician’s 24 hr access line if assistance is needed after hours.      Assessments:  Care Transitions Subsequent and Final Call    Subsequent and Final Calls  Care Transitions Interventions  Other Interventions:              Upcoming Appointments:    Future Appointments         Provider Specialty Dept Phone    2024 10:00 AM Yogesh Bridges, PT Physical Therapy [phone]    2024 10:00 AM Catie Munoz, PT Physical Therapy [phone]    2024 12:00 PM Sai Villalpando MD Orthopedic Surgery [phone]    2025 8:00 AM Elieser Hairston,  Family Medicine [phone]            Patient has agreed to contact primary care provider and/or specialist for any further questions, concerns, or needs.    Dai Peña RN

## 2024-12-19 ENCOUNTER — HOSPITAL ENCOUNTER (OUTPATIENT)
Dept: PHYSICAL THERAPY | Age: 69
Setting detail: THERAPIES SERIES
Discharge: HOME | End: 2024-12-19
Attending: STUDENT IN AN ORGANIZED HEALTH CARE EDUCATION/TRAINING PROGRAM
Payer: COMMERCIAL

## 2024-12-19 PROCEDURE — 97112 NEUROMUSCULAR REEDUCATION: CPT

## 2024-12-19 PROCEDURE — 97016 VASOPNEUMATIC DEVICE THERAPY: CPT

## 2024-12-19 PROCEDURE — 97140 MANUAL THERAPY 1/> REGIONS: CPT

## 2024-12-19 PROCEDURE — 97110 THERAPEUTIC EXERCISES: CPT
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
## 2024-12-23 ENCOUNTER — HOSPITAL ENCOUNTER (OUTPATIENT)
Dept: PHYSICAL THERAPY | Age: 69
Setting detail: THERAPIES SERIES
Discharge: HOME | End: 2024-12-23
Attending: STUDENT IN AN ORGANIZED HEALTH CARE EDUCATION/TRAINING PROGRAM
Payer: COMMERCIAL

## 2024-12-23 ENCOUNTER — OFFICE VISIT (OUTPATIENT)
Dept: ORTHOPEDIC SURGERY | Age: 69
End: 2024-12-23

## 2024-12-23 VITALS — HEIGHT: 65 IN | BODY MASS INDEX: 28.66 KG/M2 | WEIGHT: 172 LBS

## 2024-12-23 DIAGNOSIS — Z98.890 S/P LEFT KNEE SURGERY: Primary | ICD-10-CM

## 2024-12-23 PROCEDURE — 97110 THERAPEUTIC EXERCISES: CPT | Performed by: PHYSICAL THERAPIST

## 2024-12-23 PROCEDURE — 97140 MANUAL THERAPY 1/> REGIONS: CPT | Performed by: PHYSICAL THERAPIST

## 2024-12-23 PROCEDURE — 99024 POSTOP FOLLOW-UP VISIT: CPT | Performed by: STUDENT IN AN ORGANIZED HEALTH CARE EDUCATION/TRAINING PROGRAM

## 2024-12-23 PROCEDURE — 97016 VASOPNEUMATIC DEVICE THERAPY: CPT | Performed by: PHYSICAL THERAPIST

## 2024-12-23 NOTE — FLOWSHEET NOTE
[facility]- Outpatient Rehabilitation and Therapy  [address], [place] [address] office: [phone] fax: [phone]    Physical Therapy: TREATMENT/PROGRESS NOTE   Patient: Sally Higgins (69 y.o. female)   Examination Date: 2024   :  1955 MRN: [id number]   Visit #: 8   Insurance Allowable Auth Needed   30 []Yes    [x]No    Insurance: Payor: UMR / Plan: UMR / Product Type: *No Product type* /   Insurance ID: [id number] - (Commercial)  Secondary Insurance (if applicable): [facility] MEDICARE   Treatment Diagnosis:     ICD-10-CM    1. Knee stiffness, left  M25.662       2. Edema of knee  M25.469       3. Acute postoperative pain of left knee  G89.18     M25.562       4. Difficulty walking  R26.2          Medical Diagnosis:  Infection or inflammatory reaction due to internal joint prosthesis, initial encounter (formerly [facility]) [T84.50XA]   Referring Physician: Sai Villalpando MD  PCP: Elieser Hairston DO     Plan of care signed (Y/N): NO    Date of Patient follow up with Physician: 24     Plan of Care Report: NO  POC update due: (10 visits /OR AUTH LIMITS, whichever is less)  2024                                             Medical History:  Comorbidities:  Osteoporosis/Osteopenia  Osteoarthritis  Anxiety  Depression  Relevant Medical History: L DULCE MARIA /L  manip 2024                                         Precautions/ Contra-indications:           Latex allergy:  NO  Pacemaker:    NO  Contraindications for Manipulation: recent surgical history (relative)  Date of Surgery: 24 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    Preferred Language for Healthcare:   [x] English       [] other:    SUBJECTIVE EXAMINATION     Patient stated complaint: Pt reports her knee feels stiff

## 2024-12-23 NOTE — PROGRESS NOTES
History: Sally presents for postop check after left knee revision poly exchange scar excision on November 19.  She is making good progress with physical therapy.  Get around without assistive devices and just using occasional Tylenol for pain control.    Exam: Incision healing well without erythema or drainage. ROM 0-110. Well controlled effusion and soft tissue swelling. No ligamentous instability and No neurovascular compromise distally.      Assessment: 69-year-old female doing well status post left knee revision as above    Plan: Continue with outpatient therapy.  Discussed some home exercises to work on for working on flexion in addition.  Will follow-up in about 6 weeks for final postop check or as needed. RTW 1/2/2024 WITHOUT RESTRICTION    She is also noted to be having some right shoulder pain.  We discussed working on some rotator cuff strengthening exercises and possible referral to Dr. Young if needed.    Sai Villalpando MD

## 2025-01-03 ENCOUNTER — HOSPITAL ENCOUNTER (OUTPATIENT)
Dept: PHYSICAL THERAPY | Age: 70
Setting detail: THERAPIES SERIES
Discharge: HOME | End: 2025-01-03
Attending: STUDENT IN AN ORGANIZED HEALTH CARE EDUCATION/TRAINING PROGRAM
Payer: COMMERCIAL

## 2025-01-03 PROCEDURE — 97112 NEUROMUSCULAR REEDUCATION: CPT

## 2025-01-03 PROCEDURE — 97110 THERAPEUTIC EXERCISES: CPT

## 2025-01-08 ENCOUNTER — HOSPITAL ENCOUNTER (OUTPATIENT)
Dept: PHYSICAL THERAPY | Age: 70
Setting detail: THERAPIES SERIES
Discharge: HOME | End: 2025-01-08
Attending: STUDENT IN AN ORGANIZED HEALTH CARE EDUCATION/TRAINING PROGRAM
Payer: COMMERCIAL

## 2025-01-08 PROCEDURE — 97140 MANUAL THERAPY 1/> REGIONS: CPT

## 2025-01-08 PROCEDURE — 97112 NEUROMUSCULAR REEDUCATION: CPT

## 2025-01-08 PROCEDURE — 97110 THERAPEUTIC EXERCISES: CPT

## 2025-01-08 NOTE — PLAN OF CARE
[facility]- Outpatient Rehabilitation and Therapy  [address], [place] [address] office: [phone] fax: [phone]    Physical Therapy Re-Certification Plan of Care    Dear Sai Villalpando MD  ,    We had the pleasure of treating the following patient for physical therapy services at [facility]. A summary of our findings can be found in the updated assessment below.  This includes our plan of care.  If you have any questions or concerns regarding these findings, please do not hesitate to contact me at the office phone number checked above.  Thank you for the referral.     Physician Signature:________________________________Date:__________________  By signing above (or electronic signature), therapist's plan is approved by physician      Total Visits: 10     Overall Response to Treatment:  Patient is responding well to treatment and improvement is noted with regards to goals    Recommendation:    [x] Continue PT 1-2x / wk for 4-6 weeks.   [] Hold PT, pending MD visit   [] Discharge to [facility]. Follow up with PT or MD PRN.      Physical Therapy: TREATMENT/PROGRESS NOTE   Patient: Sally Higgins (69 y.o. female)   Examination Date: 2025   :  1955 MRN: [id number]   Visit #: 10   Insurance Allowable Auth Needed   30 []Yes    [x]No    Insurance: Payor: UMR / Plan: UMR / Product Type: *No Product type* /   Insurance ID: [id number] - (Commercial)  Secondary Insurance (if applicable): [facility] MEDICARE   Treatment Diagnosis:     ICD-10-CM    1. Knee stiffness, left  M25.662       2. Edema of knee  M25.469       3. Acute postoperative pain of left knee  G89.18     M25.562       4. Difficulty walking  R26.2          Medical Diagnosis:  Infection or inflammatory reaction due to internal joint prosthesis, initial encounter ([facility]) [T84.50XA]   Referring Physician: Sai Villalpando MD  PCP: Elieser Hairston DO     Plan of care signed (Y/N): YES    Date of

## 2025-01-10 ENCOUNTER — HOSPITAL ENCOUNTER (OUTPATIENT)
Dept: PHYSICAL THERAPY | Age: 70
Setting detail: THERAPIES SERIES
Discharge: HOME | End: 2025-01-10
Attending: STUDENT IN AN ORGANIZED HEALTH CARE EDUCATION/TRAINING PROGRAM
Payer: COMMERCIAL

## 2025-01-10 PROCEDURE — 97140 MANUAL THERAPY 1/> REGIONS: CPT

## 2025-01-10 PROCEDURE — 97112 NEUROMUSCULAR REEDUCATION: CPT

## 2025-01-10 PROCEDURE — 97110 THERAPEUTIC EXERCISES: CPT

## 2025-01-10 NOTE — FLOWSHEET NOTE
Not Met: [] Adjusted  5. Pt will be able to play with her grandchildren for 30 mins w/o restriction from s/s(patient specific functional goal)    [x] Progressing: [] Met: [] Not Met: [] Adjusted   6. Patient will demonstrate decreased edema of L knee by 1.5 cm to allow for proper functional mobility and muscle recruitment to enable patient to return to ambulate w/o AD.   [] Progressing: [x] Met: [] Not Met: [] Adjusted       Overall Progression Towards Functional goals/ Treatment Progress Update:  [] Patient is progressing as expected towards functional goals listed.    [x] Progression is slowed due to complexities/Impairments listed.  [] Progression has been slowed due to co-morbidities.  [] Plan just implemented, too soon (<30days) to assess goals progression   [] Goals require adjustment due to lack of progress  [] Patient is not progressing as expected and requires additional follow up with physician  [] Other:     TREATMENT PLAN     Frequency/Duration: 1-2x/week for 4-6 weeks from recent POC on 1/8/2025      Plan: Cont POC- Continue emphasis/focus on exercise progression and improving proper muscle recruitment and activation/motor control patterns. Next visit plan to add new exercises and progress balance     Electronically Signed by Yogesh Bridges PT                                                      Date: 01/10/2025     Note: Portions of this note have been templated and/or copied from initial evaluation, reassessments and prior notes for documentation efficiency.    Note: If patient does not return for scheduled/recommended follow up visits, this note will serve as a discharge from care along with the most recent update on progress.    Ortho Evaluation

## 2025-01-15 ENCOUNTER — HOSPITAL ENCOUNTER (OUTPATIENT)
Dept: PHYSICAL THERAPY | Age: 70
Setting detail: THERAPIES SERIES
Discharge: HOME | End: 2025-01-15
Attending: STUDENT IN AN ORGANIZED HEALTH CARE EDUCATION/TRAINING PROGRAM
Payer: COMMERCIAL

## 2025-01-15 PROCEDURE — 97110 THERAPEUTIC EXERCISES: CPT

## 2025-01-15 PROCEDURE — 97112 NEUROMUSCULAR REEDUCATION: CPT

## 2025-01-15 NOTE — FLOWSHEET NOTE
[facility]- Outpatient Rehabilitation and Therapy  [address], [place] [address] office: [phone] fax: [phone]    Physical Therapy: TREATMENT/PROGRESS NOTE   Patient: Sally Higgins (69 y.o. female)   Examination Date: 01/15/2025   :  1955 MRN: [id number]   Visit #: 12   Insurance Allowable Auth Needed   30 []Yes    [x]No    Insurance: Payor: UMR / Plan: UMR / Product Type: *No Product type* /   Insurance ID: [id number] - (Commercial)  Secondary Insurance (if applicable): [facility] MEDICARE   Treatment Diagnosis:     ICD-10-CM    1. Knee stiffness, left  M25.662       2. Edema of knee  M25.469       3. Acute postoperative pain of left knee  G89.18     M25.562       4. Difficulty walking  R26.2          Medical Diagnosis:  Infection or inflammatory reaction due to internal joint prosthesis, initial encounter ([facility]) [T84.50XA]   Referring Physician: Sai Villalpando MD  PCP: Elieser Hairston DO     Plan of care signed (Y/N): YES    Date of Patient follow up with Physician:  PRN     Plan of Care Report: NO  POC update due: (10 visits /OR AUTH LIMITS, whichever is less)  25                                            Medical History:  Comorbidities:  Osteoporosis/Osteopenia  Osteoarthritis  Anxiety  Depression  Relevant Medical History: L DULCE MARIA /L  manip 2024                                         Precautions/ Contra-indications:           Latex allergy:  NO  Pacemaker:    NO  Contraindications for Manipulation: recent surgical history (relative)  Date of Surgery: 24 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    Preferred Language for Healthcare:   [x] English       [] other:    SUBJECTIVE EXAMINATION     Patient stated complaint: made it in on a snowy day and states

## 2025-01-17 ENCOUNTER — HOSPITAL ENCOUNTER (OUTPATIENT)
Dept: PHYSICAL THERAPY | Age: 70
Setting detail: THERAPIES SERIES
Discharge: HOME | End: 2025-01-17
Attending: STUDENT IN AN ORGANIZED HEALTH CARE EDUCATION/TRAINING PROGRAM
Payer: COMMERCIAL

## 2025-01-17 PROCEDURE — 97112 NEUROMUSCULAR REEDUCATION: CPT

## 2025-01-17 PROCEDURE — 97110 THERAPEUTIC EXERCISES: CPT

## 2025-01-17 NOTE — PLAN OF CARE
1/17/25   Pain Summary VAS 7-9/10 4/10 2/10 0/10   Functional questionnaire WOMAC (26) 27%  (27) 28% (32) 33%   ROM  L KF  110 heel side w overpressure 120deg  120    L KE             -5 -3 (-1)                            Exercises/Interventions     Therapeutic Ex (27685)  resistance Reps/Sets/time Notes/Cues/Progressions            Recumbent bike for ROM Seat 2  L0 5' Able to go fwd full time   LLE proslant G stretch (step through)  10x10\"H    HSC machine BLE 0 holes  30/40# 1x10x5\" ecc control ea ECC fatigues   HSC butt kicks standing      Heel raises Off quantum, 90lb OP into ext  [id number]\"H    Patella mobs 10x10s Sup glide/lat glide         Stairs step 1 and two knee flexion rocking  10x10\"H PT OP for inc in pt intensity   Manual Intervention (79431)                  NMR re-education (17964)   CUES NEEDED   SLS w RHF stance Airex 10x10\"H Required 2 finger support at BYRON bar   Step over , heel strike and RHF stance  4in 10x5\"H    STS to table DL 12x5\"H    RTKE/TKE Quantum, 90lbs [id number]\"H    Step taps to 8\" box  2x10 ea leg No hip hiking, or circumduction to clear box   Cone step overs  8 laps    Prone HS curls with 3 way holds  2x10x5\"H    Therapeutic Activity (30281)                                        Modalities:    CP x 10 mins       Education/Home Exercise Program:  added at home gait training    Access Code: [email or web address]  URL: [email or web address]  Date: 01/08/2025  Prepared by: Yogesh Bridges    Exercises  - Seated Table Hamstring Stretch  - 2 x daily - 5 reps - 30sec hold  - Standing Gastroc Stretch on Step  - 1 x daily - 7 x weekly - 10 reps - 10s hold  - Standing Knee Flexion Stretch on Step  - 1 x daily - 7 x weekly - 10 reps - 10s hold  - Seated Long Arc Quad  - 1 x daily - 7 x weekly - 10 reps - 10s hold  - 4 Way Patellar [place]  - 1 x daily - 7 x weekly - 10 reps - 5-10s hold  - Terminal Knee Extension with Ball and Counter  - 1 x daily - 7 x weekly - 10 reps - 10s hold  - Standing Heel Raise

## 2025-01-22 ENCOUNTER — APPOINTMENT (OUTPATIENT)
Dept: PHYSICAL THERAPY | Age: 70
End: 2025-01-22
Attending: STUDENT IN AN ORGANIZED HEALTH CARE EDUCATION/TRAINING PROGRAM
Payer: COMMERCIAL

## 2025-03-11 ASSESSMENT — PATIENT HEALTH QUESTIONNAIRE - PHQ9
SUM OF ALL RESPONSES TO PHQ QUESTIONS 1-9: 8
3. TROUBLE FALLING OR STAYING ASLEEP: MORE THAN HALF THE DAYS
7. TROUBLE CONCENTRATING ON THINGS, SUCH AS READING THE NEWSPAPER OR WATCHING TELEVISION: SEVERAL DAYS
10. IF YOU CHECKED OFF ANY PROBLEMS, HOW DIFFICULT HAVE THESE PROBLEMS MADE IT FOR YOU TO DO YOUR WORK, TAKE CARE OF THINGS AT HOME, OR GET ALONG WITH OTHER PEOPLE: SOMEWHAT DIFFICULT
4. FEELING TIRED OR HAVING LITTLE ENERGY: SEVERAL DAYS
9. THOUGHTS THAT YOU WOULD BE BETTER OFF DEAD, OR OF HURTING YOURSELF: NOT AT ALL
10. IF YOU CHECKED OFF ANY PROBLEMS, HOW DIFFICULT HAVE THESE PROBLEMS MADE IT FOR YOU TO DO YOUR WORK, TAKE CARE OF THINGS AT HOME, OR GET ALONG WITH OTHER PEOPLE: SOMEWHAT DIFFICULT
5. POOR APPETITE OR OVEREATING: SEVERAL DAYS
SUM OF ALL RESPONSES TO PHQ QUESTIONS 1-9: 8
5. POOR APPETITE OR OVEREATING: SEVERAL DAYS
SUM OF ALL RESPONSES TO PHQ QUESTIONS 1-9: 8
9. THOUGHTS THAT YOU WOULD BE BETTER OFF DEAD, OR OF HURTING YOURSELF: NOT AT ALL
7. TROUBLE CONCENTRATING ON THINGS, SUCH AS READING THE NEWSPAPER OR WATCHING TELEVISION: SEVERAL DAYS
1. LITTLE INTEREST OR PLEASURE IN DOING THINGS: SEVERAL DAYS
SUM OF ALL RESPONSES TO PHQ QUESTIONS 1-9: 8
SUM OF ALL RESPONSES TO PHQ QUESTIONS 1-9: 8
4. FEELING TIRED OR HAVING LITTLE ENERGY: SEVERAL DAYS
6. FEELING BAD ABOUT YOURSELF - OR THAT YOU ARE A FAILURE OR HAVE LET YOURSELF OR YOUR FAMILY DOWN: SEVERAL DAYS
8. MOVING OR SPEAKING SO SLOWLY THAT OTHER PEOPLE COULD HAVE NOTICED. OR THE OPPOSITE - BEING SO FIDGETY OR RESTLESS THAT YOU HAVE BEEN MOVING AROUND A LOT MORE THAN USUAL: NOT AT ALL
2. FEELING DOWN, DEPRESSED OR HOPELESS: SEVERAL DAYS
3. TROUBLE FALLING OR STAYING ASLEEP: MORE THAN HALF THE DAYS
2. FEELING DOWN, DEPRESSED OR HOPELESS: SEVERAL DAYS
6. FEELING BAD ABOUT YOURSELF - OR THAT YOU ARE A FAILURE OR HAVE LET YOURSELF OR YOUR FAMILY DOWN: SEVERAL DAYS
1. LITTLE INTEREST OR PLEASURE IN DOING THINGS: SEVERAL DAYS
8. MOVING OR SPEAKING SO SLOWLY THAT OTHER PEOPLE COULD HAVE NOTICED. OR THE OPPOSITE, BEING SO FIGETY OR RESTLESS THAT YOU HAVE BEEN MOVING AROUND A LOT MORE THAN USUAL: NOT AT ALL

## 2025-03-12 ENCOUNTER — OFFICE VISIT (OUTPATIENT)
Dept: FAMILY MEDICINE CLINIC | Age: 70
End: 2025-03-12

## 2025-03-12 VITALS
BODY MASS INDEX: 28.66 KG/M2 | HEIGHT: 65 IN | DIASTOLIC BLOOD PRESSURE: 74 MMHG | HEART RATE: 59 BPM | OXYGEN SATURATION: 99 % | WEIGHT: 172 LBS | SYSTOLIC BLOOD PRESSURE: 122 MMHG

## 2025-03-12 DIAGNOSIS — E55.9 VITAMIN D DEFICIENCY: ICD-10-CM

## 2025-03-12 DIAGNOSIS — H53.9 VISION CHANGES: Primary | ICD-10-CM

## 2025-03-12 DIAGNOSIS — E78.00 PURE HYPERCHOLESTEROLEMIA: ICD-10-CM

## 2025-03-12 DIAGNOSIS — F33.42 RECURRENT MAJOR DEPRESSIVE DISORDER, IN FULL REMISSION: ICD-10-CM

## 2025-03-12 DIAGNOSIS — H53.9 VISION CHANGES: ICD-10-CM

## 2025-03-12 DIAGNOSIS — F51.01 PRIMARY INSOMNIA: ICD-10-CM

## 2025-03-12 DIAGNOSIS — I48.0 PAF (PAROXYSMAL ATRIAL FIBRILLATION) (HCC): ICD-10-CM

## 2025-03-12 LAB
25(OH)D3 SERPL-MCNC: 34.4 NG/ML
TSH SERPL DL<=0.005 MIU/L-ACNC: 2.38 UIU/ML (ref 0.27–4.2)

## 2025-03-12 RX ORDER — BUPROPION HYDROCHLORIDE 150 MG/1
150 TABLET ORAL EVERY MORNING
Qty: 90 TABLET | Refills: 3 | Status: SHIPPED | OUTPATIENT
Start: 2025-03-12

## 2025-03-12 RX ORDER — TRAZODONE HYDROCHLORIDE 50 MG/1
100 TABLET ORAL NIGHTLY PRN
Qty: 180 TABLET | Refills: 3 | Status: SHIPPED | OUTPATIENT
Start: 2025-03-12

## 2025-03-12 RX ORDER — ATORVASTATIN CALCIUM 10 MG/1
10 TABLET, FILM COATED ORAL DAILY
Qty: 90 TABLET | Refills: 3 | Status: SHIPPED | OUTPATIENT
Start: 2025-03-12

## 2025-03-12 RX ORDER — FLECAINIDE ACETATE 100 MG/1
100 TABLET ORAL DAILY
Qty: 90 TABLET | Refills: 1 | Status: SHIPPED | OUTPATIENT
Start: 2025-03-12

## 2025-03-12 RX ORDER — SERTRALINE HYDROCHLORIDE 100 MG/1
100 TABLET, FILM COATED ORAL DAILY
Qty: 90 TABLET | Refills: 3 | Status: SHIPPED | OUTPATIENT
Start: 2025-03-12

## 2025-03-12 NOTE — PROGRESS NOTES
mouth nightly as needed for Sleep 180 tablet 3     No current facility-administered medications for this visit.       Patient's past medical history, surgical history, family history, medications,  andallergies  were all reviewed and updated as appropriate today.      Review of Systems  All other systems reviewed and negative    Physical Exam  Vitals reviewed.   Constitutional:       Appearance: Normal appearance.   HENT:      Head: Normocephalic and atraumatic.   Eyes:      General: No scleral icterus.     Extraocular Movements: Extraocular movements intact.      Pupils: Pupils are equal, round, and reactive to light.   Cardiovascular:      Rate and Rhythm: Normal rate and regular rhythm.   Pulmonary:      Effort: Pulmonary effort is normal.      Breath sounds: Normal breath sounds.   Neurological:      General: No focal deficit present.      Mental Status: She is alert and oriented to person, place, and time.      Cranial Nerves: No cranial nerve deficit.   Psychiatric:         Behavior: Behavior normal.         Thought Content: Thought content normal.         Vitals:    03/12/25 0947   BP: 122/74   Pulse: 59   SpO2: 99%       Please note that portions of this note may have been completed with a voice recognition program. Efforts were made to edit the dictations but occasionally words are mis-transcribed.

## 2025-03-13 ENCOUNTER — RESULTS FOLLOW-UP (OUTPATIENT)
Dept: FAMILY MEDICINE CLINIC | Age: 70
End: 2025-03-13

## 2025-04-01 ENCOUNTER — OFFICE VISIT (OUTPATIENT)
Dept: ORTHOPEDIC SURGERY | Age: 70
End: 2025-04-01
Payer: COMMERCIAL

## 2025-04-01 VITALS — HEIGHT: 65 IN | BODY MASS INDEX: 28.66 KG/M2 | WEIGHT: 172 LBS

## 2025-04-01 DIAGNOSIS — Z96.652 S/P REVISION OF TOTAL KNEE, LEFT: Primary | ICD-10-CM

## 2025-04-01 DIAGNOSIS — M25.562 ACUTE PAIN OF LEFT KNEE: ICD-10-CM

## 2025-04-01 PROCEDURE — 99214 OFFICE O/P EST MOD 30 MIN: CPT | Performed by: STUDENT IN AN ORGANIZED HEALTH CARE EDUCATION/TRAINING PROGRAM

## 2025-04-01 PROCEDURE — 1123F ACP DISCUSS/DSCN MKR DOCD: CPT | Performed by: STUDENT IN AN ORGANIZED HEALTH CARE EDUCATION/TRAINING PROGRAM

## 2025-04-01 NOTE — PROGRESS NOTES
History: 69-year-old female known to me for left knee revision which was a poly exchange and scar excision on November 19, 2024 presents for increased pain over the lateral aspect of her thigh and knee which started last Saturday when she was moving some boxes with a pushing type motion.  She had acute onset pain and has had swelling and bruising to the distal thigh since that time.  She has been ambulatory with a cane.  She has had decreased flexion in the knee but been able to weight-bear.  No medial pain.  No previous treatment for evaluation for this issue  History of Present Illness        Exam:     Physical Exam    Incision healined well without erythema or drainage.  Ecchymosis along the distal thigh anteriorly and laterally.  There is tenderness to palpation at the posterolateral thigh around the lateral hamstrings and IT band distally.  The quadriceps tendon is intact to palpation and nontender.  The arthrotomy repair is intact to palpation and nontender.  There is edema. she has an intact straight leg raise ROM 10-50.  No ligamentous instability and No neurovascular compromise distally.     Imaging:     3 views of the left knee ordered to insert reviewed show stable position of cemented [employer] implants    Assessment and Plan    69-year-old female with what appears to be a partial tear of the IT band and/or hamstrings s/ p L TKA revision.      I discussed with her that both of these would generally be amenable to nonoperative care with pain and edema control.  Will start off with Voltaren gel and ice rest compression elevation.  Can use Voltaren tablets and Tylenol as needed as well.  Will follow-up again in 2 to 3 weeks to make sure she getting better and consider starting physical therapy at that time.  Assessment & Plan       Sai Villalpando MD

## 2025-04-20 DIAGNOSIS — G25.81 RESTLESS LEG SYNDROME: ICD-10-CM

## 2025-04-21 NOTE — TELEPHONE ENCOUNTER
Called pt and confirmed that she is still taking the Pramipexole for restless leg syndrome and would like a refill

## 2025-04-22 ENCOUNTER — OFFICE VISIT (OUTPATIENT)
Dept: ORTHOPEDIC SURGERY | Age: 70
End: 2025-04-22
Payer: COMMERCIAL

## 2025-04-22 DIAGNOSIS — Z96.652 S/P REVISION OF TOTAL KNEE, LEFT: Primary | ICD-10-CM

## 2025-04-22 PROCEDURE — 99213 OFFICE O/P EST LOW 20 MIN: CPT | Performed by: STUDENT IN AN ORGANIZED HEALTH CARE EDUCATION/TRAINING PROGRAM

## 2025-04-22 PROCEDURE — 1123F ACP DISCUSS/DSCN MKR DOCD: CPT | Performed by: STUDENT IN AN ORGANIZED HEALTH CARE EDUCATION/TRAINING PROGRAM

## 2025-04-23 RX ORDER — PRAMIPEXOLE DIHYDROCHLORIDE 0.5 MG/1
0.5 TABLET ORAL NIGHTLY
Qty: 90 TABLET | Refills: 1 | Status: SHIPPED | OUTPATIENT
Start: 2025-04-23

## 2025-05-07 NOTE — PROGRESS NOTES
History: 69-year-old female known to me for left knee revision which was a poly exchange and scar excision on November 19, 2024. Last visit had a lateral hamstring strain and presents for follow up eval. She says she is much improved and feels like this has resolved.   History of Present Illness        Exam:     Physical Exam    Incision healined well without erythema or drainage. No overlying skin changes, full rom, no lig instability, NVID.     Imaging:     3 views of the left knee ordered to insert reviewed show stable position of cemented [employer] implants    Assessment and Plan    69-year-old female doing well s/p LK reviison. RT normal activities w/o restriction, f/u PRN.   Assessment & Plan       Sai Villalpando MD

## 2025-06-05 DIAGNOSIS — I48.0 PAF (PAROXYSMAL ATRIAL FIBRILLATION) (HCC): ICD-10-CM

## 2025-06-05 RX ORDER — DILTIAZEM HYDROCHLORIDE 120 MG/1
CAPSULE, COATED, EXTENDED RELEASE ORAL
Qty: 90 CAPSULE | Refills: 1 | Status: SHIPPED | OUTPATIENT
Start: 2025-06-05

## 2025-06-05 NOTE — TELEPHONE ENCOUNTER
Last Office Visit: 8/22/2024 Provider: TRISTEN  **Is provider OOT? No    Next Office Visit:NA    **Has patient already had 30 day supply? No    Lab orders needed? no   Encounter provider correct? Yes If not, change provider  Script changes since last refill? no    LAST LABS:   EKG: 10/2024    **Care Everywhere? no

## 2025-06-09 ENCOUNTER — OFFICE VISIT (OUTPATIENT)
Dept: FAMILY MEDICINE CLINIC | Age: 70
End: 2025-06-09
Payer: COMMERCIAL

## 2025-06-09 VITALS
DIASTOLIC BLOOD PRESSURE: 80 MMHG | HEART RATE: 70 BPM | BODY MASS INDEX: 30.82 KG/M2 | OXYGEN SATURATION: 97 % | SYSTOLIC BLOOD PRESSURE: 120 MMHG | HEIGHT: 65 IN | WEIGHT: 185 LBS

## 2025-06-09 DIAGNOSIS — E78.00 PURE HYPERCHOLESTEROLEMIA: ICD-10-CM

## 2025-06-09 DIAGNOSIS — I48.0 PAROXYSMAL ATRIAL FIBRILLATION (HCC): ICD-10-CM

## 2025-06-09 DIAGNOSIS — H25.9 AGE-RELATED CATARACT OF BOTH EYES, UNSPECIFIED AGE-RELATED CATARACT TYPE: ICD-10-CM

## 2025-06-09 DIAGNOSIS — Z00.00 ENCOUNTER FOR ANNUAL PHYSICAL EXAM: Primary | ICD-10-CM

## 2025-06-09 DIAGNOSIS — Z01.818 PRE-OP EXAMINATION: ICD-10-CM

## 2025-06-09 PROBLEM — M00.9 SEPTIC ARTHRITIS (HCC): Status: RESOLVED | Noted: 2024-09-16 | Resolved: 2025-06-09

## 2025-06-09 LAB
ALBUMIN SERPL-MCNC: 4.5 G/DL (ref 3.4–5)
ALBUMIN/GLOB SERPL: 2 {RATIO} (ref 1.1–2.2)
ALP SERPL-CCNC: 107 U/L (ref 40–129)
ALT SERPL-CCNC: 9 U/L (ref 10–40)
ANION GAP SERPL CALCULATED.3IONS-SCNC: 11 MMOL/L (ref 3–16)
AST SERPL-CCNC: 17 U/L (ref 15–37)
BASOPHILS # BLD: 0.1 K/UL (ref 0–0.2)
BASOPHILS NFR BLD: 0.8 %
BILIRUB SERPL-MCNC: 0.6 MG/DL (ref 0–1)
BUN SERPL-MCNC: 10 MG/DL (ref 7–20)
CALCIUM SERPL-MCNC: 9.5 MG/DL (ref 8.3–10.6)
CHLORIDE SERPL-SCNC: 105 MMOL/L (ref 99–110)
CHOLEST SERPL-MCNC: 182 MG/DL (ref 0–199)
CO2 SERPL-SCNC: 25 MMOL/L (ref 21–32)
CREAT SERPL-MCNC: 1 MG/DL (ref 0.6–1.2)
DEPRECATED RDW RBC AUTO: 15 % (ref 12.4–15.4)
EOSINOPHIL # BLD: 0.2 K/UL (ref 0–0.6)
EOSINOPHIL NFR BLD: 2.5 %
GFR SERPLBLD CREATININE-BSD FMLA CKD-EPI: 61 ML/MIN/{1.73_M2}
GLUCOSE SERPL-MCNC: 95 MG/DL (ref 70–99)
HCT VFR BLD AUTO: 40.8 % (ref 36–48)
HDLC SERPL-MCNC: 76 MG/DL (ref 40–60)
HGB BLD-MCNC: 13.2 G/DL (ref 12–16)
LDL CHOLESTEROL: 87 MG/DL
LYMPHOCYTES # BLD: 1.3 K/UL (ref 1–5.1)
LYMPHOCYTES NFR BLD: 18.4 %
MCH RBC QN AUTO: 25.5 PG (ref 26–34)
MCHC RBC AUTO-ENTMCNC: 32.3 G/DL (ref 31–36)
MCV RBC AUTO: 78.8 FL (ref 80–100)
MONOCYTES # BLD: 0.5 K/UL (ref 0–1.3)
MONOCYTES NFR BLD: 6.9 %
NEUTROPHILS # BLD: 4.9 K/UL (ref 1.7–7.7)
NEUTROPHILS NFR BLD: 71.4 %
PLATELET # BLD AUTO: 220 K/UL (ref 135–450)
PMV BLD AUTO: 9.2 FL (ref 5–10.5)
POTASSIUM SERPL-SCNC: 4.5 MMOL/L (ref 3.5–5.1)
PROT SERPL-MCNC: 6.7 G/DL (ref 6.4–8.2)
RBC # BLD AUTO: 5.18 M/UL (ref 4–5.2)
SODIUM SERPL-SCNC: 141 MMOL/L (ref 136–145)
TRIGL SERPL-MCNC: 97 MG/DL (ref 0–150)
VLDLC SERPL CALC-MCNC: 19 MG/DL
WBC # BLD AUTO: 6.9 K/UL (ref 4–11)

## 2025-06-09 PROCEDURE — 99397 PER PM REEVAL EST PAT 65+ YR: CPT | Performed by: STUDENT IN AN ORGANIZED HEALTH CARE EDUCATION/TRAINING PROGRAM

## 2025-06-09 PROCEDURE — 99214 OFFICE O/P EST MOD 30 MIN: CPT | Performed by: STUDENT IN AN ORGANIZED HEALTH CARE EDUCATION/TRAINING PROGRAM

## 2025-06-09 NOTE — PROGRESS NOTES
EVENING 90 capsule 1    pramipexole (MIRAPEX) 0.5 MG tablet TAKE 1 TABLET BY MOUTH AT BEDTIME 90 tablet 1    diclofenac sodium (VOLTAREN) 1 % GEL Apply 4 g topically 4 times daily 350 g 0    atorvastatin (LIPITOR) 10 MG tablet Take 1 tablet by mouth daily 90 tablet 3    buPROPion (WELLBUTRIN XL) 150 MG extended release tablet Take 1 tablet by mouth every morning 90 tablet 3    flecainide (TAMBOCOR) 100 MG tablet Take 1 tablet by mouth daily In evening 90 tablet 1    rivaroxaban (XARELTO) 20 MG TABS tablet Take 1 tablet by mouth Daily with supper 90 tablet 3    sertraline (ZOLOFT) 100 MG tablet Take 1 tablet by mouth daily 90 tablet 3    traZODone (DESYREL) 50 MG tablet Take 2 tablets by mouth nightly as needed for Sleep 180 tablet 3     No current facility-administered medications for this visit.       Patient's past medical history, surgical history, family history, medications,  andallergies  were all reviewed and updated as appropriate today.      Review of Systems  All other systems reviewed and negative    Physical Exam  Vitals reviewed.   Constitutional:       Appearance: Normal appearance.   HENT:      Head: Normocephalic and atraumatic.   Cardiovascular:      Rate and Rhythm: Normal rate and regular rhythm.   Pulmonary:      Effort: Pulmonary effort is normal.      Breath sounds: Normal breath sounds.   Neurological:      General: No focal deficit present.      Mental Status: She is alert and oriented to person, place, and time.   Psychiatric:         Behavior: Behavior normal.         Thought Content: Thought content normal.       Vitals:    06/09/25 0805   BP: 120/80   Pulse: 70   SpO2: 97%       Please note that portions of this note may have been completed with a voice recognition program. Efforts were made to edit the dictations but occasionally words are mis-transcribed.    
perioperative beta-blockers: not indicated   4. Invasive hemodynamic monitoring perioperatively: not indicated   5. Deep vein thrombosis prophylaxis postoperatively:regimen to be chosen by surgical team   6. Other measures: none      1. Age-related cataract of both eyes, unspecified age-related cataract type  2. Pre-op examination  3. Paroxysmal atrial fibrillation (HCC)  Plan for procedure as above          While assessing care for this patient, I have reviewed all pertinent lab work/imaging/ specialist notes and care in reference to those problems addressed above in detail. Appropriate medical decision making was based on this.     Please note that portions of this note may have been completed with a voice recognition program. Efforts were made to edit the dictations but occasionally words are mis-transcribed.      Pt is at an acceptable risk for planned procedure    Please call with any questions  Elieser Hairston, DO

## 2025-06-13 ENCOUNTER — RESULTS FOLLOW-UP (OUTPATIENT)
Dept: FAMILY MEDICINE CLINIC | Age: 70
End: 2025-06-13

## (undated) DEVICE — SOLUTION WND IRRIGATION 450 ML 0.5 PVP-I 0.9 NACL

## (undated) DEVICE — GOWN,REINF,POLY,AURORA,XLNG/XXL,STRL: Brand: MEDLINE

## (undated) DEVICE — SOLUTION IRRIG 2000ML 0.9% SOD CHL USP UROMATIC PLAS CONT

## (undated) DEVICE — DUAL CUT SAGITTAL BLADE

## (undated) DEVICE — GLOVE ORTHO 7 1/2   MSG9475

## (undated) DEVICE — MHCZ ARTHROSCOPY: Brand: MEDLINE INDUSTRIES, INC.

## (undated) DEVICE — NON RIMMED SPEED PIN 65MM STERILE
Type: IMPLANTABLE DEVICE | Site: KNEE | Status: NON-FUNCTIONAL
Removed: 2024-04-04

## (undated) DEVICE — GLOVE ORANGE PI 7 1/2   MSG9075

## (undated) DEVICE — SUTURE ABSORBABLE MONOFILAMENT 1 CTX 45 CM 48 MM DA VIO PDS+

## (undated) DEVICE — SUTURE VCRL SZ 2-0 L18IN ABSRB UD CT-1 L36MM 1/2 CIR J839D

## (undated) DEVICE — SUTURE STRATAFIX SPRL SZ 2 0 L14IN ABSRB UD MH L36MM 1 2 CIR SXMD2B401

## (undated) DEVICE — Device

## (undated) DEVICE — GLOVE SURG SZ 75 L12IN FNGR THK79MIL GRN LTX FREE

## (undated) DEVICE — ZIMMER® A.T.S. CYCLINDRICAL TOURNIQUET CUFF, SINGLE PORT, SINGLE BLADDER 30 IN. 76 CM)

## (undated) DEVICE — SUTURE VICRYL + SZ 1 L18IN ABSRB VLT L36MM CT-1 1/2 CIR VCP741D

## (undated) DEVICE — CONTAINER,SPECIMEN,OR STERILE,4OZ: Brand: MEDLINE

## (undated) DEVICE — STERILE PATIENT PROTECTIVE PAD FOR IMP® KNEE POSITIONERS & COHESIVE WRAP (10 / CASE): Brand: DE MAYO KNEE POSITIONER®

## (undated) DEVICE — DRESSING FOAM W3XL3IN GENTLE SIL FACE BORD ADH PD SUP ABSRB

## (undated) DEVICE — BANDAGE,ELASTIC,ESMARK,STERILE,4"X9',LF: Brand: MEDLINE

## (undated) DEVICE — HOOD, PEEL-AWAY: Brand: FLYTE

## (undated) DEVICE — TUBING PMP IRRIG GOFLO

## (undated) DEVICE — HOOD: Brand: FLYTE

## (undated) DEVICE — INVIEW CLEAR LEGGINGS: Brand: CONVERTORS

## (undated) DEVICE — 4.5 MM INCISOR PLUS STRAIGHT                                    BLADES, POWER/EP-1, VIOLET, PACKAGED                                    6 PER BOX, STERILE

## (undated) DEVICE — NAVIO FLAT MARKERS: Brand: NAVIO

## (undated) DEVICE — GAUZE,SPONGE,4"X4",8PLY,STRL,LF,10/TRAY: Brand: MEDLINE

## (undated) DEVICE — OPTIFOAM GENTLE SA, POSTOP, 4X12: Brand: MEDLINE

## (undated) DEVICE — NEEDLE HYPO 20GA L1.5IN YEL POLYPR HUB S STL REG BVL STR

## (undated) DEVICE — SUTURE MONOCRYL STRATAFIX SPRL SZ 3-0 L12IN ABSRB UD FS-1 L30X30CM SXMP2B410

## (undated) DEVICE — ADHESIVE SKIN CLOSURE WND 8.661X1.5 IN 22 CM LIQUIBAND SECUR

## (undated) DEVICE — HANDPIECE SET WITH HIGH FLOW TIP AND SUCTION TUBE: Brand: INTERPULSE